# Patient Record
Sex: FEMALE | Race: BLACK OR AFRICAN AMERICAN | NOT HISPANIC OR LATINO | Employment: FULL TIME | URBAN - METROPOLITAN AREA
[De-identification: names, ages, dates, MRNs, and addresses within clinical notes are randomized per-mention and may not be internally consistent; named-entity substitution may affect disease eponyms.]

---

## 2017-01-05 ENCOUNTER — HOSPITAL ENCOUNTER (EMERGENCY)
Facility: HOSPITAL | Age: 22
Discharge: HOME | End: 2017-01-05
Attending: EMERGENCY MEDICINE

## 2017-01-05 VITALS
OXYGEN SATURATION: 100 % | HEART RATE: 85 BPM | BODY MASS INDEX: 28.99 KG/M2 | SYSTOLIC BLOOD PRESSURE: 119 MMHG | RESPIRATION RATE: 16 BRPM | DIASTOLIC BLOOD PRESSURE: 76 MMHG | WEIGHT: 174 LBS | TEMPERATURE: 99 F | HEIGHT: 65 IN

## 2017-01-05 DIAGNOSIS — N39.0 LOWER URINARY TRACT INFECTIOUS DISEASE: Primary | ICD-10-CM

## 2017-01-05 LAB
B-HCG UR QL: NEGATIVE
BILIRUB UR QL STRIP: NEGATIVE
CLARITY UR REFRACT.AUTO: CLEAR
COLOR UR AUTO: ABNORMAL
GLUCOSE UR QL STRIP: NEGATIVE
HGB UR QL STRIP: NEGATIVE
KETONES UR QL STRIP: NEGATIVE
LEUKOCYTE ESTERASE UR QL STRIP: ABNORMAL
MICROSCOPIC COMMENT: ABNORMAL
NITRITE UR QL STRIP: NEGATIVE
PH UR STRIP: 6 [PH] (ref 5–8)
PROT UR QL STRIP: ABNORMAL
SP GR UR STRIP: 1.02 (ref 1–1.03)
URN SPEC COLLECT METH UR: ABNORMAL
UROBILINOGEN UR STRIP-ACNC: 1 EU/DL
WBC #/AREA URNS AUTO: 6 /HPF (ref 0–5)

## 2017-01-05 PROCEDURE — 81025 URINE PREGNANCY TEST: CPT

## 2017-01-05 PROCEDURE — 99284 EMERGENCY DEPT VISIT MOD MDM: CPT

## 2017-01-05 PROCEDURE — 81000 URINALYSIS NONAUTO W/SCOPE: CPT

## 2017-01-05 RX ORDER — ONDANSETRON 4 MG/1
4 TABLET, FILM COATED ORAL EVERY 8 HOURS PRN
Qty: 6 TABLET | Refills: 0 | Status: SHIPPED | OUTPATIENT
Start: 2017-01-05 | End: 2018-02-02

## 2017-01-05 RX ORDER — NITROFURANTOIN 25; 75 MG/1; MG/1
100 CAPSULE ORAL 2 TIMES DAILY
Qty: 14 CAPSULE | Refills: 0 | Status: SHIPPED | OUTPATIENT
Start: 2017-01-05 | End: 2017-01-15

## 2017-01-05 RX ORDER — IBUPROFEN 200 MG
400 TABLET ORAL EVERY 6 HOURS PRN
Qty: 30 TABLET | Refills: 0
Start: 2017-01-05 | End: 2018-02-02

## 2017-01-05 NOTE — ED PROVIDER NOTES
Encounter Date: 1/5/2017       History     Chief Complaint   Patient presents with    Vomiting     for 2 weeks, took pregnancy test at home and was negative     Review of patient's allergies indicates:  No Known Allergies  The history is provided by the patient. No  was used.     planes of nausea vomiting for 2 weeks.  Patient states that has been occurring about 1-2 times a day.  Patient states last menstrual.  Was 2-3 weeks ago.  Patient states he was a light.  Which only lasted about 2-3 days.  Patient denies any fever chills cough runny nose sore throat.  Patient has some mild suprapubic pain.  Denies any urinary frequency or dysuria.  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  History reviewed. No pertinent past surgical history.  History reviewed. No pertinent family history.  Social History   Substance Use Topics    Smoking status: Never Smoker    Smokeless tobacco: None    Alcohol use No     Review of Systems   All other systems reviewed and are negative.      Physical Exam   Initial Vitals   BP Pulse Resp Temp SpO2   01/05/17 1035 01/05/17 1035 01/05/17 1035 01/05/17 1035 01/05/17 1035   119/76 85 16 98.7 °F (37.1 °C) 100 %     Physical Exam    Nursing note and vitals reviewed.  Constitutional: She appears well-developed and well-nourished.   HENT:   Head: Normocephalic and atraumatic.   Right Ear: External ear normal.   Left Ear: External ear normal.   Nose: Nose normal.   Mouth/Throat: Oropharynx is clear and moist.   Eyes: EOM are normal. Pupils are equal, round, and reactive to light.   Neck: Normal range of motion.   Cardiovascular: Normal rate, regular rhythm and normal heart sounds. Exam reveals no gallop and no friction rub.    No murmur heard.  Pulmonary/Chest: Breath sounds normal. No respiratory distress. She has no wheezes. She has no rhonchi. She has no rales.    Abdominal: Soft. She exhibits no distension. There is no tenderness.   Musculoskeletal: Normal range of motion.   Neurological: She is alert and oriented to person, place, and time.   Skin: Skin is warm and dry.   Psychiatric: She has a normal mood and affect.         ED Course   Procedures  Labs Reviewed   PREGNANCY TEST, URINE RAPID   URINALYSIS                               ED Course     Clinical Impression:   The encounter diagnosis was Lower urinary tract infectious disease.          Satya Choi MD  01/05/17 0045

## 2017-01-05 NOTE — DISCHARGE INSTRUCTIONS
Urinary Tract Infections in Women  Urinary tract infections (UTIs) are most often caused by bacteria (germs). These bacteria enter the urinary tract. The bacteria may come from outside the body. Or they may travel from the skin outside the rectum or vagina into the urethra. Female anatomy makes it easier for bacteria from the bowel to enter a womans urinary tract, which is the most common source of UTI. This means women develop UTIs more often than men. Pain in or around the urinary tract is a common UTI symptom. But the only way to know for sure if you have a UTI for the health care provider to test your urine. The two tests that may be done are the urinalysis and urine culture.  Types of UTIs  · Cystitis: A bladder infection (cystitis) is the most common UTI in women. You may have urgent or frequent urination. You may also have pain, burning when you urinate, and bloody urine.  · Urethritis: This is an inflamed urethra, which is the tube that carries urine from the bladder to outside the body. You may have lower stomach or back pain. You may also have urgent or frequent urination.  · Pyelonephritis: This is a kidney infection. If not treated, it can be serious and damage your kidneys. In severe cases, you may be hospitalized. You may have a fever and lower back pain.  Medications to treat a UTI  Most UTIs are treated with antibiotics. These kill the bacteria. The length of time you need to take them depends on the type of infection. It may be as short as 3 days. If you have repeated UTIs, a low-dose antibiotic may be needed for several months. Take antibiotics exactly as directed. Dont stop taking them until all of the medication is gone. If you stop taking the antibiotic too soon, the infection may not go away, and you may develop a resistance to the antibiotic. This can make it much harder to treat.  Lifestyle changes to treat and prevent UTIs  The lifestyle changes below will help get rid of your UTI.  They may also help prevent future UTIs.  · Drink plenty of fluids. This includes water, juice, or other caffeine-free drinks. Fluids help flush bacteria out of your body.  · Empty your bladder. Always empty your bladder when you feel the urge to urinate. And always urinate before going to sleep. Urine that stays in your bladder can lead to infection. Try to urinate before and after sex as well.  · Practice good personal hygiene. Wipe yourself from front to back after using the toilet. This helps keep bacteria from getting into the urethra.  · Use condoms during sex. These help prevent UTIs caused by sexually transmitted bacteria. Also, avoid using spermicides during sex. These can increase the risk of UTIs. Choose other forms of birth control instead. For women who tend to get UTIs after sex, a low-dose of a preventive antibiotic may be used. Be sure to discuss this option with your health care provider.  · Follow up with your health care provider as directed. He or she may test to make sure the infection has cleared. If necessary, additional treatment may be started.  © 7120-6302 [employer]. [address] [place] [address], [place] [address]. All rights reserved. This information is not intended as a substitute for professional medical care. Always follow your healthcare professional's instructions.

## 2017-01-05 NOTE — ED AVS SNAPSHOT
[facility]  [address]  [place] [address]               Ray Knox   2017 11:42 AM   ED    Description:  Female : 1995   Department:  [facility]           Your Care was Coordinated By:     Provider Role From To    Satya Choi MD Attending Provider 17 1105 --      Reason for Visit     Vomiting           Diagnoses this Visit        Comments    Lower urinary tract infectious disease    -  Primary       ED Disposition     ED Disposition Condition Comment    Discharge             To Do List           Follow-up Information     Follow up with Russ Ram MD In 1 week(s).    Specialty:  Family Medicine    Contact information:    [address] [place] [id number] [phone]         These Medications        Disp Refills Start End    nitrofurantoin, macrocrystal-monohydrate, (MACROBID) 100 MG capsule 14 capsule 0 2017 1/15/2017    Take 1 capsule (100 mg total) by mouth 2 (two) times daily. - Oral    ibuprofen (ADVIL,MOTRIN) 200 MG tablet 30 tablet 0 2017     Take 2 tablets (400 mg total) by mouth every 6 (six) hours as needed for Pain. - Oral    ondansetron (ZOFRAN) 4 MG tablet 6 tablet 0 2017     Take 1 tablet (4 mg total) by mouth every 8 (eight) hours as needed. - Oral      [facility]     [facility] Care [facility] -  Assistance  Registered nurses in the [facility] provide clinical advisement, health education, appointment booking, and other advisory services.  Call for this free service at [phone].             Medications           Message regarding Medications     Verify the changes and/or additions to your medication regime listed below are the same as discussed with your clinician today.  If any of these changes or additions are incorrect, please notify your healthcare provider.        START taking these NEW medications        Refills    nitrofurantoin,  "macrocrystal-monohydrate, (MACROBID) 100 MG capsule 0    Sig: Take 1 capsule (100 mg total) by mouth 2 (two) times daily.    Class: Print    Route: Oral    ibuprofen (ADVIL,MOTRIN) 200 MG tablet 0    Sig: Take 2 tablets (400 mg total) by mouth every 6 (six) hours as needed for Pain.    Class: No Print    Route: Oral    ondansetron (ZOFRAN) 4 MG tablet 0    Sig: Take 1 tablet (4 mg total) by mouth every 8 (eight) hours as needed.    Class: Print    Route: Oral           Verify that the below list of medications is an accurate representation of the medications you are currently taking.  If none reported, the list may be blank. If incorrect, please contact your healthcare provider. Carry this list with you in case of emergency.           Current Medications     albuterol 90 mcg/actuation inhaler Inhale 1-2 puffs into the lungs every 4 (four) hours as needed for Wheezing.    hydrocodone-acetaminophen 5-325mg (NORCO) 5-325 mg per tablet Take 1 tablet by mouth every 4 (four) hours as needed for Pain.    ibuprofen (ADVIL,MOTRIN) 200 MG tablet Take 2 tablets (400 mg total) by mouth every 6 (six) hours as needed for Pain.    MEDROXYPROGESTERONE ACETATE (DEPO-PROVERA IM) Inject into the muscle.    nitrofurantoin, macrocrystal-monohydrate, (MACROBID) 100 MG capsule Take 1 capsule (100 mg total) by mouth 2 (two) times daily.    ondansetron (ZOFRAN) 4 MG tablet Take 1 tablet (4 mg total) by mouth every 8 (eight) hours as needed.           Clinical Reference Information           Your Vitals Were     BP Pulse Temp Resp Height Weight    119/76 (BP Location: Right arm, Patient Position: Sitting) 85 98.7 °F (37.1 °C) (Oral) 16 5' 4.5" (1.638 m) 78.9 kg (174 lb)    SpO2 BMI             100% 29.41 kg/m2         Allergies as of 1/5/2017     No Known Allergies      Immunizations Administered on Date of Encounter - 1/5/2017     None      ED Micro, Lab, POCT     Start Ordered       Status Ordering Provider    01/05/17 1048 01/05/17 1047  " UPT (Pregnancy, urine rapid)  STAT      Final result     01/05/17 1048 01/05/17 1047  Urinalysis Clean Catch  STAT      Final result     01/05/17 1047 01/05/17 1047  Urinalysis Microscopic  Once      Final result       ED Imaging Orders     None        Discharge Instructions           Urinary Tract Infections in Women  Urinary tract infections (UTIs) are most often caused by bacteria (germs). These bacteria enter the urinary tract. The bacteria may come from outside the body. Or they may travel from the skin outside the rectum or vagina into the urethra. Female anatomy makes it easier for bacteria from the bowel to enter a womans urinary tract, which is the most common source of UTI. This means women develop UTIs more often than men. Pain in or around the urinary tract is a common UTI symptom. But the only way to know for sure if you have a UTI for the health care provider to test your urine. The two tests that may be done are the urinalysis and urine culture.  Types of UTIs  · Cystitis: A bladder infection (cystitis) is the most common UTI in women. You may have urgent or frequent urination. You may also have pain, burning when you urinate, and bloody urine.  · Urethritis: This is an inflamed urethra, which is the tube that carries urine from the bladder to outside the body. You may have lower stomach or back pain. You may also have urgent or frequent urination.  · Pyelonephritis: This is a kidney infection. If not treated, it can be serious and damage your kidneys. In severe cases, you may be hospitalized. You may have a fever and lower back pain.  Medications to treat a UTI  Most UTIs are treated with antibiotics. These kill the bacteria. The length of time you need to take them depends on the type of infection. It may be as short as 3 days. If you have repeated UTIs, a low-dose antibiotic may be needed for several months. Take antibiotics exactly as directed. Dont stop taking them until all of the medication  is gone. If you stop taking the antibiotic too soon, the infection may not go away, and you may develop a resistance to the antibiotic. This can make it much harder to treat.  Lifestyle changes to treat and prevent UTIs  The lifestyle changes below will help get rid of your UTI. They may also help prevent future UTIs.  · Drink plenty of fluids. This includes water, juice, or other caffeine-free drinks. Fluids help flush bacteria out of your body.  · Empty your bladder. Always empty your bladder when you feel the urge to urinate. And always urinate before going to sleep. Urine that stays in your bladder can lead to infection. Try to urinate before and after sex as well.  · Practice good personal hygiene. Wipe yourself from front to back after using the toilet. This helps keep bacteria from getting into the urethra.  · Use condoms during sex. These help prevent UTIs caused by sexually transmitted bacteria. Also, avoid using spermicides during sex. These can increase the risk of UTIs. Choose other forms of birth control instead. For women who tend to get UTIs after sex, a low-dose of a preventive antibiotic may be used. Be sure to discuss this option with your health care provider.  · Follow up with your health care provider as directed. He or she may test to make sure the infection has cleared. If necessary, additional treatment may be started.  © 0693-7758 [employer]. [address] [place] [address]. All rights reserved. This information is not intended as a substitute for professional medical care. Always follow your healthcare professional's instructions.          [facility] Sign-Up     Activating your [facility] account is as easy as 1-2-3!     1) Visit [email or web address], select Sign Up Now, enter this activation code and your date of birth, then select Next.  [email or web address]  Expires: 2/19/2017 11:53 AM      2) Create a username and password to use when you visit [facility] in the future and  select a security question in case you lose your password and select Next.    3) Enter your e-mail address and click Sign Up!    Additional Information  If you have questions, please e-mail [email or web address] or call [phone] to talk to our [facility] staff. Remember, [facility] is NOT to be used for urgent needs. For medical emergencies, dial 911.          [facility] complies with applicable Federal civil rights laws and does not discriminate on the basis of race, color, national origin, age, disability, or sex.        Language Assistance Services     ATTENTION: Language assistance services are available, free of charge. Please call [phone].      ATENCIÓN: Si habla vance, tiene a jarrell disposición servicios gratuitos de asistencia lingüística. Llame al [phone].     CHÚ Ý: N?u b?n nói Ti?ng Vi?t, có các d?ch v? h? tr? ngôn ng? mi?n phí dành cho b?n. G?i s? [phone].

## 2017-03-03 ENCOUNTER — HOSPITAL ENCOUNTER (EMERGENCY)
Facility: HOSPITAL | Age: 22
Discharge: HOME | End: 2017-03-03
Attending: FAMILY MEDICINE

## 2017-03-03 VITALS
DIASTOLIC BLOOD PRESSURE: 73 MMHG | RESPIRATION RATE: 16 BRPM | SYSTOLIC BLOOD PRESSURE: 117 MMHG | HEIGHT: 66 IN | HEART RATE: 73 BPM | BODY MASS INDEX: 26.36 KG/M2 | OXYGEN SATURATION: 98 % | WEIGHT: 164 LBS | TEMPERATURE: 100 F

## 2017-03-03 DIAGNOSIS — N39.0 URINARY TRACT INFECTION WITHOUT HEMATURIA, SITE UNSPECIFIED: ICD-10-CM

## 2017-03-03 DIAGNOSIS — R10.11 RUQ ABDOMINAL PAIN: Primary | ICD-10-CM

## 2017-03-03 LAB
ALBUMIN SERPL BCP-MCNC: 4.5 G/DL
ALP SERPL-CCNC: 61 IU/L
ALT SERPL W/O P-5'-P-CCNC: 22 IU/L
ANION GAP SERPL CALC-SCNC: 11 MMOL/L
AST SERPL-CCNC: 18 IU/L
B-HCG UR QL: NEGATIVE
BACTERIA #/AREA URNS AUTO: ABNORMAL /HPF
BASOPHILS # BLD AUTO: 0.03 K/UL
BASOPHILS NFR BLD: 0.3 %
BILIRUB SERPL-MCNC: 0.6 MG/DL
BILIRUB UR QL STRIP: NEGATIVE
BUN SERPL-MCNC: 12 MG/DL
CALCIUM SERPL-MCNC: 9.5 MG/DL
CHLORIDE SERPL-SCNC: 106 MMOL/L
CLARITY UR REFRACT.AUTO: CLEAR
CO2 SERPL-SCNC: 26 MMOL/L
COLOR UR AUTO: YELLOW
CREAT SERPL-MCNC: 0.93 MG/DL
DIFFERENTIAL METHOD: ABNORMAL
EOSINOPHIL # BLD AUTO: 0.2 K/UL
EOSINOPHIL NFR BLD: 2.4 %
ERYTHROCYTE [DISTWIDTH] IN BLOOD BY AUTOMATED COUNT: 14 %
EST. GFR  (AFRICAN AMERICAN): >60 ML/MIN/1.73 M^2
EST. GFR  (NON AFRICAN AMERICAN): >60 ML/MIN/1.73 M^2
GLUCOSE SERPL-MCNC: 92 MG/DL
GLUCOSE UR QL STRIP: NEGATIVE
HCT VFR BLD AUTO: 37.4 %
HGB BLD-MCNC: 12 G/DL
HGB UR QL STRIP: ABNORMAL
KETONES UR QL STRIP: NEGATIVE
LEUKOCYTE ESTERASE UR QL STRIP: ABNORMAL
LIPASE SERPL-CCNC: 48 U/L
LYMPHOCYTES # BLD AUTO: 2.5 K/UL
LYMPHOCYTES NFR BLD: 27.6 %
MCH RBC QN AUTO: 26.3 PG
MCHC RBC AUTO-ENTMCNC: 32.1 %
MCV RBC AUTO: 82 FL
MICROSCOPIC COMMENT: ABNORMAL
MONOCYTES # BLD AUTO: 0.5 K/UL
MONOCYTES NFR BLD: 5.5 %
NEUTROPHILS # BLD AUTO: 5.9 K/UL
NEUTROPHILS NFR BLD: 64 %
NITRITE UR QL STRIP: NEGATIVE
PH UR STRIP: 7 [PH] (ref 5–8)
PLATELET # BLD AUTO: 237 K/UL
PMV BLD AUTO: 11 FL
POTASSIUM SERPL-SCNC: 4.2 MMOL/L
PROT SERPL-MCNC: 7.8 G/DL
PROT UR QL STRIP: NEGATIVE
RBC # BLD AUTO: 4.56 M/UL
RBC #/AREA URNS AUTO: 1 /HPF (ref 0–4)
SODIUM SERPL-SCNC: 143 MMOL/L
SP GR UR STRIP: 1.01 (ref 1–1.03)
SQUAMOUS #/AREA URNS AUTO: 3 /HPF
URN SPEC COLLECT METH UR: ABNORMAL
UROBILINOGEN UR STRIP-ACNC: ABNORMAL EU/DL
WBC # BLD AUTO: 9.14 K/UL
WBC #/AREA URNS AUTO: 15 /HPF (ref 0–5)

## 2017-03-03 PROCEDURE — 80053 COMPREHEN METABOLIC PANEL: CPT

## 2017-03-03 PROCEDURE — 81000 URINALYSIS NONAUTO W/SCOPE: CPT

## 2017-03-03 PROCEDURE — 85025 COMPLETE CBC W/AUTO DIFF WBC: CPT

## 2017-03-03 PROCEDURE — 81025 URINE PREGNANCY TEST: CPT

## 2017-03-03 PROCEDURE — 83690 ASSAY OF LIPASE: CPT

## 2017-03-03 PROCEDURE — 99283 EMERGENCY DEPT VISIT LOW MDM: CPT

## 2017-03-03 RX ORDER — KETOROLAC TROMETHAMINE 10 MG/1
10 TABLET, FILM COATED ORAL EVERY 6 HOURS PRN
Qty: 15 TABLET | Refills: 0 | Status: SHIPPED | OUTPATIENT
Start: 2017-03-03 | End: 2017-03-08

## 2017-03-03 RX ORDER — CIPROFLOXACIN 500 MG/1
500 TABLET ORAL 2 TIMES DAILY
Qty: 10 TABLET | Refills: 0 | Status: SHIPPED | OUTPATIENT
Start: 2017-03-03 | End: 2017-03-08

## 2017-03-03 NOTE — ED PROVIDER NOTES
Encounter Date: 3/3/2017       History     Chief Complaint   Patient presents with    Abdominal Pain     Pt has had upper abdominal pain that started 3 days ago. Denies nausea and vomiting and diarrhea.     Review of patient's allergies indicates:  No Known Allergies  HPI  Past Medical History:   Diagnosis Date    Asthma     Kidney cysts     pt born with 1 kidney     History reviewed. No pertinent surgical history.  History reviewed. No pertinent family history.  Social History   Substance Use Topics    Smoking status: Never Smoker    Smokeless tobacco: None    Alcohol use No     Review of Systems    Physical Exam   Initial Vitals   BP Pulse Resp Temp SpO2   03/03/17 1310 03/03/17 1310 03/03/17 1310 03/03/17 1310 03/03/17 1408   115/70 80 16 98.6 °F (37 °C) 98 %     Physical Exam    ED Course   Procedures  Labs Reviewed   URINALYSIS - Abnormal; Notable for the following:        Result Value    Occult Blood UA 2+ (*)     Urobilinogen, UA 4.0-6.0 (*)     Leukocytes, UA 2+ (*)     All other components within normal limits   URINALYSIS MICROSCOPIC - Abnormal; Notable for the following:     WBC, UA 15 (*)     Bacteria, UA Moderate (*)     All other components within normal limits   CBC W/ AUTO DIFFERENTIAL - Abnormal; Notable for the following:     MCH 26.3 (*)     All other components within normal limits   PREGNANCY TEST, URINE RAPID   COMPREHENSIVE METABOLIC PANEL   LIPASE                               ED Course     Clinical Impression:   The primary encounter diagnosis was RUQ abdominal pain. A diagnosis of Urinary tract infection without hematuria, site unspecified was also pertinent to this visit.          CHEVY Rebollar MD  03/03/17 1291

## 2017-03-03 NOTE — ED AVS SNAPSHOT
[facility]  [address] [place] [address]               Ray Knox   3/3/2017  2:05 PM   ED    Description:  Female : 1995   Department:  [facility]           Your Care was Coordinated By:     Provider Role From Mendez Rebollar MD Attending Provider 17 [phone] --      Reason for Visit     Abdominal Pain           Diagnoses this Visit        Comments    RUQ abdominal pain    -  Primary     Urinary tract infection without hematuria, site unspecified           ED Disposition     ED Disposition Condition Comment    Discharge             To Do List           Follow-up Information     Schedule an appointment as soon as possible for a visit with Russ Ram MD.    Specialty:  Family Medicine    Why:  As needed, If symptoms worsen    Contact information:    [address] [place] [address] [phone]         These Medications        Disp Refills Start End    ciprofloxacin HCl (CIPRO) 500 MG tablet 10 tablet 0 3/3/2017 3/8/2017    Take 1 tablet (500 mg total) by mouth 2 (two) times daily. For infection - Oral    ketorolac (TORADOL) 10 mg tablet 15 tablet 0 3/3/2017 3/8/2017    Take 1 tablet (10 mg total) by mouth every 6 (six) hours as needed for Pain. - Oral      [facility]     [facility] On Call Nurse Care Line -  Assistance  Registered nurses in the [facility] provide clinical advisement, health education, appointment booking, and other advisory services.  Call for this free service at [phone].             Medications           Message regarding Medications     Verify the changes and/or additions to your medication regime listed below are the same as discussed with your clinician today.  If any of these changes or additions are incorrect, please notify your healthcare provider.        START taking these NEW medications        Refills    ciprofloxacin HCl (CIPRO) 500 MG tablet 0    Sig: Take 1  "tablet (500 mg total) by mouth 2 (two) times daily. For infection    Class: Print    Route: Oral    ketorolac (TORADOL) 10 mg tablet 0    Sig: Take 1 tablet (10 mg total) by mouth every 6 (six) hours as needed for Pain.    Class: Print    Route: Oral           Verify that the below list of medications is an accurate representation of the medications you are currently taking.  If none reported, the list may be blank. If incorrect, please contact your healthcare provider. Carry this list with you in case of emergency.           Current Medications     albuterol 90 mcg/actuation inhaler Inhale 1-2 puffs into the lungs every 4 (four) hours as needed for Wheezing.    ciprofloxacin HCl (CIPRO) 500 MG tablet Take 1 tablet (500 mg total) by mouth 2 (two) times daily. For infection    hydrocodone-acetaminophen 5-325mg (NORCO) 5-325 mg per tablet Take 1 tablet by mouth every 4 (four) hours as needed for Pain.    ibuprofen (ADVIL,MOTRIN) 200 MG tablet Take 2 tablets (400 mg total) by mouth every 6 (six) hours as needed for Pain.    ketorolac (TORADOL) 10 mg tablet Take 1 tablet (10 mg total) by mouth every 6 (six) hours as needed for Pain.    MEDROXYPROGESTERONE ACETATE (DEPO-PROVERA IM) Inject into the muscle.    ondansetron (ZOFRAN) 4 MG tablet Take 1 tablet (4 mg total) by mouth every 8 (eight) hours as needed.           Clinical Reference Information           Your Vitals Were     BP Pulse Temp Resp Height Weight    117/73 (BP Location: Right arm, Patient Position: Sitting, BP Method: Automatic) 73 99.6 °F (37.6 °C) (Oral) 16 5' 5.5" (1.664 m) 74.4 kg (164 lb)    SpO2 BMI             98% 26.88 kg/m2         Allergies as of 3/3/2017     No Known Allergies      Immunizations Administered on Date of Encounter - 3/3/2017     None      ED Micro, Lab, POCT     Start Ordered       Status Ordering Provider    03/03/17 1526 03/03/17 1525  CBC auto differential  STAT      Final result     03/03/17 1526 03/03/17 1525  Comprehensive " metabolic panel  STAT      Final result     03/03/17 1526 03/03/17 1525  Lipase  STAT      Final result     03/03/17 1314 03/03/17 1313  Urinalysis Clean Catch  STAT      Final result     03/03/17 1314 03/03/17 1313  UPT (Pregnancy, urine rapid)  STAT      Final result     03/03/17 1314 03/03/17 1314  Urinalysis Microscopic  Once      Final result       ED Imaging Orders     None      Discharge References/Attachments     URINARY TRACT INFECTIONS IN WOMEN (ENGLISH)    ABDOMINAL PAIN, UNKNOWN CAUSE, (FEMALE) (ENGLISH)      [facility] Sign-Up     Activating your [facility] account is as easy as 1-2-3!     1) Visit [email or web address], select Sign Up Now, enter this activation code and your date of birth, then select Next.  [email or web address]  Expires: 4/17/2017  4:10 PM      2) Create a username and password to use when you visit [facility] in the future and select a security question in case you lose your password and select Next.    3) Enter your e-mail address and click Sign Up!    Additional Information  If you have questions, please e-mail [email or web address] or call [phone] to talk to our [facility] staff. Remember, [facility] is NOT to be used for urgent needs. For medical emergencies, dial 911.          [facility] complies with applicable Federal civil rights laws and does not discriminate on the basis of race, color, national origin, age, disability, or sex.        Language Assistance Services     ATTENTION: Language assistance services are available, free of charge. Please call [phone].      ATENCIÓN: Si habla español, tiene a jarrell disposición servicios gratuitos de asistencia lingüística. Llame al [phone].     CHÚ Ý: N?u b?n nói Ti?ng Vi?t, có các d?ch v? h? tr? ngôn ng? mi?n phí dành cho b?n. G?i s? [phone].

## 2017-05-19 ENCOUNTER — HOSPITAL ENCOUNTER (EMERGENCY)
Facility: HOSPITAL | Age: 22
Discharge: HOME | End: 2017-05-19
Attending: FAMILY MEDICINE

## 2017-05-19 VITALS
DIASTOLIC BLOOD PRESSURE: 48 MMHG | RESPIRATION RATE: 18 BRPM | BODY MASS INDEX: 29.71 KG/M2 | HEIGHT: 64 IN | OXYGEN SATURATION: 99 % | HEART RATE: 91 BPM | SYSTOLIC BLOOD PRESSURE: 95 MMHG | TEMPERATURE: 99 F | WEIGHT: 174 LBS

## 2017-05-19 DIAGNOSIS — N39.0 LOWER URINARY TRACT INFECTIOUS DISEASE: Primary | ICD-10-CM

## 2017-05-19 LAB
B-HCG UR QL: NEGATIVE
BACTERIA #/AREA URNS AUTO: ABNORMAL /HPF
BILIRUB UR QL STRIP: NEGATIVE
CLARITY UR REFRACT.AUTO: ABNORMAL
COLOR UR AUTO: YELLOW
GLUCOSE UR QL STRIP: NEGATIVE
HGB UR QL STRIP: NEGATIVE
KETONES UR QL STRIP: NEGATIVE
LEUKOCYTE ESTERASE UR QL STRIP: ABNORMAL
MICROSCOPIC COMMENT: ABNORMAL
NITRITE UR QL STRIP: POSITIVE
PH UR STRIP: 7 [PH] (ref 5–8)
PROT UR QL STRIP: NEGATIVE
SP GR UR STRIP: 1.01 (ref 1–1.03)
URN SPEC COLLECT METH UR: ABNORMAL
UROBILINOGEN UR STRIP-ACNC: NEGATIVE EU/DL
WBC #/AREA URNS AUTO: 24 /HPF (ref 0–5)

## 2017-05-19 PROCEDURE — 96372 THER/PROPH/DIAG INJ SC/IM: CPT

## 2017-05-19 PROCEDURE — 99284 EMERGENCY DEPT VISIT MOD MDM: CPT | Mod: 25

## 2017-05-19 PROCEDURE — 81000 URINALYSIS NONAUTO W/SCOPE: CPT

## 2017-05-19 PROCEDURE — 81025 URINE PREGNANCY TEST: CPT

## 2017-05-19 PROCEDURE — 63600175 PHARM REV CODE 636 W HCPCS: Performed by: FAMILY MEDICINE

## 2017-05-19 RX ORDER — NITROFURANTOIN 25; 75 MG/1; MG/1
100 CAPSULE ORAL 2 TIMES DAILY
Qty: 14 CAPSULE | Refills: 0 | Status: SHIPPED | OUTPATIENT
Start: 2017-05-19 | End: 2018-01-22

## 2017-05-19 RX ORDER — PROMETHAZINE HYDROCHLORIDE 25 MG/ML
25 INJECTION, SOLUTION INTRAMUSCULAR; INTRAVENOUS
Status: COMPLETED | OUTPATIENT
Start: 2017-05-19 | End: 2017-05-19

## 2017-05-19 RX ORDER — ACETAMINOPHEN 500 MG
1000 TABLET ORAL 3 TIMES DAILY PRN
Qty: 30 TABLET | Refills: 0
Start: 2017-05-19 | End: 2019-03-19

## 2017-05-19 RX ORDER — ONDANSETRON 8 MG/1
8 TABLET, ORALLY DISINTEGRATING ORAL 3 TIMES DAILY PRN
Qty: 12 TABLET | Refills: 1 | Status: SHIPPED | OUTPATIENT
Start: 2017-05-19 | End: 2018-02-02

## 2017-05-19 RX ORDER — IBUPROFEN 200 MG
400 TABLET ORAL EVERY 6 HOURS PRN
Qty: 30 TABLET | Refills: 0
Start: 2017-05-19 | End: 2018-02-02

## 2017-05-19 RX ADMIN — PROMETHAZINE HYDROCHLORIDE 25 MG: 25 INJECTION INTRAMUSCULAR; INTRAVENOUS at 02:05

## 2017-05-19 NOTE — ED AVS SNAPSHOT
[facility]  [address]  [place] [address]               Ray Knox   2017  1:01 PM   ED    Description:  Female : 1995   Department:  [facility]           Your Care was Coordinated By:     Provider Role From To    Edison Flower MD Attending Provider 17 1310 --      Reason for Visit     Emesis           Diagnoses this Visit        Comments    Lower urinary tract infectious disease    -  Primary       ED Disposition     ED Disposition Condition Comment    Discharge             To Do List           Follow-up Information     Follow up with Russ Ram MD In 1 week(s).    Specialty:  Family Medicine    Contact information:    [address] [place] [id number] [phone]         These Medications        Disp Refills Start End    nitrofurantoin, macrocrystal-monohydrate, (MACROBID) 100 MG capsule 14 capsule 0 2017     Take 1 capsule (100 mg total) by mouth 2 (two) times daily. - Oral    ibuprofen (ADVIL,MOTRIN) 200 MG tablet 30 tablet 0 2017     Take 2 tablets (400 mg total) by mouth every 6 (six) hours as needed for Pain. - Oral    acetaminophen (TYLENOL) 500 MG tablet 30 tablet 0 2017     Take 2 tablets (1,000 mg total) by mouth 3 (three) times daily as needed for Pain. - Oral    ondansetron (ZOFRAN ODT) 8 MG TbDL 12 tablet 1 2017     Take 1 tablet (8 mg total) by mouth 3 (three) times daily as needed. - Oral      [facility]     [facility] -  Assistance  Unless otherwise directed by your provider, please contact [facility], our nurse care line that is available for  assistance.     Registered nurses in the [facility] provide: appointment scheduling, clinical advisement, health education, and other advisory services.  Call: [phone] (toll free)               Medications           Message regarding Medications     Verify the changes and/or  additions to your medication regime listed below are the same as discussed with your clinician today.  If any of these changes or additions are incorrect, please notify your healthcare provider.        START taking these NEW medications        Refills    nitrofurantoin, macrocrystal-monohydrate, (MACROBID) 100 MG capsule 0    Sig: Take 1 capsule (100 mg total) by mouth 2 (two) times daily.    Class: Print    Route: Oral    ibuprofen (ADVIL,MOTRIN) 200 MG tablet 0    Sig: Take 2 tablets (400 mg total) by mouth every 6 (six) hours as needed for Pain.    Class: No Print    Route: Oral    acetaminophen (TYLENOL) 500 MG tablet 0    Sig: Take 2 tablets (1,000 mg total) by mouth 3 (three) times daily as needed for Pain.    Class: No Print    Route: Oral    ondansetron (ZOFRAN ODT) 8 MG TbDL 1    Sig: Take 1 tablet (8 mg total) by mouth 3 (three) times daily as needed.    Class: Print    Route: Oral      These medications were administered today        Dose Freq    promethazine injection 25 mg 25 mg ED 1 Time    Sig: Inject 1 mL (25 mg total) into the muscle ED 1 Time.    Class: Normal    Route: Intramuscular           Verify that the below list of medications is an accurate representation of the medications you are currently taking.  If none reported, the list may be blank. If incorrect, please contact your healthcare provider. Carry this list with you in case of emergency.           Current Medications     albuterol 90 mcg/actuation inhaler Inhale 1-2 puffs into the lungs every 4 (four) hours as needed for Wheezing.    MEDROXYPROGESTERONE ACETATE (DEPO-PROVERA IM) Inject into the muscle.    acetaminophen (TYLENOL) 500 MG tablet Take 2 tablets (1,000 mg total) by mouth 3 (three) times daily as needed for Pain.    hydrocodone-acetaminophen 5-325mg (NORCO) 5-325 mg per tablet Take 1 tablet by mouth every 4 (four) hours as needed for Pain.    ibuprofen (ADVIL,MOTRIN) 200 MG tablet Take 2 tablets (400 mg total) by mouth every  "6 (six) hours as needed for Pain.    ibuprofen (ADVIL,MOTRIN) 200 MG tablet Take 2 tablets (400 mg total) by mouth every 6 (six) hours as needed for Pain.    nitrofurantoin, macrocrystal-monohydrate, (MACROBID) 100 MG capsule Take 1 capsule (100 mg total) by mouth 2 (two) times daily.    ondansetron (ZOFRAN ODT) 8 MG TbDL Take 1 tablet (8 mg total) by mouth 3 (three) times daily as needed.    ondansetron (ZOFRAN) 4 MG tablet Take 1 tablet (4 mg total) by mouth every 8 (eight) hours as needed.           Clinical Reference Information           Your Vitals Were     BP Pulse Temp Resp Height Weight    113/71 (BP Location: Right arm, Patient Position: Sitting) 98 99.8 °F (37.7 °C) (Oral) 16 5' 4" (1.626 m) 78.9 kg (174 lb)    Last Period SpO2 BMI          (LMP Unknown) 98% 29.87 kg/m2        Allergies as of 5/19/2017     No Known Allergies      Immunizations Administered on Date of Encounter - 5/19/2017     None      ED Micro, Lab, POCT     Start Ordered       Status Ordering Provider    05/19/17 1318 05/19/17 1317  Urinalysis  STAT      Final result     05/19/17 1318 05/19/17 1317  UPT (Pregnancy, urine rapid)  STAT      Final result     05/19/17 1317 05/19/17 1317  Urinalysis Microscopic  Once      Final result       ED Imaging Orders     None        Discharge Instructions           Urinary Tract Infections in Women    Urinary tract infections (UTIs) are most often caused by bacteria (germs). These bacteria enter the urinary tract. The bacteria may come from outside the body. Or they may travel from the skin outside the rectum or vagina into the urethra. Female anatomy makes it easier for bacteria from the bowel to enter a womans urinary tract, which is the most common source of UTI. This means women develop UTIs more often than men. Pain in or around the urinary tract is a common UTI symptom. But the only way to know for sure if you have a UTI for the health care provider to test your urine. The two tests that may " be done are the urinalysis and urine culture.  Types of UTIs  · Cystitis: A bladder infection (cystitis) is the most common UTI in women. You may have urgent or frequent urination. You may also have pain, burning when you urinate, and bloody urine.  · Urethritis: This is an inflamed urethra, which is the tube that carries urine from the bladder to outside the body. You may have lower stomach or back pain. You may also have urgent or frequent urination.  · Pyelonephritis: This is a kidney infection. If not treated, it can be serious and damage your kidneys. In severe cases, you may be hospitalized. You may have a fever and lower back pain.  Medications to treat a UTI  Most UTIs are treated with antibiotics. These kill the bacteria. The length of time you need to take them depends on the type of infection. It may be as short as 3 days. If you have repeated UTIs, a low-dose antibiotic may be needed for several months. Take antibiotics exactly as directed. Dont stop taking them until all of the medication is gone. If you stop taking the antibiotic too soon, the infection may not go away, and you may develop a resistance to the antibiotic. This can make it much harder to treat.  Lifestyle changes to treat and prevent UTIs  The lifestyle changes below will help get rid of your UTI. They may also help prevent future UTIs.  · Drink plenty of fluids. This includes water, juice, or other caffeine-free drinks. Fluids help flush bacteria out of your body.  · Empty your bladder. Always empty your bladder when you feel the urge to urinate. And always urinate before going to sleep. Urine that stays in your bladder can lead to infection. Try to urinate before and after sex as well.  · Practice good personal hygiene. Wipe yourself from front to back after using the toilet. This helps keep bacteria from getting into the urethra.  · Use condoms during sex. These help prevent UTIs caused by sexually transmitted bacteria. Also, avoid  using spermicides during sex. These can increase the risk of UTIs. Choose other forms of birth control instead. For women who tend to get UTIs after sex, a low-dose of a preventive antibiotic may be used. Be sure to discuss this option with your health care provider.  · Follow up with your health care provider as directed. He or she may test to make sure the infection has cleared. If necessary, additional treatment may be started.  Date Last Reviewed: 9/8/2014  © 8965-4540 [employer]. [address] [place] [address]. All rights reserved. This information is not intended as a substitute for professional medical care. Always follow your healthcare professional's instructions.          [facility] Sign-Up     Activating your [facility] account is as easy as 1-2-3!     1) Visit [email or web address], select Sign Up Now, enter this activation code and your date of birth, then select Next.  [email or web address]  Expires: 7/3/2017  3:18 PM      2) Create a username and password to use when you visit [facility] in the future and select a security question in case you lose your password and select Next.    3) Enter your e-mail address and click Sign Up!    Additional Information  If you have questions, please e-mail [email or web address] or call [phone] to talk to our [facility] staff. Remember, [facility] is NOT to be used for urgent needs. For medical emergencies, dial 911.          [facility] complies with applicable Federal civil rights laws and does not discriminate on the basis of race, color, national origin, age, disability, or sex.        Language Assistance Services     ATTENTION: Language assistance services are available, free of charge. Please call [phone].      ATENCIÓN: Si habla vance, tiene a jarrell disposición servicios gratuitos de asistencia lingüística. Llame al [phone].     CHÚ Ý: N?u b?n nói Ti?ng Vi?t, có các d?ch v? h? tr? ngôn ng? mi?n phí dành cho b?n.  G?i s? [phone].

## 2017-05-19 NOTE — DISCHARGE INSTRUCTIONS
Urinary Tract Infections in Women    Urinary tract infections (UTIs) are most often caused by bacteria (germs). These bacteria enter the urinary tract. The bacteria may come from outside the body. Or they may travel from the skin outside the rectum or vagina into the urethra. Female anatomy makes it easier for bacteria from the bowel to enter a womans urinary tract, which is the most common source of UTI. This means women develop UTIs more often than men. Pain in or around the urinary tract is a common UTI symptom. But the only way to know for sure if you have a UTI for the health care provider to test your urine. The two tests that may be done are the urinalysis and urine culture.  Types of UTIs  · Cystitis: A bladder infection (cystitis) is the most common UTI in women. You may have urgent or frequent urination. You may also have pain, burning when you urinate, and bloody urine.  · Urethritis: This is an inflamed urethra, which is the tube that carries urine from the bladder to outside the body. You may have lower stomach or back pain. You may also have urgent or frequent urination.  · Pyelonephritis: This is a kidney infection. If not treated, it can be serious and damage your kidneys. In severe cases, you may be hospitalized. You may have a fever and lower back pain.  Medications to treat a UTI  Most UTIs are treated with antibiotics. These kill the bacteria. The length of time you need to take them depends on the type of infection. It may be as short as 3 days. If you have repeated UTIs, a low-dose antibiotic may be needed for several months. Take antibiotics exactly as directed. Dont stop taking them until all of the medication is gone. If you stop taking the antibiotic too soon, the infection may not go away, and you may develop a resistance to the antibiotic. This can make it much harder to treat.  Lifestyle changes to treat and prevent UTIs  The lifestyle changes below will help get rid of your UTI.  They may also help prevent future UTIs.  · Drink plenty of fluids. This includes water, juice, or other caffeine-free drinks. Fluids help flush bacteria out of your body.  · Empty your bladder. Always empty your bladder when you feel the urge to urinate. And always urinate before going to sleep. Urine that stays in your bladder can lead to infection. Try to urinate before and after sex as well.  · Practice good personal hygiene. Wipe yourself from front to back after using the toilet. This helps keep bacteria from getting into the urethra.  · Use condoms during sex. These help prevent UTIs caused by sexually transmitted bacteria. Also, avoid using spermicides during sex. These can increase the risk of UTIs. Choose other forms of birth control instead. For women who tend to get UTIs after sex, a low-dose of a preventive antibiotic may be used. Be sure to discuss this option with your health care provider.  · Follow up with your health care provider as directed. He or she may test to make sure the infection has cleared. If necessary, additional treatment may be started.  Date Last Reviewed: 9/8/2014  © 2586-5015 [employer]. [address] [place] [address], [place] [address]. All rights reserved. This information is not intended as a substitute for professional medical care. Always follow your healthcare professional's instructions.

## 2017-06-03 NOTE — ED PROVIDER NOTES
Encounter Date: 5/19/2017       History     Chief Complaint   Patient presents with    Emesis     Pt states has had nausea and vomiting x 4 days and started with diarrhea yesterday.  States last vomited this am approx 1000 and last watery stool PTA.      Review of patient's allergies indicates:  No Known Allergies  4-year-old female presents with a chief complaint of nausea and vomiting for the past 4 days.  Denies any blood in the emesis.  States at one episode of nonbloody and nonbilious diarrhea was started on yesterday at approximately 10 AM.  Patient does not believe is pregnant.  Patient denies any fever or chills.  Has noted increased urinary frequency.      The history is provided by the patient.     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 No pertinent surgical history.  Family History   Problem Relation Age of Onset    Diabetes Paternal Aunt     Hypertension Maternal Grandfather     Cancer Paternal Grandmother      Social History   Substance Use Topics    Smoking status: Never Smoker    Smokeless tobacco: Not on file    Alcohol use No     Review of Systems   Constitutional: Negative for chills and fever.   Gastrointestinal: Positive for diarrhea, nausea and vomiting. Negative for abdominal pain.   All other systems reviewed and are negative.      Physical Exam     Initial Vitals [05/19/17 1256]   BP Pulse Resp Temp SpO2   113/71 98 16 99.8 °F (37.7 °C) 98 %     Physical Exam    Nursing note and vitals reviewed.  Constitutional: She appears well-developed and well-nourished.   HENT:   Head: Normocephalic and atraumatic.   Eyes: EOM are normal. Pupils are equal, round, and reactive to light.   Cardiovascular: Normal rate, regular rhythm, normal heart sounds and intact distal pulses.   Pulmonary/Chest: Breath sounds normal.   Abdominal: Soft. Bowel sounds are normal.   Neurological: She is alert and oriented to person, place,  and time.   Skin: Skin is warm. Capillary refill takes less than 2 seconds.   Psychiatric: She has a normal mood and affect. Her behavior is normal.         ED Course   Procedures  Labs Reviewed   URINALYSIS - Abnormal; Notable for the following:        Result Value    Appearance, UA Cloudy (*)     Nitrite, UA Positive (*)     Leukocytes, UA 2+ (*)     All other components within normal limits   URINALYSIS MICROSCOPIC - Abnormal; Notable for the following:     WBC, UA 24 (*)     Bacteria, UA Many (*)     All other components within normal limits   PREGNANCY TEST, URINE RAPID                               ED Course     Clinical Impression:   The encounter diagnosis was Lower urinary tract infectious disease.          Edison Flower MD  06/02/17 1931

## 2017-07-31 ENCOUNTER — HOSPITAL ENCOUNTER (EMERGENCY)
Facility: HOSPITAL | Age: 22
Discharge: HOME | End: 2017-07-31
Attending: FAMILY MEDICINE

## 2017-07-31 VITALS
BODY MASS INDEX: 29.66 KG/M2 | HEIGHT: 65 IN | OXYGEN SATURATION: 98 % | DIASTOLIC BLOOD PRESSURE: 73 MMHG | HEART RATE: 97 BPM | RESPIRATION RATE: 20 BRPM | WEIGHT: 178 LBS | SYSTOLIC BLOOD PRESSURE: 118 MMHG | TEMPERATURE: 99 F

## 2017-07-31 DIAGNOSIS — Z00.00 NORMAL PHYSICAL EXAM: Primary | ICD-10-CM

## 2017-07-31 PROCEDURE — 99282 EMERGENCY DEPT VISIT SF MDM: CPT

## 2017-07-31 NOTE — ED TRIAGE NOTES
Pt reprots 2 weeks ago she tested positive for the flu. Pt reports her job wants her retested for the flu today before she can return to work.

## 2017-07-31 NOTE — ED PROVIDER NOTES
Encounter Date: 7/31/2017       History     Chief Complaint   Patient presents with    Influenza     Pt reprots 2 weeks ago she tested positive for the flu. Pt reports her job wants her retested for the flu today before she can return to work.      22-year-old female presents with chief complaint of insulin-dependent.  Patient states 2 weeks ago was diagnosed with influenza and left yet via a nasopharyngeal swab.  States last had symptoms last Monday and since then has been afebrile without complaint.  States needs a return to work excuse.          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 No pertinent surgical history.  Family History   Problem Relation Age of Onset    Diabetes Paternal Aunt     Hypertension Maternal Grandfather     Cancer Paternal Grandmother      Social History   Substance Use Topics    Smoking status: Never Smoker    Smokeless tobacco: Never Used    Alcohol use Yes      Comment: occ     Review of Systems   Constitutional: Negative for chills and fever.   Gastrointestinal: Negative for abdominal pain.   All other systems reviewed and are negative.      Physical Exam     Initial Vitals [07/31/17 1045]   BP Pulse Resp Temp SpO2   118/73 97 20 98.8 °F (37.1 °C) 98 %      MAP       88         Physical Exam    Nursing note and vitals reviewed.  Constitutional: She appears well-developed and well-nourished.   HENT:   Head: Normocephalic and atraumatic.   Eyes: Conjunctivae and EOM are normal. Pupils are equal, round, and reactive to light.   Neck: Normal range of motion. Neck supple.   Cardiovascular: Normal rate, regular rhythm and normal heart sounds.   Pulmonary/Chest: Breath sounds normal.   Abdominal: Soft.   Musculoskeletal: Normal range of motion.   Neurological: She is alert and oriented to person, place, and time. She has normal reflexes.   Skin: Skin  is warm. Capillary refill takes less than 2 seconds.   Psychiatric: She has a normal mood and affect. Her behavior is normal.         ED Course   Procedures  Labs Reviewed - No data to display                            ED Course     Clinical Impression:   The encounter diagnosis was Normal physical exam.                           Edison Flower MD  07/31/17 1111

## 2018-01-22 ENCOUNTER — HOSPITAL ENCOUNTER (EMERGENCY)
Facility: HOSPITAL | Age: 23
Discharge: HOME | End: 2018-01-22
Payer: MEDICAID

## 2018-01-22 VITALS
RESPIRATION RATE: 18 BRPM | HEIGHT: 65 IN | OXYGEN SATURATION: 99 % | SYSTOLIC BLOOD PRESSURE: 114 MMHG | BODY MASS INDEX: 27.32 KG/M2 | DIASTOLIC BLOOD PRESSURE: 74 MMHG | WEIGHT: 164 LBS | HEART RATE: 92 BPM | TEMPERATURE: 98 F

## 2018-01-22 DIAGNOSIS — R31.9 URINARY TRACT INFECTION WITH HEMATURIA, SITE UNSPECIFIED: Primary | ICD-10-CM

## 2018-01-22 DIAGNOSIS — R51.9 ACUTE NONINTRACTABLE HEADACHE, UNSPECIFIED HEADACHE TYPE: ICD-10-CM

## 2018-01-22 DIAGNOSIS — R42 DIZZINESS: ICD-10-CM

## 2018-01-22 DIAGNOSIS — R82.71 BACTERIA IN URINE: ICD-10-CM

## 2018-01-22 DIAGNOSIS — N39.0 URINARY TRACT INFECTION WITH HEMATURIA, SITE UNSPECIFIED: Primary | ICD-10-CM

## 2018-01-22 LAB
AMORPH CRY UR QL COMP ASSIST: ABNORMAL
B-HCG UR QL: NEGATIVE
BACTERIA #/AREA URNS AUTO: ABNORMAL /HPF
BILIRUB UR QL STRIP: NEGATIVE
CLARITY UR REFRACT.AUTO: CLEAR
COLOR UR AUTO: YELLOW
GLUCOSE UR QL STRIP: NEGATIVE
HGB UR QL STRIP: ABNORMAL
HYALINE CASTS UR QL AUTO: 0 /LPF
KETONES UR QL STRIP: NEGATIVE
LEUKOCYTE ESTERASE UR QL STRIP: ABNORMAL
MICROSCOPIC COMMENT: ABNORMAL
NITRITE UR QL STRIP: NEGATIVE
PH UR STRIP: 6 [PH] (ref 5–8)
PROT UR QL STRIP: ABNORMAL
RBC #/AREA URNS AUTO: 15 /HPF (ref 0–4)
SP GR UR STRIP: 1.01 (ref 1–1.03)
SQUAMOUS #/AREA URNS AUTO: ABNORMAL /HPF
URN SPEC COLLECT METH UR: ABNORMAL
UROBILINOGEN UR STRIP-ACNC: NEGATIVE EU/DL
WBC #/AREA URNS AUTO: >100 /HPF (ref 0–5)

## 2018-01-22 PROCEDURE — 81025 URINE PREGNANCY TEST: CPT

## 2018-01-22 PROCEDURE — 99283 EMERGENCY DEPT VISIT LOW MDM: CPT

## 2018-01-22 PROCEDURE — 81000 URINALYSIS NONAUTO W/SCOPE: CPT

## 2018-01-22 PROCEDURE — 25000003 PHARM REV CODE 250: Performed by: NURSE PRACTITIONER

## 2018-01-22 RX ORDER — KETOROLAC TROMETHAMINE 10 MG/1
10 TABLET, FILM COATED ORAL
Status: COMPLETED | OUTPATIENT
Start: 2018-01-22 | End: 2018-01-22

## 2018-01-22 RX ORDER — CIPROFLOXACIN 500 MG/1
500 TABLET ORAL 2 TIMES DAILY
Qty: 20 TABLET | Refills: 0 | Status: SHIPPED | OUTPATIENT
Start: 2018-01-22 | End: 2018-02-01

## 2018-01-22 RX ORDER — MECLIZINE HYDROCHLORIDE 25 MG/1
25 TABLET ORAL 3 TIMES DAILY PRN
Qty: 20 TABLET | Refills: 0 | Status: SHIPPED | OUTPATIENT
Start: 2018-01-22 | End: 2018-07-02

## 2018-01-22 RX ORDER — MECLIZINE HCL 12.5 MG 12.5 MG/1
25 TABLET ORAL
Status: COMPLETED | OUTPATIENT
Start: 2018-01-22 | End: 2018-01-22

## 2018-01-22 RX ADMIN — KETOROLAC TROMETHAMINE 10 MG: 10 TABLET, FILM COATED ORAL at 08:01

## 2018-01-22 RX ADMIN — MECLIZINE 25 MG: 12.5 TABLET ORAL at 08:01

## 2018-01-23 NOTE — ED PROVIDER NOTES
Encounter Date: 1/22/2018       History     Chief Complaint   Patient presents with    Headache     also reports one episode of vomiting today    Dizziness     22-year-old female presents to emergency room with headache and dizziness since 1 episode of vomiting that started today.  Patient states she took Tylenol and went to sleep but woke up with a headache was still present.  Denies any nausea.  Unknown last menstrual period.  Denies any blurry.          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  Family History   Problem Relation Age of Onset    Diabetes Paternal Aunt     Hypertension Maternal Grandfather     Cancer Paternal Grandmother      Social History   Substance Use Topics    Smoking status: Never Smoker    Smokeless tobacco: Never Used    Alcohol use Yes      Comment: occ     Review of Systems   Constitutional: Negative for fever.   HENT: Negative for sore throat.    Respiratory: Negative for shortness of breath.    Cardiovascular: Negative for chest pain.   Gastrointestinal: Negative for nausea.   Genitourinary: Negative for dysuria.   Musculoskeletal: Negative for back pain.   Skin: Negative for rash.   Neurological: Positive for dizziness and headaches. Negative for weakness.   Hematological: Does not bruise/bleed easily.   All other systems reviewed and are negative.      Physical Exam     Initial Vitals [01/22/18 1918]   BP Pulse Resp Temp SpO2   124/75 95 18 98 °F (36.7 °C) 100 %      MAP       91.33         Physical Exam    Nursing note and vitals reviewed.  Constitutional: She appears well-developed and well-nourished. No distress.   HENT:   Head: Normocephalic.   Eyes: Conjunctivae are normal.   Neck: Normal range of motion. Neck supple.   Cardiovascular: Normal rate.   Pulmonary/Chest: Breath sounds normal. No respiratory distress.    Abdominal: Soft. Bowel sounds are normal. She exhibits no distension and no abdominal bruit. There is no tenderness. There is no rebound and no guarding. No hernia.   Neurological: She is alert and oriented to person, place, and time. She has normal strength. No cranial nerve deficit or sensory deficit. GCS eye subscore is 4. GCS verbal subscore is 5. GCS motor subscore is 6.   Skin: Skin is warm and dry. Capillary refill takes less than 2 seconds.   Psychiatric: She has a normal mood and affect. Her behavior is normal. Judgment and thought content normal.         ED Course   Procedures  Labs Reviewed   URINALYSIS - Abnormal; Notable for the following:        Result Value    Protein, UA 1+ (*)     Occult Blood UA Trace (*)     Leukocytes, UA 3+ (*)     All other components within normal limits   URINALYSIS MICROSCOPIC - Abnormal; Notable for the following:     RBC, UA 15 (*)     WBC, UA >100 (*)     Bacteria, UA Many (*)     All other components within normal limits   CULTURE, URINE   PREGNANCY TEST, URINE RAPID             Medical Decision Making:   Initial Assessment:   22-year-old female presents to emergency room with headache and dizziness since 1 episode of vomiting that started today.  Patient states she took Tylenol and went to sleep but woke up with a headache was still present.  Denies any nausea.  Unknown last menstrual period.  Denies any blurry.  Patient is exam is unremarkable.  She is actively taxing on her cell phone during exam and laughing with friends.  Does not appear to be in any distress.  Awake alert oriented ×3.  PERRLA.    Differential Diagnosis:   Migraine, tension headache, cluster headache, brain tumor, CVA, vertigo, sinusitis, head trauma  Clinical Tests:   Lab Tests: Ordered and Reviewed  ED Management:  Physical exam is unremarkable.  Orthostatics unremarkable.  Patient given medications for symptoms.  Urine reveals a urinary tract infection.  I will discharge patient with Cipro and  meclizine.  Instructed to continue to take Tylenol for headache.  Rest and hydrate well.  Patient verbalizes understanding.  Instructed to follow primary care doctor in 2-3 days.  Urine sent for culture.                   ED Course      Clinical Impression:   The primary encounter diagnosis was Urinary tract infection with hematuria, site unspecified. Diagnoses of Bacteria in urine, Acute nonintractable headache, unspecified headache type, and Dizziness were also pertinent to this visit.                           Danii Knox NP  01/22/18 5104

## 2018-01-23 NOTE — ED NOTES
Patient ambulatory with steady stable gait for visual acuity test. Denies dizziness or nausea. Patient AAOX3. GCS- 15. PERRL. No evidence of distress.

## 2018-02-02 ENCOUNTER — OFFICE VISIT (OUTPATIENT)
Dept: OBSTETRICS AND GYNECOLOGY | Facility: CLINIC | Age: 23
End: 2018-02-02
Payer: MEDICAID

## 2018-02-02 ENCOUNTER — LAB VISIT (OUTPATIENT)
Dept: LAB | Facility: HOSPITAL | Age: 23
End: 2018-02-02
Attending: OBSTETRICS & GYNECOLOGY
Payer: MEDICAID

## 2018-02-02 VITALS
HEIGHT: 65 IN | BODY MASS INDEX: 27.82 KG/M2 | SYSTOLIC BLOOD PRESSURE: 120 MMHG | WEIGHT: 167 LBS | DIASTOLIC BLOOD PRESSURE: 62 MMHG

## 2018-02-02 DIAGNOSIS — N92.6 MENSES, IRREGULAR: ICD-10-CM

## 2018-02-02 DIAGNOSIS — Z01.419 WELL WOMAN EXAM WITH ROUTINE GYNECOLOGICAL EXAM: Primary | ICD-10-CM

## 2018-02-02 DIAGNOSIS — Z30.016 ENCOUNTER FOR INITIAL PRESCRIPTION OF TRANSDERMAL PATCH HORMONAL CONTRACEPTIVE DEVICE: ICD-10-CM

## 2018-02-02 LAB
ALBUMIN SERPL BCP-MCNC: 4.6 G/DL
ALP SERPL-CCNC: 50 U/L
ALT SERPL W/O P-5'-P-CCNC: 23 U/L
ANION GAP SERPL CALC-SCNC: 12 MMOL/L
AST SERPL-CCNC: 17 U/L
BASOPHILS # BLD AUTO: 0.04 K/UL
BASOPHILS NFR BLD: 0.4 %
BILIRUB SERPL-MCNC: 0.4 MG/DL
BUN SERPL-MCNC: 14 MG/DL
CALCIUM SERPL-MCNC: 9.3 MG/DL
CHLORIDE SERPL-SCNC: 108 MMOL/L
CO2 SERPL-SCNC: 24 MMOL/L
CREAT SERPL-MCNC: 0.9 MG/DL
DIFFERENTIAL METHOD: ABNORMAL
EOSINOPHIL # BLD AUTO: 0.2 K/UL
EOSINOPHIL NFR BLD: 2.2 %
ERYTHROCYTE [DISTWIDTH] IN BLOOD BY AUTOMATED COUNT: 15 %
EST. GFR  (AFRICAN AMERICAN): >60 ML/MIN/1.73 M^2
EST. GFR  (NON AFRICAN AMERICAN): >60 ML/MIN/1.73 M^2
GLUCOSE SERPL-MCNC: 84 MG/DL
HCT VFR BLD AUTO: 39.3 %
HGB BLD-MCNC: 12.1 G/DL
LYMPHOCYTES # BLD AUTO: 2.5 K/UL
LYMPHOCYTES NFR BLD: 26.5 %
MCH RBC QN AUTO: 24.7 PG
MCHC RBC AUTO-ENTMCNC: 30.8 G/DL
MCV RBC AUTO: 80 FL
MONOCYTES # BLD AUTO: 0.7 K/UL
MONOCYTES NFR BLD: 7 %
NEUTROPHILS # BLD AUTO: 6 K/UL
NEUTROPHILS NFR BLD: 63.7 %
PLATELET # BLD AUTO: 248 K/UL
PMV BLD AUTO: 12 FL
POTASSIUM SERPL-SCNC: 4.3 MMOL/L
PROT SERPL-MCNC: 8 G/DL
RBC # BLD AUTO: 4.9 M/UL
SODIUM SERPL-SCNC: 144 MMOL/L
TSH SERPL DL<=0.005 MIU/L-ACNC: 1.97 UIU/ML
WBC # BLD AUTO: 9.45 K/UL

## 2018-02-02 PROCEDURE — 99999 PR PBB SHADOW E&M-EST. PATIENT-LVL III: CPT | Mod: PBBFAC,,, | Performed by: OBSTETRICS & GYNECOLOGY

## 2018-02-02 PROCEDURE — 99385 PREV VISIT NEW AGE 18-39: CPT | Mod: S$PBB,,, | Performed by: OBSTETRICS & GYNECOLOGY

## 2018-02-02 PROCEDURE — 80053 COMPREHEN METABOLIC PANEL: CPT | Mod: PO

## 2018-02-02 PROCEDURE — 36415 COLL VENOUS BLD VENIPUNCTURE: CPT | Mod: PO

## 2018-02-02 PROCEDURE — 87491 CHLMYD TRACH DNA AMP PROBE: CPT

## 2018-02-02 PROCEDURE — 99213 OFFICE O/P EST LOW 20 MIN: CPT | Mod: PBBFAC,PN | Performed by: OBSTETRICS & GYNECOLOGY

## 2018-02-02 PROCEDURE — 85025 COMPLETE CBC W/AUTO DIFF WBC: CPT | Mod: PO

## 2018-02-02 PROCEDURE — 88175 CYTOPATH C/V AUTO FLUID REDO: CPT

## 2018-02-02 PROCEDURE — 84443 ASSAY THYROID STIM HORMONE: CPT | Mod: PO

## 2018-02-02 RX ORDER — NORELGESTROMIN AND ETHINYL ESTRADIOL 35; 150 UG/MG; UG/MG
1 PATCH TRANSDERMAL
Qty: 4 PATCH | Refills: 11 | Status: SHIPPED | OUTPATIENT
Start: 2018-02-02 | End: 2018-04-02

## 2018-02-02 NOTE — PROGRESS NOTES
CC: Annual check-up    SUBJECTIVE:   22 y.o. female   for annual routine Pap and checkup. Patient's last menstrual period was 2018 (exact date)..  She complains of vaginal bleeding.  She states that she was on depot for approximately three years from -2017 however in 10/17 she developed heavy bleeding with cramping and passage of clots. Between Dec 2017 and 2018 she has had four cycles of at least 5 days of bleeding per cycle. She states that she started her last cycle on 2018 and continues to have bleeding. She states that she is changing a heavy pad every three hours. She denies any dysuria at this time. She is not using any tampons. She denies any personal or family history of blood clots including DVT or PE or any bleeding disorders.       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 No pertinent surgical history.  Social History     Social History    Marital status: Single     Spouse name: N/A    Number of children: N/A    Years of education: N/A     Occupational History    Not on file.     Social History Main Topics    Smoking status: Never Smoker    Smokeless tobacco: Never Used    Alcohol use No    Drug use: No    Sexual activity: Not Currently     Other Topics Concern    Not on file     Social History Narrative    No narrative on file     Family History   Problem Relation Age of Onset    Diabetes Paternal Aunt     Hypertension Maternal Grandfather     Cancer Paternal Grandmother     Breast cancer Paternal Grandmother     Leukemia Paternal Grandmother     Kidney disease Maternal Grandmother     Hypertension Mother      OB History    Para Term  AB Living   0 0 0 0 0 0   SAB TAB Ectopic Multiple Live Births   0 0 0 0 0               Current Outpatient Prescriptions   Medication Sig Dispense Refill    acetaminophen (TYLENOL) 500 MG tablet Take 2 tablets (1,000 mg total) by  "mouth 3 (three) times daily as needed for Pain. 30 tablet 0    hydrocodone-acetaminophen 5-325mg (NORCO) 5-325 mg per tablet Take 1 tablet by mouth every 4 (four) hours as needed for Pain. 18 tablet 0    meclizine (ANTIVERT) 25 mg tablet Take 1 tablet (25 mg total) by mouth 3 (three) times daily as needed. 20 tablet 0    albuterol 90 mcg/actuation inhaler Inhale 1-2 puffs into the lungs every 4 (four) hours as needed for Wheezing. 1 Inhaler 0    norelgestromin-ethinyl estradiol (ORTHO EVRA) 150-35 mcg/24 hr Place 1 patch onto the skin every 7 days. 4 patch 11     No current facility-administered medications for this visit.      Allergies: Patient has no known allergies.     ROS:  Constitutional: no weight loss, weight gain, fever, fatigue  Eyes:  No vision changes, glasses/contacts  ENT/Mouth: No ulcers, sinus problems, ears ringing, headache  Cardiovascular: No inability to lie flat, chest pain, exercise intolerance, swelling, heart palpitations  Respiratory: No wheezing, coughing blood, shortness of breath, or cough  Gastrointestinal: No diarrhea, bloody stool, nausea/vomiting, constipation, gas, hemorrhoids  Genitourinary: Endorses heavy periods. No blood in urine, painful urination, urgency of urination, frequency of urination, incomplete emptying, incontinence, abnormal bleeding, painful periods, vaginal discharge, vaginal odor, painful intercourse, sexual problems, bleeding after intercourse.  Musculoskeletal: No muscle weakness  Skin/Breast: No painful breasts, nipple discharge, masses, rash, ulcers  Neurological: No passing out, seizures, numbness, headache  Endocrine: No diabetes, hypothyroid, hyperthyroid, hot flashes, hair loss, abnormal hair growth, ance  Psychiatric: No depression, crying  Hematologic: No bruises, bleeding, swollen lymph nodes, anemia.      OBJECTIVE:   The patient appears well, alert, oriented x 3, in no distress.  /62   Ht 5' 5" (1.651 m)   Wt 75.8 kg (167 lb)   LMP " 01/28/2018 (Exact Date)   BMI 27.79 kg/m²   NECK: no thyromegaly, trachea midline  SKIN: no acne, striae, hirsutism  BREAST EXAM: not examined  ABDOMEN: no hernias, masses, or hepatosplenomegaly  GENITALIA: normal external genitalia, no erythema, no discharge  URETHRA: normal urethra, normal urethral meatus  VAGINA: old blood  CERVIX: no lesions or cervical motion tenderness  UTERUS: normal  ADNEXA: normal adnexa      ASSESSMENT:   well woman  1. Well woman exam with routine gynecological exam    2. Menses, irregular    3. Encounter for initial prescription of transdermal patch hormonal contraceptive device        PLAN:   pap smear  return annually or prn  Orders Placed This Encounter    US Pelvis Complete Non OB    CBC auto differential    Comprehensive metabolic panel    TSH    Liquid-based pap smear, screening    norelgestromin-ethinyl estradiol (ORTHO EVRA) 150-35 mcg/24 hr   rtc 2 months    The use of the oral contraceptive has been fully discussed with the patient. This includes the proper method to initiate and continue the pills, the need for regular compliance to ensure adequate contraceptive effect, the physiology which make the pill effective, the instructions for what to do in event of a missed pill, and warnings about anticipated minor side effects such as breakthrough spotting, nausea, breast tenderness, weight changes, acne, headaches, etc.  She has been told of the more serious potential side effects such as MI, stroke, and deep vein thrombosis, all of which are very unlikely.  She has been asked to report any signs of such serious problems immediately.  She should back up the pill with a condom during any cycle in which antibiotics are prescribed, and during the first cycle as well. The need for additional protection, such as a condom, to prevent exposure to sexually transmitted diseases has also been discussed- the patient has been clearly reminded that OCP's cannot protect her against  diseases such as HIV and others. She understands and wishes to take the medication as prescribed.

## 2018-02-03 LAB
C TRACH DNA SPEC QL NAA+PROBE: NOT DETECTED
N GONORRHOEA DNA SPEC QL NAA+PROBE: NOT DETECTED

## 2018-02-08 ENCOUNTER — HOSPITAL ENCOUNTER (OUTPATIENT)
Dept: RADIOLOGY | Facility: HOSPITAL | Age: 23
Discharge: HOME | End: 2018-02-08
Attending: OBSTETRICS & GYNECOLOGY
Payer: MEDICAID

## 2018-02-08 DIAGNOSIS — N92.6 MENSES, IRREGULAR: ICD-10-CM

## 2018-02-08 PROCEDURE — 76856 US EXAM PELVIC COMPLETE: CPT | Mod: TC,PO

## 2018-03-08 ENCOUNTER — HOSPITAL ENCOUNTER (EMERGENCY)
Facility: HOSPITAL | Age: 23
Discharge: HOME | End: 2018-03-08
Payer: MEDICAID

## 2018-03-08 ENCOUNTER — TELEPHONE (OUTPATIENT)
Dept: OBSTETRICS AND GYNECOLOGY | Facility: CLINIC | Age: 23
End: 2018-03-08

## 2018-03-08 VITALS
OXYGEN SATURATION: 99 % | RESPIRATION RATE: 18 BRPM | HEART RATE: 95 BPM | TEMPERATURE: 99 F | SYSTOLIC BLOOD PRESSURE: 120 MMHG | DIASTOLIC BLOOD PRESSURE: 70 MMHG

## 2018-03-08 DIAGNOSIS — N92.6 IRREGULAR MENSES: Primary | ICD-10-CM

## 2018-03-08 LAB
BASOPHILS # BLD AUTO: 0.03 K/UL
BASOPHILS NFR BLD: 0.4 %
DIFFERENTIAL METHOD: ABNORMAL
EOSINOPHIL # BLD AUTO: 0.2 K/UL
EOSINOPHIL NFR BLD: 2.5 %
ERYTHROCYTE [DISTWIDTH] IN BLOOD BY AUTOMATED COUNT: 15.3 %
HCT VFR BLD AUTO: 38.6 %
HGB BLD-MCNC: 12.2 G/DL
LYMPHOCYTES # BLD AUTO: 3 K/UL
LYMPHOCYTES NFR BLD: 36.5 %
MCH RBC QN AUTO: 25.6 PG
MCHC RBC AUTO-ENTMCNC: 31.6 G/DL
MCV RBC AUTO: 81 FL
MONOCYTES # BLD AUTO: 0.7 K/UL
MONOCYTES NFR BLD: 8.6 %
NEUTROPHILS # BLD AUTO: 4.2 K/UL
NEUTROPHILS NFR BLD: 51.9 %
PLATELET # BLD AUTO: 243 K/UL
PMV BLD AUTO: 11.2 FL
RBC # BLD AUTO: 4.76 M/UL
WBC # BLD AUTO: 8.12 K/UL

## 2018-03-08 PROCEDURE — 85025 COMPLETE CBC W/AUTO DIFF WBC: CPT

## 2018-03-08 PROCEDURE — 99284 EMERGENCY DEPT VISIT MOD MDM: CPT

## 2018-03-08 RX ORDER — NAPROXEN SODIUM 550 MG/1
550 TABLET ORAL 2 TIMES DAILY WITH MEALS
Qty: 20 TABLET | Refills: 0 | Status: SHIPPED | OUTPATIENT
Start: 2018-03-08 | End: 2018-07-02

## 2018-03-08 NOTE — TELEPHONE ENCOUNTER
Pt stated to have been bleeding excessively causing her to wear double pads and changing them every 30-40 mins. Pt stated to have blood clots of the size of a marble. Pt high recommended to go to the Er. Pt verbalized understanding.

## 2018-03-08 NOTE — ED NOTES
Assisted ALEJANDRINA Anglin with pelvic exam. Pt does have vaginal bleeding consistent with a menstrual cycle. Pt tolerated exam well.

## 2018-03-08 NOTE — TELEPHONE ENCOUNTER
----- Message from Sherine Merlos sent at 3/8/2018  2:17 PM CST -----  Contact: self/[phone]  Patient called to speak with your office about an emergency she is currently having with a bleeding issue.    Please call and advise.

## 2018-03-08 NOTE — ED PROVIDER NOTES
Encounter Date: 3/8/2018       History     Chief Complaint   Patient presents with    Vaginal Bleeding     I have been having a period since before Christmas I saw Dr Gamino February 2 to try and control my periods and he put me on the patch and the week i was off the patch it got worse.  I am having lower abdominal pain and but pain. The clots are the size of quarters. I called Dr Gamino and they said to come here bc they are booked up.      22-year-old female presents to emergency room complaining of vaginal bleeding since approximately October 2017.  Patient states she was started on a new birth control approximately 6 weeks ago and has continued to have vaginal bleeding since that time.  Patient states she was passing blood clots the size of a quarter earlier today.  Denies any dizziness.  Denies any chest pain.  Denies any nausea vomiting.  Has a follow-up appointment with OB/GYN in one week but states she called today and was told to come immediately to the emergency department.          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 No pertinent surgical history.  Family History   Problem Relation Age of Onset    Diabetes Paternal Aunt     Hypertension Maternal Grandfather     Cancer Paternal Grandmother     Breast cancer Paternal Grandmother     Leukemia Paternal Grandmother     Kidney disease Maternal Grandmother     Hypertension Mother      Social History   Substance Use Topics    Smoking status: Never Smoker    Smokeless tobacco: Never Used    Alcohol use No     Review of Systems   Constitutional: Negative for fever.   HENT: Negative for sore throat.    Respiratory: Negative for shortness of breath.    Cardiovascular: Negative for chest pain.   Gastrointestinal: Negative for nausea.   Genitourinary: Positive for vaginal bleeding. Negative for dysuria.    Musculoskeletal: Negative for back pain.   Skin: Negative for rash.   Neurological: Negative for weakness.   Hematological: Does not bruise/bleed easily.   All other systems reviewed and are negative.      Physical Exam     Initial Vitals [03/08/18 1458]   BP Pulse Resp Temp SpO2   121/72 100 15 98.6 °F (37 °C) 98 %      MAP       88.33         Physical Exam    Nursing note and vitals reviewed.  Constitutional: She appears well-developed and well-nourished. No distress.   HENT:   Head: Normocephalic.   Eyes: Conjunctivae are normal.   Neck: Normal range of motion. Neck supple.   Cardiovascular: Normal rate.   Pulmonary/Chest: Breath sounds normal. No respiratory distress.   Abdominal: Soft. Bowel sounds are normal. She exhibits no distension and no abdominal bruit. There is no tenderness. There is no rebound and no guarding. No hernia.   Genitourinary: Vaginal discharge found.   Genitourinary Comments: Minimal vaginal bleeding noted on exam.  Patient having some abdominal cramping.  No cervical motion tenderness   Neurological: She is alert and oriented to person, place, and time.   Skin: Skin is warm and dry. Capillary refill takes less than 2 seconds.   Psychiatric: She has a normal mood and affect. Her behavior is normal. Judgment and thought content normal.         ED Course   Procedures  Labs Reviewed   CBC W/ AUTO DIFFERENTIAL - Abnormal; Notable for the following:        Result Value    MCV 81 (*)     MCH 25.6 (*)     MCHC 31.6 (*)     RDW 15.3 (*)     All other components within normal limits             Medical Decision Making:   Initial Assessment:   22-year-old female presents to emergency room complaining of vaginal bleeding since approximately October 2017.  Patient states she was started on a new birth control approximately 6 weeks ago and has continued to have vaginal bleeding since that time.  Patient states she was passing blood clots the size of a quarter earlier today.  Denies any dizziness.   Denies any chest pain.  Denies any nausea vomiting.  Has a follow-up appointment with OB/GYN in one week but states she called today and was told to come immediately to the emergency department.  Differential Diagnosis:   Irregular menses, pregnancy, miscarriage  ED Management:  Vaginal exam is unremarkable.  CBC is unremarkable.  I spoke with the nurse of Dr. Moser's office and discussed the results.  Patient instructed to follow-up appointment as scheduled.  Return to the emergency room if any symptoms worsen.  Will be prescribed naproxen for pain.                      Clinical Impression:   The encounter diagnosis was Irregular menses.                           Danii Knox NP  03/08/18 1606

## 2018-03-08 NOTE — ED NOTES
Called Dr. Moser's office, again, Sanjana () connected me to Sisi Moser's nurse- Linnette, NP speaking with nurse at this time.

## 2018-03-08 NOTE — ED NOTES
Dr. Moser's office called, spoke with Teodora, attempted to connect with the Nurse or the Doctor, but was unable. [facility] is sending an urgent page for someone to call us back.

## 2018-04-02 ENCOUNTER — OFFICE VISIT (OUTPATIENT)
Dept: OBSTETRICS AND GYNECOLOGY | Facility: CLINIC | Age: 23
End: 2018-04-02
Payer: MEDICAID

## 2018-04-02 DIAGNOSIS — N94.6 DYSMENORRHEA: ICD-10-CM

## 2018-04-02 DIAGNOSIS — Z30.45 ENCOUNTER FOR SURVEILLANCE OF TRANSDERMAL PATCH HORMONAL CONTRACEPTIVE DEVICE: ICD-10-CM

## 2018-04-02 DIAGNOSIS — N92.6 MENSES, IRREGULAR: Primary | ICD-10-CM

## 2018-04-02 PROCEDURE — 99213 OFFICE O/P EST LOW 20 MIN: CPT | Mod: S$PBB,,, | Performed by: OBSTETRICS & GYNECOLOGY

## 2018-04-02 RX ORDER — IBUPROFEN 600 MG/1
600 TABLET ORAL EVERY 6 HOURS PRN
Qty: 60 TABLET | Refills: 2 | Status: SHIPPED | OUTPATIENT
Start: 2018-04-02 | End: 2019-03-19

## 2018-04-02 NOTE — PROGRESS NOTES
CC:No chief complaint on file.      HPI:    22 y.o.   OB History      Para Term  AB Living    0 0 0 0 0 0    SAB TAB Ectopic Multiple Live Births    0 0 0 0 0        Complaining of: irreg vb, was seen in Feb for annual and w/u for AUB was done at that time, nml labs and u/s. Pt was coming off of Depo provera and in order to help correct irreg vb and rx for ortho Evra was sent in.  She's still having and irregular period while on the Evra patch.  Uses the patch as directed.  Has bleeding every 2 wks and lasts up to a week.        (Not in a hospital admission)    Review of patient's allergies indicates:  No Known Allergies     Past Medical History:   Diagnosis Date    Asthma     Kidney cysts     pt born with 1 kidney     No past surgical history on file.  Family History   Problem Relation Age of Onset    Diabetes Paternal Aunt     Hypertension Maternal Grandfather     Cancer Paternal Grandmother     Breast cancer Paternal Grandmother     Leukemia Paternal Grandmother     Kidney disease Maternal Grandmother     Hypertension Mother      Social History   Substance Use Topics    Smoking status: Never Smoker    Smokeless tobacco: Never Used    Alcohol use No     ROS:  GENERAL: Feeling well overall. Denies fever or chills.   SKIN: Denies rash or lesions.   HEAD: Denies head injury or headache.   NODES: Denies enlarged lymph nodes.   CHEST: Denies chest pain or shortness of breath.   CARDIOVASCULAR: Denies palpitations or left sided chest pain.    ABDOMEN: Denies diarrhea, nausea, vomiting or rectal bleeding.   URINARY: No dysuria, hematuria, or burning on urination.  REPRODUCTIVE: See HPI.   BREASTS: Denies pain, lumps, or nipple discharge.   HEMATOLOGIC: No easy bruisability or excessive bleeding.   MUSCULOSKELETAL: Denies joint pain or swelling.   NEUROLOGIC: Denies syncope or weakness.   PSYCHIATRIC: Denies depression, anxiety or mood swings.      PE: There were no vitals taken for this visit.      APPEARANCE: Well nourished, well developed, in no acute distress.  SKIN: Normal skin turgor, no lesions.  NECK: Neck symmetric without masses or thyromegaly.  NODES: No inguinal, cervical, axillary or femoral lymph node enlargement.  CARDIOVASCULAR: Normal S1, S2. No rubs, murmurs or gallops.  NEUROLOGIC: Normal mood and affect. No depression or anxiety.   ABDOMEN: Soft. No tenderness or masses. No hepatosplenomegaly. No hernias.  RESPIRATORY: Normal respiratory effort with no retractions or use of accessory muscles.  BREASTS: Symmetrical, no skin changes or visible lesions. No palpable masses, nipple discharge, or adenopathy bilaterally.   BLADDER: Non-tender  GENITALIA: normal external genitalia, no erythema, no discharge  URETHRA: normal urethra, normal urethral meatus  VAGINA: Normal  CERVIX: no lesions or cervical motion tenderness  UTERUS: not examined  ADNEXA: normal adnexa    ASSESSMENT/ PLAN    Diagnoses and all orders for this visit:    Menses, irregular    Encounter for surveillance of transdermal patch hormonal contraceptive device    *wants to try ocp's since patches arent helping  rx for motrin for cramps  F/u in 2 months.         Marty Moser MD

## 2018-04-28 ENCOUNTER — HOSPITAL ENCOUNTER (EMERGENCY)
Facility: HOSPITAL | Age: 23
Discharge: HOME | End: 2018-04-28
Attending: FAMILY MEDICINE
Payer: MEDICAID

## 2018-04-28 VITALS
RESPIRATION RATE: 18 BRPM | WEIGHT: 165 LBS | DIASTOLIC BLOOD PRESSURE: 76 MMHG | BODY MASS INDEX: 27.46 KG/M2 | SYSTOLIC BLOOD PRESSURE: 131 MMHG | OXYGEN SATURATION: 100 % | TEMPERATURE: 99 F | HEART RATE: 88 BPM

## 2018-04-28 DIAGNOSIS — M25.561 ACUTE PAIN OF RIGHT KNEE: ICD-10-CM

## 2018-04-28 DIAGNOSIS — T14.90XA INJURY: Primary | ICD-10-CM

## 2018-04-28 DIAGNOSIS — M25.571 ACUTE RIGHT ANKLE PAIN: ICD-10-CM

## 2018-04-28 PROCEDURE — 99283 EMERGENCY DEPT VISIT LOW MDM: CPT

## 2018-04-28 NOTE — ED PROVIDER NOTES
Encounter Date: 4/28/2018       History     Chief Complaint   Patient presents with    Knee Injury     fell yesterday at work now with right knee and ankle pain.  tripped over the curb.    Ankle Pain     Patient presents with pain to right ankle and knee after slip and fall yesterday at work. Patient now complains of pain to knee and top of foot and side of ankle. She denies numbness or tingling to foot.           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  Family History   Problem Relation Age of Onset    Diabetes Paternal Aunt     Hypertension Maternal Grandfather     Cancer Paternal Grandmother     Breast cancer Paternal Grandmother     Leukemia Paternal Grandmother     Kidney disease Maternal Grandmother     Hypertension Mother      Social History   Substance Use Topics    Smoking status: Never Smoker    Smokeless tobacco: Never Used    Alcohol use No     Review of Systems   Constitutional: Negative for chills and fever.   Respiratory: Negative for cough, chest tightness and shortness of breath.    Cardiovascular: Negative for chest pain.   Gastrointestinal: Negative for abdominal pain, nausea and vomiting.   Musculoskeletal: Positive for myalgias. Negative for arthralgias.   Neurological: Negative for dizziness, syncope, weakness and headaches.       Physical Exam     Initial Vitals [04/28/18 1726]   BP Pulse Resp Temp SpO2   131/76 88 18 98.5 °F (36.9 °C) 100 %      MAP       94.33         Physical Exam    Nursing note and vitals reviewed.  Constitutional: She appears well-developed and well-nourished.   HENT:   Head: Normocephalic and atraumatic.   Eyes: Conjunctivae and EOM are normal. Pupils are equal, round, and reactive to light.   Neck: Normal range of motion. Neck supple.   Cardiovascular: Normal rate and regular rhythm.   Pulmonary/Chest:  Breath sounds normal. No respiratory distress.   Abdominal: Soft. Bowel sounds are normal. There is no tenderness.   Musculoskeletal: Normal range of motion.        Right knee: She exhibits normal range of motion, no swelling, no ecchymosis and no deformity.        Right ankle: She exhibits normal range of motion, no swelling and no deformity. Tenderness.   TTP over anterior portion of knee. Pedal and posterior tibialis pulses wnl 2+, capillary refill wnl < 2 seconds. NV intact. No deformity noted  TTP over lateral malleolus of right ankle. No deformity noted.    Neurological: She is alert and oriented to person, place, and time.   Skin: Skin is warm. Capillary refill takes less than 2 seconds.         ED Course   Procedures  Labs Reviewed - No data to display          Medical Decision Making:   Initial Assessment:   Patient presents with pain to right ankle and knee after slip and fall yesterday at work. Patient now complains of pain to knee and top of foot and side of ankle. She denies numbness or tingling to foot. On exam, patient experiences TTP over anterior knee and right lateral malleolus. Full ROM with flexion and extension  Differential Diagnosis:   Right knee strain, right ankle sprain  ED Management:  Informed patient of negative Xray of knee and ankle. Will ACE wrap and discharge and instructed to ice, elevate, and follow up with PCP. Patient is agreeable with plan.                       Clinical Impression:   The primary encounter diagnosis was Injury. Diagnoses of Acute pain of right knee and Acute right ankle pain were also pertinent to this visit.                           Dennise Tadeo PA-C  04/28/18 4147

## 2018-07-02 ENCOUNTER — OFFICE VISIT (OUTPATIENT)
Dept: OBSTETRICS AND GYNECOLOGY | Facility: CLINIC | Age: 23
End: 2018-07-02
Payer: MEDICAID

## 2018-07-02 VITALS
WEIGHT: 178.81 LBS | DIASTOLIC BLOOD PRESSURE: 70 MMHG | HEIGHT: 65 IN | BODY MASS INDEX: 29.79 KG/M2 | SYSTOLIC BLOOD PRESSURE: 112 MMHG

## 2018-07-02 DIAGNOSIS — N92.6 MENSES, IRREGULAR: Primary | ICD-10-CM

## 2018-07-02 DIAGNOSIS — N97.0 FEMALE INFERTILITY ASSOCIATED WITH ANOVULATION: ICD-10-CM

## 2018-07-02 PROCEDURE — 99999 PR PBB SHADOW E&M-EST. PATIENT-LVL II: CPT | Mod: PBBFAC,,, | Performed by: OBSTETRICS & GYNECOLOGY

## 2018-07-02 PROCEDURE — 99212 OFFICE O/P EST SF 10 MIN: CPT | Mod: PBBFAC,PN | Performed by: OBSTETRICS & GYNECOLOGY

## 2018-07-02 PROCEDURE — 99214 OFFICE O/P EST MOD 30 MIN: CPT | Mod: S$PBB,,, | Performed by: OBSTETRICS & GYNECOLOGY

## 2018-07-02 RX ORDER — PROGESTERONE 100 MG/1
100 CAPSULE ORAL NIGHTLY
Qty: 28 CAPSULE | Refills: 6 | Status: SHIPPED | OUTPATIENT
Start: 2018-07-02 | End: 2018-07-19

## 2018-07-02 NOTE — PROGRESS NOTES
CC:  Chief Complaint   Patient presents with    Follow-up       HPI:  21 yo female with irregular bleeding who was getting birth control pills to regulate her cycles. She says she has not had a cycle since . She has not had any other bleeding since then. She had some cramping around the time of her menstrual cycle. She wants to stop taking birth control pills and wants to try Clomid as she wants to get pregnant.          22 y.o.   OB History      Para Term  AB Living    0 0 0 0 0 0    SAB TAB Ectopic Multiple Live Births    0 0 0 0 0        Complaining of:       (Not in a hospital admission)    Review of patient's allergies indicates:  No Known Allergies     Past Medical History:   Diagnosis Date    Asthma     Kidney cysts     pt born with 1 kidney     History reviewed. No pertinent surgical history.  Family History   Problem Relation Age of Onset    Diabetes Paternal Aunt     Hypertension Maternal Grandfather     Cancer Paternal Grandmother     Breast cancer Paternal Grandmother     Leukemia Paternal Grandmother     Kidney disease Maternal Grandmother     Hypertension Mother      Social History   Substance Use Topics    Smoking status: Never Smoker    Smokeless tobacco: Never Used    Alcohol use No     ROS:  GENERAL: Feeling well overall. Denies fever or chills.   SKIN: Denies rash or lesions.   HEAD: Denies head injury or headache.   NODES: Denies enlarged lymph nodes.   CHEST: Denies chest pain or shortness of breath.   CARDIOVASCULAR: Denies palpitations or left sided chest pain.    ABDOMEN: Denies diarrhea, nausea, vomiting or rectal bleeding.   URINARY: No dysuria, hematuria, or burning on urination.  REPRODUCTIVE: See HPI.   BREASTS: Denies pain, lumps, or nipple discharge.   HEMATOLOGIC: No easy bruisability or excessive bleeding.   MUSCULOSKELETAL: Denies joint pain or swelling.   NEUROLOGIC: Denies syncope or weakness.   PSYCHIATRIC: Denies depression, anxiety or mood  "swings.      PE: /70   Ht 5' 5" (1.651 m)   Wt 81.1 kg (178 lb 12.8 oz)   LMP 04/29/2018 (Within Weeks)   BMI 29.75 kg/m²      APPEARANCE: Well nourished, well developed, in no acute distress.  SKIN: Normal skin turgor, no lesions.  NECK: Neck symmetric without masses or thyromegaly.  NODES: No inguinal, cervical, axillary or femoral lymph node enlargement.  CARDIOVASCULAR: Normal S1, S2. No rubs, murmurs or gallops.  NEUROLOGIC: Normal mood and affect. No depression or anxiety.   ABDOMEN: Soft. No tenderness or masses. No hepatosplenomegaly. No hernias.  RESPIRATORY: Normal respiratory effort with no retractions or use of accessory muscles.    ASSESSMENT/ PLAN    Ray was seen today for follow-up.    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 On days 14-28 of cycle      rtc 3 months  Counseled on wt loss, timed intercourse  Will do ovulation kits after 3 months of prometrium and clomid after that if not ovulating      Marty Moser MD  "

## 2018-07-10 ENCOUNTER — PATIENT MESSAGE (OUTPATIENT)
Dept: OBSTETRICS AND GYNECOLOGY | Facility: CLINIC | Age: 23
End: 2018-07-10

## 2018-07-14 ENCOUNTER — PATIENT MESSAGE (OUTPATIENT)
Dept: OBSTETRICS AND GYNECOLOGY | Facility: CLINIC | Age: 23
End: 2018-07-14

## 2018-07-19 RX ORDER — MEDROXYPROGESTERONE ACETATE 5 MG/1
5 TABLET ORAL DAILY
Qty: 28 TABLET | Refills: 0 | Status: SHIPPED | OUTPATIENT
Start: 2018-07-19 | End: 2018-10-03 | Stop reason: SDUPTHER

## 2018-10-03 ENCOUNTER — OFFICE VISIT (OUTPATIENT)
Dept: OBSTETRICS AND GYNECOLOGY | Facility: CLINIC | Age: 23
End: 2018-10-03
Payer: MEDICAID

## 2018-10-03 VITALS
WEIGHT: 181 LBS | DIASTOLIC BLOOD PRESSURE: 86 MMHG | BODY MASS INDEX: 30.16 KG/M2 | SYSTOLIC BLOOD PRESSURE: 110 MMHG | HEIGHT: 65 IN

## 2018-10-03 DIAGNOSIS — N91.2 AMENORRHEA: Primary | ICD-10-CM

## 2018-10-03 DIAGNOSIS — N92.6 MENSES, IRREGULAR: ICD-10-CM

## 2018-10-03 DIAGNOSIS — N97.0 FEMALE INFERTILITY ASSOCIATED WITH ANOVULATION: ICD-10-CM

## 2018-10-03 LAB
B-HCG UR QL: NEGATIVE
CTP QC/QA: YES

## 2018-10-03 PROCEDURE — 81025 URINE PREGNANCY TEST: CPT | Mod: PBBFAC,PN | Performed by: OBSTETRICS & GYNECOLOGY

## 2018-10-03 PROCEDURE — 99214 OFFICE O/P EST MOD 30 MIN: CPT | Mod: S$PBB,,, | Performed by: OBSTETRICS & GYNECOLOGY

## 2018-10-03 PROCEDURE — 99213 OFFICE O/P EST LOW 20 MIN: CPT | Mod: PBBFAC,PN | Performed by: OBSTETRICS & GYNECOLOGY

## 2018-10-03 PROCEDURE — 99999 PR PBB SHADOW E&M-EST. PATIENT-LVL III: CPT | Mod: PBBFAC,,, | Performed by: OBSTETRICS & GYNECOLOGY

## 2018-10-03 RX ORDER — FLUTICASONE PROPIONATE 50 MCG
SPRAY, SUSPENSION (ML) NASAL
COMMUNITY
Start: 2018-09-14 | End: 2019-03-19

## 2018-10-03 RX ORDER — MEDROXYPROGESTERONE ACETATE 5 MG/1
5 TABLET ORAL DAILY
Qty: 28 TABLET | Refills: 0 | Status: SHIPPED | OUTPATIENT
Start: 2018-10-03 | End: 2019-03-19

## 2018-10-03 RX ORDER — IPRATROPIUM BROMIDE AND ALBUTEROL 20; 100 UG/1; UG/1
SPRAY, METERED RESPIRATORY (INHALATION)
COMMUNITY
Start: 2018-09-14 | End: 2019-03-19

## 2018-10-03 NOTE — PROGRESS NOTES
"CC:irreg cycles  Chief Complaint   Patient presents with    Follow-up       HPI:    23 y.o.   OB History      Para Term  AB Living    0 0 0 0 0 0    SAB TAB Ectopic Multiple Live Births    0 0 0 0 0        Complaining of: has been taking provera qd and cycles are irreg. Not doing ov kits but has lost 18 lbs.      (Not in a hospital admission)    Review of patient's allergies indicates:  No Known Allergies     Past Medical History:   Diagnosis Date    Asthma     Kidney cysts     pt born with 1 kidney     History reviewed. No pertinent surgical history.  Family History   Problem Relation Age of Onset    Diabetes Paternal Aunt     Hypertension Maternal Grandfather     Cancer Paternal Grandmother     Breast cancer Paternal Grandmother     Leukemia Paternal Grandmother     Kidney disease Maternal Grandmother     Hypertension Mother      Social History     Tobacco Use    Smoking status: Never Smoker    Smokeless tobacco: Never Used   Substance Use Topics    Alcohol use: No    Drug use: No     ROS:  GENERAL: Feeling well overall. Denies fever or chills.   SKIN: Denies rash or lesions.   HEAD: Denies head injury or headache.   NODES: Denies enlarged lymph nodes.   CHEST: Denies chest pain or shortness of breath.   CARDIOVASCULAR: Denies palpitations or left sided chest pain.    ABDOMEN: Denies diarrhea, nausea, vomiting or rectal bleeding.   URINARY: No dysuria, hematuria, or burning on urination.  REPRODUCTIVE: See HPI.   BREASTS: Denies pain, lumps, or nipple discharge.   HEMATOLOGIC: No easy bruisability or excessive bleeding.   MUSCULOSKELETAL: Denies joint pain or swelling.   NEUROLOGIC: Denies syncope or weakness.   PSYCHIATRIC: Denies depression, anxiety or mood swings.      PE: /86   Ht 5' 5" (1.651 m)   Wt 82.1 kg (181 lb)   LMP 09/15/2018 (Within Days)   BMI 30.12 kg/m²      APPEARANCE: Well nourished, well developed, in no acute distress.  SKIN: Normal skin turgor, no " lesions.  NECK: Neck symmetric without masses or thyromegaly.  NODES: No inguinal, cervical, axillary or femoral lymph node enlargement.  CARDIOVASCULAR: Normal S1, S2. No rubs, murmurs or gallops.  NEUROLOGIC: Normal mood and affect. No depression or anxiety.   ABDOMEN: Soft. No tenderness or masses. No hepatosplenomegaly. No hernias.      ASSESSMENT/ PLAN    Ray was seen today for follow-up.    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 On days 17-30 of each month      Counseled about nml cycle, PCOS, WT loss, ovulation kits need fir semenanalysisi and proper use of provera to reg cycle  Total time spent face to face with the patient: 35 minute  More than 50% of the time spent counseling/coordinating care about above.      Marty Moser MD

## 2018-11-28 ENCOUNTER — PATIENT MESSAGE (OUTPATIENT)
Dept: OBSTETRICS AND GYNECOLOGY | Facility: CLINIC | Age: 23
End: 2018-11-28

## 2018-11-29 ENCOUNTER — OFFICE VISIT (OUTPATIENT)
Dept: OBSTETRICS AND GYNECOLOGY | Facility: CLINIC | Age: 23
End: 2018-11-29
Payer: MEDICAID

## 2018-11-29 VITALS — BODY MASS INDEX: 29.85 KG/M2 | WEIGHT: 179.38 LBS

## 2018-11-29 DIAGNOSIS — Z11.3 SCREENING FOR STD (SEXUALLY TRANSMITTED DISEASE): ICD-10-CM

## 2018-11-29 DIAGNOSIS — N89.8 VAGINAL DISCHARGE: Primary | ICD-10-CM

## 2018-11-29 PROCEDURE — 99212 OFFICE O/P EST SF 10 MIN: CPT | Mod: PBBFAC,PN | Performed by: OBSTETRICS & GYNECOLOGY

## 2018-11-29 PROCEDURE — 99213 OFFICE O/P EST LOW 20 MIN: CPT | Mod: S$PBB,,, | Performed by: OBSTETRICS & GYNECOLOGY

## 2018-11-29 PROCEDURE — 87660 TRICHOMONAS VAGIN DIR PROBE: CPT

## 2018-11-29 PROCEDURE — 99999 PR PBB SHADOW E&M-EST. PATIENT-LVL II: CPT | Mod: PBBFAC,,, | Performed by: OBSTETRICS & GYNECOLOGY

## 2018-11-29 PROCEDURE — 87491 CHLMYD TRACH DNA AMP PROBE: CPT

## 2018-11-29 RX ORDER — METRONIDAZOLE 500 MG/1
TABLET ORAL
Qty: 4 TABLET | Refills: 1 | Status: SHIPPED | OUTPATIENT
Start: 2018-11-29 | End: 2019-03-19

## 2018-11-29 NOTE — PROGRESS NOTES
CC: Vaginal Discharge    HPI:   23 y.o. female  complains of white and copious vaginal discharge for 1 week. Patient's last menstrual period was 2018..  She describes her periods as irregular. She is sexually active.  She uses no method for contraception.    ROS:  GENERAL: No fever, chills, fatigability or weight loss.  VULVAR: No pain, no lesions and no itching.  VAGINAL: No relaxation, no itching, no discharge, no abnormal bleeding and no lesions.  ABDOMEN: No abdominal pain. Denies nausea. Denies vomiting. No diarrhea. No constipation  BREAST: Denies pain. No lumps. No discharge.  URINARY: No incontinence, no nocturia, no frequency and no dysuria.  CARDIOVASCULAR: No chest pain. No shortness of breath. No leg cramps.  NEUROLOGICAL: No headaches. No vision changes.        There were no vitals filed for this visit.      OBJECTIVE:   She appears well, afebrile.  Abdomen: benign, soft, nontender, no masses.  VULVA: Normal external female genitalia, normal urethra, normal urethral meatus  VAGINA:discharge: copious, white and yellow  CERVIXinflammation  UTERUSnormal  ADNEXAnormal adnexa        ASSESSMENT:   Possible trichomonas and rule out GC or chlamydia    PLAN:   Orders Placed This Encounter    metroNIDAZOLE (FLAGYL) 500 MG tablet     ROV prn if symptoms persist or worsen.  Abstain until results obtained

## 2018-11-30 ENCOUNTER — TELEPHONE (OUTPATIENT)
Dept: OBSTETRICS AND GYNECOLOGY | Facility: CLINIC | Age: 23
End: 2018-11-30

## 2018-11-30 LAB
C TRACH DNA SPEC QL NAA+PROBE: DETECTED
CANDIDA RRNA VAG QL PROBE: NEGATIVE
G VAGINALIS RRNA GENITAL QL PROBE: POSITIVE
N GONORRHOEA DNA SPEC QL NAA+PROBE: NOT DETECTED
T VAGINALIS RRNA GENITAL QL PROBE: NEGATIVE

## 2018-11-30 NOTE — TELEPHONE ENCOUNTER
Patient stated that she accidentally threw her rx of Flagyl away while cleaning her car and was wondering if she could get a refill.  Rx has been called in to the pharmacy. All questions answered and patient verbalized understanding.

## 2018-11-30 NOTE — TELEPHONE ENCOUNTER
----- Message from Shelli Bishop sent at 11/30/2018  2:25 PM CST -----  Contact: Self [phone]  Patient is calling to talk to nurse in regards to her medication, she accidentally threw away while she was cleaning her car. Please advice

## 2018-12-01 ENCOUNTER — TELEPHONE (OUTPATIENT)
Dept: OBSTETRICS AND GYNECOLOGY | Facility: CLINIC | Age: 23
End: 2018-12-01

## 2018-12-01 RX ORDER — AZITHROMYCIN 250 MG/1
TABLET, FILM COATED ORAL
Qty: 2 TABLET | Refills: 0 | Status: SHIPPED | OUTPATIENT
Start: 2018-12-01 | End: 2018-12-06

## 2018-12-06 ENCOUNTER — PATIENT MESSAGE (OUTPATIENT)
Dept: OBSTETRICS AND GYNECOLOGY | Facility: CLINIC | Age: 23
End: 2018-12-06

## 2018-12-07 NOTE — TELEPHONE ENCOUNTER
Pt notified of results and instructions. Pt already scheduled for 1/4 and stated that she didn't want a sooner appt. Pt verbalized understanding.

## 2018-12-27 ENCOUNTER — TELEPHONE (OUTPATIENT)
Dept: OBSTETRICS AND GYNECOLOGY | Facility: CLINIC | Age: 23
End: 2018-12-27

## 2018-12-27 NOTE — TELEPHONE ENCOUNTER
Called pt to reschedule appt due to dr not in clinic. Pt offered 1/16 in [facility]. Pt accepted and verbalized understanding.

## 2019-01-04 ENCOUNTER — HOSPITAL ENCOUNTER (EMERGENCY)
Facility: HOSPITAL | Age: 24
Discharge: HOME | End: 2019-01-04
Attending: SURGERY
Payer: MEDICAID

## 2019-01-04 VITALS
TEMPERATURE: 100 F | BODY MASS INDEX: 27.32 KG/M2 | WEIGHT: 164 LBS | OXYGEN SATURATION: 100 % | HEART RATE: 95 BPM | DIASTOLIC BLOOD PRESSURE: 64 MMHG | SYSTOLIC BLOOD PRESSURE: 124 MMHG | HEIGHT: 65 IN | RESPIRATION RATE: 22 BRPM

## 2019-01-04 DIAGNOSIS — R05.9 COUGH: ICD-10-CM

## 2019-01-04 DIAGNOSIS — J45.901 EXACERBATION OF ASTHMA, UNSPECIFIED ASTHMA SEVERITY, UNSPECIFIED WHETHER PERSISTENT: Primary | ICD-10-CM

## 2019-01-04 LAB
FLUAV AG SPEC QL IA: NEGATIVE
FLUBV AG SPEC QL IA: NEGATIVE
SPECIMEN SOURCE: NORMAL

## 2019-01-04 PROCEDURE — 63600175 PHARM REV CODE 636 W HCPCS: Mod: ER | Performed by: PHYSICIAN ASSISTANT

## 2019-01-04 PROCEDURE — 99284 EMERGENCY DEPT VISIT MOD MDM: CPT | Mod: 25,ER

## 2019-01-04 PROCEDURE — 94640 AIRWAY INHALATION TREATMENT: CPT | Mod: ER

## 2019-01-04 PROCEDURE — 87400 INFLUENZA A/B EACH AG IA: CPT | Mod: 59,ER

## 2019-01-04 PROCEDURE — 25000242 PHARM REV CODE 250 ALT 637 W/ HCPCS: Mod: ER | Performed by: PHYSICIAN ASSISTANT

## 2019-01-04 RX ORDER — AZITHROMYCIN 250 MG/1
TABLET, FILM COATED ORAL
Qty: 6 TABLET | Refills: 0 | Status: SHIPPED | OUTPATIENT
Start: 2019-01-04 | End: 2019-01-09

## 2019-01-04 RX ORDER — PREDNISONE 20 MG/1
40 TABLET ORAL DAILY
Qty: 10 TABLET | Refills: 0 | Status: SHIPPED | OUTPATIENT
Start: 2019-01-04 | End: 2019-01-09

## 2019-01-04 RX ORDER — PREDNISONE 20 MG/1
60 TABLET ORAL
Status: COMPLETED | OUTPATIENT
Start: 2019-01-04 | End: 2019-01-04

## 2019-01-04 RX ORDER — IPRATROPIUM BROMIDE AND ALBUTEROL SULFATE 2.5; .5 MG/3ML; MG/3ML
3 SOLUTION RESPIRATORY (INHALATION)
Status: COMPLETED | OUTPATIENT
Start: 2019-01-04 | End: 2019-01-04

## 2019-01-04 RX ORDER — ALBUTEROL SULFATE 90 UG/1
1-2 AEROSOL, METERED RESPIRATORY (INHALATION) EVERY 6 HOURS PRN
Qty: 1 INHALER | Refills: 0 | Status: SHIPPED | OUTPATIENT
Start: 2019-01-04 | End: 2019-03-19

## 2019-01-04 RX ORDER — ALBUTEROL SULFATE 2.5 MG/.5ML
2.5 SOLUTION RESPIRATORY (INHALATION) EVERY 4 HOURS PRN
Qty: 1 EACH | Refills: 0 | Status: SHIPPED | OUTPATIENT
Start: 2019-01-04 | End: 2019-03-19

## 2019-01-04 RX ADMIN — PREDNISONE 60 MG: 20 TABLET ORAL at 11:01

## 2019-01-04 RX ADMIN — IPRATROPIUM BROMIDE AND ALBUTEROL SULFATE 3 ML: .5; 3 SOLUTION RESPIRATORY (INHALATION) at 11:01

## 2019-01-04 NOTE — ED PROVIDER NOTES
Encounter Date: 1/4/2019       History     Chief Complaint   Patient presents with    Cough     cough and cold like symptoms x2 weeks, unrelieved by OTC. fever two days ago     Ray Knox, a 23 y.o. female  has a past medical history of Asthma and Kidney cysts.     She presents to the ED for evaluation of cough and nasal congestion symptoms x2 weeks.  Patient states that she has been alternating Tylenol and Motrin for her intermittent fever as well as taking over-the-counter Robitussin.  She states that her symptoms have gotten worse and her cough has been more productive.  She does have a history of asthma and feels like she needs her rescue inhaler more often.  Her last day of fever with 2 days ago.  She does attest to some chest tightness.  Denies any SOB, sore throat.            The history is provided by the patient.     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  Family History   Problem Relation Age of Onset    Diabetes Paternal Aunt     Hypertension Maternal Grandfather     Cancer Paternal Grandmother     Breast cancer Paternal Grandmother     Leukemia Paternal Grandmother     Kidney disease Maternal Grandmother     Hypertension Mother      Social History     Tobacco Use    Smoking status: Never Smoker    Smokeless tobacco: Never Used   Substance Use Topics    Alcohol use: No    Drug use: No     Review of Systems   Constitutional: Positive for fever (resolved).   HENT: Positive for congestion. Negative for sore throat.    Respiratory: Positive for cough, chest tightness and wheezing.    Gastrointestinal: Negative for nausea and vomiting.   Skin: Negative for color change.   Allergic/Immunologic: Negative for immunocompromised state.   Psychiatric/Behavioral: Negative for agitation.   All other systems reviewed and are negative.      Physical  Exam     Initial Vitals [01/04/19 1048]   BP Pulse Resp Temp SpO2   124/64 105 18 99.5 °F (37.5 °C) 98 %      MAP       --         Physical Exam    Nursing note and vitals reviewed.  Constitutional: She appears well-developed and well-nourished.   HENT:   Head: Normocephalic and atraumatic.   Right Ear: Hearing, tympanic membrane, external ear and ear canal normal.   Left Ear: Hearing, tympanic membrane, external ear and ear canal normal.   Nose: Mucosal edema present.   Mouth/Throat: Uvula is midline, oropharynx is clear and moist and mucous membranes are normal.   Eyes: Conjunctivae and EOM are normal.   Neck: Normal range of motion.   Cardiovascular: Normal rate and regular rhythm.   Pulmonary/Chest: No respiratory distress. She has no wheezes. She has rhonchi in the right upper field. She has no rales.   Musculoskeletal: Normal range of motion.   Neurological: She is alert and oriented to person, place, and time.   Skin: Skin is warm and dry. Capillary refill takes less than 2 seconds. No rash noted. No erythema.   Psychiatric: She has a normal mood and affect. Thought content normal.         ED Course   Procedures  Labs Reviewed   PREGNANCY TEST, URINE RAPID          Imaging Results          X-Ray Chest PA And Lateral (Final result)  Result time 01/04/19 11:19:32    Final result by MOI Fountain Sr., MD (01/04/19 11:19:32)                 Impression:      Normal study.      Electronically signed by: Russ Fountain MD  Date:    01/04/2019  Time:    11:19             Narrative:    EXAMINATION:  XR CHEST PA AND LATERAL    CLINICAL HISTORY:  Cough    COMPARISON:  None    FINDINGS:  The size and contour of the heart are normal. The lungs are clear. There is no pneumothorax or pleural effusion.                                 Medical Decision Making:   Initial Assessment:   Cough x2 weeks with intermittent fever and nasal congestion  Differential Diagnosis:   Asthma exacerbation, viral URI, pneumonia,  influenza  Clinical Tests:   Radiological Study: Ordered and Reviewed  ED Management:  Patient presents for evaluation of continued cough x2 weeks despite over-the-counter medication ingestion.  Influenza today was negative. Rhonchi noted to right upper lung field.  DuoNeb and prednisone were administered with improvement of symptoms.  Chest x-ray shows no evidence of infiltrate.  Patient will be prescribed a short course of steroid as well as antibiotic and her albuterol will be refilled.  She was instructed to follow up with her PCP for further evaluation and to return with any new or worsening symptoms. Patient verbalized understanding and is in agreement with plan.                      Clinical Impression:   The primary encounter diagnosis was Exacerbation of asthma, unspecified asthma severity, unspecified whether persistent. A diagnosis of Cough was also pertinent to this visit.                             Pamela Mcginnis PA-C  01/04/19 3004

## 2019-01-16 ENCOUNTER — OFFICE VISIT (OUTPATIENT)
Dept: OBSTETRICS AND GYNECOLOGY | Facility: CLINIC | Age: 24
End: 2019-01-16
Payer: MEDICAID

## 2019-01-16 VITALS
WEIGHT: 176.63 LBS | BODY MASS INDEX: 29.39 KG/M2 | SYSTOLIC BLOOD PRESSURE: 108 MMHG | DIASTOLIC BLOOD PRESSURE: 68 MMHG

## 2019-01-16 DIAGNOSIS — R30.0 DYSURIA: ICD-10-CM

## 2019-01-16 DIAGNOSIS — Z11.3 SCREENING FOR STD (SEXUALLY TRANSMITTED DISEASE): ICD-10-CM

## 2019-01-16 DIAGNOSIS — Z71.1 CONCERN ABOUT STD IN FEMALE WITHOUT DIAGNOSIS: Primary | ICD-10-CM

## 2019-01-16 LAB
CANDIDA RRNA VAG QL PROBE: NEGATIVE
G VAGINALIS RRNA GENITAL QL PROBE: NEGATIVE
T VAGINALIS RRNA GENITAL QL PROBE: NEGATIVE

## 2019-01-16 PROCEDURE — 87491 CHLMYD TRACH DNA AMP PROBE: CPT

## 2019-01-16 PROCEDURE — 99999 PR PBB SHADOW E&M-EST. PATIENT-LVL III: ICD-10-PCS | Mod: PBBFAC,,, | Performed by: OBSTETRICS & GYNECOLOGY

## 2019-01-16 PROCEDURE — 99213 PR OFFICE/OUTPT VISIT, EST, LEVL III, 20-29 MIN: ICD-10-PCS | Mod: S$PBB,,, | Performed by: OBSTETRICS & GYNECOLOGY

## 2019-01-16 PROCEDURE — 99213 OFFICE O/P EST LOW 20 MIN: CPT | Mod: S$PBB,,, | Performed by: OBSTETRICS & GYNECOLOGY

## 2019-01-16 PROCEDURE — 99213 OFFICE O/P EST LOW 20 MIN: CPT | Mod: PBBFAC,PN | Performed by: OBSTETRICS & GYNECOLOGY

## 2019-01-16 PROCEDURE — 87480 CANDIDA DNA DIR PROBE: CPT

## 2019-01-16 PROCEDURE — 87086 URINE CULTURE/COLONY COUNT: CPT

## 2019-01-16 PROCEDURE — 87510 GARDNER VAG DNA DIR PROBE: CPT

## 2019-01-16 PROCEDURE — 87088 URINE BACTERIA CULTURE: CPT

## 2019-01-16 PROCEDURE — 99999 PR PBB SHADOW E&M-EST. PATIENT-LVL III: CPT | Mod: PBBFAC,,, | Performed by: OBSTETRICS & GYNECOLOGY

## 2019-01-16 NOTE — PROGRESS NOTES
CC: f/u retest for CT    HPI:   23 y.o. female  complains dysuria. Patient's last menstrual period was 2018..  She describes her periods as irregular. She is sexually active.  She uses no method,  for contraception. Trying to get pregnant  ROS:  GENERAL: No fever, chills, fatigability or weight loss.  VULVAR: No pain, no lesions and no itching.  VAGINAL: No relaxation, no itching, no discharge, no abnormal bleeding and no lesions.  ABDOMEN: No abdominal pain. Denies nausea. Denies vomiting. No diarrhea. No constipation  BREAST: Denies pain. No lumps. No discharge.  URINARY: No incontinence, no nocturia, no frequency and no dysuria.  CARDIOVASCULAR: No chest pain. No shortness of breath. No leg cramps.  NEUROLOGICAL: No headaches. No vision changes.        Vitals:    19 0919   BP: 108/68         OBJECTIVE:   She appears well, afebrile.  Abdomen: benign, soft, nontender, no masses.  VULVA: Normal external female genitalia, normal urethra, normal urethral meatus  VAGINA:no lesions  CERVIXNormal  UTERUSnormal  ADNEXAnormal adnexa        ASSESSMENT:   rule out GC or chlamydia    PLAN:   Orders Placed This Encounter    Urine culture    HIV 1/2 Ag/Ab (4th Gen)     ROV prn if symptoms persist or worsen.

## 2019-01-17 LAB
C TRACH DNA SPEC QL NAA+PROBE: NOT DETECTED
N GONORRHOEA DNA SPEC QL NAA+PROBE: NOT DETECTED

## 2019-01-18 ENCOUNTER — LAB VISIT (OUTPATIENT)
Dept: LAB | Facility: HOSPITAL | Age: 24
End: 2019-01-18
Attending: OBSTETRICS & GYNECOLOGY
Payer: MEDICAID

## 2019-01-18 DIAGNOSIS — Z71.1 CONCERN ABOUT STD IN FEMALE WITHOUT DIAGNOSIS: ICD-10-CM

## 2019-01-18 LAB — BACTERIA UR CULT: NORMAL

## 2019-01-18 PROCEDURE — 86703 HIV-1/HIV-2 1 RESULT ANTBDY: CPT | Mod: PO,ER

## 2019-01-18 PROCEDURE — 36415 COLL VENOUS BLD VENIPUNCTURE: CPT | Mod: PO,ER

## 2019-01-21 ENCOUNTER — TELEPHONE (OUTPATIENT)
Dept: OBSTETRICS AND GYNECOLOGY | Facility: CLINIC | Age: 24
End: 2019-01-21

## 2019-01-21 LAB — HIV 1+2 AB+HIV1 P24 AG SERPL QL IA: NEGATIVE

## 2019-01-21 RX ORDER — NITROFURANTOIN 25; 75 MG/1; MG/1
100 CAPSULE ORAL 2 TIMES DAILY
Qty: 10 CAPSULE | Refills: 0 | Status: SHIPPED | OUTPATIENT
Start: 2019-01-21 | End: 2019-01-26

## 2019-01-21 NOTE — TELEPHONE ENCOUNTER
----- Message from Teodora Archer sent at 1/21/2019 11:14 AM CST -----  Contact: [phone]/uako  Patient states she is returning your call. Please advise.

## 2019-01-22 ENCOUNTER — TELEPHONE (OUTPATIENT)
Dept: OBSTETRICS AND GYNECOLOGY | Facility: CLINIC | Age: 24
End: 2019-01-22

## 2019-01-22 NOTE — TELEPHONE ENCOUNTER
----- Message from Lynne Judie sent at 1/21/2019  3:44 PM CST -----  Contact: self, [phone] (M)  Patient states she was told to call back if her test results did not appear in her [facility] account. Please advise.

## 2019-02-20 ENCOUNTER — HOSPITAL ENCOUNTER (EMERGENCY)
Facility: HOSPITAL | Age: 24
Discharge: HOME | End: 2019-02-20
Attending: EMERGENCY MEDICINE
Payer: MEDICAID

## 2019-02-20 VITALS
DIASTOLIC BLOOD PRESSURE: 72 MMHG | OXYGEN SATURATION: 98 % | WEIGHT: 174 LBS | TEMPERATURE: 98 F | SYSTOLIC BLOOD PRESSURE: 117 MMHG | HEART RATE: 83 BPM | BODY MASS INDEX: 28.99 KG/M2 | RESPIRATION RATE: 18 BRPM | HEIGHT: 65 IN

## 2019-02-20 DIAGNOSIS — N12 PYELONEPHRITIS: Primary | ICD-10-CM

## 2019-02-20 LAB
ALBUMIN SERPL BCP-MCNC: 4.8 G/DL
ALP SERPL-CCNC: 66 U/L
ALT SERPL W/O P-5'-P-CCNC: 13 U/L
ANION GAP SERPL CALC-SCNC: 10 MMOL/L
AST SERPL-CCNC: 23 U/L
B-HCG UR QL: NEGATIVE
BASOPHILS # BLD AUTO: 0.03 K/UL
BASOPHILS NFR BLD: 0.2 %
BILIRUB SERPL-MCNC: 1.1 MG/DL
BILIRUB UR QL STRIP: NEGATIVE
BUN SERPL-MCNC: 10 MG/DL
CALCIUM SERPL-MCNC: 9.4 MG/DL
CHLORIDE SERPL-SCNC: 104 MMOL/L
CLARITY UR REFRACT.AUTO: ABNORMAL
CO2 SERPL-SCNC: 26 MMOL/L
COLOR UR AUTO: ABNORMAL
CREAT SERPL-MCNC: 0.89 MG/DL
DIFFERENTIAL METHOD: ABNORMAL
EOSINOPHIL # BLD AUTO: 0.1 K/UL
EOSINOPHIL NFR BLD: 0.4 %
ERYTHROCYTE [DISTWIDTH] IN BLOOD BY AUTOMATED COUNT: 14.7 %
EST. GFR  (AFRICAN AMERICAN): >60 ML/MIN/1.73 M^2
EST. GFR  (NON AFRICAN AMERICAN): >60 ML/MIN/1.73 M^2
GLUCOSE SERPL-MCNC: 79 MG/DL
GLUCOSE UR QL STRIP: NEGATIVE
HCT VFR BLD AUTO: 39.4 %
HGB BLD-MCNC: 12.4 G/DL
HGB UR QL STRIP: NEGATIVE
KETONES UR QL STRIP: ABNORMAL
LEUKOCYTE ESTERASE UR QL STRIP: ABNORMAL
LYMPHOCYTES # BLD AUTO: 3 K/UL
LYMPHOCYTES NFR BLD: 18.4 %
MCH RBC QN AUTO: 26.1 PG
MCHC RBC AUTO-ENTMCNC: 31.5 G/DL
MCV RBC AUTO: 83 FL
MICROSCOPIC COMMENT: NORMAL
MONOCYTES # BLD AUTO: 1 K/UL
MONOCYTES NFR BLD: 5.8 %
NEUTROPHILS # BLD AUTO: 12.4 K/UL
NEUTROPHILS NFR BLD: 74.9 %
NITRITE UR QL STRIP: NEGATIVE
PH UR STRIP: 6 [PH] (ref 5–8)
PLATELET # BLD AUTO: 246 K/UL
PMV BLD AUTO: 11 FL
POTASSIUM SERPL-SCNC: 3.5 MMOL/L
PROT SERPL-MCNC: 8.6 G/DL
PROT UR QL STRIP: NEGATIVE
RBC # BLD AUTO: 4.76 M/UL
SODIUM SERPL-SCNC: 140 MMOL/L
SP GR UR STRIP: 1.02 (ref 1–1.03)
URN SPEC COLLECT METH UR: ABNORMAL
UROBILINOGEN UR STRIP-ACNC: ABNORMAL EU/DL
WBC # BLD AUTO: 16.53 K/UL
WBC #/AREA URNS AUTO: 2 /HPF (ref 0–5)

## 2019-02-20 PROCEDURE — 81025 URINE PREGNANCY TEST: CPT | Mod: ER

## 2019-02-20 PROCEDURE — 96365 THER/PROPH/DIAG IV INF INIT: CPT | Mod: ER

## 2019-02-20 PROCEDURE — 81000 URINALYSIS NONAUTO W/SCOPE: CPT | Mod: ER

## 2019-02-20 PROCEDURE — 85025 COMPLETE CBC W/AUTO DIFF WBC: CPT | Mod: ER

## 2019-02-20 PROCEDURE — 63600175 PHARM REV CODE 636 W HCPCS: Mod: ER | Performed by: PHYSICIAN ASSISTANT

## 2019-02-20 PROCEDURE — 99284 EMERGENCY DEPT VISIT MOD MDM: CPT | Mod: 25,ER

## 2019-02-20 PROCEDURE — 80053 COMPREHEN METABOLIC PANEL: CPT | Mod: ER

## 2019-02-20 RX ORDER — SULFAMETHOXAZOLE AND TRIMETHOPRIM 800; 160 MG/1; MG/1
1 TABLET ORAL EVERY 12 HOURS
Qty: 20 TABLET | Refills: 0 | Status: SHIPPED | OUTPATIENT
Start: 2019-02-20 | End: 2019-03-02

## 2019-02-20 RX ADMIN — CEFTRIAXONE 1 G: 1 INJECTION, SOLUTION INTRAVENOUS at 05:02

## 2019-02-21 NOTE — ED PROVIDER NOTES
"Encounter Date: 2/20/2019       History     Chief Complaint   Patient presents with    Hematuria     "I started peeing blood today and now I got pain in my back and my side" denies trauma.      Patient is a 23-year-old female presenting with complaint of constant moderate aching pain to the low back for 1 day.  She noticed some blood in her urine today.  No dysuria, pelvic pain or vaginal discharge. No nausea vomiting or fever.  No treatment prior to arrival.          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  Family History   Problem Relation Age of Onset    Diabetes Paternal Aunt     Hypertension Maternal Grandfather     Cancer Paternal Grandmother     Breast cancer Paternal Grandmother     Leukemia Paternal Grandmother     Kidney disease Maternal Grandmother     Hypertension Mother      Social History     Tobacco Use    Smoking status: Never Smoker    Smokeless tobacco: Never Used   Substance Use Topics    Alcohol use: No    Drug use: No     Review of Systems   Constitutional: Negative for activity change, appetite change, chills, fatigue and fever.   HENT: Negative for congestion, ear pain and sore throat.    Respiratory: Negative for cough, shortness of breath and wheezing.    Cardiovascular: Negative for chest pain, palpitations and leg swelling.   Gastrointestinal: Negative for abdominal pain, blood in stool, constipation, diarrhea, nausea and vomiting.   Genitourinary: Positive for flank pain and hematuria. Negative for dysuria, frequency, pelvic pain and urgency.   Musculoskeletal: Positive for back pain. Negative for joint swelling, neck pain and neck stiffness.   Skin: Negative for rash.   Neurological: Negative for dizziness, weakness, numbness and headaches.   Hematological: Does not bruise/bleed easily.   All other systems reviewed and are " negative.      Physical Exam     Initial Vitals [02/20/19 1456]   BP Pulse Resp Temp SpO2   118/81 81 19 98.3 °F (36.8 °C) 100 %      MAP       --         Physical Exam    Nursing note and vitals reviewed.  Constitutional: She appears well-developed and well-nourished. She appears distressed (Mild.  Resting comfortably).   HENT:   Head: Normocephalic and atraumatic.   Nose: Nose normal.   Mouth/Throat: Oropharynx is clear and moist.   Eyes: Conjunctivae and EOM are normal. Pupils are equal, round, and reactive to light.   Neck: Normal range of motion. Neck supple.   Cardiovascular: Normal rate, regular rhythm, normal heart sounds and intact distal pulses.   Pulmonary/Chest: Breath sounds normal. No respiratory distress. She has no wheezes. She has no rhonchi. She has no rales.   Abdominal: Soft. Bowel sounds are normal. There is no tenderness. There is no rebound and no guarding.   No CVA tenderness   Musculoskeletal: She exhibits no edema.   Lymphadenopathy:     She has no cervical adenopathy.   Neurological: She is alert and oriented to person, place, and time.   Skin: Skin is warm and dry. No rash noted.   Psychiatric: She has a normal mood and affect. Her behavior is normal. Judgment and thought content normal.         ED Course   Procedures  Labs Reviewed   URINALYSIS, REFLEX TO URINE CULTURE - Abnormal; Notable for the following components:       Result Value    Appearance, UA Hazy (*)     Ketones, UA 1+ (*)     Urobilinogen, UA 4.0-6.0 (*)     Leukocytes, UA 1+ (*)     All other components within normal limits    Narrative:     Preferred Collection Type->Urine, Clean Catch   CBC W/ AUTO DIFFERENTIAL - Abnormal; Notable for the following components:    WBC 16.53 (*)     MCH 26.1 (*)     MCHC 31.5 (*)     RDW 14.7 (*)     Gran # (ANC) 12.4 (*)     Gran% 74.9 (*)     All other components within normal limits   COMPREHENSIVE METABOLIC PANEL - Abnormal; Notable for the following components:    Total Protein 8.6  (*)     Total Bilirubin 1.1 (*)     All other components within normal limits   PREGNANCY TEST, URINE RAPID   URINALYSIS MICROSCOPIC    Narrative:     Preferred Collection Type->Urine, Clean Catch          Imaging Results    None          Medical Decision Making:   Clinical Tests:   Lab Tests: Ordered and Reviewed  The following lab test(s) were unremarkable: CBC, CMP, Urinalysis and UPT  New PT negative.  Urinalysis consistent with UTI.  Renal function is normal.  Patient was given a dose of Rocephin IV and prescription for Bactrim.  Advised on supportive care in the importance of close follow-up given she has only 1 kidney.  Return to the ED if worse in any way.                      Clinical Impression:       ICD-10-CM ICD-9-CM   1. Pyelonephritis N12 590.80         Disposition:   Disposition: Discharged                        MONICA Ham  02/20/19 3448

## 2019-02-21 NOTE — DISCHARGE INSTRUCTIONS
Follow-up with your PCP within 2 days without fail for recheck.  Return to the ED for fever, vomiting, severe pain, decreased urination or worse in any way

## 2019-03-19 ENCOUNTER — HOSPITAL ENCOUNTER (EMERGENCY)
Facility: HOSPITAL | Age: 24
Discharge: HOME | End: 2019-03-19
Attending: EMERGENCY MEDICINE
Payer: MEDICAID

## 2019-03-19 VITALS
OXYGEN SATURATION: 99 % | TEMPERATURE: 99 F | HEART RATE: 98 BPM | SYSTOLIC BLOOD PRESSURE: 130 MMHG | DIASTOLIC BLOOD PRESSURE: 74 MMHG | RESPIRATION RATE: 20 BRPM

## 2019-03-19 DIAGNOSIS — J06.9 URI, ACUTE: Primary | ICD-10-CM

## 2019-03-19 LAB
DEPRECATED S PYO AG THROAT QL EIA: NEGATIVE
INFLUENZA A, MOLECULAR: NEGATIVE
INFLUENZA B, MOLECULAR: NEGATIVE
SPECIMEN SOURCE: NORMAL

## 2019-03-19 PROCEDURE — 99283 EMERGENCY DEPT VISIT LOW MDM: CPT | Mod: ER

## 2019-03-19 PROCEDURE — 25000003 PHARM REV CODE 250: Mod: ER | Performed by: PHYSICIAN ASSISTANT

## 2019-03-19 PROCEDURE — 87502 INFLUENZA DNA AMP PROBE: CPT | Mod: ER

## 2019-03-19 PROCEDURE — 87880 STREP A ASSAY W/OPTIC: CPT | Mod: ER

## 2019-03-19 PROCEDURE — 87081 CULTURE SCREEN ONLY: CPT | Mod: ER

## 2019-03-19 RX ORDER — BENZONATATE 100 MG/1
100 CAPSULE ORAL 3 TIMES DAILY PRN
Qty: 10 CAPSULE | Refills: 0 | Status: SHIPPED | OUTPATIENT
Start: 2019-03-19 | End: 2019-03-29

## 2019-03-19 RX ORDER — ACETAMINOPHEN 325 MG/1
650 TABLET ORAL
Status: COMPLETED | OUTPATIENT
Start: 2019-03-19 | End: 2019-03-19

## 2019-03-19 RX ADMIN — ACETAMINOPHEN 650 MG: 325 TABLET ORAL at 11:03

## 2019-03-19 NOTE — DISCHARGE INSTRUCTIONS
YourStrep test and influenza test were both negative.  You are instructed to follow up with your primary care provider for re-evaluation within 3 days.  You are instructed to return to the emergency department immediately for any new or worsening symptoms.

## 2019-03-19 NOTE — ED PROVIDER NOTES
Encounter Date: 3/19/2019       History     Chief Complaint   Patient presents with    Influenza     3 days been feeling body aches chills sore throat      23-year-old female presents emergency department for evaluation of 2 day history of nasal congestion, cough, sore throat and body aches.  She reports that the symptoms began gradually yesterday morning and have been constant since onset.  She reports that yesterday she had a fever of 100.6.  No treatment was attempted prior to arrival.  She reports that the cough is a nonproductive cough.  She reports that she had 1 episode of posttussive vomiting yesterday.  She denies any headache, dizziness, vision changes, neck pain, chest pain, shortness of breath, abdominal pain, flank pain or dysuria.  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  She reports that her last menstrual period was 3 months ago, however she reports that this her as she has irregular menstrual cycles.  She states that she is not pregnant, she reports that she took a pregnancy test 2 days ago and was negative.          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 No pertinent surgical history.  Family History   Problem Relation Age of Onset    Diabetes Paternal Aunt     Hypertension Maternal Grandfather     Cancer Paternal Grandmother     Breast cancer Paternal Grandmother     Leukemia Paternal Grandmother     Kidney disease Maternal Grandmother     Hypertension Mother      Social History     Tobacco Use    Smoking status: Never Smoker    Smokeless tobacco: Never Used   Substance Use Topics    Alcohol use: No    Drug use: No     Review of Systems   Constitutional: Negative for activity change, appetite change and fever.   HENT: Negative for congestion, ear discharge, sinus pressure, sinus pain and sore throat.    Eyes:  Negative for photophobia and visual disturbance.   Respiratory: Negative for cough, chest tightness, shortness of breath and wheezing.    Cardiovascular: Negative for chest pain.   Gastrointestinal: Negative for abdominal pain, constipation, diarrhea, nausea and vomiting.   Genitourinary: Negative for decreased urine volume, dysuria, flank pain and hematuria.   Musculoskeletal: Negative for back pain, gait problem, joint swelling and neck pain.   Neurological: Negative for dizziness, syncope, light-headedness, numbness and headaches.       Physical Exam     Initial Vitals [03/19/19 1136]   BP Pulse Resp Temp SpO2   135/79 102 20 99.1 °F (37.3 °C) 99 %      MAP       --         Physical Exam    Nursing note and vitals reviewed.  Constitutional: She appears well-developed and well-nourished. She is not diaphoretic. No distress.   HENT:   Head: Normocephalic and atraumatic.   Right Ear: External ear normal.   Left Ear: External ear normal.   Nose: Nose normal.   Mouth/Throat: Oropharynx is clear and moist.   TMs reveal no erythema.  Posterior pharynx reveals erythema, edema or tonsillar exudate. No uvular edema or deviation noted. No trismus, stridor or drooling noted.     Eyes: Conjunctivae and EOM are normal. Pupils are equal, round, and reactive to light.   Neck: Normal range of motion. Neck supple.   Cardiovascular: Normal rate, regular rhythm and normal heart sounds. Exam reveals no gallop and no friction rub.    No murmur heard.  Pulmonary/Chest: Breath sounds normal. No respiratory distress. She has no wheezes. She has no rhonchi. She has no rales. She exhibits no tenderness.   Abdominal: Soft. Bowel sounds are normal. She exhibits no distension. There is no tenderness. There is no rebound.   Lymphadenopathy:     She has no cervical adenopathy.   Neurological: She is alert and oriented to person, place, and time.   Skin: Skin is warm and dry.   Psychiatric: She has a normal mood and affect.         ED Course    Procedures  Labs Reviewed   INFLUENZA A & B BY MOLECULAR   THROAT SCREEN, RAPID          Imaging Results    None          Medical Decision Making:   Initial Assessment:   23-year-old female presents for evaluation of nasal congestion, pharyngitis and body aches.  Physical exam reveals a nontoxic-appearing female in no acute distress.  Patient is afebrile vital signs within normal limits.  Neurological exam reveals an alert and oriented patient.TMs reveal no erythema.  Posterior pharynx reveals erythema, edema or tonsillar exudate. No uvular edema or deviation noted. No trismus, stridor or drooling noted.  Neck is supple, no meningeal signs noted. Lungs clear to auscultation bilaterally. No respiratory distress or accessory muscle use noted.  Abdominal exam reveals soft abdomen, nontender to palpation. No CVA tenderness noted.  Differential Diagnosis:   Influenza  Viral URI  Streptococcal pharyngitis  Carefully considered but doubt serious etiology including pneumonia or consolidation.  ED Management:  Patient refused UPT stating she is not pregnant.  Patient does not want urinalysis stating she is not having any urinary symptoms. Influenza negative.  Rapid strep negative. Probable URI of likely viral etiology.  Symptomatic treatment advised including plenty of clear fluid.  She was instructed to follow up with her primary care provider for re-evaluation within 3 days.                      Clinical Impression:       ICD-10-CM ICD-9-CM   1. URI, acute J06.9 465.9                                Bettie Anand PA-C  03/19/19 1245

## 2019-03-21 LAB — BACTERIA THROAT CULT: NORMAL

## 2019-04-08 ENCOUNTER — OFFICE VISIT (OUTPATIENT)
Dept: OBSTETRICS AND GYNECOLOGY | Facility: CLINIC | Age: 24
End: 2019-04-08
Payer: MEDICAID

## 2019-04-08 ENCOUNTER — LAB VISIT (OUTPATIENT)
Dept: LAB | Facility: HOSPITAL | Age: 24
End: 2019-04-08
Attending: OBSTETRICS & GYNECOLOGY
Payer: MEDICAID

## 2019-04-08 VITALS
SYSTOLIC BLOOD PRESSURE: 116 MMHG | DIASTOLIC BLOOD PRESSURE: 86 MMHG | BODY MASS INDEX: 27.49 KG/M2 | WEIGHT: 165.19 LBS

## 2019-04-08 DIAGNOSIS — Z01.419 WELL WOMAN EXAM WITH ROUTINE GYNECOLOGICAL EXAM: Primary | ICD-10-CM

## 2019-04-08 DIAGNOSIS — N97.0 FEMALE INFERTILITY ASSOCIATED WITH ANOVULATION: ICD-10-CM

## 2019-04-08 DIAGNOSIS — N92.6 MENSES, IRREGULAR: ICD-10-CM

## 2019-04-08 LAB
FSH SERPL-ACNC: 6.5 MIU/ML
TSH SERPL DL<=0.005 MIU/L-ACNC: 1.99 UIU/ML (ref 0.4–4)

## 2019-04-08 PROCEDURE — 88175 CYTOPATH C/V AUTO FLUID REDO: CPT

## 2019-04-08 PROCEDURE — 86592 SYPHILIS TEST NON-TREP QUAL: CPT | Mod: PO

## 2019-04-08 PROCEDURE — 99999 PR PBB SHADOW E&M-EST. PATIENT-LVL II: CPT | Mod: PBBFAC,,, | Performed by: OBSTETRICS & GYNECOLOGY

## 2019-04-08 PROCEDURE — 99999 PR PBB SHADOW E&M-EST. PATIENT-LVL II: ICD-10-PCS | Mod: PBBFAC,,, | Performed by: OBSTETRICS & GYNECOLOGY

## 2019-04-08 PROCEDURE — 99395 PREV VISIT EST AGE 18-39: CPT | Mod: S$PBB,,, | Performed by: OBSTETRICS & GYNECOLOGY

## 2019-04-08 PROCEDURE — 83001 ASSAY OF GONADOTROPIN (FSH): CPT | Mod: PO

## 2019-04-08 PROCEDURE — 86703 HIV-1/HIV-2 1 RESULT ANTBDY: CPT | Mod: PO

## 2019-04-08 PROCEDURE — 99212 OFFICE O/P EST SF 10 MIN: CPT | Mod: PBBFAC,PN | Performed by: OBSTETRICS & GYNECOLOGY

## 2019-04-08 PROCEDURE — 87491 CHLMYD TRACH DNA AMP PROBE: CPT

## 2019-04-08 PROCEDURE — 84443 ASSAY THYROID STIM HORMONE: CPT | Mod: PO

## 2019-04-08 PROCEDURE — 36415 COLL VENOUS BLD VENIPUNCTURE: CPT | Mod: PO

## 2019-04-08 PROCEDURE — 99395 PR PREVENTIVE VISIT,EST,18-39: ICD-10-PCS | Mod: S$PBB,,, | Performed by: OBSTETRICS & GYNECOLOGY

## 2019-04-08 RX ORDER — MEDROXYPROGESTERONE ACETATE 10 MG/1
10 TABLET ORAL DAILY
Qty: 28 TABLET | Refills: 4 | Status: SHIPPED | OUTPATIENT
Start: 2019-04-08 | End: 2019-06-26

## 2019-04-08 NOTE — PROGRESS NOTES
CC: Annual check-up    SUBJECTIVE:   23 y.o. female   for annual routine Pap and checkup. No LMP recorded (lmp unknown)..  She complains of irreg cycles, off provera, never did a semenanalysis, questionable compliance with provera and asking about clomid.    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 No pertinent surgical history.  Social History     Socioeconomic History    Marital status: Single     Spouse name: Not on file    Number of children: Not on file    Years of education: Not on file    Highest education level: Not on file   Occupational History    Not on file   Social Needs    Financial resource strain: Not on file    Food insecurity:     Worry: Not on file     Inability: Not on file    Transportation needs:     Medical: Not on file     Non-medical: Not on file   Tobacco Use    Smoking status: Never Smoker    Smokeless tobacco: Never Used   Substance and Sexual Activity    Alcohol use: No    Drug use: No    Sexual activity: Not Currently   Lifestyle    Physical activity:     Days per week: Not on file     Minutes per session: Not on file    Stress: Not on file   Relationships    Social connections:     Talks on phone: Not on file     Gets together: Not on file     Attends [employer] service: Not on file     Active member of club or organization: Not on file     Attends meetings of clubs or organizations: Not on file     Relationship status: Not on file   Other Topics Concern    Not on file   Social History Narrative    Not on file     Family History   Problem Relation Age of Onset    Diabetes Paternal Aunt     Hypertension Maternal Grandfather     Cancer Paternal Grandmother     Breast cancer Paternal Grandmother     Leukemia Paternal Grandmother     Kidney disease Maternal Grandmother     Hypertension Mother      OB History    Para Term  AB Living   0 0 0 0 0 0    SAB TAB Ectopic Multiple Live Births   0 0 0 0 0         Current Outpatient Medications   Medication Sig Dispense Refill    medroxyPROGESTERone (PROVERA) 10 MG tablet Take 1 tablet (10 mg total) by mouth once daily. On days 14-27 of each [facility] 28 tablet 4     No current facility-administered medications for this visit.      Allergies: Patient has no known allergies.     ROS:  Constitutional: no weight loss, weight gain, fever, fatigue  Eyes:  No vision changes, glasses/contacts  ENT/Mouth: No ulcers, sinus problems, ears ringing, headache  Cardiovascular: No inability to lie flat, chest pain, exercise intolerance, swelling, heart palpitations  Respiratory: No wheezing, coughing blood, shortness of breath, or cough  Gastrointestinal: No diarrhea, bloody stool, nausea/vomiting, constipation, gas, hemorrhoids  Genitourinary: No blood in urine, painful urination, urgency of urination, frequency of urination, incomplete emptying, incontinence, abnormal bleeding, painful periods, heavy periods, vaginal discharge, vaginal odor, painful intercourse, sexual problems, bleeding after intercourse.  Musculoskeletal: No muscle weakness  Skin/Breast: No painful breasts, nipple discharge, masses, rash, ulcers  Neurological: No passing out, seizures, numbness, headache  Endocrine: No diabetes, hypothyroid, hyperthyroid, hot flashes, hair loss, abnormal hair growth, ance  Psychiatric: No depression, crying  Hematologic: No bruises, bleeding, swollen lymph nodes, anemia.      OBJECTIVE:   The patient appears well, alert, oriented x 3, in no distress.  /86   Wt 74.9 kg (165 lb 3.2 oz)   LMP  (LMP Unknown)   BMI 27.49 kg/m²   NECK: no thyromegaly, trachea midline  SKIN: no acne, striae, hirsutism  BREAST EXAM: breasts appear normal, no suspicious masses, no skin or nipple changes or axillary nodes  ABDOMEN: no hernias, masses, or hepatosplenomegaly  GENITALIA: normal external genitalia, no erythema, no discharge  URETHRA:  normal urethra, normal urethral meatus  VAGINA: Normal  CERVIX: no lesions or cervical motion tenderness and anterior  UTERUS: normal  ADNEXA: normal adnexa      ASSESSMENT:   well woman  1. Well woman exam with routine gynecological exam    2. Menses, irregular    3. 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

## 2019-04-09 LAB
HIV 1+2 AB+HIV1 P24 AG SERPL QL IA: NEGATIVE
RPR SER QL: NORMAL

## 2019-04-10 LAB
C TRACH DNA SPEC QL NAA+PROBE: NOT DETECTED
N GONORRHOEA DNA SPEC QL NAA+PROBE: NOT DETECTED

## 2019-06-26 ENCOUNTER — HOSPITAL ENCOUNTER (EMERGENCY)
Facility: HOSPITAL | Age: 24
Discharge: HOME | End: 2019-06-26
Attending: SURGERY
Payer: MEDICAID

## 2019-06-26 VITALS
RESPIRATION RATE: 18 BRPM | DIASTOLIC BLOOD PRESSURE: 72 MMHG | SYSTOLIC BLOOD PRESSURE: 112 MMHG | HEART RATE: 66 BPM | BODY MASS INDEX: 27.32 KG/M2 | WEIGHT: 164 LBS | HEIGHT: 65 IN | TEMPERATURE: 98 F | OXYGEN SATURATION: 99 %

## 2019-06-26 DIAGNOSIS — N76.0 ACUTE VAGINITIS: ICD-10-CM

## 2019-06-26 DIAGNOSIS — N76.0 BACTERIAL VAGINOSIS: Primary | ICD-10-CM

## 2019-06-26 DIAGNOSIS — B96.89 BACTERIAL VAGINOSIS: Primary | ICD-10-CM

## 2019-06-26 LAB
BACTERIA GENITAL QL WET PREP: ABNORMAL
CLUE CELLS VAG QL WET PREP: ABNORMAL
FILAMENT FUNGI VAG WET PREP-#/AREA: ABNORMAL
SPECIMEN SOURCE: ABNORMAL
T VAGINALIS GENITAL QL WET PREP: ABNORMAL
WBC #/AREA VAG WET PREP: ABNORMAL
YEAST GENITAL QL WET PREP: ABNORMAL

## 2019-06-26 PROCEDURE — 99284 EMERGENCY DEPT VISIT MOD MDM: CPT | Mod: 25,ER

## 2019-06-26 PROCEDURE — 25000003 PHARM REV CODE 250: Mod: ER | Performed by: PHYSICIAN ASSISTANT

## 2019-06-26 PROCEDURE — 96372 THER/PROPH/DIAG INJ SC/IM: CPT | Mod: ER

## 2019-06-26 PROCEDURE — 87210 SMEAR WET MOUNT SALINE/INK: CPT | Mod: ER

## 2019-06-26 PROCEDURE — 63600175 PHARM REV CODE 636 W HCPCS: Mod: ER | Performed by: PHYSICIAN ASSISTANT

## 2019-06-26 PROCEDURE — 87491 CHLMYD TRACH DNA AMP PROBE: CPT | Mod: ER

## 2019-06-26 RX ORDER — CEFTRIAXONE 250 MG/1
250 INJECTION, POWDER, FOR SOLUTION INTRAMUSCULAR; INTRAVENOUS
Status: COMPLETED | OUTPATIENT
Start: 2019-06-26 | End: 2019-06-26

## 2019-06-26 RX ORDER — AZITHROMYCIN 250 MG/1
1000 TABLET, FILM COATED ORAL
Status: COMPLETED | OUTPATIENT
Start: 2019-06-26 | End: 2019-06-26

## 2019-06-26 RX ORDER — ONDANSETRON 4 MG/1
4 TABLET, ORALLY DISINTEGRATING ORAL
Status: COMPLETED | OUTPATIENT
Start: 2019-06-26 | End: 2019-06-26

## 2019-06-26 RX ORDER — METRONIDAZOLE 500 MG/1
500 TABLET ORAL ONCE
Qty: 4 TABLET | Refills: 0 | Status: SHIPPED | OUTPATIENT
Start: 2019-06-26 | End: 2019-06-26

## 2019-06-26 RX ORDER — FLUCONAZOLE 150 MG/1
150 TABLET ORAL DAILY
Qty: 1 TABLET | Refills: 0 | Status: SHIPPED | OUTPATIENT
Start: 2019-06-26 | End: 2019-06-27

## 2019-06-26 RX ADMIN — CEFTRIAXONE SODIUM 250 MG: 250 INJECTION, POWDER, FOR SOLUTION INTRAMUSCULAR; INTRAVENOUS at 10:06

## 2019-06-26 RX ADMIN — AZITHROMYCIN MONOHYDRATE 1000 MG: 250 TABLET ORAL at 10:06

## 2019-06-26 RX ADMIN — ONDANSETRON 4 MG: 4 TABLET, ORALLY DISINTEGRATING ORAL at 10:06

## 2019-06-27 LAB
C TRACH DNA SPEC QL NAA+PROBE: NOT DETECTED
N GONORRHOEA DNA SPEC QL NAA+PROBE: NOT DETECTED

## 2019-06-27 NOTE — ED PROVIDER NOTES
Encounter Date: 6/26/2019       History     Chief Complaint   Patient presents with    Vaginal Itching     Pt reports she was recently on an antibiotic and started with vaginal itching and burning. Pt also reoprts swelling to the vaginal area. Pt denies dysuria.      Patient is a 23-year-old female presenting with complaint of vaginal itching and irritation that began today.  She was recently on antibiotic and thinks that she may have a yeast infection.  She is also concerned that she could have STD.  She is questioning the face fullness of her sexual partner.  She would like to be tested and treated.  No treatment for his symptoms prior to arrival.        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 No pertinent surgical history.  Family History   Problem Relation Age of Onset    Diabetes Paternal Aunt     Hypertension Maternal Grandfather     Cancer Paternal Grandmother     Breast cancer Paternal Grandmother     Leukemia Paternal Grandmother     Kidney disease Maternal Grandmother     Hypertension Mother      Social History     Tobacco Use    Smoking status: Never Smoker    Smokeless tobacco: Never Used   Substance Use Topics    Alcohol use: No    Drug use: No     Review of Systems   Constitutional: Negative for activity change, appetite change, chills, fatigue and fever.   HENT: Negative for congestion, ear pain, rhinorrhea, sore throat and voice change.    Respiratory: Negative for cough, shortness of breath and wheezing.    Cardiovascular: Negative for chest pain.   Gastrointestinal: Negative for abdominal pain, nausea and vomiting.   Genitourinary: Positive for pelvic pain, vaginal discharge and vaginal pain. Negative for dysuria, flank pain, frequency and hematuria.   Musculoskeletal: Negative for neck pain and neck stiffness.   Skin: Negative for rash.    Neurological: Negative for dizziness and headaches.   All other systems reviewed and are negative.      Physical Exam     Initial Vitals [06/26/19 2126]   BP Pulse Resp Temp SpO2   117/64 85 18 98.8 °F (37.1 °C) 99 %      MAP       --         Physical Exam    Nursing note and vitals reviewed.  Constitutional: She appears well-developed and well-nourished. She appears distressed (Mild).   HENT:   Head: Normocephalic.   Nose: Nose normal.   Mouth/Throat: Oropharynx is clear and moist.   Eyes: Conjunctivae and EOM are normal. Pupils are equal, round, and reactive to light.   Neck: Normal range of motion. Neck supple.   Cardiovascular: Normal rate, regular rhythm, normal heart sounds and intact distal pulses.   Pulmonary/Chest: Breath sounds normal. No respiratory distress.   Abdominal: Soft. Bowel sounds are normal. There is no tenderness.   Genitourinary:   Genitourinary Comments: Thin white vaginal discharge with occasional thick clumps.  No lesions. No cervical motion tenderness.   Musculoskeletal: She exhibits no edema.   Lymphadenopathy:     She has no cervical adenopathy.   Neurological: She is alert and oriented to person, place, and time.   Skin: Skin is warm and dry. No rash noted.   Psychiatric: She has a normal mood and affect. Her behavior is normal. Judgment and thought content normal.         ED Course   Procedures  Labs Reviewed   VAGINAL SCREEN - Abnormal; Notable for the following components:       Result Value    Clue Cells Few (*)     WBC - Vaginal Screen Occasional (*)     Bacteria - Vaginal Screen Moderate (*)     All other components within normal limits   C. TRACHOMATIS/N. GONORRHOEAE BY AMP DNA   VAGINOSIS SCREEN BY DNA PROBE          Imaging Results    None          Medical Decision Making:   Clinical Tests:   Lab Tests: Ordered and Reviewed       <> Summary of Lab: Wet prep positive for clue cells and bacteria.  Prescription for Flagyl for bacterial vaginosis.  GC and chlamydia are pending.   Patient has been treated prophylactically with IM Rocephin and p.o. azithromycin.  She was also given a prescription for Diflucan.  Follow-up with her gynecologist.  No sex until she received test results and partners treated if needed.  She has no evidence of PID at this time.                      Clinical Impression:       ICD-10-CM ICD-9-CM   1. Bacterial vaginosis N76.0 616.10    B96.89 041.9   2. Acute vaginitis N76.0 616.10         Disposition:   Disposition: Discharged                        MONICA Ham  06/26/19 9005

## 2019-06-27 NOTE — DISCHARGE INSTRUCTIONS
No sex until you receive test results.  Follow-up with your PCP for further STD testing including HIV.

## 2019-07-08 ENCOUNTER — HOSPITAL ENCOUNTER (EMERGENCY)
Facility: HOSPITAL | Age: 24
Discharge: HOME | End: 2019-07-09
Attending: FAMILY MEDICINE
Payer: MEDICAID

## 2019-07-08 DIAGNOSIS — N92.1 MENORRHAGIA WITH IRREGULAR CYCLE: Primary | ICD-10-CM

## 2019-07-08 DIAGNOSIS — D64.9 ANEMIA, UNSPECIFIED TYPE: ICD-10-CM

## 2019-07-08 DIAGNOSIS — N30.01 ACUTE CYSTITIS WITH HEMATURIA: ICD-10-CM

## 2019-07-08 PROCEDURE — 99284 EMERGENCY DEPT VISIT MOD MDM: CPT | Mod: ER

## 2019-07-09 VITALS
TEMPERATURE: 98 F | SYSTOLIC BLOOD PRESSURE: 109 MMHG | BODY MASS INDEX: 28 KG/M2 | DIASTOLIC BLOOD PRESSURE: 57 MMHG | OXYGEN SATURATION: 99 % | HEART RATE: 75 BPM | HEIGHT: 64 IN | RESPIRATION RATE: 18 BRPM | WEIGHT: 164 LBS

## 2019-07-09 LAB
ALBUMIN SERPL BCP-MCNC: 4.2 G/DL (ref 3.5–5.2)
ALP SERPL-CCNC: 55 U/L (ref 38–126)
ALT SERPL W/O P-5'-P-CCNC: 11 U/L (ref 10–44)
ANION GAP SERPL CALC-SCNC: 10 MMOL/L (ref 8–16)
AST SERPL-CCNC: 17 U/L (ref 15–46)
B-HCG UR QL: NEGATIVE
BACTERIA #/AREA URNS AUTO: ABNORMAL /HPF
BASOPHILS # BLD AUTO: 0.04 K/UL (ref 0–0.2)
BASOPHILS NFR BLD: 0.4 % (ref 0–1.9)
BILIRUB SERPL-MCNC: 0.5 MG/DL (ref 0.1–1)
BILIRUB UR QL STRIP: NEGATIVE
BUN SERPL-MCNC: 14 MG/DL (ref 7–17)
CALCIUM SERPL-MCNC: 9.1 MG/DL (ref 8.7–10.5)
CHLORIDE SERPL-SCNC: 104 MMOL/L (ref 95–110)
CLARITY UR REFRACT.AUTO: ABNORMAL
CO2 SERPL-SCNC: 25 MMOL/L (ref 23–29)
COLOR UR AUTO: ABNORMAL
CREAT SERPL-MCNC: 0.93 MG/DL (ref 0.5–1.4)
DIFFERENTIAL METHOD: ABNORMAL
EOSINOPHIL # BLD AUTO: 0.3 K/UL (ref 0–0.5)
EOSINOPHIL NFR BLD: 2.9 % (ref 0–8)
ERYTHROCYTE [DISTWIDTH] IN BLOOD BY AUTOMATED COUNT: 15.7 % (ref 11.5–14.5)
EST. GFR  (AFRICAN AMERICAN): >60 ML/MIN/1.73 M^2
EST. GFR  (NON AFRICAN AMERICAN): >60 ML/MIN/1.73 M^2
GLUCOSE SERPL-MCNC: 86 MG/DL (ref 70–110)
GLUCOSE UR QL STRIP: NEGATIVE
HCT VFR BLD AUTO: 32.7 % (ref 37–48.5)
HGB BLD-MCNC: 10.2 G/DL (ref 12–16)
HGB UR QL STRIP: ABNORMAL
HYALINE CASTS UR QL AUTO: 0 /LPF
KETONES UR QL STRIP: ABNORMAL
LEUKOCYTE ESTERASE UR QL STRIP: ABNORMAL
LYMPHOCYTES # BLD AUTO: 3.5 K/UL (ref 1–4.8)
LYMPHOCYTES NFR BLD: 31.2 % (ref 18–48)
MCH RBC QN AUTO: 24.3 PG (ref 27–31)
MCHC RBC AUTO-ENTMCNC: 31.2 G/DL (ref 32–36)
MCV RBC AUTO: 78 FL (ref 82–98)
MICROSCOPIC COMMENT: ABNORMAL
MONOCYTES # BLD AUTO: 0.7 K/UL (ref 0.3–1)
MONOCYTES NFR BLD: 6.4 % (ref 4–15)
NEUTROPHILS # BLD AUTO: 6.6 K/UL (ref 1.8–7.7)
NEUTROPHILS NFR BLD: 59.1 % (ref 38–73)
NITRITE UR QL STRIP: NEGATIVE
PH UR STRIP: 7 [PH] (ref 5–8)
PLATELET # BLD AUTO: 231 K/UL (ref 150–350)
PMV BLD AUTO: 11 FL (ref 9.2–12.9)
POTASSIUM SERPL-SCNC: 4.1 MMOL/L (ref 3.5–5.1)
PROT SERPL-MCNC: 7.5 G/DL (ref 6–8.4)
PROT UR QL STRIP: ABNORMAL
RBC # BLD AUTO: 4.2 M/UL (ref 4–5.4)
RBC #/AREA URNS AUTO: >100 /HPF (ref 0–4)
SODIUM SERPL-SCNC: 139 MMOL/L (ref 136–145)
SP GR UR STRIP: 1.01 (ref 1–1.03)
SQUAMOUS #/AREA URNS AUTO: ABNORMAL /HPF
URN SPEC COLLECT METH UR: ABNORMAL
UROBILINOGEN UR STRIP-ACNC: ABNORMAL EU/DL
WBC # BLD AUTO: 11.3 K/UL (ref 3.9–12.7)
WBC #/AREA URNS AUTO: 10 /HPF (ref 0–5)

## 2019-07-09 PROCEDURE — 25000003 PHARM REV CODE 250: Mod: ER | Performed by: FAMILY MEDICINE

## 2019-07-09 PROCEDURE — 81025 URINE PREGNANCY TEST: CPT | Mod: ER

## 2019-07-09 PROCEDURE — 80053 COMPREHEN METABOLIC PANEL: CPT | Mod: ER

## 2019-07-09 PROCEDURE — 85025 COMPLETE CBC W/AUTO DIFF WBC: CPT | Mod: ER

## 2019-07-09 PROCEDURE — 81000 URINALYSIS NONAUTO W/SCOPE: CPT | Mod: ER

## 2019-07-09 RX ORDER — NITROFURANTOIN 25; 75 MG/1; MG/1
100 CAPSULE ORAL 2 TIMES DAILY
Qty: 14 CAPSULE | Refills: 0 | Status: SHIPPED | OUTPATIENT
Start: 2019-07-09 | End: 2019-07-15

## 2019-07-09 RX ORDER — IBUPROFEN 800 MG/1
800 TABLET ORAL EVERY 6 HOURS PRN
Qty: 20 TABLET | Refills: 0 | Status: SHIPPED | OUTPATIENT
Start: 2019-07-09 | End: 2020-01-04 | Stop reason: CLARIF

## 2019-07-09 RX ORDER — IBUPROFEN 400 MG/1
800 TABLET ORAL
Status: COMPLETED | OUTPATIENT
Start: 2019-07-09 | End: 2019-07-09

## 2019-07-09 RX ADMIN — SODIUM CHLORIDE 1000 ML: 0.9 INJECTION, SOLUTION INTRAVENOUS at 01:07

## 2019-07-09 RX ADMIN — IBUPROFEN 800 MG: 400 TABLET, FILM COATED ORAL at 01:07

## 2019-07-09 NOTE — ED PROVIDER NOTES
"Encounter Date: 7/8/2019       History     Chief Complaint   Patient presents with    Vaginal Bleeding     PT on menstrual cycle x4 days. PT stated, "When I was taking a bath the water turned red, I got out the bath to pee and had golf ball sized blood clots." 2 pads per hour. Denies any nausea, vomiting. PT has history of large blood clots and heavy periods. Complaints of lower back pain and heavy cramping with cycle.      23-year-old female presents with chief complaint of heavy vaginal bleeding for the last 4 days.  Patient reports has some mild lightheadedness when she stands.  Patient denies any chest pain or shortness of breath.  Denies any fever or chills. Patient has a history of irregular periods.  Had not had cycles for couple of months.        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 No pertinent surgical history.  Family History   Problem Relation Age of Onset    Diabetes Paternal Aunt     Hypertension Maternal Grandfather     Cancer Paternal Grandmother     Breast cancer Paternal Grandmother     Leukemia Paternal Grandmother     Kidney disease Maternal Grandmother     Hypertension Mother      Social History     Tobacco Use    Smoking status: Never Smoker    Smokeless tobacco: Never Used   Substance Use Topics    Alcohol use: No    Drug use: No     Review of Systems   Constitutional: Negative for chills and fever.   Genitourinary: Positive for vaginal bleeding. Negative for vaginal pain.   Neurological: Positive for dizziness.   All other systems reviewed and are negative.      Physical Exam     Initial Vitals [07/08/19 2341]   BP Pulse Resp Temp SpO2   116/64 74 20 98.4 °F (36.9 °C) 99 %      MAP       --         Physical Exam    Nursing note and vitals reviewed.  Constitutional: She appears well-developed and well-nourished.   HENT:   Head: Normocephalic " and atraumatic.   Eyes: EOM are normal. Pupils are equal, round, and reactive to light.   Neck: Normal range of motion. Neck supple.   Cardiovascular: Normal rate, regular rhythm and normal heart sounds.   Pulmonary/Chest: Breath sounds normal.   Abdominal: Soft. Bowel sounds are normal.   Genitourinary: Pelvic exam was performed with patient prone. No erythema or bleeding (No active bleeding noted no significant blood in the vaginal vault noted) in the vagina.   Musculoskeletal: Normal range of motion.   Neurological: She is alert and oriented to person, place, and time. She has normal strength. GCS score is 15. GCS eye subscore is 4. GCS verbal subscore is 5. GCS motor subscore is 6.   Skin: Skin is warm. Capillary refill takes less than 2 seconds.   Psychiatric: She has a normal mood and affect. Her behavior is normal. Thought content normal.         ED Course   Procedures  Labs Reviewed   CBC W/ AUTO DIFFERENTIAL - Abnormal; Notable for the following components:       Result Value    Hemoglobin 10.2 (*)     Hematocrit 32.7 (*)     Mean Corpuscular Volume 78 (*)     Mean Corpuscular Hemoglobin 24.3 (*)     Mean Corpuscular Hemoglobin Conc 31.2 (*)     RDW 15.7 (*)     All other components within normal limits   URINALYSIS, REFLEX TO URINE CULTURE - Abnormal; Notable for the following components:    Color, UA Orange (*)     Appearance, UA Cloudy (*)     Protein, UA 1+ (*)     Ketones, UA 1+ (*)     Occult Blood UA 3+ (*)     Urobilinogen, UA 4.0-6.0 (*)     Leukocytes, UA 2+ (*)     All other components within normal limits    Narrative:     Preferred Collection Type->Urine, Clean Catch   URINALYSIS MICROSCOPIC - Abnormal; Notable for the following components:    RBC, UA >100 (*)     WBC, UA 10 (*)     All other components within normal limits    Narrative:     Preferred Collection Type->Urine, Clean Catch   COMPREHENSIVE METABOLIC PANEL   PREGNANCY TEST, URINE RAPID          Imaging Results    None           Medical Decision Making:   Differential Diagnosis:   Dysfunctional uterine bleeding, fibroids, menorrhagia  Clinical Tests:   Lab Tests: Ordered and Reviewed  The following lab test(s) were unremarkable: CBC, CMP and UPT       <> Summary of Lab: CBC patient is anemic with a negative UPT  ED Management:  Patient administered IV fluids re-evaluated patient continued to exhibit no evidence of active hemorrhaging.  Patient was comfortable caval following up with Dr. Gamino in as an outpatient.  In light of the back to earlier noted in the urine start the patient on Macrodantin twice daily did also advise the patient to take iron supplement daily.                      Clinical Impression:       ICD-10-CM ICD-9-CM   1. Menorrhagia with irregular cycle N92.1 626.2   2. Anemia, unspecified type D64.9 285.9   3. Acute cystitis with hematuria N30.01 595.0                                Edison Flower MD  07/09/19 0136       Edison Flower MD  07/09/19 0138

## 2019-07-15 ENCOUNTER — OFFICE VISIT (OUTPATIENT)
Dept: OBSTETRICS AND GYNECOLOGY | Facility: CLINIC | Age: 24
End: 2019-07-15
Payer: MEDICAID

## 2019-07-15 ENCOUNTER — PATIENT MESSAGE (OUTPATIENT)
Dept: OBSTETRICS AND GYNECOLOGY | Facility: CLINIC | Age: 24
End: 2019-07-15

## 2019-07-15 VITALS — DIASTOLIC BLOOD PRESSURE: 60 MMHG | SYSTOLIC BLOOD PRESSURE: 112 MMHG | WEIGHT: 159 LBS | BODY MASS INDEX: 27.29 KG/M2

## 2019-07-15 DIAGNOSIS — N92.1 MENORRHAGIA WITH IRREGULAR CYCLE: Primary | ICD-10-CM

## 2019-07-15 DIAGNOSIS — N92.6 MENSES, IRREGULAR: ICD-10-CM

## 2019-07-15 DIAGNOSIS — N94.6 DYSMENORRHEA: ICD-10-CM

## 2019-07-15 LAB
B-HCG UR QL: NEGATIVE
CTP QC/QA: YES

## 2019-07-15 PROCEDURE — 99213 OFFICE O/P EST LOW 20 MIN: CPT | Mod: PBBFAC,PN | Performed by: OBSTETRICS & GYNECOLOGY

## 2019-07-15 PROCEDURE — 99213 OFFICE O/P EST LOW 20 MIN: CPT | Mod: S$PBB,,, | Performed by: OBSTETRICS & GYNECOLOGY

## 2019-07-15 PROCEDURE — 99213 PR OFFICE/OUTPT VISIT, EST, LEVL III, 20-29 MIN: ICD-10-PCS | Mod: S$PBB,,, | Performed by: OBSTETRICS & GYNECOLOGY

## 2019-07-15 PROCEDURE — 99999 PR PBB SHADOW E&M-EST. PATIENT-LVL III: CPT | Mod: PBBFAC,,, | Performed by: OBSTETRICS & GYNECOLOGY

## 2019-07-15 PROCEDURE — 99999 PR PBB SHADOW E&M-EST. PATIENT-LVL III: ICD-10-PCS | Mod: PBBFAC,,, | Performed by: OBSTETRICS & GYNECOLOGY

## 2019-07-15 PROCEDURE — 81025 URINE PREGNANCY TEST: CPT | Mod: PBBFAC,PN | Performed by: OBSTETRICS & GYNECOLOGY

## 2019-07-15 RX ORDER — FERROUS SULFATE 324(65)MG
325 TABLET, DELAYED RELEASE (ENTERIC COATED) ORAL DAILY
Refills: 0 | COMMUNITY
Start: 2019-07-15 | End: 2020-01-04 | Stop reason: CLARIF

## 2019-07-15 NOTE — PROGRESS NOTES
CC:  Chief Complaint   Patient presents with    Vaginal Bleeding       HPI:  Presents today for menorrhagia and pelvic pain. Started 10 days ago, heavy bleeding, and went to the ED a week ago w/ H/H 10, it was normal 4 month ago. She was off provera ocp in April, because she was trying to conceive that time. She said she has tried nuvaring, depo shot and prefers ocp. She said her period is much lighter in the last couple of days, however she's still seeing some clots. She denies family or personal hx of blood dycrasia, sickle cell or thalasemia, no hx of nose bleed.      23 y.o.   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 No pertinent surgical history.  Family History   Problem Relation Age of Onset    Diabetes Paternal Aunt     Hypertension Maternal Grandfather     Cancer Paternal Grandmother     Breast cancer Paternal Grandmother     Leukemia Paternal Grandmother     Kidney disease Maternal Grandmother     Hypertension Mother      Social History     Tobacco Use    Smoking status: Never Smoker    Smokeless tobacco: Never Used   Substance Use Topics    Alcohol use: No    Drug use: No     ROS:  GENERAL: Feeling well overall. Denies fever or chills.   SKIN: Denies rash or lesions.   HEAD: Denies head injury or headache.   NODES: Denies enlarged lymph nodes.   CHEST: Denies chest pain or shortness of breath.   CARDIOVASCULAR: Denies palpitations or left sided chest pain.    ABDOMEN: Denies diarrhea, nausea, vomiting or rectal bleeding.   URINARY: No dysuria, hematuria, or burning on urination.  REPRODUCTIVE: See HPI.   BREASTS: Denies pain,  lumps, or nipple discharge.   HEMATOLOGIC: No easy bruisability or excessive bleeding besides period.   MUSCULOSKELETAL: Denies joint pain or swelling.   NEUROLOGIC: Denies syncope or weakness.   PSYCHIATRIC: Denies depression, anxiety or mood swings.      PE: /60   Wt 72.1 kg (159 lb)   LMP 07/04/2019   BMI 27.29 kg/m²      APPEARANCE: Well nourished, well developed, in no acute distress.  SKIN: Normal skin turgor, no lesions.  NECK: Neck symmetric without masses or thyromegaly.  NODES: No inguinal, cervical, axillary or femoral lymph node enlargement.  CARDIOVASCULAR: Normal S1, S2. No rubs, murmurs or gallops.  NEUROLOGIC: Normal mood and affect. No depression or anxiety.   ABDOMEN: Soft. No tenderness or masses. No hepatosplenomegaly. No hernias.  RESPIRATORY: Normal respiratory effort with no retractions or use of accessory muscles.  BREASTS: Symmetrical, no skin changes or visible lesions. No palpable masses, nipple discharge, or adenopathy bilaterally.   BLADDER: Non-tender  GENITALIA: normal external genitalia, no erythema, no discharge and normal uterus, size and consistency  URETHRA: not indicated and normal urethra, normal urethral meatus  Genitourinary: consistent with vaginal bleeding/spotting.     ASSESSMENT/ PLAN    Gelescia was seen today for vaginal bleeding.    Diagnoses and all orders for this visit:    Menorrhagia with irregular cycle  -     norgestrel-ethinyl estradiol (LO/OVRAL) 0.3-30 mg-mcg per tablet; Take 1 tablet by mouth once daily.    Menses, irregular  -     norgestrel-ethinyl estradiol (LO/OVRAL) 0.3-30 mg-mcg per tablet; Take 1 tablet by mouth once daily.  -     POCT urine pregnancy    Dysmenorrhea  -     norgestrel-ethinyl estradiol (LO/OVRAL) 0.3-30 mg-mcg per tablet; Take 1 tablet by mouth once daily.    Other orders  -     ferrous sulfate 324 mg (65 mg iron) TbEC; Take 1 tablet (324 mg total) by mouth once daily.  -     norgestrel-ethinyl estradiol (LO/OVRAL) 0.3-30  mg-mcg per tablet; Take 2 tablets by mouth once daily for 7 days, THEN 1 tablet once daily for 14 days, THEN No dosage for 7 days, THEN 1 tablet once daily for 21 days.      RTC 2 mo      Marty Moser MD

## 2019-07-15 NOTE — PROGRESS NOTES
CC:  Chief Complaint   Patient presents with    Vaginal Bleeding       HPI:    23 y.o.   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 No pertinent surgical history.  Family History   Problem Relation Age of Onset    Diabetes Paternal Aunt     Hypertension Maternal Grandfather     Cancer Paternal Grandmother     Breast cancer Paternal Grandmother     Leukemia Paternal Grandmother     Kidney disease Maternal Grandmother     Hypertension Mother      Social History     Tobacco Use    Smoking status: Never Smoker    Smokeless tobacco: Never Used   Substance Use Topics    Alcohol use: No    Drug use: No     ROS:  GENERAL: Feeling well overall. Denies fever or chills.   SKIN: Denies rash or lesions.   HEAD: Denies head injury or headache.   NODES: Denies enlarged lymph nodes.   CHEST: Denies chest pain or shortness of breath.   CARDIOVASCULAR: Denies palpitations or left sided chest pain.    ABDOMEN: Denies diarrhea, nausea, vomiting or rectal bleeding.   URINARY: No dysuria, hematuria, or burning on urination.  REPRODUCTIVE: See HPI.   BREASTS: Denies pain, lumps, or nipple discharge.   HEMATOLOGIC: No easy bruisability or excessive bleeding.   MUSCULOSKELETAL: Denies joint pain or swelling.   NEUROLOGIC: Denies syncope or weakness.   PSYCHIATRIC: Denies depression, anxiety or mood swings.      PE: /60   Wt 72.1 kg (159 lb)   LMP 2019   BMI 27.29 kg/m²      APPEARANCE: Well nourished, well developed, in no acute distress.  SKIN: Normal skin turgor, no lesions.  NECK: Neck symmetric without masses or thyromegaly.  NODES: No inguinal,  "cervical, axillary or femoral lymph node enlargement.  CARDIOVASCULAR: Normal S1, S2. No rubs, murmurs or gallops.  NEUROLOGIC: Normal mood and affect. No depression or anxiety.   ABDOMEN: Soft. No tenderness or masses. No hepatosplenomegaly. No hernias.  RESPIRATORY: Normal respiratory effort with no retractions or use of accessory muscles.  BREASTS: Symmetrical, no skin changes or visible lesions. No palpable masses, nipple discharge, or adenopathy bilaterally.   BLADDER: Non-tender  GENITALIA: {Exam; genitalia female:93682::"normal external genitalia, no erythema, no discharge"}  URETHRA: {urethra:979974::"normal urethra, normal urethral meatus"}  VAGINA: {Exam; female vaginal exam:84480}  CERVIX: {pe cervix ob comprehensive:121338::"no lesions or cervical motion tenderness"}  UTERUS: {exam; uterus:15979}  ADNEXA: {exam; adnexa:72144}    ASSESSMENT/ PLAN    Gelescia was seen today for vaginal bleeding.    Diagnoses and all orders for this visit:    Menses, irregular  -     norgestrel-ethinyl estradiol (LO/OVRAL) 0.3-30 mg-mcg per tablet; Take 1 tablet by mouth once daily.  -     POCT urine pregnancy    Dysmenorrhea  -     norgestrel-ethinyl estradiol (LO/OVRAL) 0.3-30 mg-mcg per tablet; Take 1 tablet by mouth once daily.    Menorrhagia with irregular cycle  -     norgestrel-ethinyl estradiol (LO/OVRAL) 0.3-30 mg-mcg per tablet; Take 1 tablet by mouth once daily.    Other orders  -     ferrous sulfate 324 mg (65 mg iron) TbEC; Take 1 tablet (324 mg total) by mouth once daily.  -     norgestrel-ethinyl estradiol (LO/OVRAL) 0.3-30 mg-mcg per tablet; Take 2 tablets by mouth once daily for 7 days, THEN 1 tablet once daily for 14 days, THEN No dosage for 7 days, THEN 1 tablet once daily for 21 days.            Marty Moser MD  "

## 2019-07-23 ENCOUNTER — HOSPITAL ENCOUNTER (EMERGENCY)
Facility: HOSPITAL | Age: 24
Discharge: HOME | End: 2019-07-23
Attending: EMERGENCY MEDICINE
Payer: MEDICAID

## 2019-07-23 VITALS
RESPIRATION RATE: 16 BRPM | SYSTOLIC BLOOD PRESSURE: 117 MMHG | BODY MASS INDEX: 26.99 KG/M2 | HEIGHT: 65 IN | TEMPERATURE: 98 F | OXYGEN SATURATION: 99 % | DIASTOLIC BLOOD PRESSURE: 74 MMHG | WEIGHT: 162 LBS | HEART RATE: 81 BPM

## 2019-07-23 DIAGNOSIS — L50.9 URTICARIA: Primary | ICD-10-CM

## 2019-07-23 PROCEDURE — 99283 EMERGENCY DEPT VISIT LOW MDM: CPT | Mod: ER

## 2019-07-23 PROCEDURE — 63600175 PHARM REV CODE 636 W HCPCS: Mod: ER | Performed by: PHYSICIAN ASSISTANT

## 2019-07-23 RX ORDER — PREDNISONE 20 MG/1
40 TABLET ORAL DAILY
Qty: 8 TABLET | Refills: 0 | Status: SHIPPED | OUTPATIENT
Start: 2019-07-23 | End: 2019-07-27

## 2019-07-23 RX ORDER — PREDNISONE 20 MG/1
40 TABLET ORAL
Status: COMPLETED | OUTPATIENT
Start: 2019-07-23 | End: 2019-07-23

## 2019-07-23 RX ADMIN — PREDNISONE 40 MG: 20 TABLET ORAL at 11:07

## 2019-07-23 NOTE — ED PROVIDER NOTES
"Encounter Date: 7/23/2019       History     Chief Complaint   Patient presents with    swelling to botton eyelid     "I woke up quintana=ith swelling the the bottom of my eyelid and there was crusty stuff all over it and yesterday it was pink" denies trauma. "My sinuses been acting up though"     24-year-old female presents to the emergency department for evaluation of swelling and itching to her right lower eyelid.  She reports that yesterday she noticed that underneath her right eye was slightly itchy and the symptoms lasted throughout the night.  She reports that this morning she woke up in her right lower eyelid was swollen.  She reports that she is not used any new makeup, lotion, detergents, hair products, medications or foods.  This reports mild itching to her right eye but denies any redness or swelling. She denies any vision changes, discharge from the eyes, foreign body sensation or pain to the eye.  She denies  headache, dizziness, fever, neck pain, nasal congestion, cough, abdominal pain or vomiting. No treatment was attempted prior to arrival.  She reports that she is not pregnant as she has been having extended vaginal bleeding for which her GYN Dr. Moser just placed her on oral birth control one week ago.         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  Family History   Problem Relation Age of Onset    Diabetes Paternal Aunt     Hypertension Maternal Grandfather     Cancer Paternal Grandmother     Breast cancer Paternal Grandmother     Leukemia Paternal Grandmother     Kidney disease Maternal Grandmother     Hypertension Mother      Social History     Tobacco Use    Smoking status: Never Smoker    Smokeless tobacco: Never Used   Substance Use Topics    Alcohol use: No    Drug use: No "     Review of Systems   Constitutional: Negative for activity change, appetite change and fever.   HENT: Negative for congestion, ear discharge, mouth sores, sinus pressure, sinus pain, sore throat, trouble swallowing and voice change.    Eyes: Positive for itching. Negative for photophobia, pain, discharge, redness and visual disturbance.   Respiratory: Negative for cough, chest tightness, shortness of breath and wheezing.    Cardiovascular: Negative for chest pain.   Gastrointestinal: Negative for abdominal pain, constipation, diarrhea, nausea and vomiting.   Genitourinary: Negative for decreased urine volume and dysuria.   Musculoskeletal: Negative for back pain, joint swelling and neck pain.   Skin: Negative for rash.   Neurological: Negative for dizziness, syncope and headaches.       Physical Exam     Initial Vitals [07/23/19 1114]   BP Pulse Resp Temp SpO2   117/74 81 16 97.9 °F (36.6 °C) 99 %      MAP       --         Physical Exam    Nursing note and vitals reviewed.  Constitutional: She appears well-developed and well-nourished. She is not diaphoretic. No distress.   HENT:   Head: Normocephalic and atraumatic. Head is without raccoon's eyes, without Hartman's sign, without right periorbital erythema and without left periorbital erythema.       Right Ear: Tympanic membrane, external ear and ear canal normal.   Left Ear: Tympanic membrane, external ear and ear canal normal.   Nose: Nose normal. No rhinorrhea or nasal deformity.   Mouth/Throat: Uvula is midline and oropharynx is clear and moist.   Eyes: Conjunctivae and EOM are normal. Pupils are equal, round, and reactive to light. Right eye exhibits no chemosis, no discharge and no exudate. No foreign body present in the right eye. Right conjunctiva is not injected. Right conjunctiva has no hemorrhage. Left conjunctiva is not injected. Left conjunctiva has no hemorrhage.       Neck: Normal range of motion. Neck supple.   Cardiovascular: Normal rate, regular  rhythm and normal heart sounds.   Pulmonary/Chest: Breath sounds normal. No respiratory distress. She has no wheezes. She has no rhonchi. She has no rales. She exhibits no tenderness.   Abdominal: Soft. Bowel sounds are normal. She exhibits no distension. There is no tenderness. There is no rebound.   Lymphadenopathy:     She has no cervical adenopathy.   Neurological: She is alert and oriented to person, place, and time.   Skin: Skin is warm and dry.   Psychiatric: She has a normal mood and affect.         ED Course   Procedures  Labs Reviewed - No data to display       Imaging Results    None          Medical Decision Making:   Initial Assessment:   24-year-old female presents for evaluation of urticarial rash. Physical exam reveals a nontoxic-appearing female in no acute distress. Patient is afebrile vital signs within normal limits.  Neurological exam reveals an alert and oriented patient.  No periorbital ecchymosis, erythema or warmth.Urticaria noted just inferior to the right lower eyelid.  No surrounding erythema, edema or induration noted.  No warmth noted.  No vesicles, pustules, ecchymosis or bulla noted. No fluctuance noted. Conjunctiva not injected bilaterally. No chemosis noted. Posterior pharynx reveals no erythema, edema or tonsillar exudate.  No perioral erythema or edema noted. No uvular edema or deviation noted. No evidence of angioedema.  Neck is supple, no meningeal signs noted.  Lungs clear to auscultation bilaterally. No respiratory distress or accessory muscle use noted.  Differential Diagnosis:   Urticaria  I carefully considered but doubt serious etiology including periorbital cellulitis.  ED Management:  Discussed these findings at length patient verbalizes understanding and agreement course treatment.  Patient given prednisone in the emergency department will prescribe prednisone upon discharge. Instructed patient to take Benadryl at home, patient cannot have Benadryl on the emergency  department as she wants to drive herself home upon discharge. Instructed patient to follow up with her primary care provider for re-evaluation and to return to the emergency department immediately for any new or worsening symptoms.                      Clinical Impression:       ICD-10-CM ICD-9-CM   1. Urticaria L50.9 708.9                                Bettie Anand PA-C  07/23/19 1150

## 2019-07-23 NOTE — DISCHARGE INSTRUCTIONS
You are advised to follow up with your primary care provider for re-evaluation within 3 days.  You are instructed to return to the emergency department immediately for any new or worsening symptoms including but not limited to increased swelling to the eyelid, redness, warmth or fever.

## 2019-09-24 ENCOUNTER — TELEPHONE (OUTPATIENT)
Dept: OBSTETRICS AND GYNECOLOGY | Facility: CLINIC | Age: 24
End: 2019-09-24

## 2019-09-24 NOTE — TELEPHONE ENCOUNTER
----- Message from Sushila WALTONMarquis Moralesjohnathan sent at 9/24/2019 12:33 PM CDT -----  Contact: [phone] self   REFILLS:    Patient is requesting a medication refill.    RX name: ibuprofen (ADVIL,MOTRIN) 800 MG tablet    Strength: 800 Mg tab    Directions: Take 1 tablet (800 mg total) by mouth every 6 (six) hours as needed for Pain    Pharmacy name: [id number] - [address] AT [facility] [address]    Phone number where pt can be reached: [phone]

## 2019-09-24 NOTE — TELEPHONE ENCOUNTER
Called patient, patient stated she was calling to to see if she could get a refill on ibuprofen 800mg that the ER doctor wrote for her in July. I asked patient what see was taking it for and she stated she is having pelvic pains and cramping. Advised patient to schedule to be seen. Appointment scheduled for 9/25/19 at 9:45am at the [place] office. Patient verbalized understanding.

## 2019-09-25 ENCOUNTER — OFFICE VISIT (OUTPATIENT)
Dept: OBSTETRICS AND GYNECOLOGY | Facility: CLINIC | Age: 24
End: 2019-09-25
Payer: MEDICAID

## 2019-09-25 VITALS
WEIGHT: 158.38 LBS | SYSTOLIC BLOOD PRESSURE: 104 MMHG | HEIGHT: 65 IN | DIASTOLIC BLOOD PRESSURE: 72 MMHG | BODY MASS INDEX: 26.39 KG/M2

## 2019-09-25 DIAGNOSIS — Z30.41 ENCOUNTER FOR SURVEILLANCE OF CONTRACEPTIVE PILLS: ICD-10-CM

## 2019-09-25 DIAGNOSIS — N94.6 DYSMENORRHEA: Primary | ICD-10-CM

## 2019-09-25 PROCEDURE — 99213 PR OFFICE/OUTPT VISIT, EST, LEVL III, 20-29 MIN: ICD-10-PCS | Mod: S$PBB,,, | Performed by: OBSTETRICS & GYNECOLOGY

## 2019-09-25 PROCEDURE — 99999 PR PBB SHADOW E&M-EST. PATIENT-LVL III: CPT | Mod: PBBFAC,,, | Performed by: OBSTETRICS & GYNECOLOGY

## 2019-09-25 PROCEDURE — 99213 OFFICE O/P EST LOW 20 MIN: CPT | Mod: S$PBB,,, | Performed by: OBSTETRICS & GYNECOLOGY

## 2019-09-25 PROCEDURE — 99213 OFFICE O/P EST LOW 20 MIN: CPT | Mod: PBBFAC,PN | Performed by: OBSTETRICS & GYNECOLOGY

## 2019-09-25 PROCEDURE — 99999 PR PBB SHADOW E&M-EST. PATIENT-LVL III: ICD-10-PCS | Mod: PBBFAC,,, | Performed by: OBSTETRICS & GYNECOLOGY

## 2019-09-25 RX ORDER — IBUPROFEN 800 MG/1
800 TABLET ORAL EVERY 8 HOURS PRN
Qty: 60 TABLET | Refills: 4 | Status: SHIPPED | OUTPATIENT
Start: 2019-09-25 | End: 2020-01-04 | Stop reason: CLARIF

## 2019-09-25 NOTE — PROGRESS NOTES
"CC:  Chief Complaint   Patient presents with    Pelvic Pain       HPI:    24 y.o.   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 No pertinent surgical history.  Family History   Problem Relation Age of Onset    Diabetes Paternal Aunt     Hypertension Maternal Grandfather     Cancer Paternal Grandmother     Breast cancer Paternal Grandmother     Leukemia Paternal Grandmother     Kidney disease Maternal Grandmother     Hypertension Mother      Social History     Tobacco Use    Smoking status: Never Smoker    Smokeless tobacco: Never Used   Substance Use Topics    Alcohol use: No    Drug use: No     ROS:  GENERAL: Feeling well overall. Denies fever or chills.   SKIN: Denies rash or lesions.   HEAD: Denies head injury or headache.   NODES: Denies enlarged lymph nodes.   CHEST: Denies chest pain or shortness of breath.   CARDIOVASCULAR: Denies palpitations or left sided chest pain.    ABDOMEN: Denies diarrhea, nausea, vomiting or rectal bleeding.   URINARY: No dysuria, hematuria, or burning on urination.  REPRODUCTIVE: See HPI.   BREASTS: Denies pain, lumps, or nipple discharge.   HEMATOLOGIC: No easy bruisability or excessive bleeding.   MUSCULOSKELETAL: Denies joint pain or swelling.   NEUROLOGIC: Denies syncope or weakness.   PSYCHIATRIC: Denies depression, anxiety or mood swings.      PE: /72   Ht 5' 5" (1.651 m)   Wt 71.8 kg (158 lb 6.4 oz)   LMP 2019 (Exact Date)   BMI 26.36 kg/m²  "     APPEARANCE: Well nourished, well developed, in no acute distress.  SKIN: Normal skin turgor, no lesions.  NECK: Neck symmetric without masses or thyromegaly.  NODES: No inguinal, cervical, axillary or femoral lymph node enlargement.  CARDIOVASCULAR: Normal S1, S2. No rubs, murmurs or gallops.  NEUROLOGIC: Normal mood and affect. No depression or anxiety.   ABDOMEN: Soft. No tenderness or masses. No hepatosplenomegaly. No hernias.  RESPIRATORY: Normal respiratory effort with no retractions or use of accessory muscles.      ASSESSMENT/ PLAN    Ray was seen today for pelvic pain.    Diagnoses and all orders for this visit:    Dysmenorrhea    Encounter for surveillance of contraceptive pills    Other orders  -     ibuprofen (ADVIL,MOTRIN) 800 MG tablet; Take 1 tablet (800 mg total) by mouth every 8 (eight) hours as needed for Pain.      rtc prn      Marty Moser MD

## 2019-10-11 ENCOUNTER — HOSPITAL ENCOUNTER (EMERGENCY)
Facility: HOSPITAL | Age: 24
Discharge: HOME | End: 2019-10-12
Attending: FAMILY MEDICINE
Payer: MEDICAID

## 2019-10-11 VITALS
HEIGHT: 64 IN | HEART RATE: 110 BPM | DIASTOLIC BLOOD PRESSURE: 68 MMHG | WEIGHT: 154 LBS | BODY MASS INDEX: 26.29 KG/M2 | SYSTOLIC BLOOD PRESSURE: 109 MMHG | RESPIRATION RATE: 18 BRPM | OXYGEN SATURATION: 100 % | TEMPERATURE: 97 F

## 2019-10-11 DIAGNOSIS — S60.221A CONTUSION OF RIGHT HAND, INITIAL ENCOUNTER: Primary | ICD-10-CM

## 2019-10-11 PROCEDURE — 25000003 PHARM REV CODE 250: Mod: ER | Performed by: FAMILY MEDICINE

## 2019-10-11 PROCEDURE — 99283 EMERGENCY DEPT VISIT LOW MDM: CPT | Mod: 25,ER

## 2019-10-11 RX ORDER — HYDROCODONE BITARTRATE AND ACETAMINOPHEN 5; 325 MG/1; MG/1
1 TABLET ORAL
Status: COMPLETED | OUTPATIENT
Start: 2019-10-11 | End: 2019-10-11

## 2019-10-11 RX ADMIN — HYDROCODONE BITARTRATE AND ACETAMINOPHEN 1 TABLET: 5; 325 TABLET ORAL at 11:10

## 2019-10-12 NOTE — ED PROVIDER NOTES
Encounter Date: 10/11/2019       History     Chief Complaint   Patient presents with    Hand Pain     Right hand pain, slipped and fell in the rain walking down the bleachers, catching herself on outstretched hand     24-year-old female presents with chief complaint of right hand pain. Patient reports slipped and fell on her right hand while she was quickly trying to exit the stadium at which time she slipped and fell onto her right hand.  Patient denies any fever chills.  Denies any nausea vomiting is able to flex and extend her wrist.        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 No pertinent surgical history.  Family History   Problem Relation Age of Onset    Diabetes Paternal Aunt     Hypertension Maternal Grandfather     Cancer Paternal Grandmother     Breast cancer Paternal Grandmother     Leukemia Paternal Grandmother     Kidney disease Maternal Grandmother     Hypertension Mother      Social History     Tobacco Use    Smoking status: Never Smoker    Smokeless tobacco: Never Used   Substance Use Topics    Alcohol use: No    Drug use: No     Review of Systems   Musculoskeletal: Positive for arthralgias. Negative for joint swelling.   All other systems reviewed and are negative.      Physical Exam     Initial Vitals [10/11/19 2318]   BP Pulse Resp Temp SpO2   109/68 110 18 97.1 °F (36.2 °C) 100 %      MAP       --         Physical Exam    Nursing note and vitals reviewed.  Constitutional: She appears well-developed and well-nourished.   HENT:   Head: Normocephalic and atraumatic.   Eyes: EOM are normal. Pupils are equal, round, and reactive to light.   Neck: Normal range of motion. Neck supple. No thyromegaly present.   Cardiovascular: Normal rate, regular rhythm and normal heart sounds.   Pulmonary/Chest: Breath  sounds normal.   Abdominal: Soft.   Musculoskeletal: Normal range of motion. She exhibits tenderness.        Right wrist: She exhibits tenderness.        Arms:  Neurological: She is alert. She has normal strength. GCS score is 15. GCS eye subscore is 4. GCS verbal subscore is 5. GCS motor subscore is 6.   Skin: Skin is warm. Capillary refill takes less than 2 seconds.   Psychiatric: She has a normal mood and affect. Her behavior is normal. Thought content normal.         ED Course   Procedures  Labs Reviewed - No data to display       Imaging Results          X-Ray Hand 3 view Right (Final result)  Result time 10/11/19 23:38:00    Final result by Beny Lawton MD (10/11/19 23:38:00)                 Impression:      No acute fracture or dislocation.      Electronically signed by: Beny Lawton MD  Date:    10/11/2019  Time:    23:38             Narrative:    EXAMINATION:  XR HAND COMPLETE 3 VIEW RIGHT    CLINICAL HISTORY:  XR HAND COMPLETE 3 VIEW RIGHT    COMPARISON:  None    FINDINGS:  Three views of the right hand were obtained.    No evidence of acute fracture or dislocation.  Bony mineralization is normal.  Soft tissues are unremarkable.                                                      Clinical Impression:       ICD-10-CM ICD-9-CM   1. Contusion of right hand, initial encounter S60.221A 923.20                                Edison Flower MD  10/11/19 5328

## 2019-11-02 ENCOUNTER — HOSPITAL ENCOUNTER (EMERGENCY)
Facility: HOSPITAL | Age: 24
Discharge: HOME | End: 2019-11-02
Attending: FAMILY MEDICINE
Payer: MEDICAID

## 2019-11-02 VITALS
WEIGHT: 164 LBS | RESPIRATION RATE: 18 BRPM | SYSTOLIC BLOOD PRESSURE: 125 MMHG | TEMPERATURE: 98 F | BODY MASS INDEX: 27.32 KG/M2 | DIASTOLIC BLOOD PRESSURE: 77 MMHG | HEIGHT: 65 IN | HEART RATE: 69 BPM

## 2019-11-02 DIAGNOSIS — T78.40XA ALLERGIC REACTION, INITIAL ENCOUNTER: Primary | ICD-10-CM

## 2019-11-02 PROCEDURE — 99284 EMERGENCY DEPT VISIT MOD MDM: CPT | Mod: ER

## 2019-11-02 PROCEDURE — 63600175 PHARM REV CODE 636 W HCPCS: Mod: ER | Performed by: FAMILY MEDICINE

## 2019-11-02 PROCEDURE — 25000003 PHARM REV CODE 250: Mod: ER | Performed by: FAMILY MEDICINE

## 2019-11-02 RX ORDER — PREDNISONE 20 MG/1
40 TABLET ORAL DAILY
Qty: 10 TABLET | Refills: 0 | Status: SHIPPED | OUTPATIENT
Start: 2019-11-02 | End: 2019-11-07

## 2019-11-02 RX ORDER — FAMOTIDINE 20 MG/1
20 TABLET, FILM COATED ORAL
Status: COMPLETED | OUTPATIENT
Start: 2019-11-02 | End: 2019-11-02

## 2019-11-02 RX ORDER — PREDNISONE 20 MG/1
60 TABLET ORAL
Status: COMPLETED | OUTPATIENT
Start: 2019-11-02 | End: 2019-11-02

## 2019-11-02 RX ORDER — ERYTHROMYCIN 5 MG/G
OINTMENT OPHTHALMIC
Qty: 1 TUBE | Refills: 0 | Status: SHIPPED | OUTPATIENT
Start: 2019-11-02 | End: 2019-11-02 | Stop reason: ALTCHOICE

## 2019-11-02 RX ADMIN — FAMOTIDINE 20 MG: 20 TABLET, FILM COATED ORAL at 08:11

## 2019-11-02 RX ADMIN — PREDNISONE 60 MG: 20 TABLET ORAL at 08:11

## 2019-11-02 NOTE — ED NOTES
Pt's left eyelids are starting to swell as well with itching. Continues to deny respiratory complaints.  MD aware and at bedside for reassessment.

## 2019-11-02 NOTE — ED TRIAGE NOTES
Pt presents to ED c/o swelling to right side of face since this am. States she ate seafood platter and eggs and rice last night but has had that in the past. Obvious swelling to right upper eyelid with tenderness. No noticeable stye of or abscess. States she feels like the right side of her lip and face is swollen too. No obvious swelling here but states it was worse pta. Took clariton D at home pta but with no relief.

## 2019-11-09 NOTE — ED PROVIDER NOTES
Encounter Date: 11/2/2019       History     Chief Complaint   Patient presents with    Facial Swelling     Pt states started with swelling to right side of face last night, states this am still has swelling to right eye.  States took claritin D last pm. Denies SOB.  Denies any known allergies.  States ate seafood platter yesterday, eggs and rice, states no known food allergies.      24-year-old female presents with chief complaint of facial swelling.  Noted to the right side of her face.  Patient reports did take some Claritin last night without any relief.  Denies any known allergies.  Denies having similar complaints in the past.  Denies any difficulty breathing or difficulty swallowing.  Denies any shortness of breath. Denies any drainage or discharge from her nose.  Denies taking any antihypertensives name the Ace inhibitors.  Denies any new or different eye makeup.        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 No pertinent surgical history.  Family History   Problem Relation Age of Onset    Diabetes Paternal Aunt     Hypertension Maternal Grandfather     Cancer Paternal Grandmother     Breast cancer Paternal Grandmother     Leukemia Paternal Grandmother     Kidney disease Maternal Grandmother     Hypertension Mother      Social History     Tobacco Use    Smoking status: Never Smoker    Smokeless tobacco: Never Used   Substance Use Topics    Alcohol use: No    Drug use: No     Review of Systems   Constitutional: Negative for chills and fever.   HENT: Negative for sore throat.    Gastrointestinal: Negative for nausea.   Skin: Negative for rash.   All other systems reviewed and are negative.      Physical Exam     Initial Vitals [11/02/19 0759]   BP Pulse Resp Temp SpO2   125/77 69 18 98 °F (36.7 °C) --      MAP        --         Physical Exam    Nursing note and vitals reviewed.  Constitutional: She appears well-developed and well-nourished.   HENT:   Head: Normocephalic and atraumatic.   Eyes: EOM are normal. Pupils are equal, round, and reactive to light. Right eye exhibits no chemosis, no discharge and no hordeolum. No foreign body present in the right eye. Left eye exhibits no chemosis, no discharge and no hordeolum. No foreign body present in the left eye. Right conjunctiva is not injected. Left conjunctiva is not injected.   Right upper lobe eyelid swollen no discharge noted no erythema.   Cardiovascular: Normal rate, regular rhythm and normal heart sounds.   Pulmonary/Chest: Breath sounds normal.         ED Course   Procedures  Labs Reviewed - No data to display       Imaging Results    None          Medical Decision Making:   Differential Diagnosis:   Allergic reaction,.  Conjunctivitis, blepharitis  Clinical Tests:   Lab Tests: Ordered and Reviewed  ED Management:  Patient initially stated treated with steroids as well as Pepcid.  Upon re-evaluation patient's left eye started becoming itchy and swollen the symptoms are most consistent with allergic type of reaction.  No overt infectious etiology noted. Plan to keep the patient on a 5 day course of steroids as well as keeping on Zantac.  Patient advised to follow up PCP to 3 days return here for worsening.                                 Clinical Impression:       ICD-10-CM ICD-9-CM   1. Allergic reaction, initial encounter T78.40XA 995.3                             Edison Flower MD  11/09/19 0744

## 2020-01-04 ENCOUNTER — HOSPITAL ENCOUNTER (EMERGENCY)
Facility: HOSPITAL | Age: 25
Discharge: HOME | End: 2020-01-04
Attending: EMERGENCY MEDICINE
Payer: MEDICAID

## 2020-01-04 VITALS
DIASTOLIC BLOOD PRESSURE: 64 MMHG | OXYGEN SATURATION: 100 % | HEART RATE: 93 BPM | RESPIRATION RATE: 18 BRPM | TEMPERATURE: 98 F | HEIGHT: 65 IN | BODY MASS INDEX: 26.33 KG/M2 | SYSTOLIC BLOOD PRESSURE: 120 MMHG | WEIGHT: 158 LBS

## 2020-01-04 DIAGNOSIS — B37.31 YEAST VAGINITIS: Primary | ICD-10-CM

## 2020-01-04 PROCEDURE — 25000003 PHARM REV CODE 250: Mod: ER | Performed by: EMERGENCY MEDICINE

## 2020-01-04 PROCEDURE — 99283 EMERGENCY DEPT VISIT LOW MDM: CPT | Mod: ER

## 2020-01-04 RX ORDER — FLUCONAZOLE 150 MG/1
150 TABLET ORAL DAILY
Qty: 1 TABLET | Refills: 0 | Status: SHIPPED | OUTPATIENT
Start: 2020-01-04 | End: 2020-01-05

## 2020-01-04 RX ORDER — FLUCONAZOLE 150 MG/1
150 TABLET ORAL
Status: COMPLETED | OUTPATIENT
Start: 2020-01-04 | End: 2020-01-04

## 2020-01-04 RX ADMIN — FLUCONAZOLE 150 MG: 150 TABLET ORAL at 04:01

## 2020-01-04 NOTE — ED PROVIDER NOTES
"Encounter Date: 1/4/2020       History     Chief Complaint   Patient presents with    Vaginal Itching     Pt with c/o vaginal itching and "bumps" to vaginal area. Pt reports she finished aom antibiotics about a week ago and then the symptoms started. Pt states "I think I have a yeast infection. Pt denies vaginal discharge or bleeding.     24-year-old female presenting with vaginal itching and bumps on her labia that started after finishing a course of antibiotics for a kidney infection.  Patient believes she has a yeast infection.  She also says that she cut herself shaving.  She denies dysuria, vaginal discharge or pain.        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 No pertinent surgical history.  Family History   Problem Relation Age of Onset    Diabetes Paternal Aunt     Hypertension Maternal Grandfather     Cancer Paternal Grandmother     Breast cancer Paternal Grandmother     Leukemia Paternal Grandmother     Kidney disease Maternal Grandmother     Hypertension Mother      Social History     Tobacco Use    Smoking status: Never Smoker    Smokeless tobacco: Never Used   Substance Use Topics    Alcohol use: No    Drug use: No     Review of Systems   Constitutional: Negative for fever.   Genitourinary: Negative for dysuria.        +vaginal itching   Skin:        +bump on labia       Physical Exam     Initial Vitals [01/04/20 1638]   BP Pulse Resp Temp SpO2   120/64 93 18 98.4 °F (36.9 °C) 100 %      MAP       --         Physical Exam    Nursing note and vitals reviewed.  Genitourinary:   Genitourinary Comments: Few nontender, nonerythematous macular lesions on the inferior aspect of the labia on the right, no vaginal discharge, no tenderness   Neurological: She is alert and oriented to person, place, and time. "         ED Course   Procedures  Labs Reviewed - No data to display       Imaging Results    None          Medical Decision Making:   Initial Assessment:   24-year-old female presenting with vaginal itching following a course of antibiotics for kidney infection.  Clinically patient has evidence of yeast infection.  No evidence of abscess.  Will treat with Diflucan x1 in the ED and will give additional does if symptoms do not improve in 3 days.  Return instructions given.  Patient verbalized understanding and agreement with the plan.                                 Clinical Impression:       ICD-10-CM ICD-9-CM   1. Yeast vaginitis B37.3 112.1                             Rajan Jimenez MD  01/04/20 1657

## 2020-01-13 ENCOUNTER — HOSPITAL ENCOUNTER (EMERGENCY)
Facility: HOSPITAL | Age: 25
Discharge: HOME | End: 2020-01-13
Attending: FAMILY MEDICINE
Payer: MEDICAID

## 2020-01-13 VITALS
DIASTOLIC BLOOD PRESSURE: 55 MMHG | RESPIRATION RATE: 19 BRPM | TEMPERATURE: 99 F | OXYGEN SATURATION: 100 % | HEART RATE: 95 BPM | SYSTOLIC BLOOD PRESSURE: 112 MMHG | BODY MASS INDEX: 26.98 KG/M2 | WEIGHT: 158 LBS | HEIGHT: 64 IN

## 2020-01-13 DIAGNOSIS — J06.9 UPPER RESPIRATORY TRACT INFECTION, UNSPECIFIED TYPE: ICD-10-CM

## 2020-01-13 DIAGNOSIS — R55 NEAR SYNCOPE: Primary | ICD-10-CM

## 2020-01-13 DIAGNOSIS — R06.02 SHORTNESS OF BREATH: ICD-10-CM

## 2020-01-13 DIAGNOSIS — R42 DIZZINESS: ICD-10-CM

## 2020-01-13 LAB
ALBUMIN SERPL BCP-MCNC: 4.7 G/DL (ref 3.5–5.2)
ALP SERPL-CCNC: 45 U/L (ref 38–126)
ALT SERPL W/O P-5'-P-CCNC: 11 U/L (ref 10–44)
ANION GAP SERPL CALC-SCNC: 11 MMOL/L (ref 8–16)
AST SERPL-CCNC: 23 U/L (ref 15–46)
B-HCG UR QL: NEGATIVE
BACTERIA #/AREA URNS AUTO: ABNORMAL /HPF
BASOPHILS # BLD AUTO: 0.05 K/UL (ref 0–0.2)
BASOPHILS NFR BLD: 0.5 % (ref 0–1.9)
BILIRUB SERPL-MCNC: 0.3 MG/DL (ref 0.1–1)
BILIRUB UR QL STRIP: NEGATIVE
BUN SERPL-MCNC: 16 MG/DL (ref 7–17)
CALCIUM SERPL-MCNC: 9.4 MG/DL (ref 8.7–10.5)
CHLORIDE SERPL-SCNC: 107 MMOL/L (ref 95–110)
CLARITY UR REFRACT.AUTO: CLEAR
CO2 SERPL-SCNC: 22 MMOL/L (ref 23–29)
COLOR UR AUTO: ABNORMAL
CREAT SERPL-MCNC: 0.95 MG/DL (ref 0.5–1.4)
D DIMER PPP FEU-MCNC: 330 NG/ML
DIFFERENTIAL METHOD: ABNORMAL
EOSINOPHIL # BLD AUTO: 0.7 K/UL (ref 0–0.5)
EOSINOPHIL NFR BLD: 6.7 % (ref 0–8)
ERYTHROCYTE [DISTWIDTH] IN BLOOD BY AUTOMATED COUNT: 18.8 % (ref 11.5–14.5)
EST. GFR  (AFRICAN AMERICAN): >60 ML/MIN/1.73 M^2
EST. GFR  (NON AFRICAN AMERICAN): >60 ML/MIN/1.73 M^2
GLUCOSE SERPL-MCNC: 91 MG/DL (ref 70–110)
GLUCOSE UR QL STRIP: NEGATIVE
HCT VFR BLD AUTO: 32.2 % (ref 37–48.5)
HGB BLD-MCNC: 9.1 G/DL (ref 12–16)
HGB UR QL STRIP: ABNORMAL
IMM GRANULOCYTES # BLD AUTO: 0.03 K/UL (ref 0–0.04)
IMM GRANULOCYTES NFR BLD AUTO: 0.3 % (ref 0–0.5)
KETONES UR QL STRIP: NEGATIVE
LEUKOCYTE ESTERASE UR QL STRIP: ABNORMAL
LYMPHOCYTES # BLD AUTO: 3.1 K/UL (ref 1–4.8)
LYMPHOCYTES NFR BLD: 31 % (ref 18–48)
MCH RBC QN AUTO: 18.7 PG (ref 27–31)
MCHC RBC AUTO-ENTMCNC: 28.3 G/DL (ref 32–36)
MCV RBC AUTO: 66 FL (ref 82–98)
MICROSCOPIC COMMENT: ABNORMAL
MONOCYTES # BLD AUTO: 0.8 K/UL (ref 0.3–1)
MONOCYTES NFR BLD: 7.7 % (ref 4–15)
NEUTROPHILS # BLD AUTO: 5.4 K/UL (ref 1.8–7.7)
NEUTROPHILS NFR BLD: 53.8 % (ref 38–73)
NITRITE UR QL STRIP: NEGATIVE
NRBC BLD-RTO: 0 /100 WBC
PH UR STRIP: 7 [PH] (ref 5–8)
PLATELET # BLD AUTO: 312 K/UL (ref 150–350)
PMV BLD AUTO: ABNORMAL FL (ref 9.2–12.9)
POTASSIUM SERPL-SCNC: 4.3 MMOL/L (ref 3.5–5.1)
PROT SERPL-MCNC: 8.6 G/DL (ref 6–8.4)
PROT UR QL STRIP: NEGATIVE
RBC # BLD AUTO: 4.87 M/UL (ref 4–5.4)
RBC #/AREA URNS AUTO: 1 /HPF (ref 0–4)
SODIUM SERPL-SCNC: 140 MMOL/L (ref 136–145)
SP GR UR STRIP: 1 (ref 1–1.03)
TROPONIN I SERPL DL<=0.01 NG/ML-MCNC: <0.012 NG/ML (ref 0.01–0.03)
URN SPEC COLLECT METH UR: ABNORMAL
UROBILINOGEN UR STRIP-ACNC: NEGATIVE EU/DL
WBC # BLD AUTO: 9.97 K/UL (ref 3.9–12.7)
WBC #/AREA URNS AUTO: 6 /HPF (ref 0–5)

## 2020-01-13 PROCEDURE — 99285 EMERGENCY DEPT VISIT HI MDM: CPT | Mod: 25,ER

## 2020-01-13 PROCEDURE — 93010 ELECTROCARDIOGRAM REPORT: CPT | Mod: ,,, | Performed by: INTERNAL MEDICINE

## 2020-01-13 PROCEDURE — 85025 COMPLETE CBC W/AUTO DIFF WBC: CPT | Mod: ER

## 2020-01-13 PROCEDURE — 93005 ELECTROCARDIOGRAM TRACING: CPT | Mod: ER

## 2020-01-13 PROCEDURE — 25500020 PHARM REV CODE 255: Mod: ER | Performed by: FAMILY MEDICINE

## 2020-01-13 PROCEDURE — 81000 URINALYSIS NONAUTO W/SCOPE: CPT | Mod: ER

## 2020-01-13 PROCEDURE — 85379 FIBRIN DEGRADATION QUANT: CPT | Mod: ER

## 2020-01-13 PROCEDURE — 80053 COMPREHEN METABOLIC PANEL: CPT | Mod: ER

## 2020-01-13 PROCEDURE — 84484 ASSAY OF TROPONIN QUANT: CPT | Mod: ER

## 2020-01-13 PROCEDURE — 81025 URINE PREGNANCY TEST: CPT | Mod: ER

## 2020-01-13 PROCEDURE — 93010 EKG 12-LEAD: ICD-10-PCS | Mod: ,,, | Performed by: INTERNAL MEDICINE

## 2020-01-13 PROCEDURE — 96360 HYDRATION IV INFUSION INIT: CPT | Mod: ER

## 2020-01-13 PROCEDURE — 63600175 PHARM REV CODE 636 W HCPCS: Mod: ER | Performed by: PHYSICIAN ASSISTANT

## 2020-01-13 RX ORDER — MEDROXYPROGESTERONE ACETATE 150 MG/ML
150 INJECTION, SUSPENSION INTRAMUSCULAR
COMMUNITY
End: 2020-06-15 | Stop reason: SDUPTHER

## 2020-01-13 RX ADMIN — IOHEXOL 100 ML: 350 INJECTION, SOLUTION INTRAVENOUS at 11:01

## 2020-01-13 RX ADMIN — SODIUM CHLORIDE 1000 ML: 0.9 INJECTION, SOLUTION INTRAVENOUS at 10:01

## 2020-01-14 NOTE — DISCHARGE INSTRUCTIONS
Follow up with your PCP in the next 1-2 days for re-evaluation of symptoms. Return to the ED if her symptoms worsen in any way.

## 2020-01-14 NOTE — ED PROVIDER NOTES
"Encounter Date: 1/13/2020       History     Chief Complaint   Patient presents with    Dizziness     25 y/o F to er be evaluated for "dizziness episode and SOB this morning." Pt went to Urgent care and seen for upper respiratory infection earlier. Pt sent from Urgent care here b/c they told her she is "anemic and looks dehydrated." Pt given Rx of z-pack for URI. Pt reports cold and fever symptoms x 1 week.      Patient is a 24-year-old female with past medical history of anemia and asthma who was sent to ED from  for near syncope and shortness of breath. Patient reports that this morning she started having central chest tightness with shortness of breath on exertion.  She reports that she also has a mild cough.  She says she used her cousin's inhalers at home, along with some improvement of symptoms. She also reports that whenever she had this episode of shortness of breath, she felt dizzy and lightheaded.  She states she almost passed out but she did not have full syncope episode.  Reports that she has also been having cough and congestion over the last few days.  She was told by the urgent care that she had a URI.  She was given Rx for Z-Yehuda.  He denies any associated fever, chills, body aches, nausea, vomiting, abdominal pain, leg pain, leg swelling. She reports that she is currently on birth control - Depot injections.  Denies any recent travel, recent surgery, history of DVT/PE.  She also denies any family history of premature CVD or sudden cardiac death.    The history is provided by the patient.     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 No pertinent surgical history.  Family History   Problem Relation Age of Onset    Diabetes Paternal Aunt     Hypertension Maternal Grandfather     Cancer Paternal Grandmother     " Breast cancer Paternal Grandmother     Leukemia Paternal Grandmother     Kidney disease Maternal Grandmother     Hypertension Mother      Social History     Tobacco Use    Smoking status: Never Smoker    Smokeless tobacco: Never Used   Substance Use Topics    Alcohol use: No    Drug use: No     Review of Systems   Constitutional: Negative for chills, diaphoresis, fatigue and fever.   HENT: Positive for congestion. Negative for ear pain, sore throat and trouble swallowing.    Eyes: Negative for pain and visual disturbance.   Respiratory: Positive for cough, chest tightness and shortness of breath. Negative for wheezing.    Cardiovascular: Positive for chest pain. Negative for palpitations and leg swelling.   Gastrointestinal: Negative for abdominal pain, nausea and vomiting.   Genitourinary: Negative for decreased urine volume, difficulty urinating, dysuria, hematuria and menstrual problem.   Musculoskeletal: Negative for back pain, neck pain and neck stiffness.   Skin: Negative for rash.   Neurological: Positive for dizziness and light-headedness. Negative for tremors, seizures, syncope, facial asymmetry, speech difficulty, weakness, numbness and headaches.       Physical Exam     Initial Vitals [01/13/20 2023]   BP Pulse Resp Temp SpO2   115/75 75 18 98.1 °F (36.7 °C) 100 %      MAP       --         Physical Exam    Nursing note and vitals reviewed.  Constitutional: She appears well-developed and well-nourished. She is not diaphoretic. No distress.   HENT:   Head: Normocephalic and atraumatic.   Eyes: Conjunctivae and EOM are normal. Pupils are equal, round, and reactive to light.   Neck: Normal range of motion. Neck supple.   Cardiovascular: Normal rate, regular rhythm, normal heart sounds and intact distal pulses. Exam reveals no friction rub.    Pulmonary/Chest: Breath sounds normal. No respiratory distress.   Abdominal: Soft. Bowel sounds are normal. There is no tenderness.   Musculoskeletal: Normal  range of motion. She exhibits no edema or tenderness.   No lower extremity edema, calf tenderness, or palpable cords.   Neurological: She is alert and oriented to person, place, and time. She has normal strength and normal reflexes. No cranial nerve deficit. GCS score is 15. GCS eye subscore is 4. GCS verbal subscore is 5. GCS motor subscore is 6.   Skin: Skin is warm. Capillary refill takes less than 2 seconds. No rash noted. No erythema.         ED Course   Procedures  Labs Reviewed   CBC W/ AUTO DIFFERENTIAL - Abnormal; Notable for the following components:       Result Value    Hemoglobin 9.1 (*)     Hematocrit 32.2 (*)     Mean Corpuscular Volume 66 (*)     Mean Corpuscular Hemoglobin 18.7 (*)     Mean Corpuscular Hemoglobin Conc 28.3 (*)     RDW 18.8 (*)     Eos # 0.7 (*)     All other components within normal limits   COMPREHENSIVE METABOLIC PANEL - Abnormal; Notable for the following components:    CO2 22 (*)     Total Protein 8.6 (*)     All other components within normal limits   URINALYSIS - Abnormal; Notable for the following components:    Occult Blood UA 2+ (*)     Leukocytes, UA 2+ (*)     All other components within normal limits   D DIMER - Abnormal; Notable for the following components:    D-Dimer 330 (*)     All other components within normal limits   URINALYSIS MICROSCOPIC - Abnormal; Notable for the following components:    WBC, UA 6 (*)     Bacteria Many (*)     All other components within normal limits   PREGNANCY TEST, URINE RAPID   TROPONIN I          Imaging Results          CTA Chest Non-Coronary (PE Study) (Final result)  Result time 01/13/20 23:11:08    Final result by Art Altamirano MD (01/13/20 23:11:08)                 Impression:      1. Negative CTA chest.  No pulmonary embolism.  2. Clear lungs.  3. 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  All CT scans at this facility are performed  using dose modulation techniques as appropriate to  performed exam including the following:  automated exposure control; adjustment of mA and/or kV according to the patients size (this includes techniques or standardized protocols for targeted exams where dose is matched to indication/reason for exam: i.e. extremities or head);  iterative reconstruction technique.      Electronically signed by: Art Altamirano MD  Date:    01/13/2020  Time:    23:11             Narrative:    EXAMINATION:  CTA CHEST NON CORON    CLINICAL HISTORY:  Chest pain, acute, PE suspected, intermed prob, positive D-dimer;    TECHNIQUE:  CT angiogram chest performed with pulmonary embolism protocol.  100 cc Omnipaque 350.  Soft tissue algorithm.  Multiplanar maximum intensity projections.    COMPARISON:  None    FINDINGS:  There is excellent opacification of the pulmonary arteries without intraluminal filling defect to suggest pulmonary embolism.  Normal caliber pulmonary arteries.    Normal caliber aorta without dissection.  Heart and pericardium are normal.    There is no mediastinal or hilar lymphadenopathy.  Trachea and central bronchi are widely patent.    Lungs are clear.    The gallbladder is completely contracted versus absent.  No bile duct dilatation.    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  Otherwise, the visualized portion of the upper abdominal viscera is unremarkable.    The bones are unremarkable.                                                   ED Course as of Jan 13 2357   Mon Jan 13, 2020   2200 EKG shows normal sinus rhythm at rate 91 beats p.m..  No ST or T-wave changes.  No QT prolongation.  No STEMI.  No previous for comparison.    [EM]   2215 Similar to previous 6 months ago    Hemoglobin(!): 9.1 [EM]   2216 D-Dimer(!): 330 [EM]   2216 Negative    Troponin I: <0.012 [EM]   2326   1. Negative CTA chest.  No pulmonary embolism.  2. Clear lungs.  3. Markedly atrophic right kidney with 2 cm cyst, likely congenital  insult such as MCDK.   CTA Chest Non-Coronary (PE Study) [EM]      ED Course User Index  [EM] Eri Ayala PA-C                Clinical Impression:       ICD-10-CM ICD-9-CM   1. Near syncope R55 780.2   2. Shortness of breath R06.02 786.05   3. Dizziness R42 780.4   4. Upper respiratory tract infection, unspecified type J06.9 465.9                             Jose Matamoros MD  01/14/20 0530

## 2020-01-14 NOTE — ED NOTES
Adult Physical Assessment  LOC: Ray Knox, 24 y.o. female verified via two identifiers.  The patient is awake, alert, oriented and speaking appropriately at this time.  APPEARANCE: Patient resting comfortably and appears to be in no acute distress at this time. Patient is clean and well groomed, patient's clothing is properly fastened.  SKIN:The skin is warm and dry, color consistent with ethnicity, patient has normal skin turgor and moist mucus membranes, skin intact, no breakdown or brusing noted.  MUSCULOSKELETAL: Patient moving all extremities well, no obvious swelling or deformities noted.  RESPIRATORY: Airway is open and patent, respirations are spontaneous, patient has a normal effort and rate, no accessory muscle use noted.  CARDIAC: Patient has a normal rate and rhythm, no periphreal edema noted in any extremity, capillary refill < 3 seconds in all extremities  ABDOMEN: Soft and non tender to palpation, no abdominal distention noted. Bowel sounds present in all four quadrants.  NEUROLOGIC: Eyes open spontaneously, behavior appropriate to situation, follows commands, facial expression symmetrical, bilateral hand grasp equal and even, purposeful motor response noted, normal sensation in all extremities when touched with a finger.

## 2020-01-26 ENCOUNTER — PATIENT MESSAGE (OUTPATIENT)
Dept: OBSTETRICS AND GYNECOLOGY | Facility: CLINIC | Age: 25
End: 2020-01-26

## 2020-02-05 ENCOUNTER — TELEPHONE (OUTPATIENT)
Dept: OBSTETRICS AND GYNECOLOGY | Facility: CLINIC | Age: 25
End: 2020-02-05

## 2020-02-05 NOTE — TELEPHONE ENCOUNTER
----- Message from Dorothy Ray sent at 2/5/2020 11:35 AM CST -----  Pt called to see if she can squeezed in today since she missed her scheduled appt today.      Pt contact #[phone].    Thanks

## 2020-02-05 NOTE — TELEPHONE ENCOUNTER
Called patient, patient stated she had to cancel her appointment for today cause of the weather. Patient stated she would go to any of the locations for the next appointment if it was sooner the 2/19/2020. Appointment scheduled for 02/11/2020 at 10:00am at the [facility]. Patient verbalized understanding.

## 2020-02-10 ENCOUNTER — TELEPHONE (OUTPATIENT)
Dept: OBSTETRICS AND GYNECOLOGY | Facility: CLINIC | Age: 25
End: 2020-02-10

## 2020-02-10 NOTE — TELEPHONE ENCOUNTER
Called pt, no answer, left message to remind pt of appointment on 02/11/2020 for 10:00am at the [place] office, and if pt was unable to make appointment please call the office to reschedule.

## 2020-02-11 ENCOUNTER — OFFICE VISIT (OUTPATIENT)
Dept: OBSTETRICS AND GYNECOLOGY | Facility: CLINIC | Age: 25
End: 2020-02-11
Payer: MEDICAID

## 2020-02-11 VITALS
BODY MASS INDEX: 28.38 KG/M2 | SYSTOLIC BLOOD PRESSURE: 114 MMHG | WEIGHT: 165.38 LBS | DIASTOLIC BLOOD PRESSURE: 78 MMHG

## 2020-02-11 DIAGNOSIS — Z86.19 HISTORY OF SEXUALLY TRANSMITTED DISEASE: ICD-10-CM

## 2020-02-11 DIAGNOSIS — N89.8 VAGINAL DISCHARGE: Primary | ICD-10-CM

## 2020-02-11 DIAGNOSIS — Z11.3 SCREENING FOR STD (SEXUALLY TRANSMITTED DISEASE): ICD-10-CM

## 2020-02-11 PROCEDURE — 99213 PR OFFICE/OUTPT VISIT, EST, LEVL III, 20-29 MIN: ICD-10-PCS | Mod: S$PBB,,, | Performed by: OBSTETRICS & GYNECOLOGY

## 2020-02-11 PROCEDURE — 99999 PR PBB SHADOW E&M-EST. PATIENT-LVL III: ICD-10-PCS | Mod: PBBFAC,,, | Performed by: OBSTETRICS & GYNECOLOGY

## 2020-02-11 PROCEDURE — 99213 OFFICE O/P EST LOW 20 MIN: CPT | Mod: S$PBB,,, | Performed by: OBSTETRICS & GYNECOLOGY

## 2020-02-11 PROCEDURE — 99213 OFFICE O/P EST LOW 20 MIN: CPT | Mod: PBBFAC,PO | Performed by: OBSTETRICS & GYNECOLOGY

## 2020-02-11 PROCEDURE — 87491 CHLMYD TRACH DNA AMP PROBE: CPT | Mod: 59

## 2020-02-11 PROCEDURE — 99999 PR PBB SHADOW E&M-EST. PATIENT-LVL III: CPT | Mod: PBBFAC,,, | Performed by: OBSTETRICS & GYNECOLOGY

## 2020-02-11 PROCEDURE — 87481 CANDIDA DNA AMP PROBE: CPT | Mod: 59

## 2020-02-11 NOTE — PROGRESS NOTES
CC: Vaginal Discharge    HPI:   24 y.o. female  complains of white, curd-like and vulvar erythema noted vaginal discharge for 1 week. No LMP recorded. Patient has had an injection..  No menses since starting Depo. Second Depo scheduled by [facility] in 3/2020. She is not sexually active since 21.  She uses Depo-Provera for contraception.    ROS:  GENERAL: No fever, chills, fatigability or weight loss.  VULVAR: No pain, no lesions and no itching.  VAGINAL: No relaxation, no itching, no discharge, no abnormal bleeding and no lesions.  ABDOMEN: No abdominal pain. Denies nausea. Denies vomiting. No diarrhea. No constipation  BREAST: Denies pain. No lumps. No discharge.  URINARY: No incontinence, no nocturia, no frequency and no dysuria.  CARDIOVASCULAR: No chest pain. No shortness of breath. No leg cramps.  NEUROLOGICAL: No headaches. No vision changes.        Vitals:    20 1034   BP: 114/78         OBJECTIVE:   She appears well, afebrile.  Abdomen: benign, soft, non tender, no masses.  VULVA: vulvar excoriation, vulvar erythema  VAGINA:no lesions  CERVIX white discharge present. Cervix w/ inflammation. No cervical motion tenderness.   UTERUSnot examined  ADNEXAnot evaluated        ASSESSMENT:   rule out GC, yeast or chlamydia  STI screening    PLAN:   Orders Placed This Encounter    C. trachomatis/N. gonorrhoeae by AMP DNA [facility]; Cervix    Vaginosis Screen by DNA Probe    HIV 1/2 Ag/Ab (4th Gen)    RPR     Return in 2020 for annual WWE and pap. Encouraged the pt to receive her second depo at [facility] in 3/2020 prior to her WWE in 2020.

## 2020-02-13 ENCOUNTER — TELEPHONE (OUTPATIENT)
Dept: OBSTETRICS AND GYNECOLOGY | Facility: CLINIC | Age: 25
End: 2020-02-13

## 2020-02-13 LAB
BACTERIAL VAGINOSIS DNA: NEGATIVE
C TRACH DNA SPEC QL NAA+PROBE: NOT DETECTED
CANDIDA GLABRATA DNA: NEGATIVE
CANDIDA KRUSEI DNA: NEGATIVE
CANDIDA RRNA VAG QL PROBE: POSITIVE
N GONORRHOEA DNA SPEC QL NAA+PROBE: NOT DETECTED
T VAGINALIS RRNA GENITAL QL PROBE: NEGATIVE

## 2020-02-13 RX ORDER — TERCONAZOLE 8 MG/G
1 CREAM VAGINAL NIGHTLY
Qty: 20 G | Refills: 0 | Status: SHIPPED | OUTPATIENT
Start: 2020-02-13 | End: 2020-02-16

## 2020-03-28 ENCOUNTER — PATIENT MESSAGE (OUTPATIENT)
Dept: OBSTETRICS AND GYNECOLOGY | Facility: CLINIC | Age: 25
End: 2020-03-28

## 2020-03-30 RX ORDER — MEDROXYPROGESTERONE ACETATE 150 MG/ML
150 INJECTION, SUSPENSION INTRAMUSCULAR
Qty: 1 ML | Refills: 3 | Status: SHIPPED | OUTPATIENT
Start: 2020-03-30 | End: 2020-04-09 | Stop reason: SDUPTHER

## 2020-03-31 ENCOUNTER — PATIENT MESSAGE (OUTPATIENT)
Dept: OBSTETRICS AND GYNECOLOGY | Facility: CLINIC | Age: 25
End: 2020-03-31

## 2020-04-09 ENCOUNTER — PATIENT MESSAGE (OUTPATIENT)
Dept: OBSTETRICS AND GYNECOLOGY | Facility: CLINIC | Age: 25
End: 2020-04-09

## 2020-04-09 RX ORDER — MEDROXYPROGESTERONE ACETATE 150 MG/ML
150 INJECTION, SUSPENSION INTRAMUSCULAR
Qty: 1 ML | Refills: 3 | Status: SHIPPED | OUTPATIENT
Start: 2020-04-09 | End: 2021-02-17

## 2020-04-09 NOTE — TELEPHONE ENCOUNTER
Called patient, patient stated she was trying to see if provider could send Rx to her pharmacy for her Depo injection. Patient stated she normally goes to the health unit but due to the COVID-19 she can not. Patient stated she is late getting her injection but she took a UPT and it was negative. Informed patient I will send message to provider.

## 2020-04-25 ENCOUNTER — PATIENT MESSAGE (OUTPATIENT)
Dept: OBSTETRICS AND GYNECOLOGY | Facility: CLINIC | Age: 25
End: 2020-04-25

## 2020-04-27 ENCOUNTER — PATIENT MESSAGE (OUTPATIENT)
Dept: OBSTETRICS AND GYNECOLOGY | Facility: CLINIC | Age: 25
End: 2020-04-27

## 2020-05-06 ENCOUNTER — OFFICE VISIT (OUTPATIENT)
Dept: OBSTETRICS AND GYNECOLOGY | Facility: CLINIC | Age: 25
End: 2020-05-06
Payer: MEDICAID

## 2020-05-06 VITALS
DIASTOLIC BLOOD PRESSURE: 65 MMHG | SYSTOLIC BLOOD PRESSURE: 108 MMHG | HEIGHT: 64 IN | WEIGHT: 170.38 LBS | BODY MASS INDEX: 29.09 KG/M2

## 2020-05-06 DIAGNOSIS — N89.8 VAGINAL ODOR: ICD-10-CM

## 2020-05-06 DIAGNOSIS — Z11.3 SCREENING FOR STD (SEXUALLY TRANSMITTED DISEASE): ICD-10-CM

## 2020-05-06 DIAGNOSIS — Z01.419 WELL WOMAN EXAM WITH ROUTINE GYNECOLOGICAL EXAM: Primary | ICD-10-CM

## 2020-05-06 PROCEDURE — 87801 DETECT AGNT MULT DNA AMPLI: CPT

## 2020-05-06 PROCEDURE — 88141 PR  CYTOPATH CERV/VAG INTERPRET: ICD-10-PCS | Mod: ,,, | Performed by: PATHOLOGY

## 2020-05-06 PROCEDURE — 99999 PR PBB SHADOW E&M-EST. PATIENT-LVL III: ICD-10-PCS | Mod: PBBFAC,,, | Performed by: OBSTETRICS & GYNECOLOGY

## 2020-05-06 PROCEDURE — 88141 CYTOPATH C/V INTERPRET: CPT | Mod: ,,, | Performed by: PATHOLOGY

## 2020-05-06 PROCEDURE — 99213 OFFICE O/P EST LOW 20 MIN: CPT | Mod: PBBFAC,PN | Performed by: OBSTETRICS & GYNECOLOGY

## 2020-05-06 PROCEDURE — 87491 CHLMYD TRACH DNA AMP PROBE: CPT | Mod: 59

## 2020-05-06 PROCEDURE — 99999 PR PBB SHADOW E&M-EST. PATIENT-LVL III: CPT | Mod: PBBFAC,,, | Performed by: OBSTETRICS & GYNECOLOGY

## 2020-05-06 PROCEDURE — 87481 CANDIDA DNA AMP PROBE: CPT | Mod: 59

## 2020-05-06 PROCEDURE — 88142 CYTOPATH C/V THIN LAYER: CPT | Performed by: PATHOLOGY

## 2020-05-06 PROCEDURE — 99395 PREV VISIT EST AGE 18-39: CPT | Mod: S$PBB,,, | Performed by: OBSTETRICS & GYNECOLOGY

## 2020-05-06 PROCEDURE — 99395 PR PREVENTIVE VISIT,EST,18-39: ICD-10-PCS | Mod: S$PBB,,, | Performed by: OBSTETRICS & GYNECOLOGY

## 2020-05-06 NOTE — PROGRESS NOTES
3CC: Annual check-up3    SUBJECTIVE:   24 y.o. female   for annual routine Pap and checkup. No LMP recorded. Patient has had an injection..  She complains of vaginal odor  Gets depo from mom who is a nurse, no cycles.        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 No pertinent surgical history.  Social History     Socioeconomic History    Marital status: Single     Spouse name: Not on file    Number of children: Not on file    Years of education: Not on file    Highest education level: Not on file   Occupational History    Not on file   Social Needs    Financial resource strain: Not on file    Food insecurity:     Worry: Not on file     Inability: Not on file    Transportation needs:     Medical: Not on file     Non-medical: Not on file   Tobacco Use    Smoking status: Never Smoker    Smokeless tobacco: Never Used   Substance and Sexual Activity    Alcohol use: No    Drug use: No    Sexual activity: Not Currently   Lifestyle    Physical activity:     Days per week: Not on file     Minutes per session: Not on file    Stress: Not on file   Relationships    Social connections:     Talks on phone: Not on file     Gets together: Not on file     Attends Denominational service: Not on file     Active member of club or organization: Not on file     Attends meetings of clubs or organizations: Not on file     Relationship status: Not on file   Other Topics Concern    Not on file   Social History Narrative    Not on file     Family History   Problem Relation Age of Onset    Diabetes Paternal Aunt     Hypertension Maternal Grandfather     Cancer Paternal Grandmother     Breast cancer Paternal Grandmother     Leukemia Paternal Grandmother     Kidney disease Maternal Grandmother     Hypertension Mother      OB History    Para Term  AB Living   0 0 0 0 0 0   SAB TAB  "Ectopic Multiple Live Births   0 0 0 0 0         Current Outpatient Medications   Medication Sig Dispense Refill    medroxyPROGESTERone (DEPO-PROVERA) 150 mg/mL injection Inject 150 mg into the muscle every 3 (three) months.      medroxyPROGESTERone (DEPO-PROVERA) 150 mg/mL Syrg Inject 1 mL (150 mg total) into the muscle every 3 (three) months. 1 mL 3     No current facility-administered medications for this visit.      Allergies: Patient has no known allergies.     ROS:  Constitutional: no weight loss, weight gain, fever, fatigue  Eyes:  No vision changes, glasses/contacts  ENT/Mouth: No ulcers, sinus problems, ears ringing, headache  Cardiovascular: No inability to lie flat, chest pain, exercise intolerance, swelling, heart palpitations  Respiratory: No wheezing, coughing blood, shortness of breath, or cough  Gastrointestinal: No diarrhea, bloody stool, nausea/vomiting, constipation, gas, hemorrhoids  Genitourinary: No blood in urine, painful urination, urgency of urination, frequency of urination, incomplete emptying, incontinence, abnormal bleeding, painful periods, heavy periods, vaginal discharge, vaginal odor, painful intercourse, sexual problems, bleeding after intercourse.  Musculoskeletal: No muscle weakness  Skin/Breast: No painful breasts, nipple discharge, masses, rash, ulcers  Neurological: No passing out, seizures, numbness, headache  Endocrine: No diabetes, hypothyroid, hyperthyroid, hot flashes, hair loss, abnormal hair growth, ance  Psychiatric: No depression, crying  Hematologic: No bruises, bleeding, swollen lymph nodes, anemia.      OBJECTIVE:   The patient appears well, alert, oriented x 3, in no distress.  /65   Ht 5' 4" (1.626 m)   Wt 77.3 kg (170 lb 6.4 oz)   BMI 29.25 kg/m²   NECK: no thyromegaly, trachea midline  SKIN: no acne, striae, hirsutism  BREAST EXAM: not examined  ABDOMEN: no hernias, masses, or hepatosplenomegaly  GENITALIA: normal external genitalia, no erythema, no " discharge  URETHRA: normal urethra, normal urethral meatus  VAGINA: vaginal discharge scant and white  CERVIX: no lesions or cervical motion tenderness  UTERUS: normal  ADNEXA: normal adnexa      ASSESSMENT:   well woman  no contraindication to begin use of depo contraceptives  1. Well woman exam with routine gynecological exam    2. Screening for STD (sexually transmitted disease)    3. Vaginal odor        PLAN:   pap smear  additional lab tests per orders  return annually or prn  BA supp  Will f/u on testing

## 2020-05-08 LAB
BACTERIAL VAGINOSIS DNA: POSITIVE
CANDIDA GLABRATA DNA: NEGATIVE
CANDIDA KRUSEI DNA: NEGATIVE
CANDIDA RRNA VAG QL PROBE: POSITIVE
T VAGINALIS RRNA GENITAL QL PROBE: NEGATIVE

## 2020-05-09 LAB
C TRACH DNA SPEC QL NAA+PROBE: DETECTED
N GONORRHOEA DNA SPEC QL NAA+PROBE: NOT DETECTED

## 2020-05-11 ENCOUNTER — TELEPHONE (OUTPATIENT)
Dept: OBSTETRICS AND GYNECOLOGY | Facility: CLINIC | Age: 25
End: 2020-05-11

## 2020-05-11 RX ORDER — AZITHROMYCIN 500 MG/1
1000 TABLET, FILM COATED ORAL ONCE
Qty: 2 TABLET | Refills: 0 | Status: SHIPPED | OUTPATIENT
Start: 2020-05-11 | End: 2021-05-04

## 2020-05-11 NOTE — TELEPHONE ENCOUNTER
Call and notify patient of positive chlamydia.  Prescription for 1 g Azithromycin PO x 1 dose only sent to pharmacy.   that patient's partner should be tested and treated as well.  She should plan to return to clinic in 2-3 weeks for a test of cure.  In the interim, she should use barrier protection to prevent re-infection.  Also has BV and yeast will tx when she comes back for SHAYNE

## 2020-05-11 NOTE — TELEPHONE ENCOUNTER
Called patient, informed patient of providers message. SHAYNE appointment is scheduled for 6/17/2020 at 9:30am at the [place] office. Patient verbalized understanding

## 2020-06-11 ENCOUNTER — TELEPHONE (OUTPATIENT)
Dept: OBSTETRICS AND GYNECOLOGY | Facility: CLINIC | Age: 25
End: 2020-06-11

## 2020-06-11 NOTE — TELEPHONE ENCOUNTER
----- Message from Nilda Phillips sent at 6/11/2020 11:04 AM CDT -----  Contact: self  [phone]  .Type:  Patient Returning Call    Who Called:  Self     Who Left Message for Patient: Claire Reilly    Does the patient know what this is regarding? appt     Would the patient rather a call back or a response via [facility]?  Call back     Best Call Back Number [phone]

## 2020-06-11 NOTE — TELEPHONE ENCOUNTER
Called patient, patient stated she could not come on 6/19/20 and would like to keep her scheduled appointment.

## 2020-06-11 NOTE — TELEPHONE ENCOUNTER
Called patient, no answer, left message to call back. I was calling because I see she has an appointment on 6/17/20 at 9:30am at the [place] office but provider will not be in the office at the time of her appointment and I was calling to see if we can change her appointment to 6/19/20 at the [place] office.

## 2020-06-15 ENCOUNTER — OFFICE VISIT (OUTPATIENT)
Dept: OBSTETRICS AND GYNECOLOGY | Facility: CLINIC | Age: 25
End: 2020-06-15
Payer: MEDICAID

## 2020-06-15 VITALS
BODY MASS INDEX: 30.86 KG/M2 | WEIGHT: 179.81 LBS | DIASTOLIC BLOOD PRESSURE: 80 MMHG | SYSTOLIC BLOOD PRESSURE: 120 MMHG

## 2020-06-15 DIAGNOSIS — Z11.3 SCREENING FOR STD (SEXUALLY TRANSMITTED DISEASE): Primary | ICD-10-CM

## 2020-06-15 PROCEDURE — 99213 PR OFFICE/OUTPT VISIT, EST, LEVL III, 20-29 MIN: ICD-10-PCS | Mod: S$PBB,,, | Performed by: OBSTETRICS & GYNECOLOGY

## 2020-06-15 PROCEDURE — 99212 OFFICE O/P EST SF 10 MIN: CPT | Mod: PBBFAC,PN | Performed by: OBSTETRICS & GYNECOLOGY

## 2020-06-15 PROCEDURE — 99213 OFFICE O/P EST LOW 20 MIN: CPT | Mod: S$PBB,,, | Performed by: OBSTETRICS & GYNECOLOGY

## 2020-06-15 PROCEDURE — 99999 PR PBB SHADOW E&M-EST. PATIENT-LVL II: ICD-10-PCS | Mod: PBBFAC,,, | Performed by: OBSTETRICS & GYNECOLOGY

## 2020-06-15 PROCEDURE — 87491 CHLMYD TRACH DNA AMP PROBE: CPT

## 2020-06-15 PROCEDURE — 99999 PR PBB SHADOW E&M-EST. PATIENT-LVL II: CPT | Mod: PBBFAC,,, | Performed by: OBSTETRICS & GYNECOLOGY

## 2020-06-15 RX ORDER — METRONIDAZOLE 500 MG/1
500 TABLET ORAL EVERY 12 HOURS
Qty: 14 TABLET | Refills: 0 | Status: SHIPPED | OUTPATIENT
Start: 2020-06-15 | End: 2020-06-22

## 2020-06-15 RX ORDER — IBUPROFEN 800 MG/1
800 TABLET ORAL
COMMUNITY
Start: 2020-05-28 | End: 2020-11-03

## 2020-06-15 RX ORDER — MEDROXYPROGESTERONE ACETATE 150 MG/ML
150 INJECTION, SUSPENSION INTRAMUSCULAR
Qty: 1 SYRINGE | Refills: 3 | Status: SHIPPED | OUTPATIENT
Start: 2020-06-15 | End: 2020-06-19

## 2020-06-15 RX ORDER — CYCLOBENZAPRINE HCL 10 MG
1 TABLET ORAL DAILY PRN
COMMUNITY
Start: 2020-05-28 | End: 2020-11-13 | Stop reason: CLARIF

## 2020-06-15 RX ORDER — TERCONAZOLE 8 MG/G
1 CREAM VAGINAL NIGHTLY
Qty: 20 G | Refills: 0 | Status: SHIPPED | OUTPATIENT
Start: 2020-06-15 | End: 2020-06-18

## 2020-06-15 NOTE — PROGRESS NOTES
CC: Vaginal Discharge, needs depo    HPI:   24 y.o. female  complains of white and thick vaginal discharge for 1 week. No LMP recorded (lmp unknown). Patient has had an injection..  She describes her periods as absent. She is not sexually active.  She uses Depo-Provera for contraception. Had + CT, took zithromax, had yeast and BV but has not gotten tx'd and still has sx's, needs refill on depo, mom gives her her shots    ROS:  GENERAL: No fever, chills, fatigability or weight loss.  VULVAR: No pain, no lesions and no itching.  VAGINAL: No relaxation, no itching, no discharge, no abnormal bleeding and no lesions.  ABDOMEN: No abdominal pain. Denies nausea. Denies vomiting. No diarrhea. No constipation  BREAST: Denies pain. No lumps. No discharge.  URINARY: No incontinence, no nocturia, no frequency and no dysuria.  CARDIOVASCULAR: No chest pain. No shortness of breath. No leg cramps.  NEUROLOGICAL: No headaches. No vision changes.        Vitals:    06/15/20 1352   BP: 120/80         OBJECTIVE:   She appears well, afebrile.  Abdomen: benign, soft, nontender, no masses.  VULVA: Normal external female genitalia, normal urethra, normal urethral meatus  VAGINA:discharge: white and thick  CERVIXNormal  UTERUSnormal  ADNEXAno mass, fullness, tenderness        ASSESSMENT:   bacterial vaginosis, rule out GC or chlamydia and C. albicans vulvovaginitis  contraception  PLAN:   Orders Placed This Encounter    C. trachomatis/N. gonorrhoeae by AMP DNA [facility]; Urine    terconazole (TERAZOL 3) 0.8 % vaginal cream    metroNIDAZOLE (FLAGYL) 500 MG tablet    medroxyPROGESTERone (DEPO-PROVERA) 150 mg/mL Syrg   former partner notified of +CT  ROV prn if symptoms persist or worsen.

## 2020-06-17 LAB
C TRACH DNA SPEC QL NAA+PROBE: NOT DETECTED
N GONORRHOEA DNA SPEC QL NAA+PROBE: NOT DETECTED

## 2020-07-30 ENCOUNTER — PATIENT MESSAGE (OUTPATIENT)
Dept: OBSTETRICS AND GYNECOLOGY | Facility: CLINIC | Age: 25
End: 2020-07-30

## 2020-08-05 ENCOUNTER — OFFICE VISIT (OUTPATIENT)
Dept: OBSTETRICS AND GYNECOLOGY | Facility: CLINIC | Age: 25
End: 2020-08-05
Payer: MEDICAID

## 2020-08-05 VITALS
DIASTOLIC BLOOD PRESSURE: 60 MMHG | WEIGHT: 178.81 LBS | SYSTOLIC BLOOD PRESSURE: 100 MMHG | BODY MASS INDEX: 30.53 KG/M2 | HEIGHT: 64 IN

## 2020-08-05 DIAGNOSIS — N89.8 VAGINAL ODOR: ICD-10-CM

## 2020-08-05 DIAGNOSIS — N30.01 ACUTE CYSTITIS WITH HEMATURIA: ICD-10-CM

## 2020-08-05 DIAGNOSIS — N89.8 VAGINAL DISCHARGE: Primary | ICD-10-CM

## 2020-08-05 PROCEDURE — 87088 URINE BACTERIA CULTURE: CPT

## 2020-08-05 PROCEDURE — 99999 PR PBB SHADOW E&M-EST. PATIENT-LVL III: ICD-10-PCS | Mod: PBBFAC,,, | Performed by: OBSTETRICS & GYNECOLOGY

## 2020-08-05 PROCEDURE — 99213 OFFICE O/P EST LOW 20 MIN: CPT | Mod: S$PBB,,, | Performed by: OBSTETRICS & GYNECOLOGY

## 2020-08-05 PROCEDURE — 99999 PR PBB SHADOW E&M-EST. PATIENT-LVL III: CPT | Mod: PBBFAC,,, | Performed by: OBSTETRICS & GYNECOLOGY

## 2020-08-05 PROCEDURE — 87186 SC STD MICRODIL/AGAR DIL: CPT

## 2020-08-05 PROCEDURE — 87077 CULTURE AEROBIC IDENTIFY: CPT

## 2020-08-05 PROCEDURE — 87491 CHLMYD TRACH DNA AMP PROBE: CPT

## 2020-08-05 PROCEDURE — 99213 PR OFFICE/OUTPT VISIT, EST, LEVL III, 20-29 MIN: ICD-10-PCS | Mod: S$PBB,,, | Performed by: OBSTETRICS & GYNECOLOGY

## 2020-08-05 PROCEDURE — 87086 URINE CULTURE/COLONY COUNT: CPT

## 2020-08-05 PROCEDURE — 99213 OFFICE O/P EST LOW 20 MIN: CPT | Mod: PBBFAC,PN | Performed by: OBSTETRICS & GYNECOLOGY

## 2020-08-05 NOTE — PROGRESS NOTES
CC:There is no problem list on file for this patient.      HPI:   Ray Knox is a 25 y.o. female  who complains of burning with urination, dysuria, frequency and suprapubic pressure x7 days. No LMP recorded (lmp unknown). Patient has had an injection..  She does not complain of vaginal discharge.  She is sexually active.  She uses Depo-Provera.      ROS:  GENERAL: No fever, chills, fatigability or weight loss.  VULVAR: No pain, no lesions and no itching.  VAGINAL: No relaxation, no itching, no discharge, no abnormal bleeding and no lesions.  ABDOMEN: No abdominal pain. Denies nausea. Denies vomiting. No diarrhea. No constipation  BREAST: Denies pain. No lumps. No discharge.  URINARY: No incontinence, no nocturia, no frequency and no dysuria.  CARDIOVASCULAR: No chest pain. No shortness of breath. No leg cramps.  NEUROLOGICAL: No headaches. No vision changes.      Vitals:    20 1047   BP: 100/60       OBJECTIVE: Appears well, in no apparent distress.    soft, non-tender; bowel sounds normal  EXTERNAL GENITALIA: normal appearing vulva with no masses, tenderness or lesions  VAGINA: discharge scant white  CERVIX: no lesions or cervical motion tenderness  UTERUS: small, nontender  ADNEXA: no masses palpable and nontender    Urine dipstick shows positive for WBC's, positive for RBC's and positive for protein.      ASSESSMENT: UTI uncomplicated without evidence of pyelonephritis      PLAN:    Treatment per orders - also push fluids, may use Pyridium OTC prn. Call or return to clinic prn if these symptoms worsen or fail to improve as anticipated.   rx snet in for macrobid , but pt did not p/u rx  GC/CT done  Urine cx  Start abx today

## 2020-08-07 ENCOUNTER — TELEPHONE (OUTPATIENT)
Dept: OBSTETRICS AND GYNECOLOGY | Facility: CLINIC | Age: 25
End: 2020-08-07

## 2020-08-07 LAB — BACTERIA UR CULT: ABNORMAL

## 2020-08-07 RX ORDER — CIPROFLOXACIN 500 MG/1
500 TABLET ORAL 2 TIMES DAILY
Qty: 6 TABLET | Refills: 0 | Status: SHIPPED | OUTPATIENT
Start: 2020-08-07 | End: 2020-08-10

## 2020-08-08 LAB
C TRACH DNA SPEC QL NAA+PROBE: NOT DETECTED
N GONORRHOEA DNA SPEC QL NAA+PROBE: NOT DETECTED

## 2020-08-10 ENCOUNTER — HOSPITAL ENCOUNTER (EMERGENCY)
Facility: HOSPITAL | Age: 25
Discharge: HOME | End: 2020-08-11
Attending: EMERGENCY MEDICINE
Payer: MEDICAID

## 2020-08-10 DIAGNOSIS — N30.00 ACUTE CYSTITIS WITHOUT HEMATURIA: Primary | ICD-10-CM

## 2020-08-10 DIAGNOSIS — D35.01 ADRENAL ADENOMA, RIGHT: ICD-10-CM

## 2020-08-10 LAB
B-HCG UR QL: NEGATIVE
BACTERIA #/AREA URNS AUTO: ABNORMAL /HPF
BILIRUB UR QL STRIP: NEGATIVE
CLARITY UR REFRACT.AUTO: CLEAR
COLOR UR AUTO: ABNORMAL
GLUCOSE UR QL STRIP: NEGATIVE
HGB UR QL STRIP: NEGATIVE
HYALINE CASTS UR QL AUTO: 0 /LPF
KETONES UR QL STRIP: ABNORMAL
LEUKOCYTE ESTERASE UR QL STRIP: ABNORMAL
MICROSCOPIC COMMENT: ABNORMAL
NITRITE UR QL STRIP: NEGATIVE
PH UR STRIP: 6 [PH] (ref 5–8)
PROT UR QL STRIP: ABNORMAL
RBC #/AREA URNS AUTO: 0 /HPF (ref 0–4)
SP GR UR STRIP: 1.02 (ref 1–1.03)
SQUAMOUS #/AREA URNS AUTO: 10 /HPF
URN SPEC COLLECT METH UR: ABNORMAL
UROBILINOGEN UR STRIP-ACNC: 1 EU/DL
WBC #/AREA URNS AUTO: 25 /HPF (ref 0–5)

## 2020-08-10 PROCEDURE — 96374 THER/PROPH/DIAG INJ IV PUSH: CPT | Mod: ER

## 2020-08-10 PROCEDURE — 80053 COMPREHEN METABOLIC PANEL: CPT | Mod: ER

## 2020-08-10 PROCEDURE — 87186 SC STD MICRODIL/AGAR DIL: CPT | Mod: ER

## 2020-08-10 PROCEDURE — 81025 URINE PREGNANCY TEST: CPT | Mod: ER

## 2020-08-10 PROCEDURE — 87077 CULTURE AEROBIC IDENTIFY: CPT | Mod: ER

## 2020-08-10 PROCEDURE — 87088 URINE BACTERIA CULTURE: CPT | Mod: ER

## 2020-08-10 PROCEDURE — 85025 COMPLETE CBC W/AUTO DIFF WBC: CPT | Mod: ER

## 2020-08-10 PROCEDURE — 87086 URINE CULTURE/COLONY COUNT: CPT | Mod: ER

## 2020-08-10 PROCEDURE — 99285 EMERGENCY DEPT VISIT HI MDM: CPT | Mod: 25,ER

## 2020-08-10 PROCEDURE — 96375 TX/PRO/DX INJ NEW DRUG ADDON: CPT | Mod: ER

## 2020-08-10 PROCEDURE — 81000 URINALYSIS NONAUTO W/SCOPE: CPT | Mod: ER

## 2020-08-11 VITALS
TEMPERATURE: 99 F | WEIGHT: 178 LBS | HEIGHT: 64 IN | OXYGEN SATURATION: 100 % | BODY MASS INDEX: 30.39 KG/M2 | SYSTOLIC BLOOD PRESSURE: 112 MMHG | DIASTOLIC BLOOD PRESSURE: 77 MMHG | RESPIRATION RATE: 19 BRPM | HEART RATE: 78 BPM

## 2020-08-11 LAB
ALBUMIN SERPL BCP-MCNC: 4.3 G/DL (ref 3.5–5.2)
ALP SERPL-CCNC: 52 U/L (ref 38–126)
ALT SERPL W/O P-5'-P-CCNC: 12 U/L (ref 10–44)
ANION GAP SERPL CALC-SCNC: 7 MMOL/L (ref 8–16)
AST SERPL-CCNC: 17 U/L (ref 15–46)
BASOPHILS # BLD AUTO: 0.04 K/UL (ref 0–0.2)
BASOPHILS NFR BLD: 0.4 % (ref 0–1.9)
BILIRUB SERPL-MCNC: 0.4 MG/DL (ref 0.1–1)
BUN SERPL-MCNC: 12 MG/DL (ref 7–17)
CALCIUM SERPL-MCNC: 9.1 MG/DL (ref 8.7–10.5)
CHLORIDE SERPL-SCNC: 108 MMOL/L (ref 95–110)
CO2 SERPL-SCNC: 24 MMOL/L (ref 23–29)
CREAT SERPL-MCNC: 1.18 MG/DL (ref 0.5–1.4)
DIFFERENTIAL METHOD: ABNORMAL
EOSINOPHIL # BLD AUTO: 0.3 K/UL (ref 0–0.5)
EOSINOPHIL NFR BLD: 2.5 % (ref 0–8)
ERYTHROCYTE [DISTWIDTH] IN BLOOD BY AUTOMATED COUNT: 17.2 % (ref 11.5–14.5)
EST. GFR  (AFRICAN AMERICAN): >60 ML/MIN/1.73 M^2
EST. GFR  (NON AFRICAN AMERICAN): >60 ML/MIN/1.73 M^2
GLUCOSE SERPL-MCNC: 115 MG/DL (ref 70–110)
HCT VFR BLD AUTO: 33.7 % (ref 37–48.5)
HGB BLD-MCNC: 10 G/DL (ref 12–16)
IMM GRANULOCYTES # BLD AUTO: 0.14 K/UL (ref 0–0.04)
IMM GRANULOCYTES NFR BLD AUTO: 1.4 % (ref 0–0.5)
LYMPHOCYTES # BLD AUTO: 3.1 K/UL (ref 1–4.8)
LYMPHOCYTES NFR BLD: 31.5 % (ref 18–48)
MCH RBC QN AUTO: 21.9 PG (ref 27–31)
MCHC RBC AUTO-ENTMCNC: 29.7 G/DL (ref 32–36)
MCV RBC AUTO: 74 FL (ref 82–98)
MONOCYTES # BLD AUTO: 0.7 K/UL (ref 0.3–1)
MONOCYTES NFR BLD: 6.6 % (ref 4–15)
NEUTROPHILS # BLD AUTO: 5.7 K/UL (ref 1.8–7.7)
NEUTROPHILS NFR BLD: 57.6 % (ref 38–73)
NRBC BLD-RTO: 0 /100 WBC
PLATELET # BLD AUTO: 287 K/UL (ref 150–350)
PMV BLD AUTO: 11.4 FL (ref 9.2–12.9)
POTASSIUM SERPL-SCNC: 3.9 MMOL/L (ref 3.5–5.1)
PROT SERPL-MCNC: 7.7 G/DL (ref 6–8.4)
RBC # BLD AUTO: 4.57 M/UL (ref 4–5.4)
SODIUM SERPL-SCNC: 139 MMOL/L (ref 136–145)
WBC # BLD AUTO: 9.89 K/UL (ref 3.9–12.7)

## 2020-08-11 PROCEDURE — 25500020 PHARM REV CODE 255: Mod: ER | Performed by: EMERGENCY MEDICINE

## 2020-08-11 PROCEDURE — 63600175 PHARM REV CODE 636 W HCPCS: Mod: ER | Performed by: EMERGENCY MEDICINE

## 2020-08-11 RX ORDER — KETOROLAC TROMETHAMINE 30 MG/ML
10 INJECTION, SOLUTION INTRAMUSCULAR; INTRAVENOUS
Status: COMPLETED | OUTPATIENT
Start: 2020-08-11 | End: 2020-08-11

## 2020-08-11 RX ORDER — CEPHALEXIN 500 MG/1
500 CAPSULE ORAL EVERY 12 HOURS
Qty: 10 CAPSULE | Refills: 0 | Status: SHIPPED | OUTPATIENT
Start: 2020-08-11 | End: 2020-08-16

## 2020-08-11 RX ORDER — ONDANSETRON 2 MG/ML
4 INJECTION INTRAMUSCULAR; INTRAVENOUS
Status: COMPLETED | OUTPATIENT
Start: 2020-08-11 | End: 2020-08-11

## 2020-08-11 RX ADMIN — ONDANSETRON 4 MG: 2 INJECTION INTRAMUSCULAR; INTRAVENOUS at 12:08

## 2020-08-11 RX ADMIN — KETOROLAC TROMETHAMINE 10 MG: 30 INJECTION, SOLUTION INTRAMUSCULAR at 01:08

## 2020-08-11 RX ADMIN — IOHEXOL 100 ML: 350 INJECTION, SOLUTION INTRAVENOUS at 01:08

## 2020-08-11 NOTE — ED PROVIDER NOTES
"Encounter Date: 8/10/2020       History     Chief Complaint   Patient presents with    Abdominal Pain     Pt to er states "feeling nausea with abdominal cramping all day." Pt she vomited x 1 and it had "blood in it" Pt reports to taking Tylenol this morning and a percocet about 3 hours ago with no relief     Patient currently presents with concern regarding abdominal cramping.  This is localized to the lower abdomen.  Onset was noted earlier today.  Patient notes she has been nauseated with 1 episode of vomiting earlier today.  She denies any constipation or diarrhea.  Patient notes that she took a Percocet on 3 hr ago with no relief.  Vaginal discharge is also denied.        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 No pertinent surgical history.  Family History   Problem Relation Age of Onset    Diabetes Paternal Aunt     Hypertension Maternal Grandfather     Cancer Paternal Grandmother     Breast cancer Paternal Grandmother     Leukemia Paternal Grandmother     Kidney disease Maternal Grandmother     Hypertension Mother      Social History     Tobacco Use    Smoking status: Never Smoker    Smokeless tobacco: Never Used   Substance Use Topics    Alcohol use: No    Drug use: No     Review of Systems   Constitutional: Negative for chills and fever.   HENT: Negative for congestion and rhinorrhea.    Respiratory: Negative for cough, chest tightness, shortness of breath and wheezing.    Cardiovascular: Negative for chest pain, palpitations and leg swelling.   Gastrointestinal: Positive for abdominal pain, nausea and vomiting. Negative for constipation and diarrhea.   Genitourinary: Negative for dysuria, frequency, urgency, vaginal bleeding and vaginal discharge.   Skin: Negative for color change and rash. "   Allergic/Immunologic: Negative for immunocompromised state.   Neurological: Negative for dizziness, weakness and numbness.   Hematological: Negative for adenopathy. Does not bruise/bleed easily.   All other systems reviewed and are negative.      Physical Exam     Initial Vitals [08/10/20 2306]   BP Pulse Resp Temp SpO2   120/77 80 18 99 °F (37.2 °C) 99 %      MAP       --         Physical Exam    Nursing note and vitals reviewed.  Constitutional: She appears well-developed and well-nourished. She is not diaphoretic. No distress.   HENT:   Head: Normocephalic and atraumatic.   Right Ear: External ear normal.   Left Ear: External ear normal.   Nose: Nose normal.   Mouth/Throat: Oropharynx is clear and moist.   Eyes: Conjunctivae and EOM are normal. Pupils are equal, round, and reactive to light. No scleral icterus.   Neck: Neck supple. No tracheal deviation present. No JVD present.   Cardiovascular: Normal rate, regular rhythm, normal heart sounds and intact distal pulses. Exam reveals no gallop and no friction rub.    No murmur heard.  Pulmonary/Chest: Breath sounds normal. No respiratory distress. She has no wheezes. She has no rhonchi. She has no rales.   Abdominal: Soft. Bowel sounds are normal. She exhibits no distension. There is abdominal tenderness (mild SP).   Musculoskeletal: Normal range of motion. No edema.   Neurological: She is alert and oriented to person, place, and time. She has normal strength. No cranial nerve deficit or sensory deficit.   Skin: Skin is warm and dry. No rash noted.   Psychiatric: She has a normal mood and affect. Her behavior is normal.         ED Course   Procedures  Labs Reviewed   URINALYSIS, REFLEX TO URINE CULTURE - Abnormal; Notable for the following components:       Result Value    Protein, UA 1+ (*)     Ketones, UA 1+ (*)     Leukocytes, UA 3+ (*)     All other components within normal limits    Narrative:     Specimen Source->Urine   URINALYSIS MICROSCOPIC - Abnormal;  Notable for the following components:    WBC, UA 25 (*)     Bacteria Moderate (*)     All other components within normal limits    Narrative:     Specimen Source->Urine   CBC W/ AUTO DIFFERENTIAL - Abnormal; Notable for the following components:    Hemoglobin 10.0 (*)     Hematocrit 33.7 (*)     Mean Corpuscular Volume 74 (*)     Mean Corpuscular Hemoglobin 21.9 (*)     Mean Corpuscular Hemoglobin Conc 29.7 (*)     RDW 17.2 (*)     Immature Granulocytes 1.4 (*)     Immature Grans (Abs) 0.14 (*)     All other components within normal limits   COMPREHENSIVE METABOLIC PANEL - Abnormal; Notable for the following components:    Glucose 115 (*)     Anion Gap 7 (*)     All other components within normal limits   CULTURE, URINE   PREGNANCY TEST, URINE RAPID    Narrative:     Specimen Source->Urine            X-Rays: Other Radiology Reports: To preliminary report from the CT of the abdomen and pelvis demonstrates no evidence of appendicitis.  Uterus is noted to be fluid filled with mildly thickened endometrium.  Multiple underlying follicles noted in the ovaries.  Trace pelvic fluid noted on the right.  A 21 mm right adrenal gland adenoma is noted.     Medical Decision Making:   ED Management:  All findings were reviewed with the patient/family in detail.  Patient has been advised of the diagnosis of UTI any indication for antibiotics as well as the diagnosis for an incidental adrenal adenoma.  At present this appears to be benign but she has been encouraged to seek PCP follow-up to arrange surveillance.  I see no indication of an emergent process beyond that addressed during our encounter but have duly counseled the patient/family regarding the need for prompt follow-up as well as the indications that should prompt immediate return to the emergency room should new or worrisome developments occur.  The patient has additionally been provided with printed information regarding diagnosis as well as instructions regarding follow  up and any medications intended to manage the patient's aforementioned conditions.  The patient/family communicates understanding of all this information and all remaining questions and concerns were addressed at this time.                                       Clinical Impression:       ICD-10-CM ICD-9-CM   1. Acute cystitis without hematuria  N30.00 595.0   2. Adrenal adenoma, right  D35.01 227.0             ED Disposition Condition    Discharge Stable        ED Prescriptions     Medication Sig Dispense Start Date End Date Auth. Provider    cephALEXin (KEFLEX) 500 MG capsule Take 1 capsule (500 mg total) by mouth every 12 (twelve) hours. for 5 days 10 capsule 8/11/2020 8/16/2020 Main Velazquez MD        Follow-up Information     Follow up With Specialties Details Why Contact Info    Russ Ram MD Family Medicine Schedule an appointment as soon as possible for a visit  for reassessment and surveillance of incidental adrenal adenoma [address] [place] [id number]  [phone]                                       Main Velaqzuez MD  08/11/20 1631       Main Velazquez MD  08/13/20 0549

## 2020-08-11 NOTE — ED NOTES
Educated pt on clean catch specimen. Pt provided a cup and wipes to obtain specimen.  Pt ambulatory to the bathroom with CHLOÉ

## 2020-08-13 LAB — BACTERIA UR CULT: ABNORMAL

## 2020-08-21 ENCOUNTER — HOSPITAL ENCOUNTER (EMERGENCY)
Facility: HOSPITAL | Age: 25
Discharge: HOME | End: 2020-08-21
Attending: FAMILY MEDICINE
Payer: MEDICAID

## 2020-08-21 VITALS
SYSTOLIC BLOOD PRESSURE: 99 MMHG | BODY MASS INDEX: 30.73 KG/M2 | OXYGEN SATURATION: 100 % | HEART RATE: 60 BPM | TEMPERATURE: 99 F | RESPIRATION RATE: 19 BRPM | DIASTOLIC BLOOD PRESSURE: 61 MMHG | WEIGHT: 180 LBS | HEIGHT: 64 IN

## 2020-08-21 DIAGNOSIS — F12.90 MARIJUANA USE: ICD-10-CM

## 2020-08-21 DIAGNOSIS — R10.9 ABDOMINAL PAIN: ICD-10-CM

## 2020-08-21 DIAGNOSIS — K59.00 CONSTIPATION, UNSPECIFIED CONSTIPATION TYPE: Primary | ICD-10-CM

## 2020-08-21 LAB
ALBUMIN SERPL BCP-MCNC: 4.5 G/DL (ref 3.5–5.2)
ALP SERPL-CCNC: 46 U/L (ref 38–126)
ALT SERPL W/O P-5'-P-CCNC: 13 U/L (ref 10–44)
AMPHET+METHAMPHET UR QL: NEGATIVE
ANION GAP SERPL CALC-SCNC: 10 MMOL/L (ref 8–16)
AST SERPL-CCNC: 18 U/L (ref 15–46)
B-HCG UR QL: NEGATIVE
BACTERIA #/AREA URNS AUTO: ABNORMAL /HPF
BARBITURATES UR QL SCN>200 NG/ML: NEGATIVE
BASOPHILS # BLD AUTO: 0.05 K/UL (ref 0–0.2)
BASOPHILS NFR BLD: 0.5 % (ref 0–1.9)
BENZODIAZ UR QL SCN>200 NG/ML: NEGATIVE
BILIRUB SERPL-MCNC: 0.7 MG/DL (ref 0.1–1)
BILIRUB UR QL STRIP: NEGATIVE
BUN SERPL-MCNC: 12 MG/DL (ref 7–17)
BZE UR QL SCN: NEGATIVE
CALCIUM SERPL-MCNC: 9.1 MG/DL (ref 8.7–10.5)
CANNABINOIDS UR QL SCN: NORMAL
CHLORIDE SERPL-SCNC: 106 MMOL/L (ref 95–110)
CLARITY UR REFRACT.AUTO: ABNORMAL
CO2 SERPL-SCNC: 24 MMOL/L (ref 23–29)
COLOR UR AUTO: YELLOW
CREAT SERPL-MCNC: 1.21 MG/DL (ref 0.5–1.4)
CREAT UR-MCNC: 261 MG/DL (ref 15–325)
DIFFERENTIAL METHOD: ABNORMAL
EOSINOPHIL # BLD AUTO: 0.3 K/UL (ref 0–0.5)
EOSINOPHIL NFR BLD: 2.6 % (ref 0–8)
ERYTHROCYTE [DISTWIDTH] IN BLOOD BY AUTOMATED COUNT: 17.1 % (ref 11.5–14.5)
EST. GFR  (AFRICAN AMERICAN): >60 ML/MIN/1.73 M^2
EST. GFR  (NON AFRICAN AMERICAN): >60 ML/MIN/1.73 M^2
GLUCOSE SERPL-MCNC: 89 MG/DL (ref 70–110)
GLUCOSE UR QL STRIP: NEGATIVE
HCT VFR BLD AUTO: 35.1 % (ref 37–48.5)
HGB BLD-MCNC: 10.4 G/DL (ref 12–16)
HGB UR QL STRIP: ABNORMAL
IMM GRANULOCYTES # BLD AUTO: 0.03 K/UL (ref 0–0.04)
IMM GRANULOCYTES NFR BLD AUTO: 0.3 % (ref 0–0.5)
KETONES UR QL STRIP: NEGATIVE
LEUKOCYTE ESTERASE UR QL STRIP: ABNORMAL
LYMPHOCYTES # BLD AUTO: 2.9 K/UL (ref 1–4.8)
LYMPHOCYTES NFR BLD: 30.2 % (ref 18–48)
MCH RBC QN AUTO: 22 PG (ref 27–31)
MCHC RBC AUTO-ENTMCNC: 29.6 G/DL (ref 32–36)
MCV RBC AUTO: 74 FL (ref 82–98)
METHADONE UR QL SCN>300 NG/ML: NEGATIVE
MICROSCOPIC COMMENT: ABNORMAL
MONOCYTES # BLD AUTO: 0.7 K/UL (ref 0.3–1)
MONOCYTES NFR BLD: 7.6 % (ref 4–15)
NEUTROPHILS # BLD AUTO: 5.6 K/UL (ref 1.8–7.7)
NEUTROPHILS NFR BLD: 58.8 % (ref 38–73)
NITRITE UR QL STRIP: NEGATIVE
NRBC BLD-RTO: 0 /100 WBC
OPIATES UR QL SCN: NEGATIVE
PCP UR QL SCN>25 NG/ML: NEGATIVE
PH UR STRIP: 7 [PH] (ref 5–8)
PLATELET # BLD AUTO: 289 K/UL (ref 150–350)
PMV BLD AUTO: 10.9 FL (ref 9.2–12.9)
POTASSIUM SERPL-SCNC: 4.2 MMOL/L (ref 3.5–5.1)
PROT SERPL-MCNC: 7.9 G/DL (ref 6–8.4)
PROT UR QL STRIP: ABNORMAL
RBC # BLD AUTO: 4.73 M/UL (ref 4–5.4)
RBC #/AREA URNS AUTO: 1 /HPF (ref 0–4)
SODIUM SERPL-SCNC: 140 MMOL/L (ref 136–145)
SP GR UR STRIP: 1.01 (ref 1–1.03)
TOXICOLOGY INFORMATION: NORMAL
URN SPEC COLLECT METH UR: ABNORMAL
UROBILINOGEN UR STRIP-ACNC: 1 EU/DL
WBC # BLD AUTO: 9.59 K/UL (ref 3.9–12.7)
WBC #/AREA URNS AUTO: 8 /HPF (ref 0–5)

## 2020-08-21 PROCEDURE — 85025 COMPLETE CBC W/AUTO DIFF WBC: CPT | Mod: ER

## 2020-08-21 PROCEDURE — 80307 DRUG TEST PRSMV CHEM ANLYZR: CPT | Mod: ER

## 2020-08-21 PROCEDURE — 99284 EMERGENCY DEPT VISIT MOD MDM: CPT | Mod: 25,ER

## 2020-08-21 PROCEDURE — 96374 THER/PROPH/DIAG INJ IV PUSH: CPT | Mod: ER

## 2020-08-21 PROCEDURE — 80053 COMPREHEN METABOLIC PANEL: CPT | Mod: ER

## 2020-08-21 PROCEDURE — 81000 URINALYSIS NONAUTO W/SCOPE: CPT | Mod: ER

## 2020-08-21 PROCEDURE — 81025 URINE PREGNANCY TEST: CPT | Mod: ER

## 2020-08-21 PROCEDURE — 63600175 PHARM REV CODE 636 W HCPCS: Mod: ER | Performed by: FAMILY MEDICINE

## 2020-08-21 RX ORDER — ONDANSETRON 4 MG/1
4 TABLET, ORALLY DISINTEGRATING ORAL EVERY 6 HOURS PRN
Qty: 12 TABLET | Refills: 0 | Status: SHIPPED | OUTPATIENT
Start: 2020-08-21 | End: 2020-11-13 | Stop reason: CLARIF

## 2020-08-21 RX ORDER — ONDANSETRON 2 MG/ML
4 INJECTION INTRAMUSCULAR; INTRAVENOUS
Status: COMPLETED | OUTPATIENT
Start: 2020-08-21 | End: 2020-08-21

## 2020-08-21 RX ORDER — ADHESIVE BANDAGE
30 BANDAGE TOPICAL DAILY PRN
COMMUNITY
Start: 2020-08-21 | End: 2020-11-13 | Stop reason: CLARIF

## 2020-08-21 RX ADMIN — ONDANSETRON 4 MG: 2 INJECTION INTRAMUSCULAR; INTRAVENOUS at 05:08

## 2020-08-21 NOTE — ED PROVIDER NOTES
"Encounter Date: 8/21/2020       History     Chief Complaint   Patient presents with    Abdominal Pain     Pt states "I found out a couple days ago that both my kidneys have cyst. I have been vomiting. I finished all my antibiotics. My doctor told me to come here." Denies fever, diarrhea, chest pain. C/o body aches. States right side of abd hurts. Tylenol at 1100. PA speaking with pt on iPad.      25-year-old female complains of right abdominal pain for last 2 weeks.  Which was intermittent and more constant since last week and she was seen in the ED and had CT of abdomen.  She was diagnosed with UTI and was prescribed antibiotics.  She also mentioned about kidney cyst and had follow-up with PCP and Nephrology.  Today patient presents with persistent nausea, vomiting which has been there since last week.  She also complains of pain not getting better.  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  On review of CT  Patient has:congenital hypoplasia of the right kidney, with unchanged small cysts.  Left  upper pole cyst- moderate amount of stool in right colon and transverse colon.    The history is provided by the patient.     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 No pertinent surgical history.  Family History   Problem Relation Age of Onset    Diabetes Paternal Aunt     Hypertension Maternal Grandfather     Cancer Paternal Grandmother     Breast cancer Paternal Grandmother     Leukemia Paternal Grandmother     Kidney disease Maternal Grandmother     Hypertension Mother      Social History     Tobacco Use    Smoking status: Never Smoker    Smokeless tobacco: Never Used   Substance Use Topics    Alcohol use: No    Drug use: No     Review of Systems   Constitutional: Negative for activity change, " appetite change, chills and fever.   HENT: Negative for congestion, ear discharge, rhinorrhea, sinus pressure, sinus pain, sore throat and trouble swallowing.    Eyes: Negative for photophobia, pain, discharge, redness, itching and visual disturbance.   Respiratory: Negative for cough, chest tightness, shortness of breath and wheezing.    Cardiovascular: Negative for chest pain, palpitations and leg swelling.   Gastrointestinal: Positive for abdominal pain, nausea and vomiting. Negative for abdominal distention, constipation and diarrhea.   Genitourinary: Negative for dysuria, flank pain, frequency and hematuria.   Musculoskeletal: Negative for back pain, gait problem, neck pain and neck stiffness.   Skin: Negative for rash and wound.   Neurological: Negative for dizziness, tremors, seizures, syncope, speech difficulty, weakness, light-headedness, numbness and headaches.   Psychiatric/Behavioral: Negative for behavioral problems, confusion, hallucinations and sleep disturbance. The patient is not nervous/anxious.    All other systems reviewed and are negative.      Physical Exam     Initial Vitals [08/21/20 1633]   BP Pulse Resp Temp SpO2   (!) 102/59 85 19 99.4 °F (37.4 °C) 98 %      MAP       --         Physical Exam    Nursing note and vitals reviewed.  Constitutional: Vital signs are normal. She appears well-developed and well-nourished. She is active.   HENT:   Head: Normocephalic and atraumatic.   Right Ear: Tympanic membrane normal.   Left Ear: Tympanic membrane normal.   Nose: Nose normal.   Mouth/Throat: Oropharynx is clear and moist.   Eyes: Conjunctivae and lids are normal.   Neck: Trachea normal, normal range of motion and full passive range of motion without pain. Neck supple. Normal range of motion present. No neck rigidity.   Cardiovascular: Normal rate, regular rhythm, S1 normal, S2 normal, normal heart sounds, intact distal pulses and normal pulses.   Pulmonary/Chest: Breath sounds normal. No  respiratory distress. She has no wheezes. She has no rhonchi. She has no rales. She exhibits no tenderness.   Abdominal: Soft. Normal appearance and bowel sounds are normal. She exhibits no distension. There is abdominal tenderness in the right upper quadrant and right lower quadrant. There is guarding.       Musculoskeletal: Normal range of motion.   Lymphadenopathy:     She has no cervical adenopathy.   Neurological: She is alert and oriented to person, place, and time. She has normal strength and normal reflexes. No cranial nerve deficit or sensory deficit. GCS score is 15. GCS eye subscore is 4. GCS verbal subscore is 5. GCS motor subscore is 6.   Skin: Skin is warm and intact. Capillary refill takes less than 2 seconds. No abrasion, no bruising and no rash noted.   Psychiatric: She has a normal mood and affect. Her speech is normal and behavior is normal. Judgment and thought content normal. She is not actively hallucinating. Cognition and memory are normal. She is attentive.         ED Course   Procedures  Labs Reviewed   CBC W/ AUTO DIFFERENTIAL   COMPREHENSIVE METABOLIC PANEL   URINALYSIS, REFLEX TO URINE CULTURE          Imaging Results    None          Medical Decision Making:   Initial Assessment:   Right upper and right lower quadrant pain.  I do not think it is a cyst which is causing her pain.  And have lab work repeat and urinalysis.  Flat and erect x-ray.  Differential Diagnosis:   Constipation,  Gastroenteritis,  GERD,  ED Management:  Pt checked out to  at shift change.                                 Clinical Impression:       ICD-10-CM ICD-9-CM   1. Constipation, unspecified constipation type  K59.00 564.00   2. Abdominal pain  R10.9 789.00   3. Marijuana use  F12.90 305.20                                Jose Matamoros MD  08/24/20 0632

## 2020-08-22 NOTE — ED NOTES
"The patient was received from the off-going emergency room physician Dr Matamoros at 6:00PM.  All pertinent details presently available from the patient encounter were discussed along with the expected plan for disposition.      Vitals:    08/21/20 1633 08/21/20 1900   BP: (!) 102/59 99/61   Pulse: 85 60   Resp: 19    Temp: 99.4 °F (37.4 °C)    SpO2: 98% 100%   Weight: 81.6 kg (180 lb)    Height: 5' 4" (1.626 m)      Results for ARLEY REYES (MRN [id number]) as of 8/23/2020 04:14   Ref. Range 8/21/2020 17:12 8/21/2020 17:31 8/21/2020 17:47 8/21/2020 18:19   WBC Latest Ref Range: 3.90 - 12.70 K/uL  9.59     RBC Latest Ref Range: 4.00 - 5.40 M/uL  4.73     Hemoglobin Latest Ref Range: 12.0 - 16.0 g/dL  10.4 (L)     Hematocrit Latest Ref Range: 37.0 - 48.5 %  35.1 (L)     MCV Latest Ref Range: 82 - 98 fL  74 (L)     MCH Latest Ref Range: 27.0 - 31.0 pg  22.0 (L)     MCHC Latest Ref Range: 32.0 - 36.0 g/dL  29.6 (L)     RDW Latest Ref Range: 11.5 - 14.5 %  17.1 (H)     Platelets Latest Ref Range: 150 - 350 K/uL  289     MPV Latest Ref Range: 9.2 - 12.9 fL  10.9     Gran% Latest Ref Range: 38.0 - 73.0 %  58.8     Gran # (ANC) Latest Ref Range: 1.8 - 7.7 K/uL  5.6     Lymph% Latest Ref Range: 18.0 - 48.0 %  30.2     Lymph # Latest Ref Range: 1.0 - 4.8 K/uL  2.9     Mono% Latest Ref Range: 4.0 - 15.0 %  7.6     Mono # Latest Ref Range: 0.3 - 1.0 K/uL  0.7     Eosinophil% Latest Ref Range: 0.0 - 8.0 %  2.6     Eos # Latest Ref Range: 0.0 - 0.5 K/uL  0.3     Basophil% Latest Ref Range: 0.0 - 1.9 %  0.5     Baso # Latest Ref Range: 0.00 - 0.20 K/uL  0.05     nRBC Latest Ref Range: 0 /100 WBC  0     Differential Method Unknown  Automated     Immature Grans (Abs) Latest Ref Range: 0.00 - 0.04 K/uL  0.03     Immature Granulocytes Latest Ref Range: 0.0 - 0.5 %  0.3     Sodium Latest Ref Range: 136 - 145 mmol/L  140     Potassium Latest Ref Range: 3.5 - 5.1 mmol/L  4.2     Chloride Latest Ref Range: 95 - 110 mmol/L  106     CO2 " Latest Ref Range: 23 - 29 mmol/L  24     Anion Gap Latest Ref Range: 8 - 16 mmol/L  10     BUN, Bld Latest Ref Range: 7 - 17 mg/dL  12     Creatinine Latest Ref Range: 0.50 - 1.40 mg/dL  1.21     eGFR if non African American Latest Ref Range: >60 mL/min/1.73 m^2  >60.0     eGFR if African American Latest Ref Range: >60 mL/min/1.73 m^2  >60.0     Glucose Latest Ref Range: 70 - 110 mg/dL  89     Calcium Latest Ref Range: 8.7 - 10.5 mg/dL  9.1     Alkaline Phosphatase Latest Ref Range: 38 - 126 U/L  46     PROTEIN TOTAL Latest Ref Range: 6.0 - 8.4 g/dL  7.9     Albumin Latest Ref Range: 3.5 - 5.2 g/dL  4.5     BILIRUBIN TOTAL Latest Ref Range: 0.1 - 1.0 mg/dL  0.7     AST Latest Ref Range: 15 - 46 U/L  18     ALT Latest Ref Range: 10 - 44 U/L  13     Preg Test, Ur Unknown Negative      Benzodiazepines Unknown   Negative    Methadone metabolites Unknown   Negative    Phencyclidine Unknown   Negative    Barbiturate Screen, Ur Unknown   Negative    Cocaine (Metab.) Unknown   Negative    Opiate Scrn, Ur Unknown   Negative    Amphetamine Screen, Ur Unknown   Negative    Marijuana (THC) Metabolite Unknown   Presumptive Positive    Specimen UA Unknown Urine, Clean Catch      Color, UA Latest Ref Range: Yellow, Straw, Justina  Yellow      Appearance, UA Latest Ref Range: Clear  Cloudy (A)      Specific Marty, UA Latest Ref Range: 1.005 - 1.030  1.010      pH, UA Latest Ref Range: 5.0 - 8.0  7.0      Protein, UA Latest Ref Range: Negative  Trace (A)      Glucose, UA Latest Ref Range: Negative  Negative      Ketones, UA Latest Ref Range: Negative  Negative      Occult Blood UA Latest Ref Range: Negative  Trace (A)      NITRITE UA Latest Ref Range: Negative  Negative      UROBILINOGEN UA Latest Ref Range: <2.0 EU/dL 1.0      Bilirubin (UA) Latest Ref Range: Negative  Negative      Leukocytes, UA Latest Ref Range: Negative  2+ (A)      RBC, UA Latest Ref Range: 0 - 4 /hpf 1      WBC, UA Latest Ref Range: 0 - 5 /hpf 8 (H)       Bacteria, UA Latest Ref Range: None-Occ /hpf Moderate (A)      Microscopic Comment Unknown SEE COMMENT      Creatinine, Random Ur Latest Ref Range: 15.0 - 325.0 mg/dL   261.0    XR ABDOMEN FLAT AND ERECT Unknown    Rpt   Toxicology Information Unknown   SEE COMMENT        All findings were reviewed with the patient/family in detail.  Patient is feeling improved at this time and has been counseled regarding the appropriate use of laxatives as needed for resolving her constipation.  I see no indication of an emergent process beyond that addressed during our encounter but have duly counseled the patient/family regarding the need for prompt follow-up as well as the indications that should prompt immediate return to the emergency room should new or worrisome developments occur.  The patient has additionally been provided with printed information regarding diagnosis as well as instructions regarding follow up and any medications intended to manage the patient's aforementioned conditions.  The patient/family communicates understanding of all this information and all remaining questions and concerns were addressed at this time.            ICD-10-CM ICD-9-CM   1. Constipation, unspecified constipation type  K59.00 564.00   2. Abdominal pain  R10.9 789.00   3. Marijuana use  F12.90 305.20              Main Velazquez MD  08/23/20 0415

## 2020-09-26 ENCOUNTER — HOSPITAL ENCOUNTER (EMERGENCY)
Facility: HOSPITAL | Age: 25
Discharge: HOME | End: 2020-09-26
Attending: FAMILY MEDICINE
Payer: MEDICAID

## 2020-09-26 VITALS
WEIGHT: 184 LBS | DIASTOLIC BLOOD PRESSURE: 70 MMHG | TEMPERATURE: 100 F | BODY MASS INDEX: 30.66 KG/M2 | HEART RATE: 89 BPM | OXYGEN SATURATION: 100 % | RESPIRATION RATE: 18 BRPM | HEIGHT: 65 IN | SYSTOLIC BLOOD PRESSURE: 125 MMHG

## 2020-09-26 DIAGNOSIS — H57.89 IRRITATION OF RIGHT EYE: Primary | ICD-10-CM

## 2020-09-26 PROCEDURE — 99283 EMERGENCY DEPT VISIT LOW MDM: CPT | Mod: ER

## 2020-09-26 PROCEDURE — 25000003 PHARM REV CODE 250: Mod: ER | Performed by: FAMILY MEDICINE

## 2020-09-26 RX ORDER — TETRACAINE HYDROCHLORIDE 5 MG/ML
2 SOLUTION OPHTHALMIC
Status: COMPLETED | OUTPATIENT
Start: 2020-09-26 | End: 2020-09-26

## 2020-09-26 RX ORDER — GENTAMICIN SULFATE 3 MG/ML
2 SOLUTION/ DROPS OPHTHALMIC 4 TIMES DAILY
Qty: 15 ML | Refills: 0 | Status: SHIPPED | OUTPATIENT
Start: 2020-09-26 | End: 2020-11-13 | Stop reason: CLARIF

## 2020-09-26 RX ORDER — GENTAMICIN SULFATE 3 MG/ML
2 SOLUTION/ DROPS OPHTHALMIC
Status: COMPLETED | OUTPATIENT
Start: 2020-09-26 | End: 2020-09-26

## 2020-09-26 RX ADMIN — GENTAMICIN SULFATE 2 DROP: 3 SOLUTION OPHTHALMIC at 05:09

## 2020-09-26 RX ADMIN — FLUORESCEIN SODIUM 1 EACH: 1 STRIP OPHTHALMIC at 05:09

## 2020-09-26 RX ADMIN — TETRACAINE HYDROCHLORIDE 2 DROP: 5 SOLUTION OPHTHALMIC at 05:09

## 2020-09-26 NOTE — Clinical Note
"Ray Knox (Gelescia) was seen and treated in our emergency department on 9/26/2020.  She may return to work on 09/28/2020.       If you have any questions or concerns, please don't hesitate to call.       RN    "

## 2020-09-26 NOTE — ED PROVIDER NOTES
Encounter Date: 9/26/2020       History     Chief Complaint   Patient presents with    Eye Pain     Pt reports last night a mosquito flew into her R eye and now the R eye is irriatated.      25-year-old female felt like a muscular fell in her right eye.  Since then she has been having foreign body sensation.  Mild discharged to medial aspect of the eye.  Normal vision.    The history is provided by the patient.     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 No pertinent surgical history.  Family History   Problem Relation Age of Onset    Diabetes Paternal Aunt     Hypertension Maternal Grandfather     Cancer Paternal Grandmother     Breast cancer Paternal Grandmother     Leukemia Paternal Grandmother     Kidney disease Maternal Grandmother     Hypertension Mother      Social History     Tobacco Use    Smoking status: Never Smoker    Smokeless tobacco: Never Used   Substance Use Topics    Alcohol use: Yes     Comment: OCC    Drug use: No     Review of Systems   Constitutional: Negative for activity change, appetite change, chills and fever.   HENT: Negative for congestion, ear discharge, rhinorrhea, sinus pressure, sinus pain, sore throat and trouble swallowing.    Eyes: Positive for pain and discharge. Negative for photophobia, redness, itching and visual disturbance.   Respiratory: Negative for cough, chest tightness, shortness of breath and wheezing.    Cardiovascular: Negative for chest pain, palpitations and leg swelling.   Gastrointestinal: Negative for abdominal distention, abdominal pain, constipation, diarrhea, nausea and vomiting.   Genitourinary: Negative for dysuria, flank pain, frequency and hematuria.   Musculoskeletal: Negative for back pain, gait problem, neck pain and neck stiffness.   Skin: Negative for  rash and wound.   Neurological: Negative for dizziness, tremors, seizures, syncope, speech difficulty, weakness, light-headedness, numbness and headaches.   Psychiatric/Behavioral: Negative for behavioral problems, confusion, hallucinations and sleep disturbance. The patient is not nervous/anxious.    All other systems reviewed and are negative.      Physical Exam     Initial Vitals [09/26/20 1713]   BP Pulse Resp Temp SpO2   125/70 89 18 99.5 °F (37.5 °C) 100 %      MAP       --         Physical Exam    Nursing note and vitals reviewed.  Constitutional: Vital signs are normal. She appears well-developed and well-nourished. She is not diaphoretic. She is active. No distress.   HENT:   Head: Normocephalic and atraumatic.   Right Ear: Tympanic membrane normal.   Left Ear: Tympanic membrane normal.   Nose: Nose normal.   Mouth/Throat: Oropharynx is clear and moist.   Eyes: Conjunctivae, EOM and lids are normal. Pupils are equal, round, and reactive to light. Lids are everted and swept, no foreign bodies found. No foreign body present in the right eye.   Slit lamp exam:       The right eye shows fluorescein uptake. The right eye shows no corneal abrasion.   Small episclera rule abrasion on right medial aspect at 9:00 a.m. position   Neck: Trachea normal, normal range of motion and full passive range of motion without pain. Neck supple. Normal range of motion present. No neck rigidity.   Cardiovascular: Normal rate, regular rhythm, S1 normal, S2 normal, normal heart sounds, intact distal pulses and normal pulses.   Pulmonary/Chest: Breath sounds normal. No respiratory distress. She has no wheezes. She has no rhonchi. She has no rales.   Abdominal: Soft. Normal appearance and bowel sounds are normal. She exhibits no distension. There is no abdominal tenderness.   Musculoskeletal: Normal range of motion.   Lymphadenopathy:     She has no cervical adenopathy.   Neurological: She is alert and oriented to person, place, and  time. She has normal strength and normal reflexes. No cranial nerve deficit or sensory deficit. GCS score is 15. GCS eye subscore is 4. GCS verbal subscore is 5. GCS motor subscore is 6.   Skin: Skin is warm and intact. Capillary refill takes less than 2 seconds. No abrasion, no bruising and no rash noted.   Psychiatric: She has a normal mood and affect. Her speech is normal and behavior is normal. Judgment and thought content normal. She is not actively hallucinating. Cognition and memory are normal. She is attentive.         ED Course   Procedures  Labs Reviewed - No data to display       Imaging Results    None          Medical Decision Making:   ED Management:  Foreign body sensation in right eye.  Episclera low abrasion on middle aspect of right eye.  Treated with it again and gentamicin eyedrops.  Advised to continue gentamicin eyedrops at home and follow up with Ophthalmology as needed.                             Clinical Impression:     ICD-10-CM ICD-9-CM   1. Irritation of right eye  H57.89 379.99                      Disposition:   Disposition: Discharged  Condition: Stable     ED Disposition Condition    Discharge Stable        ED Prescriptions     Medication Sig Dispense Start Date End Date Auth. Provider    gentamicin (GARAMYCIN) 0.3 % ophthalmic solution Place 2 drops into both eyes 4 (four) times daily. 15 mL 9/26/2020  Jose Matamoros MD        Follow-up Information     Follow up With Specialties Details Why Contact Info    Russ Ram MD Family Medicine   [address] [place] [id number] [phone]                                         Jose Matamoros MD  09/26/20 1745       Jose Matamoros MD  09/26/20 9273

## 2020-10-13 ENCOUNTER — LAB VISIT (OUTPATIENT)
Dept: LAB | Facility: HOSPITAL | Age: 25
End: 2020-10-13
Attending: INTERNAL MEDICINE
Payer: MEDICAID

## 2020-10-13 DIAGNOSIS — Q61.02 RENAL CYSTS, CONGENITAL, BILATERAL: ICD-10-CM

## 2020-10-13 DIAGNOSIS — N27.0 SMALL RIGHT KIDNEY: ICD-10-CM

## 2020-10-13 DIAGNOSIS — D50.0 IRON DEFICIENCY ANEMIA DUE TO CHRONIC BLOOD LOSS: ICD-10-CM

## 2020-10-13 DIAGNOSIS — N18.2 CKD (CHRONIC KIDNEY DISEASE) STAGE 2, GFR 60-89 ML/MIN: ICD-10-CM

## 2020-10-13 LAB
ALBUMIN SERPL BCP-MCNC: 4.3 G/DL (ref 3.5–5.2)
ALP SERPL-CCNC: 48 U/L (ref 38–126)
ALT SERPL W/O P-5'-P-CCNC: 20 U/L (ref 10–44)
ANION GAP SERPL CALC-SCNC: 10 MMOL/L (ref 8–16)
ANION GAP SERPL CALC-SCNC: 10 MMOL/L (ref 8–16)
AST SERPL-CCNC: 46 U/L (ref 15–46)
BASOPHILS # BLD AUTO: 0.04 K/UL (ref 0–0.2)
BASOPHILS NFR BLD: 0.5 % (ref 0–1.9)
BILIRUB SERPL-MCNC: 0.5 MG/DL (ref 0.1–1)
BUN SERPL-MCNC: 12 MG/DL (ref 7–17)
BUN SERPL-MCNC: 12 MG/DL (ref 7–17)
CALCIUM SERPL-MCNC: 9.2 MG/DL (ref 8.7–10.5)
CALCIUM SERPL-MCNC: 9.2 MG/DL (ref 8.7–10.5)
CHLORIDE SERPL-SCNC: 107 MMOL/L (ref 95–110)
CHLORIDE SERPL-SCNC: 107 MMOL/L (ref 95–110)
CO2 SERPL-SCNC: 23 MMOL/L (ref 23–29)
CO2 SERPL-SCNC: 23 MMOL/L (ref 23–29)
CREAT SERPL-MCNC: 1.01 MG/DL (ref 0.5–1.4)
CREAT SERPL-MCNC: 1.01 MG/DL (ref 0.5–1.4)
DIFFERENTIAL METHOD: ABNORMAL
EOSINOPHIL # BLD AUTO: 0.3 K/UL (ref 0–0.5)
EOSINOPHIL NFR BLD: 3.8 % (ref 0–8)
ERYTHROCYTE [DISTWIDTH] IN BLOOD BY AUTOMATED COUNT: 16.1 % (ref 11.5–14.5)
EST. GFR  (AFRICAN AMERICAN): >60 ML/MIN/1.73 M^2
EST. GFR  (AFRICAN AMERICAN): >60 ML/MIN/1.73 M^2
EST. GFR  (NON AFRICAN AMERICAN): >60 ML/MIN/1.73 M^2
EST. GFR  (NON AFRICAN AMERICAN): >60 ML/MIN/1.73 M^2
GLUCOSE SERPL-MCNC: 135 MG/DL (ref 70–110)
GLUCOSE SERPL-MCNC: 135 MG/DL (ref 70–110)
HCT VFR BLD AUTO: 37.8 % (ref 37–48.5)
HGB BLD-MCNC: 11.2 G/DL (ref 12–16)
IMM GRANULOCYTES # BLD AUTO: 0.02 K/UL (ref 0–0.04)
IMM GRANULOCYTES NFR BLD AUTO: 0.2 % (ref 0–0.5)
LYMPHOCYTES # BLD AUTO: 2.8 K/UL (ref 1–4.8)
LYMPHOCYTES NFR BLD: 31.8 % (ref 18–48)
MCH RBC QN AUTO: 22.2 PG (ref 27–31)
MCHC RBC AUTO-ENTMCNC: 29.6 G/DL (ref 32–36)
MCV RBC AUTO: 75 FL (ref 82–98)
MONOCYTES # BLD AUTO: 0.4 K/UL (ref 0.3–1)
MONOCYTES NFR BLD: 4.4 % (ref 4–15)
NEUTROPHILS # BLD AUTO: 5.2 K/UL (ref 1.8–7.7)
NEUTROPHILS NFR BLD: 59.3 % (ref 38–73)
NRBC BLD-RTO: 0 /100 WBC
PLATELET # BLD AUTO: 274 K/UL (ref 150–350)
PMV BLD AUTO: 11 FL (ref 9.2–12.9)
POTASSIUM SERPL-SCNC: 4.1 MMOL/L (ref 3.5–5.1)
POTASSIUM SERPL-SCNC: 4.1 MMOL/L (ref 3.5–5.1)
PROT SERPL-MCNC: 7.7 G/DL (ref 6–8.4)
RBC # BLD AUTO: 5.05 M/UL (ref 4–5.4)
SODIUM SERPL-SCNC: 140 MMOL/L (ref 136–145)
SODIUM SERPL-SCNC: 140 MMOL/L (ref 136–145)
WBC # BLD AUTO: 8.73 K/UL (ref 3.9–12.7)

## 2020-10-13 PROCEDURE — 82728 ASSAY OF FERRITIN: CPT

## 2020-10-13 PROCEDURE — 85025 COMPLETE CBC W/AUTO DIFF WBC: CPT | Mod: PO

## 2020-10-13 PROCEDURE — 80053 COMPREHEN METABOLIC PANEL: CPT | Mod: PO

## 2020-10-13 PROCEDURE — 36415 COLL VENOUS BLD VENIPUNCTURE: CPT | Mod: PO

## 2020-10-13 PROCEDURE — 83540 ASSAY OF IRON: CPT | Mod: PO

## 2020-10-14 ENCOUNTER — CLINICAL SUPPORT (OUTPATIENT)
Dept: UROLOGY | Facility: CLINIC | Age: 25
End: 2020-10-14
Payer: MEDICAID

## 2020-10-14 ENCOUNTER — HOSPITAL ENCOUNTER (OUTPATIENT)
Dept: RADIOLOGY | Facility: HOSPITAL | Age: 25
Discharge: HOME | End: 2020-10-14
Attending: INTERNAL MEDICINE
Payer: MEDICAID

## 2020-10-14 ENCOUNTER — DOCUMENTATION ONLY (OUTPATIENT)
Dept: UROLOGY | Facility: CLINIC | Age: 25
End: 2020-10-14

## 2020-10-14 DIAGNOSIS — N39.0 URINARY TRACT INFECTION WITHOUT HEMATURIA, SITE UNSPECIFIED: Primary | ICD-10-CM

## 2020-10-14 DIAGNOSIS — N18.2 CKD (CHRONIC KIDNEY DISEASE) STAGE 2, GFR 60-89 ML/MIN: ICD-10-CM

## 2020-10-14 DIAGNOSIS — N28.1 RENAL CYSTS, ACQUIRED, BILATERAL: ICD-10-CM

## 2020-10-14 DIAGNOSIS — N39.0 URINARY TRACT INFECTION WITHOUT HEMATURIA, SITE UNSPECIFIED: ICD-10-CM

## 2020-10-14 LAB
FERRITIN SERPL-MCNC: 12 NG/ML (ref 20–300)
IRON SERPL-MCNC: 26 UG/DL (ref 30–160)
POC RESIDUAL URINE VOLUME: 181 ML (ref 0–100)
SATURATED IRON: 6 % (ref 20–50)
TOTAL IRON BINDING CAPACITY: 443 UG/DL (ref 250–450)
TRANSFERRIN SERPL-MCNC: 299 MG/DL (ref 200–375)

## 2020-10-14 PROCEDURE — 51798 US URINE CAPACITY MEASURE: CPT | Mod: PBBFAC,PO

## 2020-10-14 PROCEDURE — 99211 OFF/OP EST MAY X REQ PHY/QHP: CPT | Mod: PBBFAC,25,PO

## 2020-10-14 PROCEDURE — 76770 US EXAM ABDO BACK WALL COMP: CPT | Mod: 26,,, | Performed by: RADIOLOGY

## 2020-10-14 PROCEDURE — 99999 PR PBB SHADOW E&M-EST. PATIENT-LVL I: CPT | Mod: PBBFAC,,,

## 2020-10-14 PROCEDURE — 76770 US EXAM ABDO BACK WALL COMP: CPT | Mod: TC

## 2020-10-14 PROCEDURE — 76770 US RETROPERITONEAL COMPLETE: ICD-10-PCS | Mod: 26,,, | Performed by: RADIOLOGY

## 2020-10-14 PROCEDURE — 99999 PR PBB SHADOW E&M-EST. PATIENT-LVL I: ICD-10-PCS | Mod: PBBFAC,,,

## 2020-10-14 NOTE — PROGRESS NOTES
Dr Perez contacted office regarding patient currently being evaluated by him.  He is requesting a bladder scan.  Appointment scheduled as nurse visit.  Will send bladder scan upon completion.

## 2020-11-03 ENCOUNTER — HOSPITAL ENCOUNTER (EMERGENCY)
Facility: HOSPITAL | Age: 25
Discharge: HOME | End: 2020-11-03
Attending: EMERGENCY MEDICINE
Payer: MEDICAID

## 2020-11-03 VITALS
WEIGHT: 154 LBS | DIASTOLIC BLOOD PRESSURE: 80 MMHG | HEIGHT: 65 IN | SYSTOLIC BLOOD PRESSURE: 123 MMHG | OXYGEN SATURATION: 100 % | TEMPERATURE: 98 F | RESPIRATION RATE: 18 BRPM | BODY MASS INDEX: 25.66 KG/M2 | HEART RATE: 93 BPM

## 2020-11-03 DIAGNOSIS — M54.42 ACUTE LEFT-SIDED LOW BACK PAIN WITH LEFT-SIDED SCIATICA: ICD-10-CM

## 2020-11-03 DIAGNOSIS — N30.00 ACUTE CYSTITIS WITHOUT HEMATURIA: ICD-10-CM

## 2020-11-03 DIAGNOSIS — S39.012A STRAIN OF LUMBAR REGION, INITIAL ENCOUNTER: Primary | ICD-10-CM

## 2020-11-03 LAB
B-HCG UR QL: NEGATIVE
BACTERIA #/AREA URNS AUTO: ABNORMAL /HPF
BILIRUB UR QL STRIP: NEGATIVE
CLARITY UR REFRACT.AUTO: ABNORMAL
COLOR UR AUTO: YELLOW
GLUCOSE UR QL STRIP: NEGATIVE
HGB UR QL STRIP: ABNORMAL
KETONES UR QL STRIP: NEGATIVE
LEUKOCYTE ESTERASE UR QL STRIP: ABNORMAL
MICROSCOPIC COMMENT: ABNORMAL
NITRITE UR QL STRIP: POSITIVE
PH UR STRIP: 7 [PH] (ref 5–8)
PROT UR QL STRIP: NEGATIVE
RBC #/AREA URNS AUTO: 1 /HPF (ref 0–4)
SP GR UR STRIP: 1.01 (ref 1–1.03)
URN SPEC COLLECT METH UR: ABNORMAL
UROBILINOGEN UR STRIP-ACNC: NEGATIVE EU/DL
WBC #/AREA URNS AUTO: 11 /HPF (ref 0–5)

## 2020-11-03 PROCEDURE — 99284 EMERGENCY DEPT VISIT MOD MDM: CPT | Mod: ER

## 2020-11-03 PROCEDURE — 87088 URINE BACTERIA CULTURE: CPT | Mod: ER

## 2020-11-03 PROCEDURE — 87086 URINE CULTURE/COLONY COUNT: CPT | Mod: ER

## 2020-11-03 PROCEDURE — 81000 URINALYSIS NONAUTO W/SCOPE: CPT | Mod: ER

## 2020-11-03 PROCEDURE — 87186 SC STD MICRODIL/AGAR DIL: CPT | Mod: ER

## 2020-11-03 PROCEDURE — 87077 CULTURE AEROBIC IDENTIFY: CPT | Mod: ER

## 2020-11-03 PROCEDURE — 25000003 PHARM REV CODE 250: Mod: ER | Performed by: PHYSICIAN ASSISTANT

## 2020-11-03 PROCEDURE — 81025 URINE PREGNANCY TEST: CPT | Mod: ER

## 2020-11-03 RX ORDER — KETOROLAC TROMETHAMINE 10 MG/1
10 TABLET, FILM COATED ORAL EVERY 6 HOURS
Qty: 10 TABLET | Refills: 0 | Status: SHIPPED | OUTPATIENT
Start: 2020-11-03 | End: 2020-11-13 | Stop reason: CLARIF

## 2020-11-03 RX ORDER — METHOCARBAMOL 500 MG/1
500 TABLET, FILM COATED ORAL 3 TIMES DAILY
Qty: 15 TABLET | Refills: 0 | Status: SHIPPED | OUTPATIENT
Start: 2020-11-03 | End: 2020-11-08

## 2020-11-03 RX ORDER — METHOCARBAMOL 500 MG/1
500 TABLET, FILM COATED ORAL
Status: DISCONTINUED | OUTPATIENT
Start: 2020-11-03 | End: 2020-11-03

## 2020-11-03 RX ORDER — CEPHALEXIN 250 MG/1
250 CAPSULE ORAL 4 TIMES DAILY
Qty: 28 CAPSULE | Refills: 0 | Status: SHIPPED | OUTPATIENT
Start: 2020-11-03 | End: 2020-11-10

## 2020-11-03 RX ORDER — ACETAMINOPHEN 325 MG/1
650 TABLET ORAL
Status: COMPLETED | OUTPATIENT
Start: 2020-11-03 | End: 2020-11-03

## 2020-11-03 RX ORDER — TRAMADOL HYDROCHLORIDE 50 MG/1
50 TABLET ORAL
Status: DISCONTINUED | OUTPATIENT
Start: 2020-11-03 | End: 2020-11-03

## 2020-11-03 RX ORDER — CEPHALEXIN 500 MG/1
500 CAPSULE ORAL
Status: COMPLETED | OUTPATIENT
Start: 2020-11-03 | End: 2020-11-03

## 2020-11-03 RX ADMIN — CEPHALEXIN 500 MG: 500 CAPSULE ORAL at 05:11

## 2020-11-03 RX ADMIN — ACETAMINOPHEN 650 MG: 325 TABLET ORAL at 05:11
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
## 2020-11-03 NOTE — Clinical Note
"Ray Knox (Gelescia) was seen and treated in our emergency department on 11/3/2020.  She may return to work on 11/05/2020.       If you have any questions or concerns, please don't hesitate to call.       RN    "
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
## 2020-11-03 NOTE — DISCHARGE INSTRUCTIONS
You are advised to follow-up with your primary care provider for re-evaluation within 3 days.  You are instructed to return to the emergency department immediately for any new or worsening symptoms.

## 2020-11-04 ENCOUNTER — PATIENT MESSAGE (OUTPATIENT)
Dept: OBSTETRICS AND GYNECOLOGY | Facility: CLINIC | Age: 25
End: 2020-11-04

## 2020-11-06 LAB — BACTERIA UR CULT: ABNORMAL

## 2020-11-13 ENCOUNTER — HOSPITAL ENCOUNTER (EMERGENCY)
Facility: HOSPITAL | Age: 25
Discharge: HOME | End: 2020-11-13
Attending: EMERGENCY MEDICINE
Payer: MEDICAID

## 2020-11-13 VITALS
RESPIRATION RATE: 19 BRPM | SYSTOLIC BLOOD PRESSURE: 108 MMHG | BODY MASS INDEX: 30.66 KG/M2 | TEMPERATURE: 99 F | DIASTOLIC BLOOD PRESSURE: 67 MMHG | HEIGHT: 65 IN | HEART RATE: 77 BPM | OXYGEN SATURATION: 99 % | WEIGHT: 184 LBS

## 2020-11-13 DIAGNOSIS — B34.9 VIRAL SYNDROME: Primary | ICD-10-CM

## 2020-11-13 LAB — SARS-COV-2 RDRP RESP QL NAA+PROBE: NEGATIVE

## 2020-11-13 PROCEDURE — U0002 COVID-19 LAB TEST NON-CDC: HCPCS | Mod: ER

## 2020-11-13 PROCEDURE — 99282 EMERGENCY DEPT VISIT SF MDM: CPT | Mod: ER

## 2020-11-13 NOTE — ED PROVIDER NOTES
Encounter Date: 11/13/2020       History     Chief Complaint   Patient presents with    COVID-19 Concerns     Pt reports diarrhea yesterday, sneezing, cough, loss of taste and smell. Denies SOB, chest pain, vomiting, ear pain, sore throat, or headaches. Pt states a coworker + for COVID. Pt has been taking Tylenol at 0200 and Mucinex.      HPI     Pt is a 25 y.o. female w/h/o renal cysts, who presents for COVID19 concerns. Duration: three days, gradual onset. Loose non-bloody stool, rhinorrhea, congestion, change in sense of taste, SOB, dry cough. Denies fever, chills, CP, HA, neck pain, rash, nausea, vomiting, abdominal pain, change in urine, change in vaginal dc. Does not get menses 2/2 depo; due for next depo on 11/18. Multiple sick contacts at work.        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 No pertinent surgical history.  Family History   Problem Relation Age of Onset    Diabetes Paternal Aunt     Hypertension Maternal Grandfather     Cancer Paternal Grandmother     Breast cancer Paternal Grandmother     Leukemia Paternal Grandmother     Kidney disease Maternal Grandmother     Hypertension Mother      Social History     Tobacco Use    Smoking status: Never Smoker    Smokeless tobacco: Never Used   Substance Use Topics    Alcohol use: Yes     Comment: OCC    Drug use: No     Review of Systems     General: No fever.  No chills.  Eyes: No visual changes.  Head: No headache.    Integument: No rashes or lesions.  Chest: No shortness of breath.  Cardiovascular: No chest pain.  Abdomen: No abdominal pain.  No nausea or vomiting.  Urinary: No abnormal urination.  Neurologic: No focal weakness.  No numbness.  Hematologic: No easy bruising.  Endocrine: No excessive thirst or urination.      Physical Exam     Initial  Vitals [11/13/20 0938]   BP Pulse Resp Temp SpO2   123/77 104 19 98.9 °F (37.2 °C) 100 %      MAP       --         Physical Exam     Appearance: No acute distress.  HEENT: Normocephalic. Atraumatic. No conjunctival injection. EOMI. PERRL.     Neck: No JVD.  No LN. Neck supple.    Chest: Non-tender. Clear to auscultation bilaterally.  Good air movement.  No wheezes.  No rhonchi.  Cardiovascular: Borderline tachycardic. Regular rhythm. No murmurs. No gallops. No rubs. +2 radial/DP/DT pulses bilaterally. No LE edema or ttp  Abdomen: Soft. Not distended. Nontender. No guarding. No rebound. No Masses  Musculoskeletal: Good range of motion all joints. No deformities.    Neurologic: Alert and oriented x 3.  Equal strength in upper and lower extremities bilaterally. Normal sensation. No facial droop. Normal speech. Neg Kernigs and Brudzinski.   Psych:  Appropriate, conversant. Mildly anxious   Integumentary: No rashes seen.  Good turgor.  No abrasions.  No ecchymoses.      ED Course   Procedures  Labs Reviewed   SARS-COV-2 RNA AMPLIFICATION, QUAL          Imaging Results    None                            ED Course as of Nov 13 1133   Fri Nov 13, 2020   1039 COVID-19 Rapid Screening [EG]      ED Course User Index  [EG] Violette Minaya MD            Clinical Impression:     ICD-10-CM ICD-9-CM   1. Viral syndrome  B34.9 079.99                      26 yo female presents for COVID19 concerns. Borderline tachycardic, improved on re-check, otherwise VSS, afeb. No e/o CHF or VTE. Mildly anxious. COVID neg. Offered flu, pt declined.    Discussed results, diagnosis, and treatment plan with pt; advised close follow-up with PCP. Reviewed strict return precautions. Pt confirms understanding and ability to comply.          ED Disposition Condition    Discharge Stable        ED Prescriptions     None        Follow-up Information     Follow up With Specialties Details Why Contact Info    Russ Ram MD Family Medicine Schedule  an appointment as soon as possible for a visit   [address] [place] [id number] [phone]                                         Violette Minaya MD  11/13/20 9702

## 2020-11-13 NOTE — Clinical Note
"Ray Rochajuan Knox was seen and treated in our emergency department on 11/13/2020.  She may return to work on 11/14/2020.       If you have any questions or concerns, please don't hesitate to call.      Violette Minaya MD"

## 2020-11-18 ENCOUNTER — HOSPITAL ENCOUNTER (EMERGENCY)
Facility: HOSPITAL | Age: 25
Discharge: HOME | End: 2020-11-18
Attending: EMERGENCY MEDICINE
Payer: MEDICAID

## 2020-11-18 VITALS
OXYGEN SATURATION: 99 % | TEMPERATURE: 99 F | RESPIRATION RATE: 16 BRPM | HEART RATE: 82 BPM | BODY MASS INDEX: 29.57 KG/M2 | DIASTOLIC BLOOD PRESSURE: 77 MMHG | HEIGHT: 66 IN | SYSTOLIC BLOOD PRESSURE: 117 MMHG | WEIGHT: 184 LBS

## 2020-11-18 DIAGNOSIS — S60.221A CONTUSION OF RIGHT HAND, INITIAL ENCOUNTER: Primary | ICD-10-CM

## 2020-11-18 PROCEDURE — 99283 EMERGENCY DEPT VISIT LOW MDM: CPT | Mod: 25,ER
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
## 2020-12-01 ENCOUNTER — TELEPHONE (OUTPATIENT)
Dept: OBSTETRICS AND GYNECOLOGY | Facility: CLINIC | Age: 25
End: 2020-12-01

## 2020-12-01 ENCOUNTER — OFFICE VISIT (OUTPATIENT)
Dept: OBSTETRICS AND GYNECOLOGY | Facility: CLINIC | Age: 25
End: 2020-12-01
Payer: MEDICAID

## 2020-12-01 VITALS
SYSTOLIC BLOOD PRESSURE: 112 MMHG | WEIGHT: 192.13 LBS | HEIGHT: 66 IN | BODY MASS INDEX: 30.88 KG/M2 | DIASTOLIC BLOOD PRESSURE: 74 MMHG

## 2020-12-01 DIAGNOSIS — N18.2 STAGE 2 CHRONIC KIDNEY DISEASE: Primary | ICD-10-CM

## 2020-12-01 DIAGNOSIS — Z31.9 PATIENT DESIRES PREGNANCY: ICD-10-CM

## 2020-12-01 PROCEDURE — 99999 PR PBB SHADOW E&M-EST. PATIENT-LVL III: ICD-10-PCS | Mod: PBBFAC,,, | Performed by: OBSTETRICS & GYNECOLOGY

## 2020-12-01 PROCEDURE — 99999 PR PBB SHADOW E&M-EST. PATIENT-LVL III: CPT | Mod: PBBFAC,,, | Performed by: OBSTETRICS & GYNECOLOGY

## 2020-12-01 PROCEDURE — 99213 OFFICE O/P EST LOW 20 MIN: CPT | Mod: PBBFAC,PO | Performed by: OBSTETRICS & GYNECOLOGY

## 2020-12-01 PROCEDURE — 99213 PR OFFICE/OUTPT VISIT, EST, LEVL III, 20-29 MIN: ICD-10-PCS | Mod: S$PBB,,, | Performed by: OBSTETRICS & GYNECOLOGY

## 2020-12-01 PROCEDURE — 99213 OFFICE O/P EST LOW 20 MIN: CPT | Mod: S$PBB,,, | Performed by: OBSTETRICS & GYNECOLOGY

## 2020-12-01 NOTE — TELEPHONE ENCOUNTER
----- Message from Vaishnavi Enriquez sent at 12/1/2020  1:39 PM CST -----  Contact: [phone]  Type:  Needs Medical Advice    Who Called: PT  Reason for call: pt would like to make sure she can still come to her appt today at 2:45  Would the patient rather a call back or a response via [employer]?  Call back  Best Call Back Number:  [phone]

## 2020-12-01 NOTE — TELEPHONE ENCOUNTER
Patient calling to see if she could earlier let patient know that she couldn't that KASEY Moser was in surgery . Patient verbalized understanding.

## 2020-12-02 NOTE — PROGRESS NOTES
"  CC:  Chief Complaint   Patient presents with    Contraception       HPI:    25 y.o.   OB History        0    Para   0    Term   0       0    AB   0    Living   0       SAB   0    TAB   0    Ectopic   0    Multiple   0    Live Births   0               Complaining of: has been on depo stopped in July still hasn;t had a cycle and is requesting to start clomid  Has h/o CKD stage 2, Cr is 1 has 1 kidney  (Not in a hospital admission)      Review of patient's allergies indicates:  No Known Allergies     Past Medical History:   Diagnosis Date    Anemia     Asthma     Bilateral renal cysts     Congenital absence of one kidney     Kidney cysts     pt born with 1 kidney     History reviewed. No pertinent surgical history.  Family History   Problem Relation Age of Onset    Diabetes Paternal Aunt     Hypertension Maternal Grandfather     Cancer Paternal Grandmother     Breast cancer Paternal Grandmother     Leukemia Paternal Grandmother     Kidney disease Maternal Grandmother     Hypertension Mother      Social History     Tobacco Use    Smoking status: Never Smoker    Smokeless tobacco: Never Used   Substance Use Topics    Alcohol use: Yes     Comment: OCC    Drug use: No     ROS:  GENERAL: Feeling well overall. Denies fever or chills.   SKIN: Denies rash or lesions.   HEAD: Denies head injury or headache.   NODES: Denies enlarged lymph nodes.   CHEST: Denies chest pain or shortness of breath.   CARDIOVASCULAR: Denies palpitations or left sided chest pain.    ABDOMEN: Denies diarrhea, nausea, vomiting or rectal bleeding.   URINARY: No dysuria, hematuria, or burning on urination.  REPRODUCTIVE: See HPI.   BREASTS: Denies pain, lumps, or nipple discharge.   HEMATOLOGIC: No easy bruisability or excessive bleeding.   MUSCULOSKELETAL: Denies joint pain or swelling.   NEUROLOGIC: Denies syncope or weakness.   PSYCHIATRIC: Denies depression, anxiety or mood swings.      PE: /74   Ht 5' 5.5" " (1.664 m)   Wt 87.1 kg (192 lb 1.6 oz)   BMI 31.48 kg/m²      APPEARANCE: Well nourished, well developed, in no acute distress.  SKIN: Normal skin turgor, no lesions.  NECK: Neck symmetric without masses or thyromegaly.  NODES: No inguinal, cervical, axillary or femoral lymph node enlargement.  CARDIOVASCULAR: Normal S1, S2. No rubs, murmurs or gallops.  NEUROLOGIC: Normal mood and affect. No depression or anxiety.   ABDOMEN: Soft. No tenderness or masses. No hepatosplenomegaly. No hernias.  RESPIRATORY: Normal respiratory effort with no retractions or use of accessory muscles.    ASSESSMENT/ PLAN    Ray was seen today for contraception.    Diagnoses and all orders for this visit:    Stage 2 chronic kidney disease  -     Ambulatory referral/consult to Perinatology; Future    Patient desires pregnancy  -     Ambulatory referral/consult to Perinatology; Future      Will need to see MFM bf attempting to concieve  rtc for annual 5/21    would not advise getting pregnant until consult with MFM done    Marty Moser MD

## 2020-12-28 ENCOUNTER — LAB VISIT (OUTPATIENT)
Dept: LAB | Facility: HOSPITAL | Age: 25
End: 2020-12-28
Attending: INTERNAL MEDICINE
Payer: MEDICAID

## 2020-12-28 DIAGNOSIS — N18.2 CKD (CHRONIC KIDNEY DISEASE) STAGE 2, GFR 60-89 ML/MIN: ICD-10-CM

## 2020-12-28 DIAGNOSIS — N28.1 RENAL CYSTS, ACQUIRED, BILATERAL: ICD-10-CM

## 2020-12-28 LAB
BACTERIA #/AREA URNS HPF: ABNORMAL /HPF
BILIRUB UR QL STRIP: NEGATIVE
CLARITY UR: ABNORMAL
COLOR UR: YELLOW
GLUCOSE UR QL STRIP: NEGATIVE
HGB UR QL STRIP: NEGATIVE
KETONES UR QL STRIP: NEGATIVE
LEUKOCYTE ESTERASE UR QL STRIP: NEGATIVE
MICROSCOPIC COMMENT: ABNORMAL
NITRITE UR QL STRIP: POSITIVE
PH UR STRIP: 7 [PH] (ref 5–8)
PROT UR QL STRIP: NEGATIVE
RBC #/AREA URNS HPF: 3 /HPF (ref 0–4)
SP GR UR STRIP: 1.01 (ref 1–1.03)
URN SPEC COLLECT METH UR: ABNORMAL
UROBILINOGEN UR STRIP-ACNC: NEGATIVE EU/DL
WBC #/AREA URNS HPF: 15 /HPF (ref 0–5)

## 2020-12-28 PROCEDURE — 81000 URINALYSIS NONAUTO W/SCOPE: CPT

## 2021-01-04 ENCOUNTER — HOSPITAL ENCOUNTER (EMERGENCY)
Facility: HOSPITAL | Age: 26
Discharge: HOME | End: 2021-01-04
Attending: FAMILY MEDICINE
Payer: MEDICAID

## 2021-01-04 VITALS
HEART RATE: 102 BPM | BODY MASS INDEX: 32.65 KG/M2 | OXYGEN SATURATION: 100 % | RESPIRATION RATE: 19 BRPM | HEIGHT: 65 IN | SYSTOLIC BLOOD PRESSURE: 120 MMHG | TEMPERATURE: 99 F | WEIGHT: 196 LBS | DIASTOLIC BLOOD PRESSURE: 81 MMHG

## 2021-01-04 DIAGNOSIS — S83.92XA SPRAIN OF LEFT KNEE, UNSPECIFIED LIGAMENT, INITIAL ENCOUNTER: Primary | ICD-10-CM

## 2021-01-04 DIAGNOSIS — M25.562 LEFT KNEE PAIN: ICD-10-CM

## 2021-01-04 PROCEDURE — 99283 EMERGENCY DEPT VISIT LOW MDM: CPT | Mod: 25,ER

## 2021-01-04 PROCEDURE — 29505 APPLICATION LONG LEG SPLINT: CPT | Mod: LT,ER

## 2021-01-07 ENCOUNTER — INITIAL CONSULT (OUTPATIENT)
Dept: MATERNAL FETAL MEDICINE | Facility: CLINIC | Age: 26
End: 2021-01-07
Payer: MEDICAID

## 2021-01-07 VITALS
BODY MASS INDEX: 32.72 KG/M2 | DIASTOLIC BLOOD PRESSURE: 75 MMHG | SYSTOLIC BLOOD PRESSURE: 122 MMHG | WEIGHT: 196.63 LBS

## 2021-01-07 DIAGNOSIS — N18.2 STAGE 2 CHRONIC KIDNEY DISEASE: ICD-10-CM

## 2021-01-07 DIAGNOSIS — Z31.9 PATIENT DESIRES PREGNANCY: ICD-10-CM

## 2021-01-07 PROCEDURE — 99214 OFFICE O/P EST MOD 30 MIN: CPT | Mod: S$PBB,,, | Performed by: OBSTETRICS & GYNECOLOGY

## 2021-01-07 PROCEDURE — 99999 PR PBB SHADOW E&M-EST. PATIENT-LVL III: ICD-10-PCS | Mod: PBBFAC,,, | Performed by: OBSTETRICS & GYNECOLOGY

## 2021-01-07 PROCEDURE — 99999 PR PBB SHADOW E&M-EST. PATIENT-LVL III: CPT | Mod: PBBFAC,,, | Performed by: OBSTETRICS & GYNECOLOGY

## 2021-01-07 PROCEDURE — 99213 OFFICE O/P EST LOW 20 MIN: CPT | Mod: PBBFAC | Performed by: OBSTETRICS & GYNECOLOGY

## 2021-01-07 PROCEDURE — 99214 PR OFFICE/OUTPT VISIT, EST, LEVL IV, 30-39 MIN: ICD-10-PCS | Mod: S$PBB,,, | Performed by: OBSTETRICS & GYNECOLOGY

## 2021-01-17 ENCOUNTER — HOSPITAL ENCOUNTER (EMERGENCY)
Facility: HOSPITAL | Age: 26
Discharge: HOME | End: 2021-01-17
Attending: EMERGENCY MEDICINE
Payer: MEDICAID

## 2021-01-17 VITALS
TEMPERATURE: 99 F | RESPIRATION RATE: 19 BRPM | WEIGHT: 196 LBS | HEART RATE: 95 BPM | BODY MASS INDEX: 32.65 KG/M2 | HEIGHT: 65 IN | OXYGEN SATURATION: 100 % | DIASTOLIC BLOOD PRESSURE: 50 MMHG | SYSTOLIC BLOOD PRESSURE: 103 MMHG

## 2021-01-17 DIAGNOSIS — R51.9 ACUTE NONINTRACTABLE HEADACHE, UNSPECIFIED HEADACHE TYPE: Primary | ICD-10-CM

## 2021-01-17 LAB — B-HCG UR QL: NEGATIVE

## 2021-01-17 PROCEDURE — 96361 HYDRATE IV INFUSION ADD-ON: CPT | Mod: ER

## 2021-01-17 PROCEDURE — 99285 EMERGENCY DEPT VISIT HI MDM: CPT | Mod: 25,ER

## 2021-01-17 PROCEDURE — 81025 URINE PREGNANCY TEST: CPT | Mod: ER

## 2021-01-17 PROCEDURE — 96375 TX/PRO/DX INJ NEW DRUG ADDON: CPT | Mod: ER

## 2021-01-17 PROCEDURE — 63600175 PHARM REV CODE 636 W HCPCS: Mod: ER | Performed by: EMERGENCY MEDICINE

## 2021-01-17 PROCEDURE — 25000003 PHARM REV CODE 250: Mod: ER | Performed by: EMERGENCY MEDICINE

## 2021-01-17 PROCEDURE — 96374 THER/PROPH/DIAG INJ IV PUSH: CPT | Mod: ER

## 2021-01-17 RX ORDER — METOCLOPRAMIDE HYDROCHLORIDE 5 MG/ML
10 INJECTION INTRAMUSCULAR; INTRAVENOUS
Status: COMPLETED | OUTPATIENT
Start: 2021-01-17 | End: 2021-01-17

## 2021-01-17 RX ORDER — BUTALBITAL, ACETAMINOPHEN AND CAFFEINE 50; 325; 40 MG/1; MG/1; MG/1
1 TABLET ORAL EVERY 6 HOURS PRN
Qty: 12 TABLET | Refills: 0 | Status: SHIPPED | OUTPATIENT
Start: 2021-01-17 | End: 2021-02-17

## 2021-01-17 RX ORDER — DIPHENHYDRAMINE HYDROCHLORIDE 50 MG/ML
50 INJECTION INTRAMUSCULAR; INTRAVENOUS
Status: COMPLETED | OUTPATIENT
Start: 2021-01-17 | End: 2021-01-17

## 2021-01-17 RX ADMIN — DIPHENHYDRAMINE HYDROCHLORIDE 50 MG: 50 INJECTION, SOLUTION INTRAMUSCULAR; INTRAVENOUS at 02:01

## 2021-01-17 RX ADMIN — SODIUM CHLORIDE 1000 ML: 0.9 INJECTION, SOLUTION INTRAVENOUS at 02:01

## 2021-01-17 RX ADMIN — METOCLOPRAMIDE 10 MG: 5 INJECTION, SOLUTION INTRAMUSCULAR; INTRAVENOUS at 02:01

## 2021-02-08 ENCOUNTER — TELEPHONE (OUTPATIENT)
Dept: OBSTETRICS AND GYNECOLOGY | Facility: CLINIC | Age: 26
End: 2021-02-08

## 2021-02-11 ENCOUNTER — OFFICE VISIT (OUTPATIENT)
Dept: FAMILY MEDICINE | Facility: HOSPITAL | Age: 26
End: 2021-02-11
Attending: FAMILY MEDICINE
Payer: MEDICAID

## 2021-02-11 VITALS
DIASTOLIC BLOOD PRESSURE: 81 MMHG | HEART RATE: 95 BPM | HEIGHT: 65 IN | BODY MASS INDEX: 33.72 KG/M2 | SYSTOLIC BLOOD PRESSURE: 121 MMHG | WEIGHT: 202.38 LBS

## 2021-02-11 DIAGNOSIS — R06.83 SNORING: ICD-10-CM

## 2021-02-11 DIAGNOSIS — G44.209 TENSION HEADACHE: Primary | ICD-10-CM

## 2021-02-11 DIAGNOSIS — F07.81 POSTCONCUSSION SYNDROME: ICD-10-CM

## 2021-02-11 DIAGNOSIS — E66.9 OBESITY, UNSPECIFIED CLASSIFICATION, UNSPECIFIED OBESITY TYPE, UNSPECIFIED WHETHER SERIOUS COMORBIDITY PRESENT: ICD-10-CM

## 2021-02-11 DIAGNOSIS — D50.9 IRON DEFICIENCY ANEMIA, UNSPECIFIED IRON DEFICIENCY ANEMIA TYPE: ICD-10-CM

## 2021-02-11 PROCEDURE — 99213 OFFICE O/P EST LOW 20 MIN: CPT | Performed by: STUDENT IN AN ORGANIZED HEALTH CARE EDUCATION/TRAINING PROGRAM

## 2021-02-11 RX ORDER — FERROUS SULFATE 324(65)MG
324 TABLET, DELAYED RELEASE (ENTERIC COATED) ORAL DAILY
Qty: 30 TABLET | Refills: 11 | Status: SHIPPED | OUTPATIENT
Start: 2021-02-11 | End: 2021-11-25

## 2021-02-11 RX ORDER — PROPRANOLOL HYDROCHLORIDE 40 MG/1
40 TABLET ORAL DAILY
Qty: 30 TABLET | Refills: 1 | Status: SHIPPED | OUTPATIENT
Start: 2021-02-11 | End: 2021-05-19

## 2021-02-17 ENCOUNTER — OFFICE VISIT (OUTPATIENT)
Dept: ORTHOPEDICS | Facility: CLINIC | Age: 26
End: 2021-02-17
Payer: MEDICAID

## 2021-02-17 VITALS
WEIGHT: 212 LBS | BODY MASS INDEX: 35.28 KG/M2 | SYSTOLIC BLOOD PRESSURE: 126 MMHG | RESPIRATION RATE: 18 BRPM | DIASTOLIC BLOOD PRESSURE: 70 MMHG | HEART RATE: 68 BPM

## 2021-02-17 DIAGNOSIS — M25.562 LEFT KNEE PAIN, UNSPECIFIED CHRONICITY: ICD-10-CM
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
## 2021-02-17 PROCEDURE — 99999 PR PBB SHADOW E&M-EST. PATIENT-LVL IV: ICD-10-PCS | Mod: PBBFAC,,, | Performed by: PHYSICIAN ASSISTANT

## 2021-02-17 PROCEDURE — 99214 OFFICE O/P EST MOD 30 MIN: CPT | Mod: PBBFAC,PN | Performed by: PHYSICIAN ASSISTANT

## 2021-02-17 PROCEDURE — 99203 OFFICE O/P NEW LOW 30 MIN: CPT | Mod: S$PBB,,, | Performed by: PHYSICIAN ASSISTANT

## 2021-02-17 PROCEDURE — 99999 PR PBB SHADOW E&M-EST. PATIENT-LVL IV: CPT | Mod: PBBFAC,,, | Performed by: PHYSICIAN ASSISTANT

## 2021-02-17 PROCEDURE — 99203 PR OFFICE/OUTPT VISIT, NEW, LEVL III, 30-44 MIN: ICD-10-PCS | Mod: S$PBB,,, | Performed by: PHYSICIAN ASSISTANT

## 2021-02-18 ENCOUNTER — TELEPHONE (OUTPATIENT)
Dept: FAMILY MEDICINE | Facility: HOSPITAL | Age: 26
End: 2021-02-18

## 2021-02-23 ENCOUNTER — CLINICAL SUPPORT (OUTPATIENT)
Dept: REHABILITATION | Facility: HOSPITAL | Age: 26
End: 2021-02-23
Payer: MEDICAID

## 2021-02-23 DIAGNOSIS — G89.29 CHRONIC PAIN OF LEFT KNEE: ICD-10-CM
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
## 2021-02-23 DIAGNOSIS — M62.81 MUSCLE WEAKNESS OF LOWER EXTREMITY: ICD-10-CM

## 2021-02-23 DIAGNOSIS — M25.562 LEFT KNEE PAIN, UNSPECIFIED CHRONICITY: ICD-10-CM

## 2021-02-23 DIAGNOSIS — M25.562 CHRONIC PAIN OF LEFT KNEE: ICD-10-CM

## 2021-02-23 DIAGNOSIS — M25.662 DECREASED RANGE OF MOTION (ROM) OF LEFT KNEE: ICD-10-CM

## 2021-02-23 PROCEDURE — 97161 PT EVAL LOW COMPLEX 20 MIN: CPT | Mod: PO

## 2021-03-03 ENCOUNTER — CLINICAL SUPPORT (OUTPATIENT)
Dept: REHABILITATION | Facility: HOSPITAL | Age: 26
End: 2021-03-03
Payer: MEDICAID

## 2021-03-03 DIAGNOSIS — M25.662 DECREASED RANGE OF MOTION (ROM) OF LEFT KNEE: ICD-10-CM

## 2021-03-03 DIAGNOSIS — M25.562 CHRONIC PAIN OF LEFT KNEE: Primary | ICD-10-CM

## 2021-03-03 DIAGNOSIS — M62.81 MUSCLE WEAKNESS OF LOWER EXTREMITY: ICD-10-CM

## 2021-03-03 DIAGNOSIS — G89.29 CHRONIC PAIN OF LEFT KNEE: Primary | ICD-10-CM

## 2021-03-03 PROCEDURE — 97110 THERAPEUTIC EXERCISES: CPT | Mod: PO,CQ

## 2021-03-09 ENCOUNTER — CLINICAL SUPPORT (OUTPATIENT)
Dept: REHABILITATION | Facility: HOSPITAL | Age: 26
End: 2021-03-09
Payer: MEDICAID

## 2021-03-09 ENCOUNTER — OFFICE VISIT (OUTPATIENT)
Dept: SLEEP MEDICINE | Facility: CLINIC | Age: 26
End: 2021-03-09
Payer: MEDICAID

## 2021-03-09 VITALS
HEIGHT: 65 IN | DIASTOLIC BLOOD PRESSURE: 78 MMHG | WEIGHT: 202.06 LBS | HEART RATE: 89 BPM | BODY MASS INDEX: 33.66 KG/M2 | SYSTOLIC BLOOD PRESSURE: 112 MMHG

## 2021-03-09 DIAGNOSIS — R35.1 NOCTURIA: ICD-10-CM

## 2021-03-09 DIAGNOSIS — M25.662 DECREASED RANGE OF MOTION (ROM) OF LEFT KNEE: ICD-10-CM

## 2021-03-09 DIAGNOSIS — G89.29 CHRONIC PAIN OF LEFT KNEE: ICD-10-CM

## 2021-03-09 DIAGNOSIS — R29.818 SUSPECTED SLEEP APNEA: Primary | ICD-10-CM

## 2021-03-09 DIAGNOSIS — G47.10 HYPERSOMNOLENCE: ICD-10-CM

## 2021-03-09 DIAGNOSIS — M25.562 CHRONIC PAIN OF LEFT KNEE: ICD-10-CM

## 2021-03-09 DIAGNOSIS — M62.81 MUSCLE WEAKNESS OF LOWER EXTREMITY: ICD-10-CM

## 2021-03-09 PROCEDURE — 99999 PR PBB SHADOW E&M-EST. PATIENT-LVL II: ICD-10-PCS | Mod: PBBFAC,,, | Performed by: INTERNAL MEDICINE

## 2021-03-09 PROCEDURE — 99204 OFFICE O/P NEW MOD 45 MIN: CPT | Mod: S$PBB,,, | Performed by: INTERNAL MEDICINE

## 2021-03-09 PROCEDURE — 99212 OFFICE O/P EST SF 10 MIN: CPT | Mod: PBBFAC | Performed by: INTERNAL MEDICINE

## 2021-03-09 PROCEDURE — 99999 PR PBB SHADOW E&M-EST. PATIENT-LVL II: CPT | Mod: PBBFAC,,, | Performed by: INTERNAL MEDICINE

## 2021-03-09 PROCEDURE — 99204 PR OFFICE/OUTPT VISIT, NEW, LEVL IV, 45-59 MIN: ICD-10-PCS | Mod: S$PBB,,, | Performed by: INTERNAL MEDICINE

## 2021-03-09 PROCEDURE — 97110 THERAPEUTIC EXERCISES: CPT | Mod: PO

## 2021-03-11 ENCOUNTER — TELEPHONE (OUTPATIENT)
Dept: SLEEP MEDICINE | Facility: OTHER | Age: 26
End: 2021-03-11

## 2021-03-12 ENCOUNTER — CLINICAL SUPPORT (OUTPATIENT)
Dept: REHABILITATION | Facility: HOSPITAL | Age: 26
End: 2021-03-12
Payer: MEDICAID

## 2021-03-12 ENCOUNTER — OFFICE VISIT (OUTPATIENT)
Dept: FAMILY MEDICINE | Facility: HOSPITAL | Age: 26
End: 2021-03-12
Payer: MEDICAID

## 2021-03-12 VITALS
DIASTOLIC BLOOD PRESSURE: 66 MMHG | WEIGHT: 203.69 LBS | HEART RATE: 89 BPM | BODY MASS INDEX: 32.73 KG/M2 | HEIGHT: 66 IN | SYSTOLIC BLOOD PRESSURE: 125 MMHG

## 2021-03-12 DIAGNOSIS — G89.29 CHRONIC PAIN OF LEFT KNEE: ICD-10-CM

## 2021-03-12 DIAGNOSIS — R06.83 SNORING: ICD-10-CM

## 2021-03-12 DIAGNOSIS — M25.662 DECREASED RANGE OF MOTION (ROM) OF LEFT KNEE: ICD-10-CM

## 2021-03-12 DIAGNOSIS — F07.81 POSTCONCUSSION SYNDROME: Primary | ICD-10-CM

## 2021-03-12 DIAGNOSIS — M62.81 MUSCLE WEAKNESS OF LOWER EXTREMITY: ICD-10-CM

## 2021-03-12 DIAGNOSIS — E66.9 OBESITY, UNSPECIFIED CLASSIFICATION, UNSPECIFIED OBESITY TYPE, UNSPECIFIED WHETHER SERIOUS COMORBIDITY PRESENT: ICD-10-CM

## 2021-03-12 DIAGNOSIS — G44.209 TENSION HEADACHE: ICD-10-CM

## 2021-03-12 DIAGNOSIS — M25.562 CHRONIC PAIN OF LEFT KNEE: ICD-10-CM

## 2021-03-12 DIAGNOSIS — D50.9 IRON DEFICIENCY ANEMIA, UNSPECIFIED IRON DEFICIENCY ANEMIA TYPE: ICD-10-CM

## 2021-03-12 PROCEDURE — 99213 OFFICE O/P EST LOW 20 MIN: CPT | Performed by: STUDENT IN AN ORGANIZED HEALTH CARE EDUCATION/TRAINING PROGRAM

## 2021-03-12 PROCEDURE — 97110 THERAPEUTIC EXERCISES: CPT | Mod: PO

## 2021-03-12 RX ORDER — ALBUTEROL SULFATE 0.83 MG/ML
1 SOLUTION RESPIRATORY (INHALATION)
COMMUNITY
Start: 2021-01-05 | End: 2022-12-03

## 2021-03-12 RX ORDER — CYCLOBENZAPRINE HCL 10 MG
10 TABLET ORAL EVERY 8 HOURS
COMMUNITY
Start: 2021-01-04 | End: 2021-08-01 | Stop reason: ALTCHOICE

## 2021-03-12 RX ORDER — ALBUTEROL SULFATE 90 UG/1
2 AEROSOL, METERED RESPIRATORY (INHALATION) EVERY 4 HOURS PRN
COMMUNITY
Start: 2021-01-04 | End: 2022-12-03

## 2021-03-15 ENCOUNTER — CLINICAL SUPPORT (OUTPATIENT)
Dept: REHABILITATION | Facility: HOSPITAL | Age: 26
End: 2021-03-15
Payer: MEDICAID

## 2021-03-15 DIAGNOSIS — M25.662 DECREASED RANGE OF MOTION (ROM) OF LEFT KNEE: ICD-10-CM

## 2021-03-15 DIAGNOSIS — G89.29 CHRONIC PAIN OF LEFT KNEE: ICD-10-CM

## 2021-03-15 DIAGNOSIS — M62.81 MUSCLE WEAKNESS OF LOWER EXTREMITY: ICD-10-CM

## 2021-03-15 DIAGNOSIS — M25.562 CHRONIC PAIN OF LEFT KNEE: ICD-10-CM

## 2021-03-15 PROCEDURE — 97110 THERAPEUTIC EXERCISES: CPT | Mod: PO

## 2021-03-17 ENCOUNTER — CLINICAL SUPPORT (OUTPATIENT)
Dept: REHABILITATION | Facility: HOSPITAL | Age: 26
End: 2021-03-17
Payer: MEDICAID

## 2021-03-17 DIAGNOSIS — M25.662 DECREASED RANGE OF MOTION (ROM) OF LEFT KNEE: ICD-10-CM

## 2021-03-17 DIAGNOSIS — M62.81 MUSCLE WEAKNESS OF LOWER EXTREMITY: ICD-10-CM

## 2021-03-17 DIAGNOSIS — G89.29 CHRONIC PAIN OF LEFT KNEE: ICD-10-CM

## 2021-03-17 DIAGNOSIS — M25.562 CHRONIC PAIN OF LEFT KNEE: ICD-10-CM

## 2021-03-17 PROCEDURE — 97110 THERAPEUTIC EXERCISES: CPT | Mod: PO

## 2021-03-25 ENCOUNTER — TELEPHONE (OUTPATIENT)
Dept: SLEEP MEDICINE | Facility: OTHER | Age: 26
End: 2021-03-25

## 2021-03-26 ENCOUNTER — HOSPITAL ENCOUNTER (OUTPATIENT)
Dept: SLEEP MEDICINE | Facility: OTHER | Age: 26
Discharge: HOME | End: 2021-03-26
Attending: INTERNAL MEDICINE
Payer: MEDICAID

## 2021-03-26 DIAGNOSIS — R35.1 NOCTURIA: ICD-10-CM

## 2021-03-26 DIAGNOSIS — G47.10 HYPERSOMNOLENCE: ICD-10-CM

## 2021-03-26 DIAGNOSIS — R29.818 SUSPECTED SLEEP APNEA: ICD-10-CM

## 2021-03-26 PROCEDURE — 95800 SLP STDY UNATTENDED: CPT

## 2021-04-01 ENCOUNTER — CLINICAL SUPPORT (OUTPATIENT)
Dept: REHABILITATION | Facility: HOSPITAL | Age: 26
End: 2021-04-01
Payer: MEDICAID

## 2021-04-01 ENCOUNTER — PATIENT MESSAGE (OUTPATIENT)
Dept: FAMILY MEDICINE | Facility: HOSPITAL | Age: 26
End: 2021-04-01

## 2021-04-01 DIAGNOSIS — G47.10 HYPERSOMNOLENCE: Primary | ICD-10-CM

## 2021-04-01 DIAGNOSIS — G47.8 SLEEP PARALYSIS: ICD-10-CM

## 2021-04-01 DIAGNOSIS — M25.662 DECREASED RANGE OF MOTION (ROM) OF LEFT KNEE: ICD-10-CM

## 2021-04-01 DIAGNOSIS — R44.2 HYPNAGOGIC HALLUCINATIONS: ICD-10-CM

## 2021-04-01 DIAGNOSIS — M62.81 MUSCLE WEAKNESS OF LOWER EXTREMITY: ICD-10-CM

## 2021-04-01 DIAGNOSIS — M25.562 CHRONIC PAIN OF LEFT KNEE: ICD-10-CM

## 2021-04-01 DIAGNOSIS — G89.29 CHRONIC PAIN OF LEFT KNEE: ICD-10-CM

## 2021-04-01 PROCEDURE — 97110 THERAPEUTIC EXERCISES: CPT | Mod: PO

## 2021-04-05 ENCOUNTER — PATIENT MESSAGE (OUTPATIENT)
Dept: SLEEP MEDICINE | Facility: OTHER | Age: 26
End: 2021-04-05

## 2021-04-05 ENCOUNTER — TELEPHONE (OUTPATIENT)
Dept: SLEEP MEDICINE | Facility: CLINIC | Age: 26
End: 2021-04-05

## 2021-04-05 ENCOUNTER — TELEPHONE (OUTPATIENT)
Dept: SLEEP MEDICINE | Facility: OTHER | Age: 26
End: 2021-04-05

## 2021-04-05 RX ORDER — IBUPROFEN 600 MG/1
600 TABLET ORAL 3 TIMES DAILY
COMMUNITY
Start: 2021-03-21 | End: 2021-08-01

## 2021-04-06 ENCOUNTER — TELEPHONE (OUTPATIENT)
Dept: SLEEP MEDICINE | Facility: CLINIC | Age: 26
End: 2021-04-06

## 2021-04-06 ENCOUNTER — PATIENT MESSAGE (OUTPATIENT)
Dept: SLEEP MEDICINE | Facility: CLINIC | Age: 26
End: 2021-04-06

## 2021-04-06 DIAGNOSIS — G47.33 OSA (OBSTRUCTIVE SLEEP APNEA): Primary | ICD-10-CM

## 2021-04-06 DIAGNOSIS — R05.9 COUGH: ICD-10-CM

## 2021-04-14 RX ORDER — IBUPROFEN 600 MG/1
600 TABLET ORAL 3 TIMES DAILY
Qty: 90 TABLET | Refills: 0 | OUTPATIENT
Start: 2021-04-14

## 2021-04-23 ENCOUNTER — HOSPITAL ENCOUNTER (EMERGENCY)
Facility: HOSPITAL | Age: 26
Discharge: HOME | End: 2021-04-23
Attending: EMERGENCY MEDICINE
Payer: MEDICAID

## 2021-04-23 VITALS
RESPIRATION RATE: 20 BRPM | HEIGHT: 64 IN | HEART RATE: 95 BPM | BODY MASS INDEX: 34.49 KG/M2 | OXYGEN SATURATION: 100 % | DIASTOLIC BLOOD PRESSURE: 78 MMHG | SYSTOLIC BLOOD PRESSURE: 121 MMHG | WEIGHT: 202 LBS | TEMPERATURE: 98 F

## 2021-04-23 DIAGNOSIS — N39.0 URINARY TRACT INFECTION WITHOUT HEMATURIA, SITE UNSPECIFIED: Primary | ICD-10-CM

## 2021-04-23 LAB
ALBUMIN SERPL BCP-MCNC: 4.3 G/DL (ref 3.5–5.2)
ALP SERPL-CCNC: 69 U/L (ref 55–135)
ALT SERPL W/O P-5'-P-CCNC: 16 U/L (ref 10–44)
ANION GAP SERPL CALC-SCNC: 9 MMOL/L (ref 8–16)
AST SERPL-CCNC: 16 U/L (ref 10–40)
B-HCG UR QL: NEGATIVE
BACTERIA #/AREA URNS HPF: ABNORMAL /HPF
BASOPHILS # BLD AUTO: 0.05 K/UL (ref 0–0.2)
BASOPHILS NFR BLD: 0.4 % (ref 0–1.9)
BILIRUB SERPL-MCNC: 0.4 MG/DL (ref 0.1–1)
BILIRUB UR QL STRIP: NEGATIVE
BUN SERPL-MCNC: 8 MG/DL (ref 6–20)
CALCIUM SERPL-MCNC: 9.1 MG/DL (ref 8.7–10.5)
CHLORIDE SERPL-SCNC: 106 MMOL/L (ref 95–110)
CLARITY UR: ABNORMAL
CO2 SERPL-SCNC: 24 MMOL/L (ref 23–29)
COLOR UR: ABNORMAL
CREAT SERPL-MCNC: 0.9 MG/DL (ref 0.5–1.4)
CTP QC/QA: YES
DIFFERENTIAL METHOD: ABNORMAL
EOSINOPHIL # BLD AUTO: 0.2 K/UL (ref 0–0.5)
EOSINOPHIL NFR BLD: 1.4 % (ref 0–8)
ERYTHROCYTE [DISTWIDTH] IN BLOOD BY AUTOMATED COUNT: 15.6 % (ref 11.5–14.5)
EST. GFR  (AFRICAN AMERICAN): >60 ML/MIN/1.73 M^2
EST. GFR  (NON AFRICAN AMERICAN): >60 ML/MIN/1.73 M^2
GLUCOSE SERPL-MCNC: 97 MG/DL (ref 70–110)
GLUCOSE UR QL STRIP: NEGATIVE
HCT VFR BLD AUTO: 39.3 % (ref 37–48.5)
HGB BLD-MCNC: 12.2 G/DL (ref 12–16)
HGB UR QL STRIP: NEGATIVE
IMM GRANULOCYTES # BLD AUTO: 0.04 K/UL (ref 0–0.04)
IMM GRANULOCYTES NFR BLD AUTO: 0.3 % (ref 0–0.5)
KETONES UR QL STRIP: NEGATIVE
LEUKOCYTE ESTERASE UR QL STRIP: ABNORMAL
LYMPHOCYTES # BLD AUTO: 3.5 K/UL (ref 1–4.8)
LYMPHOCYTES NFR BLD: 29.7 % (ref 18–48)
MCH RBC QN AUTO: 24.1 PG (ref 27–31)
MCHC RBC AUTO-ENTMCNC: 31 G/DL (ref 32–36)
MCV RBC AUTO: 78 FL (ref 82–98)
MICROSCOPIC COMMENT: ABNORMAL
MONOCYTES # BLD AUTO: 0.9 K/UL (ref 0.3–1)
MONOCYTES NFR BLD: 7.5 % (ref 4–15)
NEUTROPHILS # BLD AUTO: 7.2 K/UL (ref 1.8–7.7)
NEUTROPHILS NFR BLD: 60.7 % (ref 38–73)
NITRITE UR QL STRIP: POSITIVE
NRBC BLD-RTO: 0 /100 WBC
PH UR STRIP: 8 [PH] (ref 5–8)
PLATELET # BLD AUTO: 279 K/UL (ref 150–450)
PMV BLD AUTO: 11.2 FL (ref 9.2–12.9)
POTASSIUM SERPL-SCNC: 3.9 MMOL/L (ref 3.5–5.1)
PROT SERPL-MCNC: 8 G/DL (ref 6–8.4)
PROT UR QL STRIP: NEGATIVE
RBC # BLD AUTO: 5.06 M/UL (ref 4–5.4)
SODIUM SERPL-SCNC: 139 MMOL/L (ref 136–145)
SP GR UR STRIP: 1.02 (ref 1–1.03)
SQUAMOUS #/AREA URNS HPF: 4 /HPF
URN SPEC COLLECT METH UR: ABNORMAL
UROBILINOGEN UR STRIP-ACNC: NEGATIVE EU/DL
WBC # BLD AUTO: 11.85 K/UL (ref 3.9–12.7)
WBC #/AREA URNS HPF: 6 /HPF (ref 0–5)

## 2021-04-23 PROCEDURE — 80053 COMPREHEN METABOLIC PANEL: CPT | Performed by: EMERGENCY MEDICINE

## 2021-04-23 PROCEDURE — 81000 URINALYSIS NONAUTO W/SCOPE: CPT | Performed by: EMERGENCY MEDICINE

## 2021-04-23 PROCEDURE — 81025 URINE PREGNANCY TEST: CPT | Performed by: EMERGENCY MEDICINE

## 2021-04-23 PROCEDURE — 25000003 PHARM REV CODE 250: Performed by: EMERGENCY MEDICINE

## 2021-04-23 PROCEDURE — 99284 EMERGENCY DEPT VISIT MOD MDM: CPT

## 2021-04-23 PROCEDURE — 85025 COMPLETE CBC W/AUTO DIFF WBC: CPT | Performed by: EMERGENCY MEDICINE

## 2021-04-23 RX ORDER — ACETAMINOPHEN 325 MG/1
650 TABLET ORAL
Status: COMPLETED | OUTPATIENT
Start: 2021-04-23 | End: 2021-04-23

## 2021-04-23 RX ORDER — PHENAZOPYRIDINE HYDROCHLORIDE 200 MG/1
200 TABLET, FILM COATED ORAL 2 TIMES DAILY PRN
Qty: 4 TABLET | Refills: 0 | Status: SHIPPED | OUTPATIENT
Start: 2021-04-23 | End: 2021-04-25

## 2021-04-23 RX ORDER — CEPHALEXIN 500 MG/1
500 CAPSULE ORAL EVERY 12 HOURS
Qty: 14 CAPSULE | Refills: 0 | Status: SHIPPED | OUTPATIENT
Start: 2021-04-23 | End: 2021-04-30

## 2021-04-23 RX ADMIN — ACETAMINOPHEN 650 MG: 325 TABLET ORAL at 09:04

## 2021-04-29 ENCOUNTER — OFFICE VISIT (OUTPATIENT)
Dept: OBSTETRICS AND GYNECOLOGY | Facility: CLINIC | Age: 26
End: 2021-04-29
Payer: MEDICAID

## 2021-04-29 VITALS
HEIGHT: 64 IN | BODY MASS INDEX: 35.13 KG/M2 | WEIGHT: 205.81 LBS | DIASTOLIC BLOOD PRESSURE: 60 MMHG | SYSTOLIC BLOOD PRESSURE: 100 MMHG

## 2021-04-29 DIAGNOSIS — Z31.69 ENCOUNTER FOR PRECONCEPTION CONSULTATION: ICD-10-CM

## 2021-04-29 DIAGNOSIS — Q61.4 MULTICYSTIC KIDNEY DISEASE: ICD-10-CM

## 2021-04-29 DIAGNOSIS — Z20.2 STD EXPOSURE: Primary | ICD-10-CM

## 2021-04-29 PROCEDURE — 99999 PR PBB SHADOW E&M-EST. PATIENT-LVL III: CPT | Mod: PBBFAC,,, | Performed by: OBSTETRICS & GYNECOLOGY

## 2021-04-29 PROCEDURE — 99214 OFFICE O/P EST MOD 30 MIN: CPT | Mod: S$PBB,,, | Performed by: OBSTETRICS & GYNECOLOGY

## 2021-04-29 PROCEDURE — 87481 CANDIDA DNA AMP PROBE: CPT | Mod: 59 | Performed by: OBSTETRICS & GYNECOLOGY

## 2021-04-29 PROCEDURE — 99999 PR PBB SHADOW E&M-EST. PATIENT-LVL III: ICD-10-PCS | Mod: PBBFAC,,, | Performed by: OBSTETRICS & GYNECOLOGY

## 2021-04-29 PROCEDURE — 99213 OFFICE O/P EST LOW 20 MIN: CPT | Mod: PBBFAC,PO | Performed by: OBSTETRICS & GYNECOLOGY

## 2021-04-29 PROCEDURE — 99214 PR OFFICE/OUTPT VISIT, EST, LEVL IV, 30-39 MIN: ICD-10-PCS | Mod: S$PBB,,, | Performed by: OBSTETRICS & GYNECOLOGY

## 2021-04-30 ENCOUNTER — TELEPHONE (OUTPATIENT)
Dept: OBSTETRICS AND GYNECOLOGY | Facility: CLINIC | Age: 26
End: 2021-04-30

## 2021-04-30 RX ORDER — METRONIDAZOLE 500 MG/1
500 TABLET ORAL EVERY 12 HOURS
Qty: 14 TABLET | Refills: 0 | Status: SHIPPED | OUTPATIENT
Start: 2021-04-30 | End: 2021-05-07

## 2021-04-30 RX ORDER — TERCONAZOLE 8 MG/G
1 CREAM VAGINAL NIGHTLY
Qty: 20 G | Refills: 0 | Status: SHIPPED | OUTPATIENT
Start: 2021-04-30 | End: 2021-05-03

## 2021-05-02 ENCOUNTER — LAB VISIT (OUTPATIENT)
Dept: URGENT CARE | Facility: CLINIC | Age: 26
End: 2021-05-02
Payer: MEDICAID

## 2021-05-02 DIAGNOSIS — R05.9 COUGH: ICD-10-CM

## 2021-05-02 PROCEDURE — U0003 INFECTIOUS AGENT DETECTION BY NUCLEIC ACID (DNA OR RNA); SEVERE ACUTE RESPIRATORY SYNDROME CORONAVIRUS 2 (SARS-COV-2) (CORONAVIRUS DISEASE [COVID-19]), AMPLIFIED PROBE TECHNIQUE, MAKING USE OF HIGH THROUGHPUT TECHNOLOGIES AS DESCRIBED BY CMS-2020-01-R: HCPCS | Performed by: INTERNAL MEDICINE

## 2021-05-02 PROCEDURE — U0005 INFEC AGEN DETEC AMPLI PROBE: HCPCS | Performed by: INTERNAL MEDICINE

## 2021-05-03 ENCOUNTER — TELEPHONE (OUTPATIENT)
Dept: SLEEP MEDICINE | Facility: OTHER | Age: 26
End: 2021-05-03

## 2021-05-03 LAB — SARS-COV-2 RNA RESP QL NAA+PROBE: NOT DETECTED

## 2021-05-04 ENCOUNTER — HOSPITAL ENCOUNTER (OUTPATIENT)
Dept: SLEEP MEDICINE | Facility: OTHER | Age: 26
Discharge: HOME | End: 2021-05-04
Attending: INTERNAL MEDICINE
Payer: MEDICAID

## 2021-05-04 DIAGNOSIS — R44.2 HYPNAGOGIC HALLUCINATIONS: ICD-10-CM

## 2021-05-04 DIAGNOSIS — G47.8 SLEEP PARALYSIS: ICD-10-CM

## 2021-05-04 DIAGNOSIS — G47.10 HYPERSOMNOLENCE: ICD-10-CM

## 2021-05-04 LAB
C TRACH RRNA SPEC QL NAA+PROBE: POSITIVE
N GONORRHOEA RRNA SPEC QL NAA+PROBE: NEGATIVE

## 2021-05-04 PROCEDURE — 95810 POLYSOM 6/> YRS 4/> PARAM: CPT

## 2021-05-04 PROCEDURE — 95810 PR POLYSOMNOGRAPHY, 4 OR MORE: ICD-10-PCS | Mod: 26,,, | Performed by: INTERNAL MEDICINE

## 2021-05-04 PROCEDURE — 95810 POLYSOM 6/> YRS 4/> PARAM: CPT | Mod: 26,,, | Performed by: INTERNAL MEDICINE

## 2021-05-04 RX ORDER — AZITHROMYCIN 500 MG/1
TABLET, FILM COATED ORAL
Qty: 2 TABLET | Refills: 0 | Status: SHIPPED | OUTPATIENT
Start: 2021-05-04 | End: 2021-11-25

## 2021-05-05 LAB
AMPHET+METHAMPHET UR QL: NEGATIVE
BARBITURATES UR QL SCN>200 NG/ML: NEGATIVE
BENZODIAZ UR QL SCN>200 NG/ML: NEGATIVE
BZE UR QL SCN: NEGATIVE
CANNABINOIDS UR QL SCN: NEGATIVE
CREAT UR-MCNC: 200.7 MG/DL (ref 15–325)
ETHANOL UR-MCNC: <10 MG/DL
METHADONE UR QL SCN>300 NG/ML: NEGATIVE
OPIATES UR QL SCN: NEGATIVE
PCP UR QL SCN>25 NG/ML: NEGATIVE
TOXICOLOGY INFORMATION: NORMAL

## 2021-05-05 PROCEDURE — 80307 DRUG TEST PRSMV CHEM ANLYZR: CPT | Performed by: INTERNAL MEDICINE

## 2021-05-05 PROCEDURE — 95805 PR MULTIPLE SLEEP LATENCY TEST: ICD-10-PCS | Mod: 26,,, | Performed by: INTERNAL MEDICINE

## 2021-05-05 PROCEDURE — 95805 MULTIPLE SLEEP LATENCY TEST: CPT

## 2021-05-05 PROCEDURE — 95805 MULTIPLE SLEEP LATENCY TEST: CPT | Mod: 26,,, | Performed by: INTERNAL MEDICINE

## 2021-05-12 ENCOUNTER — PATIENT MESSAGE (OUTPATIENT)
Dept: SLEEP MEDICINE | Facility: CLINIC | Age: 26
End: 2021-05-12

## 2021-05-15 ENCOUNTER — PATIENT MESSAGE (OUTPATIENT)
Dept: OBSTETRICS AND GYNECOLOGY | Facility: CLINIC | Age: 26
End: 2021-05-15

## 2021-05-19 ENCOUNTER — OFFICE VISIT (OUTPATIENT)
Dept: OBSTETRICS AND GYNECOLOGY | Facility: CLINIC | Age: 26
End: 2021-05-19
Payer: MEDICAID

## 2021-05-19 VITALS — DIASTOLIC BLOOD PRESSURE: 80 MMHG | BODY MASS INDEX: 36.05 KG/M2 | WEIGHT: 210 LBS | SYSTOLIC BLOOD PRESSURE: 126 MMHG

## 2021-05-19 DIAGNOSIS — N94.6 DYSMENORRHEA: ICD-10-CM

## 2021-05-19 DIAGNOSIS — A74.9 CHLAMYDIA INFECTION: ICD-10-CM

## 2021-05-19 DIAGNOSIS — Z86.19 HISTORY OF SEXUALLY TRANSMITTED DISEASE: Primary | ICD-10-CM

## 2021-05-19 PROCEDURE — 99213 OFFICE O/P EST LOW 20 MIN: CPT | Mod: PBBFAC,PN | Performed by: OBSTETRICS & GYNECOLOGY

## 2021-05-19 PROCEDURE — 99213 OFFICE O/P EST LOW 20 MIN: CPT | Mod: S$PBB,,, | Performed by: OBSTETRICS & GYNECOLOGY

## 2021-05-19 PROCEDURE — 99999 PR PBB SHADOW E&M-EST. PATIENT-LVL III: ICD-10-PCS | Mod: PBBFAC,,, | Performed by: OBSTETRICS & GYNECOLOGY

## 2021-05-19 PROCEDURE — 99999 PR PBB SHADOW E&M-EST. PATIENT-LVL III: CPT | Mod: PBBFAC,,, | Performed by: OBSTETRICS & GYNECOLOGY

## 2021-05-19 PROCEDURE — 99213 PR OFFICE/OUTPT VISIT, EST, LEVL III, 20-29 MIN: ICD-10-PCS | Mod: S$PBB,,, | Performed by: OBSTETRICS & GYNECOLOGY

## 2021-05-19 RX ORDER — AZITHROMYCIN 500 MG/1
1000 TABLET, FILM COATED ORAL ONCE
Qty: 2 TABLET | Refills: 0 | Status: SHIPPED | OUTPATIENT
Start: 2021-05-19 | End: 2021-05-19

## 2021-05-19 RX ORDER — IBUPROFEN 600 MG/1
600 TABLET ORAL EVERY 6 HOURS PRN
Qty: 60 TABLET | Refills: 2 | OUTPATIENT
Start: 2021-05-19 | End: 2021-08-01

## 2021-06-01 ENCOUNTER — OFFICE VISIT (OUTPATIENT)
Dept: OBSTETRICS AND GYNECOLOGY | Facility: CLINIC | Age: 26
End: 2021-06-01
Payer: MEDICAID

## 2021-06-01 VITALS
DIASTOLIC BLOOD PRESSURE: 76 MMHG | HEIGHT: 64 IN | BODY MASS INDEX: 36 KG/M2 | WEIGHT: 210.88 LBS | SYSTOLIC BLOOD PRESSURE: 115 MMHG

## 2021-06-01 DIAGNOSIS — Z11.3 SCREENING FOR STD (SEXUALLY TRANSMITTED DISEASE): Primary | ICD-10-CM

## 2021-06-01 DIAGNOSIS — N39.0 URINARY TRACT INFECTION WITHOUT HEMATURIA, SITE UNSPECIFIED: ICD-10-CM

## 2021-06-01 PROCEDURE — 99213 PR OFFICE/OUTPT VISIT, EST, LEVL III, 20-29 MIN: ICD-10-PCS | Mod: S$PBB,,, | Performed by: OBSTETRICS & GYNECOLOGY

## 2021-06-01 PROCEDURE — 87086 URINE CULTURE/COLONY COUNT: CPT | Performed by: OBSTETRICS & GYNECOLOGY

## 2021-06-01 PROCEDURE — 99213 OFFICE O/P EST LOW 20 MIN: CPT | Mod: S$PBB,,, | Performed by: OBSTETRICS & GYNECOLOGY

## 2021-06-01 PROCEDURE — 87077 CULTURE AEROBIC IDENTIFY: CPT | Performed by: OBSTETRICS & GYNECOLOGY

## 2021-06-01 PROCEDURE — 87591 N.GONORRHOEAE DNA AMP PROB: CPT | Performed by: OBSTETRICS & GYNECOLOGY

## 2021-06-01 PROCEDURE — 99213 OFFICE O/P EST LOW 20 MIN: CPT | Mod: PBBFAC,PO | Performed by: OBSTETRICS & GYNECOLOGY

## 2021-06-01 PROCEDURE — 99999 PR PBB SHADOW E&M-EST. PATIENT-LVL III: CPT | Mod: PBBFAC,,, | Performed by: OBSTETRICS & GYNECOLOGY

## 2021-06-01 PROCEDURE — 99999 PR PBB SHADOW E&M-EST. PATIENT-LVL III: ICD-10-PCS | Mod: PBBFAC,,, | Performed by: OBSTETRICS & GYNECOLOGY

## 2021-06-01 PROCEDURE — 87186 SC STD MICRODIL/AGAR DIL: CPT | Performed by: OBSTETRICS & GYNECOLOGY

## 2021-06-01 PROCEDURE — 87491 CHLMYD TRACH DNA AMP PROBE: CPT | Performed by: OBSTETRICS & GYNECOLOGY

## 2021-06-01 PROCEDURE — 87088 URINE BACTERIA CULTURE: CPT | Performed by: OBSTETRICS & GYNECOLOGY

## 2021-06-01 RX ORDER — CIPROFLOXACIN 500 MG/1
500 TABLET ORAL 2 TIMES DAILY
Qty: 6 TABLET | Refills: 0 | Status: SHIPPED | OUTPATIENT
Start: 2021-06-01 | End: 2021-06-04

## 2021-06-03 LAB
C TRACH DNA SPEC QL NAA+PROBE: NOT DETECTED
N GONORRHOEA DNA SPEC QL NAA+PROBE: NOT DETECTED

## 2021-06-05 LAB — BACTERIA UR CULT: ABNORMAL

## 2021-06-07 ENCOUNTER — TELEPHONE (OUTPATIENT)
Dept: OBSTETRICS AND GYNECOLOGY | Facility: HOSPITAL | Age: 26
End: 2021-06-07

## 2021-06-07 RX ORDER — CIPROFLOXACIN 250 MG/1
250 TABLET, FILM COATED ORAL 2 TIMES DAILY
Qty: 6 TABLET | Refills: 0 | Status: SHIPPED | OUTPATIENT
Start: 2021-06-07 | End: 2021-06-10

## 2021-07-26 ENCOUNTER — PATIENT MESSAGE (OUTPATIENT)
Dept: OBSTETRICS AND GYNECOLOGY | Facility: CLINIC | Age: 26
End: 2021-07-26

## 2021-08-01 ENCOUNTER — HOSPITAL ENCOUNTER (EMERGENCY)
Facility: HOSPITAL | Age: 26
Discharge: HOME | End: 2021-08-01
Attending: EMERGENCY MEDICINE
Payer: MEDICAID

## 2021-08-01 VITALS
DIASTOLIC BLOOD PRESSURE: 77 MMHG | HEIGHT: 64 IN | TEMPERATURE: 98 F | OXYGEN SATURATION: 100 % | RESPIRATION RATE: 19 BRPM | WEIGHT: 210 LBS | HEART RATE: 75 BPM | BODY MASS INDEX: 35.85 KG/M2 | SYSTOLIC BLOOD PRESSURE: 125 MMHG

## 2021-08-01 DIAGNOSIS — S39.012A STRAIN OF LUMBAR REGION, INITIAL ENCOUNTER: Primary | ICD-10-CM

## 2021-08-01 LAB
B-HCG UR QL: NEGATIVE
CTP QC/QA: YES

## 2021-08-01 PROCEDURE — 81025 URINE PREGNANCY TEST: CPT | Performed by: PHYSICIAN ASSISTANT

## 2021-08-01 PROCEDURE — 99284 EMERGENCY DEPT VISIT MOD MDM: CPT

## 2021-08-01 RX ORDER — IBUPROFEN 600 MG/1
600 TABLET ORAL EVERY 6 HOURS PRN
Qty: 20 TABLET | Refills: 0 | OUTPATIENT
Start: 2021-08-01 | End: 2021-10-02

## 2021-08-01 RX ORDER — METHOCARBAMOL 500 MG/1
500 TABLET, FILM COATED ORAL 3 TIMES DAILY
Qty: 15 TABLET | Refills: 0 | Status: SHIPPED | OUTPATIENT
Start: 2021-08-01 | End: 2021-08-06

## 2021-08-09 ENCOUNTER — LAB VISIT (OUTPATIENT)
Dept: LAB | Facility: HOSPITAL | Age: 26
End: 2021-08-09
Attending: OBSTETRICS & GYNECOLOGY
Payer: MEDICAID

## 2021-08-09 ENCOUNTER — OFFICE VISIT (OUTPATIENT)
Dept: OBSTETRICS AND GYNECOLOGY | Facility: CLINIC | Age: 26
End: 2021-08-09
Payer: MEDICAID

## 2021-08-09 VITALS
HEIGHT: 64 IN | DIASTOLIC BLOOD PRESSURE: 70 MMHG | SYSTOLIC BLOOD PRESSURE: 110 MMHG | WEIGHT: 204.19 LBS | BODY MASS INDEX: 34.86 KG/M2

## 2021-08-09 DIAGNOSIS — Z01.419 WELL WOMAN EXAM WITH ROUTINE GYNECOLOGICAL EXAM: ICD-10-CM

## 2021-08-09 DIAGNOSIS — N76.2 ACUTE VULVITIS: ICD-10-CM

## 2021-08-09 DIAGNOSIS — N89.8 VAGINAL DISCHARGE: ICD-10-CM

## 2021-08-09 DIAGNOSIS — Z11.3 SCREENING FOR STD (SEXUALLY TRANSMITTED DISEASE): ICD-10-CM

## 2021-08-09 DIAGNOSIS — Z30.41 ENCOUNTER FOR SURVEILLANCE OF CONTRACEPTIVE PILLS: Primary | ICD-10-CM

## 2021-08-09 PROCEDURE — 88142 CYTOPATH C/V THIN LAYER: CPT | Performed by: PATHOLOGY

## 2021-08-09 PROCEDURE — 87481 CANDIDA DNA AMP PROBE: CPT | Mod: 59 | Performed by: OBSTETRICS & GYNECOLOGY

## 2021-08-09 PROCEDURE — 87591 N.GONORRHOEAE DNA AMP PROB: CPT | Performed by: OBSTETRICS & GYNECOLOGY

## 2021-08-09 PROCEDURE — 99999 PR PBB SHADOW E&M-EST. PATIENT-LVL II: ICD-10-PCS | Mod: PBBFAC,,, | Performed by: OBSTETRICS & GYNECOLOGY

## 2021-08-09 PROCEDURE — 88141 PR  CYTOPATH CERV/VAG INTERPRET: ICD-10-PCS | Mod: ,,, | Performed by: PATHOLOGY

## 2021-08-09 PROCEDURE — 99999 PR PBB SHADOW E&M-EST. PATIENT-LVL II: CPT | Mod: PBBFAC,,, | Performed by: OBSTETRICS & GYNECOLOGY

## 2021-08-09 PROCEDURE — 36415 COLL VENOUS BLD VENIPUNCTURE: CPT | Mod: PO | Performed by: OBSTETRICS & GYNECOLOGY

## 2021-08-09 PROCEDURE — 88141 CYTOPATH C/V INTERPRET: CPT | Mod: ,,, | Performed by: PATHOLOGY

## 2021-08-09 PROCEDURE — 99212 OFFICE O/P EST SF 10 MIN: CPT | Mod: PBBFAC,PN | Performed by: OBSTETRICS & GYNECOLOGY

## 2021-08-09 PROCEDURE — 86695 HERPES SIMPLEX TYPE 1 TEST: CPT | Mod: PO | Performed by: OBSTETRICS & GYNECOLOGY

## 2021-08-09 PROCEDURE — 99395 PREV VISIT EST AGE 18-39: CPT | Mod: S$PBB,,, | Performed by: OBSTETRICS & GYNECOLOGY

## 2021-08-09 PROCEDURE — 86696 HERPES SIMPLEX TYPE 2 TEST: CPT | Mod: PO | Performed by: OBSTETRICS & GYNECOLOGY

## 2021-08-09 PROCEDURE — 99395 PR PREVENTIVE VISIT,EST,18-39: ICD-10-PCS | Mod: S$PBB,,, | Performed by: OBSTETRICS & GYNECOLOGY

## 2021-08-09 PROCEDURE — 87491 CHLMYD TRACH DNA AMP PROBE: CPT | Performed by: OBSTETRICS & GYNECOLOGY

## 2021-08-09 RX ORDER — NORETHINDRONE ACETATE AND ETHINYL ESTRADIOL .02; 1 MG/1; MG/1
1 TABLET ORAL DAILY
Qty: 21 TABLET | Refills: 17 | Status: SHIPPED | OUTPATIENT
Start: 2021-08-09 | End: 2021-11-25

## 2021-08-11 LAB
C TRACH DNA SPEC QL NAA+PROBE: NOT DETECTED
N GONORRHOEA DNA SPEC QL NAA+PROBE: NOT DETECTED

## 2021-08-12 LAB
HSV1 DNA SPEC QL NAA+PROBE: NEGATIVE
HSV1 IGG SERPL QL IA: NEGATIVE
HSV2 DNA SPEC QL NAA+PROBE: NEGATIVE
HSV2 IGG SERPL QL IA: POSITIVE
SPECIMEN SOURCE: NORMAL

## 2021-08-13 ENCOUNTER — PATIENT MESSAGE (OUTPATIENT)
Dept: OBSTETRICS AND GYNECOLOGY | Facility: CLINIC | Age: 26
End: 2021-08-13

## 2021-08-13 LAB
BACTERIAL VAGINOSIS DNA: POSITIVE
CANDIDA GLABRATA DNA: NEGATIVE
CANDIDA KRUSEI DNA: NEGATIVE
CANDIDA RRNA VAG QL PROBE: NEGATIVE
T VAGINALIS RRNA GENITAL QL PROBE: NEGATIVE

## 2021-08-16 ENCOUNTER — HOSPITAL ENCOUNTER (EMERGENCY)
Facility: HOSPITAL | Age: 26
Discharge: LEFT BEFORE DISCHARGE | End: 2021-08-16
Payer: MEDICAID

## 2021-08-16 ENCOUNTER — TELEPHONE (OUTPATIENT)
Dept: OBSTETRICS AND GYNECOLOGY | Facility: CLINIC | Age: 26
End: 2021-08-16

## 2021-08-16 VITALS
BODY MASS INDEX: 34.83 KG/M2 | WEIGHT: 204 LBS | HEIGHT: 64 IN | TEMPERATURE: 99 F | RESPIRATION RATE: 16 BRPM | DIASTOLIC BLOOD PRESSURE: 69 MMHG | OXYGEN SATURATION: 100 % | HEART RATE: 86 BPM | SYSTOLIC BLOOD PRESSURE: 121 MMHG

## 2021-08-16 LAB
FINAL PATHOLOGIC DIAGNOSIS: NORMAL
Lab: NORMAL

## 2021-08-16 PROCEDURE — 99281 EMR DPT VST MAYX REQ PHY/QHP: CPT | Mod: ER

## 2021-08-16 RX ORDER — VALACYCLOVIR HYDROCHLORIDE 500 MG/1
500 TABLET, FILM COATED ORAL DAILY
Qty: 30 TABLET | Refills: 12 | Status: SHIPPED | OUTPATIENT
Start: 2021-08-16 | End: 2023-03-21

## 2021-08-16 RX ORDER — METRONIDAZOLE 500 MG/1
500 TABLET ORAL EVERY 12 HOURS
Qty: 14 TABLET | Refills: 0 | Status: SHIPPED | OUTPATIENT
Start: 2021-08-16 | End: 2021-08-23

## 2021-08-24 ENCOUNTER — HOSPITAL ENCOUNTER (EMERGENCY)
Facility: HOSPITAL | Age: 26
Discharge: HOME | End: 2021-08-24
Attending: EMERGENCY MEDICINE
Payer: MEDICAID

## 2021-08-24 VITALS
OXYGEN SATURATION: 98 % | RESPIRATION RATE: 20 BRPM | TEMPERATURE: 99 F | HEART RATE: 70 BPM | SYSTOLIC BLOOD PRESSURE: 124 MMHG | BODY MASS INDEX: 34.83 KG/M2 | HEIGHT: 64 IN | WEIGHT: 204 LBS | DIASTOLIC BLOOD PRESSURE: 78 MMHG

## 2021-08-24 DIAGNOSIS — T78.40XA ALLERGIC REACTION, INITIAL ENCOUNTER: ICD-10-CM

## 2021-08-24 DIAGNOSIS — H00.014 HORDEOLUM EXTERNUM OF LEFT UPPER EYELID: Primary | ICD-10-CM

## 2021-08-24 LAB
B-HCG UR QL: NEGATIVE
CTP QC/QA: YES

## 2021-08-24 PROCEDURE — 25000003 PHARM REV CODE 250: Performed by: EMERGENCY MEDICINE

## 2021-08-24 PROCEDURE — 99283 EMERGENCY DEPT VISIT LOW MDM: CPT

## 2021-08-24 PROCEDURE — 81025 URINE PREGNANCY TEST: CPT | Performed by: EMERGENCY MEDICINE

## 2021-08-24 RX ORDER — SULFAMETHOXAZOLE AND TRIMETHOPRIM 800; 160 MG/1; MG/1
1 TABLET ORAL 2 TIMES DAILY
Qty: 14 TABLET | Refills: 0 | Status: SHIPPED | OUTPATIENT
Start: 2021-08-24 | End: 2021-08-31

## 2021-08-24 RX ORDER — DIPHENHYDRAMINE HCL 25 MG
25 CAPSULE ORAL
Status: DISCONTINUED | OUTPATIENT
Start: 2021-08-24 | End: 2021-08-24

## 2021-08-24 RX ORDER — FAMOTIDINE 20 MG/1
20 TABLET, FILM COATED ORAL
Status: COMPLETED | OUTPATIENT
Start: 2021-08-24 | End: 2021-08-24

## 2021-08-24 RX ORDER — SULFAMETHOXAZOLE AND TRIMETHOPRIM 800; 160 MG/1; MG/1
1 TABLET ORAL
Status: COMPLETED | OUTPATIENT
Start: 2021-08-24 | End: 2021-08-24

## 2021-08-24 RX ADMIN — FAMOTIDINE 20 MG: 20 TABLET ORAL at 07:08

## 2021-08-24 RX ADMIN — SULFAMETHOXAZOLE AND TRIMETHOPRIM 1 TABLET: 800; 160 TABLET ORAL at 07:08

## 2021-09-05 ENCOUNTER — PATIENT MESSAGE (OUTPATIENT)
Dept: OBSTETRICS AND GYNECOLOGY | Facility: CLINIC | Age: 26
End: 2021-09-05

## 2021-10-02 ENCOUNTER — HOSPITAL ENCOUNTER (EMERGENCY)
Facility: HOSPITAL | Age: 26
Discharge: HOME | End: 2021-10-02
Payer: MEDICAID

## 2021-10-02 VITALS
WEIGHT: 205 LBS | SYSTOLIC BLOOD PRESSURE: 117 MMHG | RESPIRATION RATE: 16 BRPM | DIASTOLIC BLOOD PRESSURE: 69 MMHG | OXYGEN SATURATION: 100 % | BODY MASS INDEX: 35 KG/M2 | HEART RATE: 69 BPM | TEMPERATURE: 98 F | HEIGHT: 64 IN

## 2021-10-02 DIAGNOSIS — L03.012 FELON OF FINGER OF LEFT HAND: Primary | ICD-10-CM

## 2021-10-02 PROCEDURE — 99284 EMERGENCY DEPT VISIT MOD MDM: CPT | Mod: 25,ER

## 2021-10-02 PROCEDURE — 10160 PNXR ASPIR ABSC HMTMA BULLA: CPT | Mod: ER

## 2021-10-02 PROCEDURE — 25000003 PHARM REV CODE 250: Mod: ER | Performed by: PHYSICIAN ASSISTANT

## 2021-10-02 RX ORDER — IBUPROFEN 600 MG/1
600 TABLET ORAL EVERY 8 HOURS PRN
Qty: 15 TABLET | Refills: 0 | Status: SHIPPED | OUTPATIENT
Start: 2021-10-02 | End: 2021-10-07

## 2021-10-02 RX ORDER — CEPHALEXIN 500 MG/1
500 CAPSULE ORAL 4 TIMES DAILY
Qty: 20 CAPSULE | Refills: 0 | Status: SHIPPED | OUTPATIENT
Start: 2021-10-02 | End: 2021-10-07

## 2021-10-02 RX ORDER — IBUPROFEN 600 MG/1
600 TABLET ORAL
Status: COMPLETED | OUTPATIENT
Start: 2021-10-02 | End: 2021-10-02

## 2021-10-02 RX ORDER — FAMOTIDINE 20 MG/1
20 TABLET, FILM COATED ORAL
COMMUNITY
Start: 2021-09-09 | End: 2021-10-09

## 2021-10-02 RX ORDER — CYCLOBENZAPRINE HCL 10 MG
10 TABLET ORAL EVERY 8 HOURS
COMMUNITY
Start: 2021-09-03 | End: 2021-11-25

## 2021-10-02 RX ORDER — ALBUTEROL SULFATE 90 UG/1
2 AEROSOL, METERED RESPIRATORY (INHALATION) EVERY 6 HOURS PRN
COMMUNITY
Start: 2021-09-09 | End: 2021-10-09

## 2021-10-02 RX ORDER — CEPHALEXIN 500 MG/1
500 CAPSULE ORAL
Status: COMPLETED | OUTPATIENT
Start: 2021-10-02 | End: 2021-10-02

## 2021-10-02 RX ORDER — PROPRANOLOL HYDROCHLORIDE 40 MG/1
40 TABLET ORAL
COMMUNITY
Start: 2021-09-09 | End: 2022-06-23

## 2021-10-02 RX ORDER — FERROUS GLUCONATE 324(38)MG
324 TABLET ORAL
COMMUNITY
Start: 2021-09-09 | End: 2021-10-09

## 2021-10-02 RX ADMIN — CEPHALEXIN 500 MG: 500 CAPSULE ORAL at 11:10

## 2021-10-02 RX ADMIN — IBUPROFEN 600 MG: 600 TABLET ORAL at 11:10

## 2021-10-13 ENCOUNTER — TELEPHONE (OUTPATIENT)
Dept: GENETICS | Facility: CLINIC | Age: 26
End: 2021-10-13

## 2021-10-14 ENCOUNTER — OFFICE VISIT (OUTPATIENT)
Dept: GENETICS | Facility: CLINIC | Age: 26
End: 2021-10-14
Payer: MEDICAID

## 2021-10-14 VITALS — HEART RATE: 81 BPM | BODY MASS INDEX: 34.16 KG/M2 | HEIGHT: 64 IN | WEIGHT: 200.06 LBS | RESPIRATION RATE: 14 BRPM

## 2021-10-14 DIAGNOSIS — Z31.69 ENCOUNTER FOR PRECONCEPTION CONSULTATION: ICD-10-CM

## 2021-10-14 DIAGNOSIS — Q61.4 MULTICYSTIC KIDNEY DISEASE: Primary | ICD-10-CM

## 2021-10-14 PROCEDURE — 99213 OFFICE O/P EST LOW 20 MIN: CPT | Mod: PBBFAC | Performed by: MEDICAL GENETICS

## 2021-10-14 PROCEDURE — 99205 PR OFFICE/OUTPT VISIT, NEW, LEVL V, 60-74 MIN: ICD-10-PCS | Mod: S$PBB,,, | Performed by: MEDICAL GENETICS

## 2021-10-14 PROCEDURE — 99205 OFFICE O/P NEW HI 60 MIN: CPT | Mod: S$PBB,,, | Performed by: MEDICAL GENETICS

## 2021-10-14 PROCEDURE — 99999 PR PBB SHADOW E&M-EST. PATIENT-LVL III: CPT | Mod: PBBFAC,,, | Performed by: MEDICAL GENETICS

## 2021-10-14 PROCEDURE — 99999 PR PBB SHADOW E&M-EST. PATIENT-LVL III: ICD-10-PCS | Mod: PBBFAC,,, | Performed by: MEDICAL GENETICS

## 2021-10-25 ENCOUNTER — OFFICE VISIT (OUTPATIENT)
Dept: OBSTETRICS AND GYNECOLOGY | Facility: CLINIC | Age: 26
End: 2021-10-25
Payer: MEDICAID

## 2021-10-25 VITALS
DIASTOLIC BLOOD PRESSURE: 80 MMHG | WEIGHT: 204.19 LBS | BODY MASS INDEX: 35.29 KG/M2 | SYSTOLIC BLOOD PRESSURE: 126 MMHG

## 2021-10-25 DIAGNOSIS — Q61.4 MULTICYSTIC KIDNEY DISEASE: Primary | ICD-10-CM

## 2021-10-25 DIAGNOSIS — N92.6 MENSES, IRREGULAR: ICD-10-CM

## 2021-10-25 PROCEDURE — 99213 OFFICE O/P EST LOW 20 MIN: CPT | Mod: PBBFAC,PN | Performed by: OBSTETRICS & GYNECOLOGY

## 2021-10-25 PROCEDURE — 99213 OFFICE O/P EST LOW 20 MIN: CPT | Mod: S$PBB,,, | Performed by: OBSTETRICS & GYNECOLOGY

## 2021-10-25 PROCEDURE — 99213 PR OFFICE/OUTPT VISIT, EST, LEVL III, 20-29 MIN: ICD-10-PCS | Mod: S$PBB,,, | Performed by: OBSTETRICS & GYNECOLOGY

## 2021-10-25 PROCEDURE — 99999 PR PBB SHADOW E&M-EST. PATIENT-LVL III: ICD-10-PCS | Mod: PBBFAC,,, | Performed by: OBSTETRICS & GYNECOLOGY

## 2021-10-25 PROCEDURE — 99999 PR PBB SHADOW E&M-EST. PATIENT-LVL III: CPT | Mod: PBBFAC,,, | Performed by: OBSTETRICS & GYNECOLOGY

## 2021-10-27 ENCOUNTER — OFFICE VISIT (OUTPATIENT)
Dept: FAMILY MEDICINE | Facility: HOSPITAL | Age: 26
End: 2021-10-27
Attending: FAMILY MEDICINE
Payer: MEDICAID

## 2021-10-27 VITALS
WEIGHT: 201.25 LBS | HEART RATE: 83 BPM | HEIGHT: 64 IN | SYSTOLIC BLOOD PRESSURE: 111 MMHG | BODY MASS INDEX: 34.36 KG/M2 | DIASTOLIC BLOOD PRESSURE: 71 MMHG

## 2021-10-27 DIAGNOSIS — K59.00 CONSTIPATION, UNSPECIFIED CONSTIPATION TYPE: Primary | ICD-10-CM

## 2021-10-27 DIAGNOSIS — T78.3XXA ANGIOEDEMA DUE TO ANGIOTENSIN CONVERTING ENZYME INHIBITOR (ACE-I): ICD-10-CM

## 2021-10-27 DIAGNOSIS — T46.4X5A ANGIOEDEMA DUE TO ANGIOTENSIN CONVERTING ENZYME INHIBITOR (ACE-I): ICD-10-CM

## 2021-10-27 DIAGNOSIS — M25.442 FINGER JOINT SWELLING, LEFT: ICD-10-CM

## 2021-10-27 PROCEDURE — 99214 OFFICE O/P EST MOD 30 MIN: CPT | Performed by: STUDENT IN AN ORGANIZED HEALTH CARE EDUCATION/TRAINING PROGRAM

## 2021-10-27 RX ORDER — POLYETHYLENE GLYCOL 3350 17 G/17G
17 POWDER, FOR SOLUTION ORAL DAILY
Qty: 100 EACH | Refills: 0 | Status: SHIPPED | OUTPATIENT
Start: 2021-10-27 | End: 2021-11-25

## 2021-10-27 RX ORDER — METHYLPREDNISOLONE 4 MG/1
TABLET ORAL
COMMUNITY
Start: 2021-09-09 | End: 2021-11-25

## 2021-11-02 ENCOUNTER — HOSPITAL ENCOUNTER (OUTPATIENT)
Dept: RADIOLOGY | Facility: HOSPITAL | Age: 26
Discharge: HOME | End: 2021-11-02
Attending: STUDENT IN AN ORGANIZED HEALTH CARE EDUCATION/TRAINING PROGRAM
Payer: MEDICAID

## 2021-11-02 DIAGNOSIS — M25.442 FINGER JOINT SWELLING, LEFT: ICD-10-CM

## 2021-11-02 PROCEDURE — 76882 US LMTD JT/FCL EVL NVASC XTR: CPT | Mod: TC,LT

## 2021-11-02 PROCEDURE — 76882 US LMTD JT/FCL EVL NVASC XTR: CPT | Mod: 26,LT,, | Performed by: RADIOLOGY

## 2021-11-02 PROCEDURE — 76882 US EXTREMITY NON VASCULAR LIMITED LEFT: ICD-10-PCS | Mod: 26,LT,, | Performed by: RADIOLOGY

## 2021-11-10 ENCOUNTER — OFFICE VISIT (OUTPATIENT)
Dept: FAMILY MEDICINE | Facility: HOSPITAL | Age: 26
End: 2021-11-10
Attending: STUDENT IN AN ORGANIZED HEALTH CARE EDUCATION/TRAINING PROGRAM
Payer: MEDICAID

## 2021-11-10 VITALS
SYSTOLIC BLOOD PRESSURE: 113 MMHG | HEIGHT: 64 IN | WEIGHT: 200.19 LBS | DIASTOLIC BLOOD PRESSURE: 81 MMHG | BODY MASS INDEX: 34.18 KG/M2 | HEART RATE: 74 BPM

## 2021-11-10 DIAGNOSIS — M25.442 FINGER JOINT SWELLING, LEFT: Primary | ICD-10-CM

## 2021-11-10 PROCEDURE — 99214 OFFICE O/P EST MOD 30 MIN: CPT | Performed by: STUDENT IN AN ORGANIZED HEALTH CARE EDUCATION/TRAINING PROGRAM

## 2021-11-25 ENCOUNTER — HOSPITAL ENCOUNTER (EMERGENCY)
Facility: HOSPITAL | Age: 26
Discharge: HOME | End: 2021-11-25
Attending: FAMILY MEDICINE
Payer: MEDICAID

## 2021-11-25 VITALS
SYSTOLIC BLOOD PRESSURE: 117 MMHG | HEART RATE: 102 BPM | BODY MASS INDEX: 32.65 KG/M2 | DIASTOLIC BLOOD PRESSURE: 69 MMHG | HEIGHT: 65 IN | RESPIRATION RATE: 20 BRPM | OXYGEN SATURATION: 99 % | WEIGHT: 196 LBS | TEMPERATURE: 99 F

## 2021-11-25 DIAGNOSIS — N30.00 ACUTE CYSTITIS WITHOUT HEMATURIA: ICD-10-CM

## 2021-11-25 DIAGNOSIS — J06.9 VIRAL URI WITH COUGH: Primary | ICD-10-CM

## 2021-11-25 DIAGNOSIS — R50.9 FEVER, UNSPECIFIED FEVER CAUSE: ICD-10-CM

## 2021-11-25 DIAGNOSIS — R06.02 SHORTNESS OF BREATH: ICD-10-CM

## 2021-11-25 LAB
B-HCG UR QL: NEGATIVE
BACTERIA #/AREA URNS AUTO: ABNORMAL /HPF
BILIRUB UR QL STRIP: NEGATIVE
CLARITY UR REFRACT.AUTO: ABNORMAL
COLOR UR AUTO: ABNORMAL
GLUCOSE UR QL STRIP: NEGATIVE
HGB UR QL STRIP: ABNORMAL
HYALINE CASTS UR QL AUTO: 0 /LPF
INFLUENZA A, MOLECULAR: NEGATIVE
INFLUENZA B, MOLECULAR: NEGATIVE
KETONES UR QL STRIP: NEGATIVE
LEUKOCYTE ESTERASE UR QL STRIP: ABNORMAL
MICROSCOPIC COMMENT: ABNORMAL
NITRITE UR QL STRIP: POSITIVE
PH UR STRIP: 5 [PH] (ref 5–8)
PROT UR QL STRIP: ABNORMAL
RBC #/AREA URNS AUTO: 2 /HPF (ref 0–4)
SARS-COV-2 RDRP RESP QL NAA+PROBE: NEGATIVE
SP GR UR STRIP: 1.01 (ref 1–1.03)
SPECIMEN SOURCE: NORMAL
URN SPEC COLLECT METH UR: ABNORMAL
UROBILINOGEN UR STRIP-ACNC: ABNORMAL EU/DL
WBC #/AREA URNS AUTO: 60 /HPF (ref 0–5)
WBC CLUMPS UR QL AUTO: ABNORMAL

## 2021-11-25 PROCEDURE — 63600175 PHARM REV CODE 636 W HCPCS: Mod: ER | Performed by: FAMILY MEDICINE

## 2021-11-25 PROCEDURE — 96372 THER/PROPH/DIAG INJ SC/IM: CPT | Mod: ER

## 2021-11-25 PROCEDURE — 94640 AIRWAY INHALATION TREATMENT: CPT | Mod: ER

## 2021-11-25 PROCEDURE — 99284 EMERGENCY DEPT VISIT MOD MDM: CPT | Mod: 25,ER

## 2021-11-25 PROCEDURE — 25000003 PHARM REV CODE 250: Mod: ER | Performed by: PHYSICIAN ASSISTANT

## 2021-11-25 PROCEDURE — 25000242 PHARM REV CODE 250 ALT 637 W/ HCPCS: Mod: ER | Performed by: PHYSICIAN ASSISTANT

## 2021-11-25 PROCEDURE — 81000 URINALYSIS NONAUTO W/SCOPE: CPT | Mod: ER | Performed by: PHYSICIAN ASSISTANT

## 2021-11-25 PROCEDURE — U0002 COVID-19 LAB TEST NON-CDC: HCPCS | Mod: ER | Performed by: FAMILY MEDICINE

## 2021-11-25 PROCEDURE — 87502 INFLUENZA DNA AMP PROBE: CPT | Mod: ER | Performed by: PHYSICIAN ASSISTANT

## 2021-11-25 PROCEDURE — 81025 URINE PREGNANCY TEST: CPT | Mod: ER | Performed by: FAMILY MEDICINE

## 2021-11-25 RX ORDER — ACETAMINOPHEN 500 MG
1000 TABLET ORAL
Status: COMPLETED | OUTPATIENT
Start: 2021-11-25 | End: 2021-11-25

## 2021-11-25 RX ORDER — IPRATROPIUM BROMIDE AND ALBUTEROL SULFATE 2.5; .5 MG/3ML; MG/3ML
3 SOLUTION RESPIRATORY (INHALATION) ONCE
Status: COMPLETED | OUTPATIENT
Start: 2021-11-25 | End: 2021-11-25

## 2021-11-25 RX ORDER — CEFTRIAXONE 1 G/1
1 INJECTION, POWDER, FOR SOLUTION INTRAMUSCULAR; INTRAVENOUS
Status: COMPLETED | OUTPATIENT
Start: 2021-11-25 | End: 2021-11-25

## 2021-11-25 RX ORDER — ONDANSETRON 4 MG/1
4 TABLET, ORALLY DISINTEGRATING ORAL
Status: COMPLETED | OUTPATIENT
Start: 2021-11-25 | End: 2021-11-25

## 2021-11-25 RX ORDER — CEPHALEXIN 500 MG/1
500 CAPSULE ORAL 4 TIMES DAILY
Qty: 40 CAPSULE | Refills: 0 | Status: SHIPPED | OUTPATIENT
Start: 2021-11-25 | End: 2021-12-05

## 2021-11-25 RX ADMIN — ACETAMINOPHEN 1000 MG: 500 TABLET ORAL at 08:11

## 2021-11-25 RX ADMIN — CEFTRIAXONE SODIUM 1 G: 1 INJECTION, POWDER, FOR SOLUTION INTRAMUSCULAR; INTRAVENOUS at 11:11

## 2021-11-25 RX ADMIN — ONDANSETRON 4 MG: 4 TABLET, ORALLY DISINTEGRATING ORAL at 08:11

## 2021-11-25 RX ADMIN — IPRATROPIUM BROMIDE AND ALBUTEROL SULFATE 3 ML: 2.5; .5 SOLUTION RESPIRATORY (INHALATION) at 10:11

## 2021-12-08 ENCOUNTER — TELEPHONE (OUTPATIENT)
Dept: GENETICS | Facility: CLINIC | Age: 26
End: 2021-12-08
Payer: MEDICAID

## 2021-12-09 ENCOUNTER — OFFICE VISIT (OUTPATIENT)
Dept: GENETICS | Facility: CLINIC | Age: 26
End: 2021-12-09
Payer: MEDICAID

## 2021-12-09 VITALS
HEART RATE: 88 BPM | RESPIRATION RATE: 14 BRPM | DIASTOLIC BLOOD PRESSURE: 77 MMHG | BODY MASS INDEX: 32.36 KG/M2 | HEIGHT: 65 IN | SYSTOLIC BLOOD PRESSURE: 116 MMHG | WEIGHT: 194.25 LBS

## 2021-12-09 DIAGNOSIS — Q60.3 RENAL HYPOPLASIA, UNILATERAL: Primary | ICD-10-CM

## 2021-12-09 PROBLEM — N26.1: Status: ACTIVE | Noted: 2021-12-09

## 2021-12-09 PROCEDURE — 99417 PROLNG OP E/M EACH 15 MIN: CPT | Mod: S$PBB,,, | Performed by: MEDICAL GENETICS

## 2021-12-09 PROCEDURE — 99417 PR PROLONGED SVC, OUTPT, W/WO DIRECT PT CONTACT,  EA ADDTL 15 MIN: ICD-10-PCS | Mod: S$PBB,,, | Performed by: MEDICAL GENETICS

## 2021-12-09 PROCEDURE — 99215 PR OFFICE/OUTPT VISIT, EST, LEVL V, 40-54 MIN: ICD-10-PCS | Mod: S$PBB,,, | Performed by: MEDICAL GENETICS

## 2021-12-09 PROCEDURE — 99213 OFFICE O/P EST LOW 20 MIN: CPT | Mod: PBBFAC | Performed by: MEDICAL GENETICS

## 2021-12-09 PROCEDURE — 99999 PR PBB SHADOW E&M-EST. PATIENT-LVL III: CPT | Mod: PBBFAC,,, | Performed by: MEDICAL GENETICS

## 2021-12-09 PROCEDURE — 99999 PR PBB SHADOW E&M-EST. PATIENT-LVL III: ICD-10-PCS | Mod: PBBFAC,,, | Performed by: MEDICAL GENETICS

## 2021-12-09 PROCEDURE — 99215 OFFICE O/P EST HI 40 MIN: CPT | Mod: S$PBB,,, | Performed by: MEDICAL GENETICS

## 2021-12-09 RX ORDER — FERROUS SULFATE 325(65) MG
TABLET, DELAYED RELEASE (ENTERIC COATED) ORAL DAILY
COMMUNITY
Start: 2021-09-09 | End: 2023-01-24

## 2022-01-03 ENCOUNTER — OFFICE VISIT (OUTPATIENT)
Dept: MATERNAL FETAL MEDICINE | Facility: CLINIC | Age: 27
End: 2022-01-03
Attending: OBSTETRICS & GYNECOLOGY
Payer: MEDICAID

## 2022-01-03 ENCOUNTER — TELEPHONE (OUTPATIENT)
Dept: MATERNAL FETAL MEDICINE | Facility: CLINIC | Age: 27
End: 2022-01-03

## 2022-01-03 DIAGNOSIS — N96 HISTORY OF MULTIPLE MISCARRIAGES: ICD-10-CM

## 2022-01-03 DIAGNOSIS — N26.1: ICD-10-CM

## 2022-01-03 DIAGNOSIS — Q60.3 RENAL HYPOPLASIA, UNILATERAL: Primary | ICD-10-CM

## 2022-01-03 PROCEDURE — 99214 OFFICE O/P EST MOD 30 MIN: CPT | Mod: 95,,, | Performed by: OBSTETRICS & GYNECOLOGY

## 2022-01-03 PROCEDURE — 99214 PR OFFICE/OUTPT VISIT, EST, LEVL IV, 30-39 MIN: ICD-10-PCS | Mod: 95,,, | Performed by: OBSTETRICS & GYNECOLOGY

## 2022-01-03 NOTE — PROGRESS NOTES
The patient location is: Home  The chief complaint leading to consultation is: Polycystic kidney disease preconceptual counseling    Visit type: audiovisual, however patient was bedridden for Covid infection and left her video off    Face to Face time with patient: 20  30 minutes of total time spent on the encounter, which includes face to face time and non-face to face time preparing to see the patient (eg, review of tests), Obtaining and/or reviewing separately obtained history, Documenting clinical information in the electronic or other health record, Independently interpreting results (not separately reported) and communicating results to the patient/family/caregiver, or Care coordination (not separately reported).         Each patient to whom he or she provides medical services by telemedicine is:  (1) informed of the relationship between the physician and patient and the respective role of any other health care provider with respect to management of the patient; and (2) notified that he or she may decline to receive medical services by telemedicine and may withdraw from such care at any time.    Notes:       Chief complaint: Polycystic kidney disease    Provider requesting consultation: Dr. Royal    26 y.o. [id number] Unknown EGA.    PMH:  Past Medical History:   Diagnosis Date    Anemia     Asthma     Bilateral renal cysts     Breast disorder     Congenital absence of one kidney     Kidney cysts     pt born with 1 kidney       PObHx:  OB History    Para Term  AB Living   1 0 0 0 0 0   SAB IAB Ectopic Multiple Live Births   0 0 0 0 0      # Outcome Date GA Lbr Jarad/2nd Weight Sex Delivery Anes PTL Lv   1                 PSH:No past surgical history on file.    Family history:family history includes Breast cancer in her paternal grandmother; Cancer in her paternal grandmother; Diabetes in her paternal aunt; Hypertension in her maternal grandfather and mother; Kidney disease in her  maternal grandmother; Leukemia in her paternal grandmother.    Social history: reports that she has been smoking. She has smoked for the past 0.50 years. She has never used smokeless tobacco. She reports current alcohol use. She reports that she does not use drugs.    A detailed fetal anatomical ultrasound was completed today.  See details in imaging section of [facility].    The patient was referred for polycystic kidney disease.  This does not seem to be what she actually has.  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  On the other kidney appears normal with the exception of an isolated simple cyst.  In addition, the patient states she has had 3 prior early miscarriages in states she was sent for a workup on this.    With respect to the patient's renal disease this most likely will not place the pregnancy increased risk unless it is associated with hypertension in which case there would be a slight increased risk for the development of preeclampsia in issues with fetal growth.  I do not see any indication of hypertension in the patient's chart.    We discussed the patient's history of miscarriage.  A complete family history was obtained during her visit. There is no further family history of recurrent miscarriages.    There is no history of children born with multiple anomalies, mental retardation or chromosome abnormalities. There is no other reported history of birth defects, mental retardation, or other inherited conditions with the exception of her renal status.   Consanguinity was denied.    Due to Ms.Gelescia Knox's history of miscarriages, the genetic causes for failing to maintain a pregnancy and infertility were discussed. We discussed that there are several reasons for recurrent first trimester miscarriages. The first of these is a parental chromosome rearrangement. Chromosomes, translocations, and  aneuploidies were discussed in detail. She expressed understanding that parental chromosome rearrangement is the cause for only 2-5% of recurrent pregnancy loss. Chromosome abnormalities not related to parental chromosome rearrangements account for up to 50% of first trimester miscarriages. If she should have future miscarriages, testing on the products of conception could aid in the determination of the cause for the miscarriage.    We also discussed inherited thrombophilias as a potential cause for recurrent pregnancy loss. Mutations in Factor V Leiden, prothrombin, defects in protein C and protein S regulation can cause an increased risk for miscarriages. Though once thought to play a role in miscarriages, mutations in methylenetetrahydrofolate reductase (MTHFR) are no longer though to play a significant role. Many of these disorders can lead to abnormal clotting that impairs the implantation or development of the fetus early in gestation. Factor V Leiden is typically associated with late term pregnancy losses (i.e. 2nd and 3rd trimesters). Other, acquired thrombophilias and lupus anticoagulant studies were discussed in detail with the patient.      During today's visit, laboratory studies were ordered for *Factor V Leiden, Prothrombin, Protein C and Protein S, lupus anticoagulant, and antiphospholipid antibodies. Since the likelihood of a parental chromosome rearrangement is low, she elected to not have paternal karyotypes performed and her karyotype is normal.    She has been scheduled for a follow-up appointment to review labs in approximately 3 weeks. If she should become pregnant in the next few weeks, she will notify our office as well as yours, which will allow for further management discussion.    In addition, the patient has recently been diagnosed with coronavirus infection.  She had 1 vaccination and was scheduled to get her 2nd but became infected before that.  Her symptoms included cough, mild  shortness of breath and feeling poorly.  Her symptoms have improved but not gone away at this point in time.  I am unable to access these records at the time of consultation but she does think that she got antibody treatment.    The patient was given an opportunity to ask questions about management and the diease process.  She expressed an understanding of and agreement to the above impression and plan. All questions were answered to her satisfaction.  She was given contact information to the [facility] clinic to address further concerns.      I spent a total of 30 minutes on the day of the visit. This includes face to face time and non-face to face time preparing to see the patient (eg, review of tests), Obtaining and/or reviewing separately obtained history, Documenting clinical information in the electronic or other health record, Independently interpreting results and communicating results to the patient/family/caregiver, or Care coordination.

## 2022-01-03 NOTE — TELEPHONE ENCOUNTER
RN left message for patient to [facility] ay [phone]. RN scheduled labs for patient to go at any time. RN also scheduled a virtual follow up appointment on Jan 24th at 1:00 with Dr. King. RN explained this in message and told pt to call [facility] clinic with any questions.

## 2022-01-17 ENCOUNTER — PATIENT MESSAGE (OUTPATIENT)
Dept: OBSTETRICS AND GYNECOLOGY | Facility: CLINIC | Age: 27
End: 2022-01-17
Payer: MEDICAID

## 2022-01-18 RX ORDER — IBUPROFEN 800 MG/1
800 TABLET ORAL 3 TIMES DAILY
Qty: 30 TABLET | Refills: 4 | Status: SHIPPED | OUTPATIENT
Start: 2022-01-18 | End: 2022-04-26 | Stop reason: ALTCHOICE

## 2022-01-19 ENCOUNTER — HOSPITAL ENCOUNTER (EMERGENCY)
Facility: HOSPITAL | Age: 27
Discharge: HOME | End: 2022-01-19
Attending: EMERGENCY MEDICINE
Payer: MEDICAID

## 2022-01-19 VITALS
HEIGHT: 65 IN | OXYGEN SATURATION: 100 % | HEART RATE: 90 BPM | DIASTOLIC BLOOD PRESSURE: 76 MMHG | BODY MASS INDEX: 29.99 KG/M2 | TEMPERATURE: 98 F | WEIGHT: 180 LBS | RESPIRATION RATE: 18 BRPM | SYSTOLIC BLOOD PRESSURE: 135 MMHG

## 2022-01-19 DIAGNOSIS — R04.0 EPISTAXIS: Primary | ICD-10-CM

## 2022-01-19 PROCEDURE — 99282 EMERGENCY DEPT VISIT SF MDM: CPT | Mod: ER

## 2022-01-19 NOTE — ED PROVIDER NOTES
Encounter Date: 1/19/2022 26-year-old black female with a history of kidney cysts presents for single episode of epistaxis that occurred last night.  Patient admits she had the heater on.  She had a sudden episode of heavy bleeding with clots that resolved with pressure and spontaneously.  She denies any recent illness.  She denies recent cold she has not been ill lately.  She is not bleeding at the present time.       History     Chief Complaint   Patient presents with    Nose Problem     Nose bleed last night and again this morning before noon, denies injury or headache      HPI  Review of patient's allergies indicates:   Allergen Reactions    Lisinopril Other (See Comments), Shortness Of Breath and Swelling     angioedema      Shellfish derived Swelling     Past Medical History:   Diagnosis Date    Anemia     Asthma     Bilateral renal cysts     Breast disorder     Congenital absence of one kidney     Kidney cysts     pt born with 1 kidney     History reviewed. No pertinent surgical history.  Family History   Problem Relation Age of Onset    Diabetes Paternal Aunt     Hypertension Maternal Grandfather     Cancer Paternal Grandmother     Breast cancer Paternal Grandmother     Leukemia Paternal Grandmother     Kidney disease Maternal Grandmother     Hypertension Mother      Social History     Tobacco Use    Smoking status: Current Every Day Smoker     Years: 0.50    Smokeless tobacco: Never Used   Substance Use Topics    Alcohol use: Yes     Comment: OCC    Drug use: No     Review of Systems   Constitutional: Negative for fever.   HENT: Positive for nosebleeds. Negative for sore throat.    Respiratory: Negative for shortness of breath.    Cardiovascular: Negative for chest pain.   Gastrointestinal: Negative for nausea.   Genitourinary: Negative for dysuria.   Musculoskeletal: Negative for back pain.   Skin: Negative for rash.   Neurological: Negative for weakness.   Hematological: Does not  bruise/bleed easily.       Physical Exam     Initial Vitals [01/19/22 1358]   BP Pulse Resp Temp SpO2   135/76 90 18 98.1 °F (36.7 °C) 100 %      MAP       --         Physical Exam    Nursing note and vitals reviewed.  Constitutional: She appears well-developed and well-nourished. She is not diaphoretic. No distress.   HENT:   Head: Normocephalic and atraumatic.   Mouth/Throat: Oropharynx is clear and moist.   Small clot on the right nasal septum.  No active bleeding   Eyes: Conjunctivae and EOM are normal. Pupils are equal, round, and reactive to light.   Neck: Neck supple.   Normal range of motion.  Cardiovascular: Normal rate, regular rhythm, normal heart sounds and intact distal pulses. Exam reveals no gallop and no friction rub.    No murmur heard.  Pulmonary/Chest: Breath sounds normal. She has no wheezes. She has no rhonchi. She has no rales.   Abdominal: Abdomen is soft. She exhibits no distension. There is no abdominal tenderness.   Musculoskeletal:         General: Normal range of motion.      Cervical back: Normal range of motion and neck supple.     Neurological: She is alert and oriented to person, place, and time. She has normal strength.   Skin: Skin is warm and dry. No rash noted. No erythema.   Psychiatric: She has a normal mood and affect.         ED Course   Procedures  Labs Reviewed - No data to display       Imaging Results    None          Medications - No data to display  Medical Decision Making:   ED Management:  26-year-old female presents for right nasal septal bleeding no active bleeding at the present time.  The patient was given Bactroban ointment told to use Walter-Synephrine and Sudafed as needed.  She will be given ENT follow-up                      Clinical Impression:   Final diagnoses:  [R04.0] Epistaxis (Primary)          ED Disposition Condition    Discharge Stable        ED Prescriptions     Medication Sig Dispense Start Date End Date Auth. Provider    mupirocin calcium 2% nasl  oint (BACTROBAN) 2 % Oint by Nasal route 2 (two) times daily. for 5 days 10 g 1/19/2022 1/24/2022 Thomas Matute MD        Follow-up Information     Follow up With Specialties Details Why Contact Info    [facility]  [address] [facility] [address]  [phone]           Thomas Matute MD  01/19/22 8689

## 2022-01-19 NOTE — Clinical Note
"Ray Hornecristina"Abilio was seen and treated in our emergency department on 1/19/2022.  She may return to work on 01/19/2022.       If you have any questions or concerns, please don't hesitate to call.      JOSHUA Watson RN    "
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
## 2022-01-21 ENCOUNTER — OFFICE VISIT (OUTPATIENT)
Dept: ORTHOPEDICS | Facility: CLINIC | Age: 27
End: 2022-01-21
Payer: MEDICAID

## 2022-01-21 ENCOUNTER — PATIENT MESSAGE (OUTPATIENT)
Dept: MATERNAL FETAL MEDICINE | Facility: CLINIC | Age: 27
End: 2022-01-21
Payer: MEDICAID

## 2022-01-21 VITALS
DIASTOLIC BLOOD PRESSURE: 84 MMHG | WEIGHT: 193.81 LBS | SYSTOLIC BLOOD PRESSURE: 116 MMHG | BODY MASS INDEX: 32.25 KG/M2

## 2022-01-21 DIAGNOSIS — R22.32 MASS OF LEFT FINGER: Primary | ICD-10-CM

## 2022-01-21 PROCEDURE — 3079F DIAST BP 80-89 MM HG: CPT | Mod: CPTII,,, | Performed by: PHYSICIAN ASSISTANT

## 2022-01-21 PROCEDURE — 3079F PR MOST RECENT DIASTOLIC BLOOD PRESSURE 80-89 MM HG: ICD-10-PCS | Mod: CPTII,,, | Performed by: PHYSICIAN ASSISTANT

## 2022-01-21 PROCEDURE — 99213 OFFICE O/P EST LOW 20 MIN: CPT | Mod: PBBFAC,PN | Performed by: PHYSICIAN ASSISTANT

## 2022-01-21 PROCEDURE — 99999 PR PBB SHADOW E&M-EST. PATIENT-LVL III: ICD-10-PCS | Mod: PBBFAC,,, | Performed by: PHYSICIAN ASSISTANT

## 2022-01-21 PROCEDURE — 99999 PR PBB SHADOW E&M-EST. PATIENT-LVL III: CPT | Mod: PBBFAC,,, | Performed by: PHYSICIAN ASSISTANT

## 2022-01-21 PROCEDURE — 99214 OFFICE O/P EST MOD 30 MIN: CPT | Mod: S$PBB,,, | Performed by: PHYSICIAN ASSISTANT

## 2022-01-21 PROCEDURE — 3008F BODY MASS INDEX DOCD: CPT | Mod: CPTII,,, | Performed by: PHYSICIAN ASSISTANT

## 2022-01-21 PROCEDURE — 1159F MED LIST DOCD IN RCRD: CPT | Mod: CPTII,,, | Performed by: PHYSICIAN ASSISTANT

## 2022-01-21 PROCEDURE — 1160F RVW MEDS BY RX/DR IN RCRD: CPT | Mod: CPTII,,, | Performed by: PHYSICIAN ASSISTANT

## 2022-01-21 PROCEDURE — 3074F SYST BP LT 130 MM HG: CPT | Mod: CPTII,,, | Performed by: PHYSICIAN ASSISTANT

## 2022-01-21 PROCEDURE — 1159F PR MEDICATION LIST DOCUMENTED IN MEDICAL RECORD: ICD-10-PCS | Mod: CPTII,,, | Performed by: PHYSICIAN ASSISTANT

## 2022-01-21 PROCEDURE — 99214 PR OFFICE/OUTPT VISIT, EST, LEVL IV, 30-39 MIN: ICD-10-PCS | Mod: S$PBB,,, | Performed by: PHYSICIAN ASSISTANT

## 2022-01-21 PROCEDURE — 3008F PR BODY MASS INDEX (BMI) DOCUMENTED: ICD-10-PCS | Mod: CPTII,,, | Performed by: PHYSICIAN ASSISTANT

## 2022-01-21 PROCEDURE — 3074F PR MOST RECENT SYSTOLIC BLOOD PRESSURE < 130 MM HG: ICD-10-PCS | Mod: CPTII,,, | Performed by: PHYSICIAN ASSISTANT

## 2022-01-21 PROCEDURE — 1160F PR REVIEW ALL MEDS BY PRESCRIBER/CLIN PHARMACIST DOCUMENTED: ICD-10-PCS | Mod: CPTII,,, | Performed by: PHYSICIAN ASSISTANT

## 2022-01-25 ENCOUNTER — LAB VISIT (OUTPATIENT)
Dept: LAB | Facility: OTHER | Age: 27
End: 2022-01-25
Attending: OBSTETRICS & GYNECOLOGY
Payer: MEDICAID

## 2022-01-25 DIAGNOSIS — N96 HISTORY OF MULTIPLE MISCARRIAGES: ICD-10-CM

## 2022-01-25 PROCEDURE — 85305 CLOT INHIBIT PROT S TOTAL: CPT | Performed by: OBSTETRICS & GYNECOLOGY

## 2022-01-25 PROCEDURE — 86147 CARDIOLIPIN ANTIBODY EA IG: CPT | Mod: 59 | Performed by: OBSTETRICS & GYNECOLOGY

## 2022-01-25 PROCEDURE — 85610 PROTHROMBIN TIME: CPT | Performed by: OBSTETRICS & GYNECOLOGY

## 2022-01-25 PROCEDURE — 81240 F2 GENE: CPT | Performed by: OBSTETRICS & GYNECOLOGY

## 2022-01-25 PROCEDURE — 81241 F5 GENE: CPT | Performed by: OBSTETRICS & GYNECOLOGY

## 2022-01-25 PROCEDURE — 85306 CLOT INHIBIT PROT S FREE: CPT | Performed by: OBSTETRICS & GYNECOLOGY

## 2022-01-25 PROCEDURE — 85303 CLOT INHIBIT PROT C ACTIVITY: CPT | Performed by: OBSTETRICS & GYNECOLOGY

## 2022-01-25 PROCEDURE — 85300 ANTITHROMBIN III ACTIVITY: CPT | Performed by: OBSTETRICS & GYNECOLOGY

## 2022-01-25 PROCEDURE — 86038 ANTINUCLEAR ANTIBODIES: CPT | Performed by: OBSTETRICS & GYNECOLOGY

## 2022-01-25 PROCEDURE — 86146 BETA-2 GLYCOPROTEIN ANTIBODY: CPT | Mod: 59 | Performed by: OBSTETRICS & GYNECOLOGY

## 2022-01-26 LAB — ANA SER QL IF: NORMAL

## 2022-01-27 LAB
AT III ACT/NOR PPP CHRO: 77 % (ref 83–118)
PROT C ACT/NOR PPP CHRO: 94 % (ref 70–150)
PROT S ACT/NOR PPP: 99 % (ref 50–160)

## 2022-01-28 LAB
B2 GLYCOPROT1 IGA SER QL: <9 SAU
B2 GLYCOPROT1 IGG SER QL: <9 SGU
B2 GLYCOPROT1 IGM SER QL: <9 SMU
CARDIOLIPIN IGG SER IA-ACNC: <9.4 GPL (ref 0–14.99)
CARDIOLIPIN IGM SER IA-ACNC: 24.3 MPL (ref 0–12.49)
PROT S FREE AG ACT/NOR PPP IA: 103 % (ref 50–147)

## 2022-02-01 LAB
F2 C.20210G>A GENO BLD/T: NEGATIVE
F2 GENE MUT ANL BLD/T: NORMAL
F5 GENE MUT ANL BLD/T: NORMAL
F5 P.R506Q BLD/T QL: NEGATIVE

## 2022-02-02 ENCOUNTER — OFFICE VISIT (OUTPATIENT)
Dept: MATERNAL FETAL MEDICINE | Facility: CLINIC | Age: 27
End: 2022-02-02
Attending: OBSTETRICS & GYNECOLOGY
Payer: MEDICAID

## 2022-02-02 DIAGNOSIS — Q61.4 MULTICYSTIC KIDNEY DISEASE: ICD-10-CM

## 2022-02-02 DIAGNOSIS — N97.0 INFERTILITY, ANOVULATION: ICD-10-CM

## 2022-02-02 DIAGNOSIS — N26.1: Primary | ICD-10-CM

## 2022-02-02 DIAGNOSIS — Z31.9 PATIENT DESIRES PREGNANCY: ICD-10-CM

## 2022-02-02 PROCEDURE — 99214 PR OFFICE/OUTPT VISIT, EST, LEVL IV, 30-39 MIN: ICD-10-PCS | Mod: 95,Q6,, | Performed by: OBSTETRICS & GYNECOLOGY

## 2022-02-02 PROCEDURE — 99214 OFFICE O/P EST MOD 30 MIN: CPT | Mod: 95,Q6,, | Performed by: OBSTETRICS & GYNECOLOGY

## 2022-02-03 LAB
APTT IMM NP PPP: NORMAL SEC (ref 32–48)
APTT P HEP NEUT PPP: NORMAL SEC (ref 32–48)
CONFIRM APTT STACLOT: NORMAL
DRVVT SCREEN TO CONFIRM RATIO: NORMAL RATIO
LA 3 SCREEN W REFLEX-IMP: NORMAL
LA NT DPL PPP QL: NORMAL
MIXING DRVVT: NORMAL SEC (ref 33–44)
PROTHROMBIN TIME: 13.5 SEC (ref 12–15.5)
REPTILASE TIME: NORMAL SEC
SCREEN APTT: 37 SEC (ref 32–48)
SCREEN DRVVT: 40 SEC (ref 33–44)
THROMBIN TIME: NORMAL SEC (ref 14.7–19.5)

## 2022-02-08 ENCOUNTER — HOSPITAL ENCOUNTER (EMERGENCY)
Facility: HOSPITAL | Age: 27
Discharge: HOME | End: 2022-02-08
Attending: EMERGENCY MEDICINE
Payer: MEDICAID

## 2022-02-08 VITALS
TEMPERATURE: 98 F | OXYGEN SATURATION: 98 % | HEART RATE: 93 BPM | WEIGHT: 184 LBS | BODY MASS INDEX: 30.62 KG/M2 | DIASTOLIC BLOOD PRESSURE: 61 MMHG | SYSTOLIC BLOOD PRESSURE: 121 MMHG | RESPIRATION RATE: 18 BRPM

## 2022-02-08 DIAGNOSIS — N39.0 URINARY TRACT INFECTION WITHOUT HEMATURIA, SITE UNSPECIFIED: Primary | ICD-10-CM

## 2022-02-08 DIAGNOSIS — E86.0 DEHYDRATION: ICD-10-CM

## 2022-02-08 DIAGNOSIS — R50.9 ACUTE FEBRILE ILLNESS: ICD-10-CM

## 2022-02-08 DIAGNOSIS — R07.9 CHEST PAIN: ICD-10-CM

## 2022-02-08 LAB
ALBUMIN SERPL BCP-MCNC: 4.3 G/DL (ref 3.5–5.2)
ALP SERPL-CCNC: 55 U/L (ref 38–126)
ALT SERPL W/O P-5'-P-CCNC: 13 U/L (ref 10–44)
ANION GAP SERPL CALC-SCNC: 13 MMOL/L (ref 8–16)
ANISOCYTOSIS BLD QL SMEAR: SLIGHT
AST SERPL-CCNC: 20 U/L (ref 15–46)
B-HCG UR QL: NEGATIVE
BACTERIA #/AREA URNS AUTO: ABNORMAL /HPF
BASOPHILS # BLD AUTO: 0.07 K/UL (ref 0–0.2)
BASOPHILS NFR BLD: 0.3 % (ref 0–1.9)
BILIRUB SERPL-MCNC: 1.5 MG/DL (ref 0.1–1)
BILIRUB UR QL STRIP: NEGATIVE
CALCIUM SERPL-MCNC: 8.7 MG/DL (ref 8.7–10.5)
CHLORIDE SERPL-SCNC: 102 MMOL/L (ref 95–110)
CLARITY UR REFRACT.AUTO: ABNORMAL
CO2 SERPL-SCNC: 21 MMOL/L (ref 23–29)
COLOR UR AUTO: ABNORMAL
CREAT SERPL-MCNC: 1.1 MG/DL (ref 0.5–1.4)
CTP QC/QA: YES
DIFFERENTIAL METHOD: ABNORMAL
EOSINOPHIL # BLD AUTO: 0 K/UL (ref 0–0.5)
EOSINOPHIL NFR BLD: 0 % (ref 0–8)
ERYTHROCYTE [DISTWIDTH] IN BLOOD BY AUTOMATED COUNT: 15.2 % (ref 11.5–14.5)
EST. GFR  (AFRICAN AMERICAN): >60 ML/MIN/1.73 M^2
EST. GFR  (NON AFRICAN AMERICAN): >60 ML/MIN/1.73 M^2
GLUCOSE SERPL-MCNC: 114 MG/DL (ref 70–110)
GLUCOSE UR QL STRIP: NEGATIVE
HCT VFR BLD AUTO: 36.3 % (ref 37–48.5)
HGB BLD-MCNC: 11.7 G/DL (ref 12–16)
HGB UR QL STRIP: ABNORMAL
HYALINE CASTS UR QL AUTO: 1 /LPF
IMM GRANULOCYTES # BLD AUTO: 0.07 K/UL (ref 0–0.04)
IMM GRANULOCYTES NFR BLD AUTO: 0.3 % (ref 0–0.5)
INFLUENZA A, MOLECULAR: NEGATIVE
INFLUENZA B, MOLECULAR: NEGATIVE
KETONES UR QL STRIP: NEGATIVE
LACTATE SERPL-SCNC: 1.4 MMOL/L (ref 0.5–2.2)
LEUKOCYTE ESTERASE UR QL STRIP: ABNORMAL
LYMPHOCYTES # BLD AUTO: 3.5 K/UL (ref 1–4.8)
LYMPHOCYTES NFR BLD: 16.7 % (ref 18–48)
MCH RBC QN AUTO: 25.9 PG (ref 27–31)
MCHC RBC AUTO-ENTMCNC: 32.2 G/DL (ref 32–36)
MCV RBC AUTO: 81 FL (ref 82–98)
MICROSCOPIC COMMENT: ABNORMAL
MONOCYTES # BLD AUTO: 2.1 K/UL (ref 0.3–1)
MONOCYTES NFR BLD: 10.2 % (ref 4–15)
NEUTROPHILS # BLD AUTO: 15.2 K/UL (ref 1.8–7.7)
NEUTROPHILS NFR BLD: 72.5 % (ref 38–73)
NITRITE UR QL STRIP: POSITIVE
NRBC BLD-RTO: 0 /100 WBC
OVALOCYTES BLD QL SMEAR: ABNORMAL
PH UR STRIP: 6 [PH] (ref 5–8)
PLATELET # BLD AUTO: 256 K/UL (ref 150–450)
PMV BLD AUTO: 10.7 FL (ref 9.2–12.9)
POLYCHROMASIA BLD QL SMEAR: ABNORMAL
POTASSIUM SERPL-SCNC: 3.3 MMOL/L (ref 3.5–5.1)
PROT SERPL-MCNC: 7.8 G/DL (ref 6–8.4)
PROT UR QL STRIP: ABNORMAL
RBC # BLD AUTO: 4.51 M/UL (ref 4–5.4)
RBC #/AREA URNS AUTO: 20 /HPF (ref 0–4)
SARS-COV-2 RDRP RESP QL NAA+PROBE: NEGATIVE
SODIUM SERPL-SCNC: 136 MMOL/L (ref 136–145)
SP GR UR STRIP: 1.01 (ref 1–1.03)
SPECIMEN SOURCE: NORMAL
URN SPEC COLLECT METH UR: ABNORMAL
UROBILINOGEN UR STRIP-ACNC: ABNORMAL EU/DL
UUN UR-MCNC: 8 MG/DL (ref 7–17)
WBC # BLD AUTO: 20.98 K/UL (ref 3.9–12.7)
WBC #/AREA URNS AUTO: 40 /HPF (ref 0–5)

## 2022-02-08 PROCEDURE — 87502 INFLUENZA DNA AMP PROBE: CPT | Mod: ER | Performed by: PHYSICIAN ASSISTANT

## 2022-02-08 PROCEDURE — 87040 BLOOD CULTURE FOR BACTERIA: CPT | Mod: ER | Performed by: EMERGENCY MEDICINE

## 2022-02-08 PROCEDURE — 25000003 PHARM REV CODE 250: Mod: ER | Performed by: EMERGENCY MEDICINE

## 2022-02-08 PROCEDURE — 87086 URINE CULTURE/COLONY COUNT: CPT | Mod: ER | Performed by: EMERGENCY MEDICINE

## 2022-02-08 PROCEDURE — 85025 COMPLETE CBC W/AUTO DIFF WBC: CPT | Mod: ER | Performed by: EMERGENCY MEDICINE

## 2022-02-08 PROCEDURE — 63600175 PHARM REV CODE 636 W HCPCS: Mod: ER | Performed by: EMERGENCY MEDICINE

## 2022-02-08 PROCEDURE — 81025 URINE PREGNANCY TEST: CPT | Mod: ER | Performed by: EMERGENCY MEDICINE

## 2022-02-08 PROCEDURE — 99285 EMERGENCY DEPT VISIT HI MDM: CPT | Mod: 25,ER

## 2022-02-08 PROCEDURE — 81000 URINALYSIS NONAUTO W/SCOPE: CPT | Mod: ER | Performed by: EMERGENCY MEDICINE

## 2022-02-08 PROCEDURE — 87088 URINE BACTERIA CULTURE: CPT | Mod: ER | Performed by: EMERGENCY MEDICINE

## 2022-02-08 PROCEDURE — 96365 THER/PROPH/DIAG IV INF INIT: CPT | Mod: ER

## 2022-02-08 PROCEDURE — 94760 N-INVAS EAR/PLS OXIMETRY 1: CPT | Mod: ER

## 2022-02-08 PROCEDURE — 80053 COMPREHEN METABOLIC PANEL: CPT | Mod: ER | Performed by: EMERGENCY MEDICINE

## 2022-02-08 PROCEDURE — 93010 EKG 12-LEAD: ICD-10-PCS | Mod: ,,, | Performed by: INTERNAL MEDICINE

## 2022-02-08 PROCEDURE — 87077 CULTURE AEROBIC IDENTIFY: CPT | Mod: ER | Performed by: EMERGENCY MEDICINE

## 2022-02-08 PROCEDURE — U0002 COVID-19 LAB TEST NON-CDC: HCPCS | Mod: ER | Performed by: PHYSICIAN ASSISTANT

## 2022-02-08 PROCEDURE — 83605 ASSAY OF LACTIC ACID: CPT | Mod: ER | Performed by: EMERGENCY MEDICINE

## 2022-02-08 PROCEDURE — 93005 ELECTROCARDIOGRAM TRACING: CPT | Mod: ER

## 2022-02-08 PROCEDURE — 93010 ELECTROCARDIOGRAM REPORT: CPT | Mod: ,,, | Performed by: INTERNAL MEDICINE

## 2022-02-08 PROCEDURE — 87186 SC STD MICRODIL/AGAR DIL: CPT | Mod: ER | Performed by: EMERGENCY MEDICINE

## 2022-02-08 PROCEDURE — 96361 HYDRATE IV INFUSION ADD-ON: CPT | Mod: ER

## 2022-02-08 RX ORDER — AMOXICILLIN AND CLAVULANATE POTASSIUM 875; 125 MG/1; MG/1
1 TABLET, FILM COATED ORAL 2 TIMES DAILY
Qty: 14 TABLET | Refills: 0 | Status: SHIPPED | OUTPATIENT
Start: 2022-02-08 | End: 2022-03-21

## 2022-02-08 RX ORDER — IBUPROFEN 600 MG/1
600 TABLET ORAL
Status: COMPLETED | OUTPATIENT
Start: 2022-02-08 | End: 2022-02-08

## 2022-02-08 RX ORDER — ACETAMINOPHEN 500 MG
1000 TABLET ORAL
Status: COMPLETED | OUTPATIENT
Start: 2022-02-08 | End: 2022-02-08

## 2022-02-08 RX ORDER — NITROFURANTOIN 25; 75 MG/1; MG/1
100 CAPSULE ORAL
Status: COMPLETED | OUTPATIENT
Start: 2022-02-08 | End: 2022-02-08

## 2022-02-08 RX ORDER — NITROFURANTOIN 25; 75 MG/1; MG/1
100 CAPSULE ORAL 2 TIMES DAILY
Qty: 14 CAPSULE | Refills: 0 | Status: SHIPPED | OUTPATIENT
Start: 2022-02-08 | End: 2022-02-15

## 2022-02-08 RX ORDER — PROCHLORPERAZINE MALEATE 5 MG
10 TABLET ORAL
Status: COMPLETED | OUTPATIENT
Start: 2022-02-08 | End: 2022-02-08

## 2022-02-08 RX ADMIN — IBUPROFEN 600 MG: 600 TABLET ORAL at 01:02

## 2022-02-08 RX ADMIN — SODIUM CHLORIDE, SODIUM LACTATE, POTASSIUM CHLORIDE, AND CALCIUM CHLORIDE 2505 ML: .6; .31; .03; .02 INJECTION, SOLUTION INTRAVENOUS at 04:02

## 2022-02-08 RX ADMIN — NITROFURANTOIN (MONOHYDRATE/MACROCRYSTALS) 100 MG: 75; 25 CAPSULE ORAL at 06:02

## 2022-02-08 RX ADMIN — ACETAMINOPHEN 1000 MG: 500 TABLET ORAL at 02:02

## 2022-02-08 RX ADMIN — PROCHLORPERAZINE MALEATE 10 MG: 5 TABLET ORAL at 02:02

## 2022-02-08 RX ADMIN — CEFTRIAXONE SODIUM 2 G: 2 INJECTION, POWDER, FOR SOLUTION INTRAMUSCULAR; INTRAVENOUS at 03:02

## 2022-02-08 NOTE — ED PROVIDER NOTES
Encounter Date: 2/8/2022       History     Chief Complaint   Patient presents with    Dizziness     Dizziness, chest pain, fatigue, chills x2 days. Pt took 1000mg tylenol at 0800 today.    Dysuria     The patient is a 26-year-old female with a history of asthma who presents to the emergency department with fever, dizziness, chest pressure, generalized weakness, chills, headache, sinus congestion, nausea, and 1 episode of vomiting.  She states that her symptoms began yesterday.  She has been taking Tylenol for her symptoms without relief.  Her symptoms have been constant.  They are moderate in severity.  She denies any sick contacts.  She is unclear of the etiology.        Review of patient's allergies indicates:   Allergen Reactions    Lisinopril Other (See Comments), Shortness Of Breath and Swelling     angioedema       Past Medical History:   Diagnosis Date    Anemia     Asthma     Bilateral renal cysts     Breast disorder     Congenital absence of one kidney     Kidney cysts     pt born with 1 kidney     No past surgical history on file.  Family History   Problem Relation Age of Onset    Diabetes Paternal Aunt     Hypertension Maternal Grandfather     Cancer Paternal Grandmother     Breast cancer Paternal Grandmother     Leukemia Paternal Grandmother     Kidney disease Maternal Grandmother     Hypertension Mother      Social History     Tobacco Use    Smoking status: Current Every Day Smoker     Years: 0.50    Smokeless tobacco: Never Used   Substance Use Topics    Alcohol use: Yes     Comment: OCC    Drug use: No     Review of Systems  General: Reports fever. Reports chills. Reports generalized weakness.  HENT: Denies sore throat.  Denies earache.  Denies rhinorrhea.  Eyes: Denies visual changes.  Denies eye pain.  Denies drainage. Reports sinus congestion.  Cardiovascular: Reports chest pressure without pain.  Denies shortness of breath.  Denies orthopnea.  Denies dyspnea on exertion.   Denies coughing.  Denies wheezing.  Respiratory: Denies shortness of breath.  Denies wheezing.  Denies coughing.  GI: Denies abdominal pain.  Reports nausea.   Reports 1 episode of vomiting.  Denies diarrhea.  Denies constipation.  Denies melena.  Denies hematochezia.  : Denies dysuria.  Denies hematuria.    Skin: Denies rashes.  Denies lesions.  Denies pallor.  Neuro:  Reports mild headache.  Denies head trauma.  Denies numbness.  Denies focal weakness.  Musculoskeletal: Denies neck pain.  Denies back pain.  Denies extremity pain.  Denies extremity swelling. Denies neck stiffness.  Reports body aches.      Physical Exam     Initial Vitals [02/08/22 1251]   BP Pulse Resp Temp SpO2   134/77 (!) 125 18 (!) 103 °F (39.4 °C) 97 %      MAP       --         Physical Exam  General:  The patient appears weak.  Nontoxic.  Well-nourished.  Well-developed.  Alert and oriented x3.  HENT:  Dry mucous membranes.  Normocephalic atraumatic.  Oropharynx clear.   Eyes: Pupils equally round and reactive to light.  Extraocular movements intact.  No scleral icterus.  No conjunctival pallor.  Cardiovascular: Mild tachycardia noted.  Regular rhythm.  No murmurs, rubs, or gallops.  Brisk capillary fill.  2+ distal pulses.  Respiratory: Clear to auscultation bilaterally.  No wheezes, rales, or rhonchi.  No respiratory distress.  Abdomen: Soft.  Nontender.  Nondistended.  No guarding.  No rebound.  No masses.  No abdominal bruit auscultated.  Skin: No rashes.  No lesions.  No pallor.  No jaundice.  Neuro: Cranial nerves II through XII grossly intact.  Moving all extremities equally.  No sensory deficits.  Strength 5 out of 5 in all 4 extremities.  Musculoskeletal: Neck supple.  No extremity tenderness.  Moving all extremities without pain.  No back tenderness.  No neck tenderness.        ED Course   Procedures  Labs Reviewed   URINALYSIS, REFLEX TO URINE CULTURE - Abnormal; Notable for the following components:       Result Value     Appearance, UA Cloudy (*)     Protein, UA 1+ (*)     Occult Blood UA 3+ (*)     Nitrite, UA Positive (*)     Urobilinogen, UA 4.0-6.0 (*)     Leukocytes, UA 3+ (*)     All other components within normal limits    Narrative:     Preferred Collection Type->Urine, Clean Catch  Specimen Source->Urine  Collection Type->Urine, Clean Catch   URINALYSIS MICROSCOPIC - Abnormal; Notable for the following components:    RBC, UA 20 (*)     WBC, UA 40 (*)     Bacteria Many (*)     All other components within normal limits    Narrative:     Preferred Collection Type->Urine, Clean Catch  Specimen Source->Urine  Collection Type->Urine, Clean Catch   CBC W/ AUTO DIFFERENTIAL - Abnormal; Notable for the following components:    WBC 20.98 (*)     Hemoglobin 11.7 (*)     Hematocrit 36.3 (*)     MCV 81 (*)     MCH 25.9 (*)     RDW 15.2 (*)     Gran # (ANC) 15.2 (*)     Immature Grans (Abs) 0.07 (*)     Mono # 2.1 (*)     Lymph % 16.7 (*)     All other components within normal limits   COMPREHENSIVE METABOLIC PANEL - Abnormal; Notable for the following components:    Potassium 3.3 (*)     CO2 21 (*)     Glucose 114 (*)     Total Bilirubin 1.5 (*)     All other components within normal limits   INFLUENZA A & B BY MOLECULAR   CULTURE, URINE   CULTURE, BLOOD   CULTURE, BLOOD   PREGNANCY TEST, URINE RAPID    Narrative:     Specimen Source->Urine   LACTIC ACID, PLASMA   SARS-COV-2 RDRP GENE     EKG Readings: (Independently Interpreted)   I independently interpreted this EKG.  Sinus tachycardia with a rate of 106.  Normal axis.  Normal intervals.  Nonspecific T-wave abnormalities are noted in the anterior and low lateral leads.     ECG Results          EKG 12-lead (Final result)  Result time 02/08/22 13:49:07    Final result by Interface, Lab In [facility] (02/08/22 13:49:07)                 Narrative:    Test Reason : R07.9,    Vent. Rate : 106 BPM     Atrial Rate : 106 BPM     P-R Int : 172 ms          QRS Dur : 082 ms      QT Int : 316 ms        P-R-T Axes : 056 069 025 degrees     QTc Int : 419 ms    Sinus tachycardia  Nonspecific T wave abnormality  Abnormal ECG    Confirmed by Amador Barksdale MD ([id number]) on 2/8/2022 1:49:01 PM    Referred By: BOBBY   SELF           Confirmed By:Amador Barksdale MD                            Imaging Results          X-Ray Chest AP Portable (Final result)  Result time 02/08/22 14:39:24    Final result by Elijah Sage III, MD (02/08/22 14:39:24)                 Impression:      No acute findings.      Electronically signed by: Elijah Sage MD  Date:    02/08/2022  Time:    14:39             Narrative:    EXAMINATION:  XR CHEST AP PORTABLE    CLINICAL HISTORY:  concern for covid 19;    FINDINGS:  Comparison is made with the most recent prior chest x-ray.  The cardiomediastinal silhouette is within normal limits for AP technique. The lungs appear clear of active disease. No acute appearing infiltrate, pleural effusion or pneumothorax identified.                                 Medications   nitrofurantoin (macrocrystal-monohydrate) 100 MG capsule 100 mg (has no administration in time range)   ibuprofen tablet 600 mg (600 mg Oral Given 2/8/22 1305)   prochlorperazine tablet 10 mg (10 mg Oral Given 2/8/22 1422)   acetaminophen tablet 1,000 mg (1,000 mg Oral Given 2/8/22 1422)   lactated ringers bolus 2,505 mL (2,505 mLs Intravenous New Bag 2/8/22 1615)   cefTRIAXone (ROCEPHIN) 2 g/50 mL D5W IVPB (0 g Intravenous Stopped 2/8/22 1612)     Medical Decision Making:   Initial Assessment:    This is an emergent evaluation.  The patient is febrile and tachycardic.  She is currently drinking Gatorade.  I will provide the patient with ibuprofen, and Compazine.  I will check an influenza swab and COVID test along with obtaining an EKG, chest x-ray, and urinalysis.  I will reassess.  ED Management:  2:31 p.m.   COVID and influenza are negative. Patient is noted to have a nitrite positive urinary tract infection.  She has been  provided with ibuprofen.  I will also provide Tylenol.  Her current temperature is 101°.    Although the heart rate has improved, the patient's blood pressure is trending downward.  Because of this, copious IV fluids for resuscitative purposes along with broad-spectrum antibiotics and laboratory studies have been ordered.    3:15 p.m.  The patient will receive Rocephin 2 g IV and a 30 cc/kilos IV fluid bolus.  I will reassess once laboratory studies have resulted.    5:52 p.m.  After IV fluid, Tylenol, Motrin, and IV antibiotics, the patient is resting comfortably.  Her vital signs are within normal limits.  I do not feel strongly that any is further emergent intervention is required at this point.  Laboratory studies do not indicate sepsis.  At this time, I feel the patient is clinically stable for discharge.  I will double cover her with Augmentin and Macrobid.  She has been instructed to follow up closely with her primary care physician.                      Clinical Impression:   Final diagnoses:  [R07.9] Chest pain  [N39.0] Urinary tract infection without hematuria, site unspecified (Primary)  [E86.0] Dehydration  [R50.9] Acute febrile illness          ED Disposition Condition    Discharge Stable        ED Prescriptions     Medication Sig Dispense Start Date End Date Auth. Provider    nitrofurantoin, macrocrystal-monohydrate, (MACROBID) 100 MG capsule Take 1 capsule (100 mg total) by mouth 2 (two) times daily. for 7 days 14 capsule 2/8/2022 2/15/2022 Pavan Arenas MD    amoxicillin-clavulanate 875-125mg (AUGMENTIN) 875-125 mg per tablet Take 1 tablet by mouth 2 (two) times daily. 14 tablet 2/8/2022  Pavan Arenas MD        Follow-up Information     Follow up With Specialties Details Why Contact Info    Les Horton MD Family Medicine In 2 days  [address] [facility] [address]  [phone]             Pavan Arenas MD  02/08/22 7960

## 2022-02-08 NOTE — PROGRESS NOTES
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                                                                           Anovulation  Visit type: audiovisual     Face to Face time with patient: 20  minutes  30 minutes of total time spent on the encounter, which includes face to face time and non-face to face time preparing to see the patient (eg, review of tests), Obtaining and/or reviewing separately obtained history, Documenting clinical information in the electronic or other health record, Independently interpreting results (not separately reported) and communicating results to the patient/family/caregiver, or Care coordination (not separately reported).     Miss Knox is a 25 yo [id number] with a history of recurrent pregnancy loss. She however has not had a confirmed pregnancy by ultrasound. She reports these losses were all very early prior to 8 weeks. She also states she can't remember when they occurred exactly. I asked that she try to record them as best she can to help us best understand how to help her problems, There seems have been only one pregnancy. In addition she has congenital unilateral atrophic kidney with the remaining kidney as she states is  polycystic. I reviewed the detailed consult of  and Dr. King. .   We discussed the following test results which at this time appears that she does not have APLS but she may have antithrombin lll deficiency. We discussed a need to repeat values in 12 weeks from the studies  below. She seems to understand but then she states she needs to no how she can become pregnant.   Beta-2 Glyco 1 IgG <=20 SGU <9    Beta-2 Glyco 1 IgM <=20 SMU <9    Beta-2 Glyco 1 IgA <=20 ZULY <9    DAVID negative  Protein S and C functional - negative  Protein S antigen- negative  APA Isotype IgG 0.00 - 14.99 GPL <9.40     Comment: Interpretation: Absent   APA Isotype IgM 0.00 - 12.49 MPL 24.30High     Antithrombin III 83 - 118 % 77Low     Factor V Leiden NEGATIVE    Comment: FACTOR V LEIDEN (R506Q) VARIANT NOT DETECTED     In reviewing her labs she does not have APLA syndrome but she may have Antithrombinlll def.   We discussed the need for repeat studies. In addition liver or kidney disease may give the impression of Antithrombinlll deficiency.   Recommendation  1. Repeat Cardiolipins in 12 weeks from first levels  2. Repeat Antithrombin lll at same time  3. The above to be performed by primary ob provider and if positive refer to [facility]  Ms. Knox does not seem to understand her problem is not straight forward and simple thus she's frustrated that she doesn't have what she desires and that is a viable pregnancy.  No follow up with at this time until above studies are performed

## 2022-02-10 ENCOUNTER — PATIENT MESSAGE (OUTPATIENT)
Dept: MATERNAL FETAL MEDICINE | Facility: CLINIC | Age: 27
End: 2022-02-10
Payer: MEDICAID

## 2022-02-10 ENCOUNTER — PATIENT MESSAGE (OUTPATIENT)
Dept: ORTHOPEDICS | Facility: CLINIC | Age: 27
End: 2022-02-10
Payer: MEDICAID

## 2022-02-10 LAB — BACTERIA UR CULT: ABNORMAL

## 2022-02-11 DIAGNOSIS — Z41.9 ELECTIVE SURGERY: Primary | ICD-10-CM

## 2022-02-13 LAB
BACTERIA BLD CULT: NORMAL
BACTERIA BLD CULT: NORMAL

## 2022-02-14 DIAGNOSIS — R22.32 FINGER MASS, LEFT: Primary | ICD-10-CM

## 2022-02-28 ENCOUNTER — HOSPITAL ENCOUNTER (EMERGENCY)
Facility: HOSPITAL | Age: 27
Discharge: HOME | End: 2022-02-28
Attending: FAMILY MEDICINE
Payer: MEDICAID

## 2022-02-28 VITALS
SYSTOLIC BLOOD PRESSURE: 131 MMHG | WEIGHT: 184 LBS | RESPIRATION RATE: 19 BRPM | OXYGEN SATURATION: 100 % | DIASTOLIC BLOOD PRESSURE: 77 MMHG | TEMPERATURE: 98 F | HEART RATE: 96 BPM | HEIGHT: 65 IN | BODY MASS INDEX: 30.66 KG/M2

## 2022-02-28 DIAGNOSIS — J06.9 VIRAL URI: Primary | ICD-10-CM

## 2022-02-28 LAB
GROUP A STREP, MOLECULAR: NEGATIVE
INFLUENZA A, MOLECULAR: NEGATIVE
INFLUENZA B, MOLECULAR: NEGATIVE
SARS-COV-2 RDRP RESP QL NAA+PROBE: NEGATIVE
SPECIMEN SOURCE: NORMAL

## 2022-02-28 PROCEDURE — 87502 INFLUENZA DNA AMP PROBE: CPT | Mod: ER | Performed by: FAMILY MEDICINE

## 2022-02-28 PROCEDURE — 87651 STREP A DNA AMP PROBE: CPT | Mod: ER | Performed by: FAMILY MEDICINE

## 2022-02-28 PROCEDURE — U0002 COVID-19 LAB TEST NON-CDC: HCPCS | Mod: ER | Performed by: FAMILY MEDICINE

## 2022-02-28 PROCEDURE — 99283 EMERGENCY DEPT VISIT LOW MDM: CPT | Mod: ER

## 2022-02-28 RX ORDER — BENZONATATE 100 MG/1
100 CAPSULE ORAL 3 TIMES DAILY PRN
Qty: 10 CAPSULE | Refills: 0 | Status: SHIPPED | OUTPATIENT
Start: 2022-02-28 | End: 2022-03-10

## 2022-02-28 NOTE — ED PROVIDER NOTES
"Encounter Date: 2/28/2022       History     Chief Complaint   Patient presents with    COVID-19 Concerns     Pt reports "sore throat, runny nose and when I woke up, I ain't had no voice." Symptoms started last night. Took Tylenol cold and flu and allergy meds without relief.      26-year-old female presents emergency department for evaluation of pharyngitis, nasal congestion, mild runny nose and mild cough.  She reports that the symptoms began gradually last night and have been intermittent since onset.  She reports that the cough is a very mild dry cough.  She reports that she attempted treatment with Tylenol cold and flu, as well as allergy/sinus medications this morning approximately 5:00 a.m. with minimal relief of symptoms.  She denies any fever, headache, dizziness, neck pain, chest pain, shortness of breath, difficulty swallowing, palpitations, abdominal pain, nausea, vomiting, flank pain or dysuria.  She denies any known sick contacts.  She reports that her last menstrual period started today.  She denies risk of pregnancy.        Review of patient's allergies indicates:   Allergen Reactions    Lisinopril Other (See Comments), Shortness Of Breath and Swelling     angioedema       Past Medical History:   Diagnosis Date    Anemia     Asthma     Bilateral renal cysts     Breast disorder     Congenital absence of one kidney     Kidney cysts     pt born with 1 kidney     History reviewed. No pertinent surgical history.  Family History   Problem Relation Age of Onset    Diabetes Paternal Aunt     Hypertension Maternal Grandfather     Cancer Paternal Grandmother     Breast cancer Paternal Grandmother     Leukemia Paternal Grandmother     Kidney disease Maternal Grandmother     Hypertension Mother      Social History     Tobacco Use    Smoking status: Current Every Day Smoker     Years: 0.50    Smokeless tobacco: Never Used   Substance Use Topics    Alcohol use: Yes     Comment: OCC    Drug use: " No     Review of Systems   Constitutional: Negative for activity change, appetite change and fever.   HENT: Positive for congestion, rhinorrhea and sore throat. Negative for ear discharge, ear pain, sinus pressure, sinus pain, trouble swallowing and voice change.    Eyes: Negative for photophobia, redness and visual disturbance.   Respiratory: Positive for cough. Negative for chest tightness, shortness of breath and wheezing.    Cardiovascular: Negative for chest pain.   Gastrointestinal: Negative for abdominal pain, diarrhea, nausea and vomiting.   Genitourinary: Negative for decreased urine volume and dysuria.   Musculoskeletal: Negative for back pain and neck pain.   Skin: Negative for rash.   Neurological: Negative for dizziness, syncope, weakness, light-headedness, numbness and headaches.       Physical Exam     Initial Vitals [02/28/22 1221]   BP Pulse Resp Temp SpO2   131/77 96 19 98.1 °F (36.7 °C) 100 %      MAP       --         Physical Exam    Nursing note and vitals reviewed.  Constitutional: She appears well-developed and well-nourished. She is not diaphoretic. No distress.   HENT:   Head: Normocephalic and atraumatic.   Right Ear: External ear normal.   Left Ear: External ear normal.   Nose: Nose normal.   Mouth/Throat: Oropharynx is clear and moist.   Eyes: Conjunctivae and EOM are normal. Pupils are equal, round, and reactive to light.   Neck: Neck supple.   Normal range of motion.  Cardiovascular: Normal rate, regular rhythm and normal heart sounds.   Pulmonary/Chest: Breath sounds normal. No respiratory distress. She has no wheezes. She has no rhonchi. She has no rales. She exhibits no tenderness.   Musculoskeletal:      Cervical back: Normal range of motion and neck supple.     Lymphadenopathy:     She has no cervical adenopathy.   Neurological: She is alert and oriented to person, place, and time.   Skin: Skin is warm and dry.   Psychiatric: She has a normal mood and affect.         ED Course    Procedures  Labs Reviewed   INFLUENZA A & B BY MOLECULAR   GROUP A STREP, MOLECULAR   SARS-COV-2 RNA AMPLIFICATION, QUAL    Narrative:     Is the patient symptomatic?->Yes          Imaging Results    None          Medications - No data to display  Medical Decision Making:   Initial Assessment:   26-year-old female presents emergency department for evaluation of pharyngitis, rhinorrhea, nasal congestion and mild dry cough.  Physical exam reveals a nontoxic-appearing female in no acute distress.  Patient is afebrile vital signs within normal limits.  Neurological exam reveals alert and oriented patient.  TMs reveal no erythema.  Posterior pharynx reveals no erythema, edema or tonsillar exudate.  No uvular edema or deviation noted.  No trismus, stridor drooling noted.  Neck is supple, nontender to palpation.  Lungs clear to auscultation bilaterally.    Differential Diagnosis:   COVID-19  Influenza  Viral URI  Streptococcal pharyngitis  ED Management:  He influenza negative.  Group a strep negative.  Rapid COVID negative.  These findings were discussed at length with the patient verbalizes understanding and agreement course of treatment.  Patient given Tessalon for symptom control.  Instructed patient to follow up her primary care provider for re-evaluation and to return to the emergency department immediately for any new or worsening symptoms.                      Clinical Impression:   Final diagnoses:  [J06.9] Viral URI (Primary)          ED Disposition Condition    Discharge Stable        ED Prescriptions     Medication Sig Dispense Start Date End Date Auth. Provider    benzonatate (TESSALON) 100 MG capsule Take 1 capsule (100 mg total) by mouth 3 (three) times daily as needed. 10 capsule 2/28/2022 3/10/2022 Bettie Anand PA-C        Follow-up Information    None          Bettie Anand PA-C  02/28/22 2033

## 2022-02-28 NOTE — DISCHARGE INSTRUCTIONS
Your COVID test, flu test and strep test were all negative.  This is likely upper respiratory infection of viral etiology.  Symptomatic treatment advised including plenty of clear fluid.  You are  advised to alternate Tylenol or Motrin as needed for any fever or body aches.  You are instructed to follow-up with her primary care provider for re-evaluation and to return to the emergency department immediately for any new or worsening symptoms.

## 2022-02-28 NOTE — Clinical Note
"Ray "Joaquim Knox was seen and treated in our emergency department on 2/28/2022.     COVID-19 is present in our communities across the state. There is limited testing for COVID at this time, so not all patients can be tested. In this situation, your employee meets the following criteria:    Ray Knox has met the criteria for COVID-19 testing and has a NEGATIVE result. The employee can return to work once they are asymptomatic for 24 hours without the use of fever reducing medications (Tylenol, Motrin, etc).     If the employee is not fully vaccinated and had a close contact:  · Retest at 5 to 7 days post-exposure  · If possible, it is recommended that they quarantine for 5 days from the time of contact regardless of their test status.  · A mask should be worn post quarantine for 5 days.    If you have any questions or concerns, or if I can be of further assistance, please do not hesitate to contact me.    Sincerely,             Bettie Anand PA-C"

## 2022-03-05 ENCOUNTER — LAB VISIT (OUTPATIENT)
Dept: PRIMARY CARE CLINIC | Facility: CLINIC | Age: 27
End: 2022-03-05
Payer: MEDICAID

## 2022-03-05 DIAGNOSIS — Z41.9 ELECTIVE SURGERY: ICD-10-CM

## 2022-03-05 PROCEDURE — U0005 INFEC AGEN DETEC AMPLI PROBE: HCPCS | Performed by: ORTHOPAEDIC SURGERY

## 2022-03-05 PROCEDURE — U0003 INFECTIOUS AGENT DETECTION BY NUCLEIC ACID (DNA OR RNA); SEVERE ACUTE RESPIRATORY SYNDROME CORONAVIRUS 2 (SARS-COV-2) (CORONAVIRUS DISEASE [COVID-19]), AMPLIFIED PROBE TECHNIQUE, MAKING USE OF HIGH THROUGHPUT TECHNOLOGIES AS DESCRIBED BY CMS-2020-01-R: HCPCS | Performed by: ORTHOPAEDIC SURGERY

## 2022-03-06 LAB
SARS-COV-2 RNA RESP QL NAA+PROBE: NOT DETECTED
SARS-COV-2- CYCLE NUMBER: NORMAL

## 2022-03-07 ENCOUNTER — PATIENT MESSAGE (OUTPATIENT)
Dept: SURGERY | Facility: HOSPITAL | Age: 27
End: 2022-03-07
Payer: MEDICAID

## 2022-03-08 ENCOUNTER — ANESTHESIA EVENT (OUTPATIENT)
Dept: SURGERY | Facility: HOSPITAL | Age: 27
End: 2022-03-08
Payer: MEDICAID

## 2022-03-08 ENCOUNTER — HOSPITAL ENCOUNTER (OUTPATIENT)
Facility: HOSPITAL | Age: 27
Discharge: HOME | End: 2022-03-08
Attending: ORTHOPAEDIC SURGERY | Admitting: ORTHOPAEDIC SURGERY
Payer: MEDICAID

## 2022-03-08 ENCOUNTER — ANESTHESIA (OUTPATIENT)
Dept: SURGERY | Facility: HOSPITAL | Age: 27
End: 2022-03-08
Payer: MEDICAID

## 2022-03-08 VITALS
BODY MASS INDEX: 30.82 KG/M2 | RESPIRATION RATE: 17 BRPM | OXYGEN SATURATION: 97 % | SYSTOLIC BLOOD PRESSURE: 119 MMHG | DIASTOLIC BLOOD PRESSURE: 61 MMHG | HEART RATE: 73 BPM | HEIGHT: 65 IN | WEIGHT: 185 LBS | TEMPERATURE: 98 F

## 2022-03-08 DIAGNOSIS — R22.32 FINGER MASS, LEFT: ICD-10-CM

## 2022-03-08 PROCEDURE — 88307 TISSUE EXAM BY PATHOLOGIST: CPT | Mod: 26,,, | Performed by: PATHOLOGY

## 2022-03-08 PROCEDURE — 88307 PR  SURG PATH,LEVEL V: ICD-10-PCS | Mod: 26,,, | Performed by: PATHOLOGY

## 2022-03-08 PROCEDURE — 88307 TISSUE EXAM BY PATHOLOGIST: CPT | Performed by: PATHOLOGY

## 2022-03-08 PROCEDURE — 37000008 HC ANESTHESIA 1ST 15 MINUTES: Performed by: ORTHOPAEDIC SURGERY

## 2022-03-08 PROCEDURE — 88342 IMHCHEM/IMCYTCHM 1ST ANTB: CPT | Performed by: PATHOLOGY

## 2022-03-08 PROCEDURE — 71000015 HC POSTOP RECOV 1ST HR: Performed by: ORTHOPAEDIC SURGERY

## 2022-03-08 PROCEDURE — 25000003 PHARM REV CODE 250: Performed by: ORTHOPAEDIC SURGERY

## 2022-03-08 PROCEDURE — 88342 IMHCHEM/IMCYTCHM 1ST ANTB: CPT | Mod: 26,,, | Performed by: PATHOLOGY

## 2022-03-08 PROCEDURE — 36000706: Performed by: ORTHOPAEDIC SURGERY

## 2022-03-08 PROCEDURE — 37000009 HC ANESTHESIA EA ADD 15 MINS: Performed by: ORTHOPAEDIC SURGERY

## 2022-03-08 PROCEDURE — 01810 ANES PX NRV MUSC F/ARM WRST: CPT | Performed by: ORTHOPAEDIC SURGERY

## 2022-03-08 PROCEDURE — 63600175 PHARM REV CODE 636 W HCPCS: Performed by: ORTHOPAEDIC SURGERY

## 2022-03-08 PROCEDURE — 88342 CHG IMMUNOCYTOCHEMISTRY: ICD-10-PCS | Mod: 26,,, | Performed by: PATHOLOGY

## 2022-03-08 PROCEDURE — 71000016 HC POSTOP RECOV ADDL HR: Performed by: ORTHOPAEDIC SURGERY

## 2022-03-08 PROCEDURE — 36000707: Performed by: ORTHOPAEDIC SURGERY

## 2022-03-08 PROCEDURE — 88341 IMHCHEM/IMCYTCHM EA ADD ANTB: CPT | Mod: 26,,, | Performed by: PATHOLOGY

## 2022-03-08 PROCEDURE — 88341 IMHCHEM/IMCYTCHM EA ADD ANTB: CPT | Performed by: PATHOLOGY

## 2022-03-08 PROCEDURE — 26113 EXC HAND TUM DEEP 1.5 CM/>: CPT | Mod: F1,,, | Performed by: ORTHOPAEDIC SURGERY

## 2022-03-08 PROCEDURE — 63600175 PHARM REV CODE 636 W HCPCS: Performed by: NURSE ANESTHETIST, CERTIFIED REGISTERED

## 2022-03-08 PROCEDURE — 26113 PR EX TUM/VASC MAL SFT TIS HAND/FNGR SUBFSC 1.5+CM: ICD-10-PCS | Mod: F1,,, | Performed by: ORTHOPAEDIC SURGERY

## 2022-03-08 PROCEDURE — 88341 PR IHC OR ICC EACH ADD'L SINGLE ANTIBODY  STAINPR: ICD-10-PCS | Mod: 26,,, | Performed by: PATHOLOGY

## 2022-03-08 PROCEDURE — 25000003 PHARM REV CODE 250: Performed by: NURSE ANESTHETIST, CERTIFIED REGISTERED

## 2022-03-08 RX ORDER — OXYCODONE HYDROCHLORIDE 5 MG/1
10 TABLET ORAL EVERY 4 HOURS PRN
Status: DISCONTINUED | OUTPATIENT
Start: 2022-03-08 | End: 2022-03-08 | Stop reason: HOSPADM

## 2022-03-08 RX ORDER — KETOROLAC TROMETHAMINE 30 MG/ML
30 INJECTION, SOLUTION INTRAMUSCULAR; INTRAVENOUS ONCE
Status: COMPLETED | OUTPATIENT
Start: 2022-03-08 | End: 2022-03-08

## 2022-03-08 RX ORDER — BUPIVACAINE HYDROCHLORIDE 5 MG/ML
INJECTION, SOLUTION EPIDURAL; INTRACAUDAL
Status: DISCONTINUED | OUTPATIENT
Start: 2022-03-08 | End: 2022-03-08 | Stop reason: HOSPADM

## 2022-03-08 RX ORDER — MIDAZOLAM HYDROCHLORIDE 1 MG/ML
INJECTION, SOLUTION INTRAMUSCULAR; INTRAVENOUS
Status: DISCONTINUED | OUTPATIENT
Start: 2022-03-08 | End: 2022-03-08

## 2022-03-08 RX ORDER — PHENYLEPHRINE HYDROCHLORIDE 10 MG/ML
INJECTION INTRAVENOUS
Status: DISCONTINUED | OUTPATIENT
Start: 2022-03-08 | End: 2022-03-08

## 2022-03-08 RX ORDER — PROPOFOL 10 MG/ML
VIAL (ML) INTRAVENOUS CONTINUOUS PRN
Status: DISCONTINUED | OUTPATIENT
Start: 2022-03-08 | End: 2022-03-08

## 2022-03-08 RX ORDER — CEFAZOLIN SODIUM 2 G/50ML
2 SOLUTION INTRAVENOUS
Status: DISCONTINUED | OUTPATIENT
Start: 2022-03-08 | End: 2022-03-08 | Stop reason: HOSPADM

## 2022-03-08 RX ORDER — LIDOCAINE HYDROCHLORIDE 20 MG/ML
INJECTION, SOLUTION EPIDURAL; INFILTRATION; INTRACAUDAL; PERINEURAL
Status: DISCONTINUED | OUTPATIENT
Start: 2022-03-08 | End: 2022-03-08

## 2022-03-08 RX ORDER — HYDROCODONE BITARTRATE AND ACETAMINOPHEN 5; 325 MG/1; MG/1
1 TABLET ORAL EVERY 4 HOURS PRN
Qty: 12 TABLET | Refills: 0 | Status: SHIPPED | OUTPATIENT
Start: 2022-03-08 | End: 2022-03-21 | Stop reason: SDUPTHER

## 2022-03-08 RX ORDER — ONDANSETRON 8 MG/1
8 TABLET, ORALLY DISINTEGRATING ORAL EVERY 8 HOURS PRN
Status: DISCONTINUED | OUTPATIENT
Start: 2022-03-08 | End: 2022-03-08 | Stop reason: HOSPADM

## 2022-03-08 RX ORDER — PROPOFOL 10 MG/ML
VIAL (ML) INTRAVENOUS
Status: DISCONTINUED | OUTPATIENT
Start: 2022-03-08 | End: 2022-03-08

## 2022-03-08 RX ORDER — LIDOCAINE HYDROCHLORIDE 10 MG/ML
INJECTION, SOLUTION EPIDURAL; INFILTRATION; INTRACAUDAL; PERINEURAL
Status: DISCONTINUED | OUTPATIENT
Start: 2022-03-08 | End: 2022-03-08 | Stop reason: HOSPADM

## 2022-03-08 RX ORDER — FENTANYL CITRATE 50 UG/ML
INJECTION, SOLUTION INTRAMUSCULAR; INTRAVENOUS
Status: DISCONTINUED | OUTPATIENT
Start: 2022-03-08 | End: 2022-03-08

## 2022-03-08 RX ORDER — ACETAMINOPHEN 325 MG/1
650 TABLET ORAL EVERY 4 HOURS PRN
Status: DISCONTINUED | OUTPATIENT
Start: 2022-03-08 | End: 2022-03-08 | Stop reason: HOSPADM

## 2022-03-08 RX ADMIN — SODIUM CHLORIDE: 0.9 INJECTION, SOLUTION INTRAVENOUS at 01:03

## 2022-03-08 RX ADMIN — MIDAZOLAM 2 MG: 1 INJECTION INTRAMUSCULAR; INTRAVENOUS at 01:03

## 2022-03-08 RX ADMIN — FENTANYL CITRATE 25 MCG: 50 INJECTION, SOLUTION INTRAMUSCULAR; INTRAVENOUS at 01:03

## 2022-03-08 RX ADMIN — PROPOFOL 150 MCG/KG/MIN: 10 INJECTION, EMULSION INTRAVENOUS at 01:03

## 2022-03-08 RX ADMIN — PHENYLEPHRINE HYDROCHLORIDE 100 MCG: 10 INJECTION INTRAVENOUS at 01:03

## 2022-03-08 RX ADMIN — KETOROLAC TROMETHAMINE 30 MG: 30 INJECTION, SOLUTION INTRAMUSCULAR; INTRAVENOUS at 02:03

## 2022-03-08 RX ADMIN — PROPOFOL 50 MG: 10 INJECTION, EMULSION INTRAVENOUS at 01:03

## 2022-03-08 RX ADMIN — LIDOCAINE HYDROCHLORIDE 80 MG: 20 INJECTION, SOLUTION EPIDURAL; INFILTRATION; INTRACAUDAL; PERINEURAL at 01:03

## 2022-03-08 RX ADMIN — GLYCOPYRROLATE 0.2 MG: 0.2 INJECTION, SOLUTION INTRAMUSCULAR; INTRAVITREAL at 01:03

## 2022-03-08 RX ADMIN — PROPOFOL 200 MCG/KG/MIN: 10 INJECTION, EMULSION INTRAVENOUS at 01:03

## 2022-03-08 NOTE — OP NOTE
[facility] - Surgery (Hospital)  Operative Note      Date of Procedure: 3/8/2022     Procedure: Procedure(s) (LRB):  EXCISION, MASS, FINGER (Left)     Surgeon(s) and Role:     * Bk Doty Jr., MD - Primary    Assisting Surgeon: None    Pre-Operative Diagnosis: Finger mass, left [R22.32]    Post-Operative Diagnosis: Post-Op Diagnosis Codes:     * Finger mass, left [R22.32]    Anesthesia: Local MAC    Operative Findings (including complications, if any):  Mass left index finger    Description of Technical Procedures:     Preop diagnosis:  Soft tissue mass left index finger (2 cm).    Postop diagnosis:  Same.    Operative procedure:  Excisional biopsy soft tissue mass left index finger (2 cm).    Surgeon:  Soren.    First assistant:  Mitzy richardson, student.    Anesthesia:  Mac.  .    Specimen:  Mass.    Complications:  None.    EBL:  Minimal    Operative procedure in detail as follows:    After operative consent was obtained the patient brought to leonor perating room placed supine operating table.  Anesthesia by IV sedation followed by injection of xylocaine Marcaine combination at the base of left index finger performing a digital block.    Tourniquet applied left arm left upper extremity prepped and draped out in the normal sterile fashion.  The Esmarch used to exsanguinate the limb and the tourniquet inflated 225 mmHg.    A chevron incision made with 15 blade directly over the mass over the middle phalanx of l the mass which was large and extending down to the flexor tendon sheath.  eft index finger.  Full-thickness skin flaps carefully raised.  The skin was very thinned out over the mass  The mass was measuring about 2 cm and was carefully shelled off of the flexor tendon sheath and the digital nerve which was compressed on the radial side.  The neurovascular bundle was carefully protected at the mass removed in 1 large block.  There was no evidence of extension to the tendon or the bone.  There was also no  evidence of infection.    Hemostasis achieved with Bovie the wound irrigated and closed with interrupted 5 O nylon horizontal mattress suture on the skin.  Sterile dressing applied followed by light wrap tourniquet deflated patient brought to recovery room stable condition all sponge needle counts reported as correct no complications    Significant Surgical Tasks Conducted by the Assistant(s), if Applicable: retraction    Estimated Blood Loss (EBL): * No values recorded between 3/8/2022  1:32 PM and 3/8/2022  1:57 PM *           Implants: * No implants in log *    Specimens:   Specimen (24h ago, onward)             Start     Ordered    03/08/22 1341  Specimen to Pathology, Surgery Orthopedics  Once        Comments: Pre-op Diagnosis: Finger mass, left [R22.32]    Procedure(s):  EXCISION, MASS, FINGER     Number of specimens: 1    Name of specimens: 1. Left index finger mass -perm   References:    Click here for ordering Quick Tip   Question Answer Comment   Procedure Type: Orthopedics    Release to patient Immediate        03/08/22 1341                        Condition: Good    Disposition: PACU - hemodynamically stable.    Attestation: I was present and scrubbed for the entire procedure.    Discharge Note    OUTCOME: Patient tolerated treatment/procedure well without complication and is now ready for discharge.    DISPOSITION: Home or Self Care    FINAL DIAGNOSIS:  Finger mass, left    FOLLOWUP: In clinic    DISCHARGE INSTRUCTIONS:    Discharge Procedure Orders   Diet general     Call MD for:  temperature >100.4     Call MD for:  persistent nausea and vomiting     Call MD for:  severe uncontrolled pain     Keep surgical extremity elevated     Remove dressing in 72 hours
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
## 2022-03-08 NOTE — ANESTHESIA POSTPROCEDURE EVALUATION
Anesthesia Post Evaluation    Patient: Ray Knox    Procedure(s) Performed: Procedure(s) (LRB):  EXCISION, MASS, FINGER (Left)    Final Anesthesia Type: MAC      Patient location during evaluation: [facility]  Level of consciousness: awake and alert  Post-procedure vital signs: reviewed and stable  Pain management: adequate  Airway patency: patent    PONV status at discharge: No PONV  Anesthetic complications: no      Cardiovascular status: hemodynamically stable  Respiratory status: room air, spontaneous ventilation and unassisted  Hydration status: euvolemic  Follow-up not needed.          Vitals Value Taken Time   BP 95/67 03/08/22 1406   Temp 36.2 03/08/22 1406   Pulse 108 03/08/22 1406   Resp 18 03/08/22 1406   SpO2 99 03/08/22 1406         No case tracking events are documented in the log.      Pain/Joey Score: No data recorded

## 2022-03-08 NOTE — DISCHARGE INSTRUCTIONS
After Hand Surgery  After surgery, the better you take care of yourself--especially your hand--the sooner it will heal. Follow your surgeons instructions. Try not to bump your hand, and dont move or lift anything while youre still wearing bandages, a splint, or a cast.  Care for your hand    Keep your hand elevated above heart level as much as possible for the first several days after surgery. This helps reduce swelling and pain.  To help prevent infection and speed healing, take care not to get your cast or bandages wet.  YOU MAY REMOVE DRESSING IN 3 DAYS AFTER 3 DAYS YOU MAY REPLACE WITH A WATER PROOF BANDAGE AND DO YOU BEST TO KEEP INCISIONS AS CLEAN AND DRY AS POSSIBLE, DO NOT SUBMERGE IN WATER SUCH AS SINK OR TUBE UNTIL CLEARED BY DR ANDERSON, LIGHTLY CLEAN WITH SOAP AND WATER DAILY    Relieve pain as directed  Your surgeon may prescribe pain medicine or suggest you take an anti-inflammatory medicine. You might also be instructed to apply ice (or another cold source) to your hand. If you use ice cubes, put them in a plastic bag and rest it on top of your bandages. Leave the cold source on your hand for as long as its comfortable. Do this several times a day for the first few days after surgery. It may take several minutes before you can feel the cold through the cast or bandages.  Follow up with your surgeon  During a follow-up visit after surgery, your surgeon will check your progress. The stitches, bandages, splint, or cast may be removed. A new cast or splint may be placed. If your hand has healed enough, your surgeon may prescribe exercises.  Do prescribed hand exercises  Your surgeon may recommend that you do exercises. These may be done under the guidance of a physical or occupational therapist. The exercises strengthen your hand, help you regain flexibility, and restore proper function. Do the exercises as advised.  Call your surgeon if you have...  A fever higher than 100.4°F (38.0°C) taken by  mouth  Side effects from your medicine, such as prolonged nausea  A wet or loose dressing, or a dressing that is too tight  Excessive bleeding  Increased, ongoing pain or numbness  Signs of infection (such as drainage, warmth, or redness) at the incision site    ANESTHESIA  -For the first 24 hours after surgery:  Do not drive, use heavy equipment, make important decisions, or drink alcohol  -It is normal to feel sleepy for several hours.  Rest until you are more awake.  -Have someone stay with you, if needed.  They can watch for problems and help keep you safe.  -Some possible post anesthesia side effects include: nausea and vomiting, sore throat and hoarseness, sleepiness, and dizziness.    PAIN  -If you have pain after surgery, pain medicine will help you feel better.  Take it as directed, before pain becomes severe.  Most pain relievers taken by mouth need at least 20-30 minutes to start working.  -Do not drive or drink alcohol while taking pain medicine.  -Pain medication can upset your stomach.  Taking them with a little food may help.  -Other ways to help control pain: elevation, ice, and relaxation  -Call your surgeon if still having unmanageable pain an hour after taking pain medicine.  -Pain medicine can cause constipation.  Taking an over-the counter stool softener while on prescription pain medicine and drinking plenty of fluids can prevent this side effect.  -Call your surgeon if you have severe side effects like: breathing problems, trouble waking up, dizziness, confusion, or severe constipation.    NAUSEA  -Some people have nausea after surgery.  This is often because of anesthesia, pain, pain medicine, or the stress of surgery.  -Do not push yourself to eat.  Start off with clear liquids and soup.  Slowly move to solid foods.  Don't eat fatty, rich, spicy foods at first.  Eat smaller amounts.  -If you develop persistent nausea and vomiting please notify your surgeon  immediately.    BLEEDING  -Different types of surgery require different types of care and dressing changes.  It is important to follow all instructions and advice from your surgeon.  Change dressing as directed.  Call your surgeon for any concerns regarding postop bleeding.    SIGNS OF INFECTION  -Signs of infection include: fever, swelling, drainage, and redness  -Notify your surgeon if you have a fever of 100.4 F (38.0 C) or higher.  -Notify your surgeon if you notice redness, swelling, increased pain, pus, or a foul smell at the incision site.

## 2022-03-08 NOTE — TRANSFER OF CARE
"Anesthesia Transfer of Care Note    Patient: Ray Knox    Procedure(s) Performed: Procedure(s) (LRB):  EXCISION, MASS, FINGER (Left)    Patient location: [facility]    Anesthesia Type: MAC    Transport from OR: Transported from OR on room air with adequate spontaneous ventilation    Post assessment: no apparent anesthetic complications    Post vital signs: stable    Level of consciousness: awake and alert    Nausea/Vomiting: no nausea/vomiting    Complications: none    Transfer of care protocol was followed      Last vitals:   Visit Vitals  /78 (BP Location: Right arm, Patient Position: Sitting)   Pulse 75   Temp 36.6 °C (97.9 °F)   Resp 16   Ht 5' 5" (1.651 m)   Wt 83.9 kg (185 lb)   LMP 02/28/2022   SpO2 98%   Breastfeeding No   BMI 30.79 kg/m²     "

## 2022-03-08 NOTE — ANESTHESIA PREPROCEDURE EVALUATION
03/08/2022  Ray Knox is a 26 y.o., female.PMH congenital unilateral kidney, HTN, asthma. Plan for L finger mass excision.       Pre-op Assessment    I have reviewed the Patient Summary Reports.     I have reviewed the Nursing Notes. I have reviewed the NPO Status.      Review of Systems  Anesthesia Hx:  History of prior surgery of interest to airway management or planning: Denies Family Hx of Anesthesia complications.   Denies Personal Hx of Anesthesia complications.   Hematology/Oncology:         -- Anemia:   Cardiovascular:   Hypertension    Pulmonary:   Asthma Sleep Apnea    Renal/:   Chronic Renal Disease (multicystic kidney disease, born with 1 kidney), CRI    Neurological:  Neurology Normal    Endocrine:  Endocrine Normal        Physical Exam    Airway:  Mallampati: II   Mouth Opening: Normal  TM Distance: Normal  Tongue: Normal  Neck ROM: Normal ROM    Dental:  Intact        Anesthesia Plan  Type of Anesthesia, risks & benefits discussed:    Anesthesia Type: MAC, Gen ETT  Intra-op Monitoring Plan: Standard ASA Monitors  Post Op Pain Control Plan: multimodal analgesia  Induction:  IV  Informed Consent: Informed consent signed with the Patient and all parties understand the risks and agree with anesthesia plan.  All questions answered.   ASA Score: 3    Ready For Surgery From Anesthesia Perspective.     .

## 2022-03-17 LAB
FINAL PATHOLOGIC DIAGNOSIS: NORMAL
GROSS: NORMAL
Lab: NORMAL

## 2022-03-18 NOTE — PROGRESS NOTES
Subjective:      Patient ID: Ray Knox is a 26 y.o. female.    Chief Complaint: No chief complaint on file.      DESMOND  (French)    She is here for a postop visit. She is s/p Excisional biopsy soft tissue mass left index finger (2 cm) by Dr. Doty on 3/8/22. 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     She has mild pain in left finger with some numbness at the tip. She has limited ROM as well. She is taking hydrocodone for pain.     She works at [employer] and has been out of work.       Past Medical History:   Diagnosis Date    Anemia     Asthma     Bilateral renal cysts     Breast disorder     Congenital absence of one kidney     Kidney cysts     pt born with 1 kidney         Current Outpatient Medications:     albuterol (PROVENTIL) 2.5 mg /3 mL (0.083 %) nebulizer solution, 1 mL., Disp: , Rfl:     albuterol (PROVENTIL/VENTOLIN HFA) 90 mcg/actuation inhaler, Inhale 2 puffs into the lungs every 4 (four) hours as needed., Disp: , Rfl:     ferrous sulfate 325 (65 FE) MG EC tablet, Take by mouth once daily., Disp: , Rfl:     ibuprofen (ADVIL,MOTRIN) 800 MG tablet, Take 1 tablet (800 mg total) by mouth 3 (three) times daily., Disp: 30 tablet, Rfl: 4    HYDROcodone-acetaminophen (NORCO) 5-325 mg per tablet, Take 1 tablet by mouth every 8 (eight) hours as needed for Pain., Disp: 6 tablet, Rfl: 0    propranoloL (INDERAL) 40 MG tablet, Take 40 mg by mouth., Disp: , Rfl:     valACYclovir (VALTREX) 500 MG tablet, Take 1 tablet (500 mg total) by mouth once daily., Disp: 30 tablet, Rfl: 12    Review of patient's allergies indicates:   Allergen Reactions    Lisinopril Other (See Comments), Shortness Of Breath and Swelling     angioedema         Review of Systems   Constitutional: Negative for chills, fever, night sweats and weight gain.   Gastrointestinal: Negative for bowel incontinence, nausea and vomiting.   Genitourinary: Negative for bladder incontinence.   Neurological: Negative for disturbances  "in coordination and loss of balance.         Objective:        Ht 5' 5" (1.651 m)   Wt 88 kg (194 lb)   LMP 02/28/2022   BMI 32.28 kg/m²     Ortho/SPM Exam    Exam of left index finger:   Incision is clean and dry with sutures intact.   No signs of infection.   She has very little flexion of finger due to pain. She has full extension.      She is NVI distally with good capillary refill. Subjective numbness to tip of finger.         Assessment:       Encounter Diagnoses   Name Primary?    Finger mass, left Yes    Elective surgery           Plan:       Diagnoses and all orders for this visit:    Finger mass, left  -     HYDROcodone-acetaminophen (NORCO) 5-325 mg per tablet; Take 1 tablet by mouth every 8 (eight) hours as needed for Pain.  -     Ambulatory referral/consult to Physical/Occupational Therapy; Future    Elective surgery  -     HYDROcodone-acetaminophen (NORCO) 5-325 mg per tablet; Take 1 tablet by mouth every 8 (eight) hours as needed for Pain.  -     Ambulatory referral/consult to Physical/Occupational Therapy; Future      She is doing well s/p above surgery. Following plan made:     - Sutures removed today without difficulty.   - Wound care reviewed.   - Work on ROM/flexion of index finger. OT orders to [facility].   - She had significant pain with suture removal. She was given one last refill of hydrocodone for severe pain (#6).  reviewed and is appropriate. Will transition to OTC NSAIDs.   - She works at [employer] and remains out of work until her follow up. Will email me if she can return earlier.   - Staff will call her with f/u with French.     Follow up in about 4 weeks (around 4/18/2022) for postop visit with Dr. Doty.         "

## 2022-03-21 ENCOUNTER — OFFICE VISIT (OUTPATIENT)
Dept: ORTHOPEDICS | Facility: CLINIC | Age: 27
End: 2022-03-21
Payer: MEDICAID

## 2022-03-21 ENCOUNTER — TELEPHONE (OUTPATIENT)
Dept: ORTHOPEDICS | Facility: CLINIC | Age: 27
End: 2022-03-21
Payer: MEDICAID

## 2022-03-21 VITALS — HEIGHT: 65 IN | BODY MASS INDEX: 32.32 KG/M2 | WEIGHT: 194 LBS

## 2022-03-21 DIAGNOSIS — R22.32 FINGER MASS, LEFT: Primary | ICD-10-CM

## 2022-03-21 DIAGNOSIS — Z41.9 ELECTIVE SURGERY: ICD-10-CM

## 2022-03-21 PROCEDURE — 3008F PR BODY MASS INDEX (BMI) DOCUMENTED: ICD-10-PCS | Mod: CPTII,,, | Performed by: PHYSICIAN ASSISTANT

## 2022-03-21 PROCEDURE — 1160F RVW MEDS BY RX/DR IN RCRD: CPT | Mod: CPTII,,, | Performed by: PHYSICIAN ASSISTANT

## 2022-03-21 PROCEDURE — 99999 PR PBB SHADOW E&M-EST. PATIENT-LVL III: ICD-10-PCS | Mod: PBBFAC,,, | Performed by: PHYSICIAN ASSISTANT

## 2022-03-21 PROCEDURE — 1159F PR MEDICATION LIST DOCUMENTED IN MEDICAL RECORD: ICD-10-PCS | Mod: CPTII,,, | Performed by: PHYSICIAN ASSISTANT

## 2022-03-21 PROCEDURE — 1160F PR REVIEW ALL MEDS BY PRESCRIBER/CLIN PHARMACIST DOCUMENTED: ICD-10-PCS | Mod: CPTII,,, | Performed by: PHYSICIAN ASSISTANT

## 2022-03-21 PROCEDURE — 99999 PR PBB SHADOW E&M-EST. PATIENT-LVL III: CPT | Mod: PBBFAC,,, | Performed by: PHYSICIAN ASSISTANT

## 2022-03-21 PROCEDURE — 99024 POSTOP FOLLOW-UP VISIT: CPT | Mod: ,,, | Performed by: PHYSICIAN ASSISTANT

## 2022-03-21 PROCEDURE — 3008F BODY MASS INDEX DOCD: CPT | Mod: CPTII,,, | Performed by: PHYSICIAN ASSISTANT

## 2022-03-21 PROCEDURE — 1159F MED LIST DOCD IN RCRD: CPT | Mod: CPTII,,, | Performed by: PHYSICIAN ASSISTANT

## 2022-03-21 PROCEDURE — 99213 OFFICE O/P EST LOW 20 MIN: CPT | Mod: PBBFAC,PN | Performed by: PHYSICIAN ASSISTANT

## 2022-03-21 PROCEDURE — 99024 PR POST-OP FOLLOW-UP VISIT: ICD-10-PCS | Mod: ,,, | Performed by: PHYSICIAN ASSISTANT

## 2022-03-21 RX ORDER — HYDROCODONE BITARTRATE AND ACETAMINOPHEN 5; 325 MG/1; MG/1
1 TABLET ORAL EVERY 8 HOURS PRN
Qty: 6 TABLET | Refills: 0 | OUTPATIENT
Start: 2022-03-21 | End: 2022-07-02

## 2022-03-21 NOTE — PATIENT INSTRUCTIONS
You can wash your hand with soap and water.     You can move finger as you tolerate. Okay to work on making a fist.     I sent therapy orders to [facility]. They should call you or you can call [phone] to schedule.     I sent a refill of hydrocodone to your pharmacy. Take only as needed for severe pain. After this, you can transition to OTC motrin/tylenol as needed.     Dr. Doty's staff will call you with a follow up with him in 4-5 weeks.     Call if you need anything.     Phoebe   [phone]

## 2022-03-21 NOTE — TELEPHONE ENCOUNTER
Left message for pt to return call to schedule appt with Dr. Doty at either [place] or [place] location.

## 2022-03-21 NOTE — TELEPHONE ENCOUNTER
----- Message from Jany You RN sent at 3/21/2022  1:48 PM CDT -----  Good afternoon,    This pt also needs an appt for approx 4-5 weeks from today but I did not see any f/u spots in this time frame. Please assist in scheduling.     Thank you,   Jany

## 2022-03-21 NOTE — TELEPHONE ENCOUNTER
----- Message from Lashno Morales sent at 3/21/2022  2:53 PM CDT -----  Type:  Patient Returning Call    Who Called:pt  Who Left Message for Patient:office  Does the patient know what this is regarding?:to schedule appt  Would the patient rather a call back or a response via [facility]? call  Best Call Back Number:[phone]  Additional Information:

## 2022-03-23 ENCOUNTER — CLINICAL SUPPORT (OUTPATIENT)
Dept: REHABILITATION | Facility: HOSPITAL | Age: 27
End: 2022-03-23
Payer: MEDICAID

## 2022-03-23 DIAGNOSIS — Z74.1 REQUIRES ASSISTANCE WITH ACTIVITIES OF DAILY LIVING (ADL): ICD-10-CM

## 2022-03-23 DIAGNOSIS — M25.642 FINGER STIFFNESS, LEFT: ICD-10-CM

## 2022-03-23 DIAGNOSIS — R22.32 FINGER MASS, LEFT: ICD-10-CM

## 2022-03-23 DIAGNOSIS — M79.642 HAND PAIN, LEFT: ICD-10-CM

## 2022-03-23 DIAGNOSIS — Z41.9 ELECTIVE SURGERY: ICD-10-CM

## 2022-03-23 DIAGNOSIS — R29.898 DECREASED PINCH STRENGTH: ICD-10-CM

## 2022-03-23 PROCEDURE — 97165 OT EVAL LOW COMPLEX 30 MIN: CPT | Mod: PO

## 2022-03-23 NOTE — PATIENT INSTRUCTIONS
"[facility] - OCCUPATIONAL THERAPY  HOME EXERCISE PROGRAM     Complete the following massages for 2 minutes each, 2-3x/day.                       Complete the following exercises with 10 repetitions each, 3x/day.     AROM: DIP Flexion / Extension  Pinch middle knuckle to prevent bending. Bend end knuckle until stretch is felt.   Hold 3 seconds. Relax. Straighten finger as far as possible.    AROM: PIP Flexion / Extension  Pinch bottom knuckle  to prevent bending. Actively bend middle knuckle until stretch is felt.   Hold 3 seconds. Relax. Straighten finger as far as possible.      AROM: Isolated MCP Flexion / Extension ("Wave")   Bend only your large, bottom knuckles. Hold 3 seconds. Keep the tips of your fingers straight. Straighten fingers.  AROM: MCP and PIP Flexion / Extension ("Straight Fist")  Bend your bottom and middle knuckles, keeping the tips of your fingers straight. Try to touch the pads of your fingers on your palm. Hold 3 seconds. Straighten your fingers.   AROM: Isolated IPJ Flexion / Extension ("Hook")  Bend only your middle and end knuckles. Hold 3 seconds.   Straighten your fingers.       AROM: Composite Flexion / Extension ("Full Fist")  Bend every joint in your hand into a fist. Hold 3 seconds. Straighten your fingers.       Therapist:  MARIELA Botello  "

## 2022-03-24 PROBLEM — R29.898 DECREASED PINCH STRENGTH: Status: ACTIVE | Noted: 2022-03-24

## 2022-03-24 PROBLEM — Z74.1 REQUIRES ASSISTANCE WITH ACTIVITIES OF DAILY LIVING (ADL): Status: ACTIVE | Noted: 2022-03-24

## 2022-03-24 PROBLEM — M25.642 FINGER STIFFNESS, LEFT: Status: ACTIVE | Noted: 2022-03-24

## 2022-03-24 PROBLEM — M79.642 HAND PAIN, LEFT: Status: ACTIVE | Noted: 2022-03-24

## 2022-03-24 NOTE — PLAN OF CARE
"  [facility]  Occupational Therapy Initial Evaluation    Date: 3/23/2022  Name: Ray Knox  Clinic Number: [id number]    Therapy Diagnosis:   Encounter Diagnoses   Name Primary?    Finger mass, left     Elective surgery     Finger stiffness, left     Hand pain, left     Decreased pinch strength     Requires assistance with activities of daily living (ADL)      Physician: Phoebe Barber PA-C    Physician Orders: Eval and treat with all modalities. Good HEP. 1-2 x per week x 6 weeks.  Medical Diagnosis: R22.32 (ICD-10-CM) - Finger mass, left Z41.9 (ICD-10-CM) - Elective surgery   Surgical Procedure and Date: Excisional biopsy soft tissue mass left index finger (2 cm) by Dr. Doty on 3/8/22.  Evaluation Date: 3/23/2022  Insurance Authorization Period Expiration: 3/21/2022  Plan of Care Certification Period: 5/6/2022  Progress Note Due:  4/22/2022  Date of Return to MD: 4/27/2022  Visit # / Visits authorized: 1 / 1  FOTO: 3/23/2022 / 63%    Precautions:  Standard    Time In: 1:05  Time Out: 1:45   Total Appointment Time (timed & untimed codes): 40 minutes    SUBJECTIVE     Date of Onset: September 2021    History of Current Condition/Mechanism of Injury: Ray reports: she noticed a mass growing on her left index finger in September of 2021. She stated it was not painful. He had treatment on it prior to having it removed, but it was unsuccessful. She had elective surgery to remove the mass on 3/8/2022, and arrives today for her initial therapy evaluation     Involved Side: left   Dominant Side: Right  Imaging: x-ray 10/2/2021 "Second digit volar soft tissue swelling at the level of the middle phalanx.  No foreign body or fracture.  No osseous involvement."  Prior Therapy: NA  Occupation:  [employer] and    Working presently: employed  Duties: ,      Functional Limitations/Social History:    Previous functional status includes: Independent with all ADLs. "     Current Functional Status   Home/Living environment: lives with their family      Limitation of Functional Status as follows:   ADLs/IADLs:     - Feeding:needs assistance with cutting food     - Bathing: difficulty washing other side of body     - Dressing/Grooming: difficulty with fasteners     - Driving: no difficulty    Pain:  Functional Pain Scale Rating 0-10: Current 3/10, worst 10/10, best 3/10   Location: left index finger   Description: Throbbing, Tingling, Numb and Sharp  Aggravating Factors: Morning, Extension, Flexing and Lifting  Easing Factors: pain medication    Patient's Goals for Therapy: return to prior level of function     Medical History:   Past Medical History:   Diagnosis Date    Anemia     Asthma     Bilateral renal cysts     Breast disorder     Congenital absence of one kidney     Kidney cysts     pt born with 1 kidney       Surgical History:    has a past surgical history that includes Finger mass excision (Left, 3/8/2022).    Medications:   has a current medication list which includes the following prescription(s): albuterol, albuterol, ferrous sulfate, hydrocodone-acetaminophen, ibuprofen, propranolol, and valacyclovir.    Allergies:   Review of patient's allergies indicates:   Allergen Reactions    Lisinopril Other (See Comments), Shortness Of Breath and Swelling     angioedema            OBJECTIVE     Observation/Appearance:  Skin intact would closed no current signes of redness or infection     Edema. Measured in centimeters.   3/23/2022 3/23/2022    Left Right   Mid palm  18.5 18.7   Proximal Wrist Crease 15.9 15.8   Index P2 5.5 5.0       Hand ROM. Measured in degrees.   3/23/2022 3/23/2022    Left Right    A/P Active         Index: MP  70/80 90              PIP     40/60 110              DIP 15/50 55          Sensation  The Lumpkin-Mckayla Monofilament test was administered, no shows no impairments in light tough sensation. She was able to recognize 0.4g monofilament.       Strength (Dyanmometer) and Pinch Strength (Pinch Gauge)  Measured in pounds and psi. Average of three trials.   3/23/2022 3/23/2022    Left Right   Rung II 25 65   Key Pinch 5 18   3pt Pinch 3 9   2pt Pinch 1 5       Limitation/Restriction for FOTO Hand  Survey    Therapist reviewed FOTO scores for Ray Knox on 3/23/2022.   FOTO documents entered into [employer] - see Media section.    Limitation Score: 63%         Treatment     Total Treatment time (time-based codes) separate from Evaluation: 0 minutes    Patient Education and Home Exercises      Education provided:   - scar massage and silicone     Written Home Exercises Provided: yes.  Exercises were reviewed and Ray was able to demonstrate them prior to the end of the session.  Ray demonstrated fair  understanding of the education provided. See EMR under Patient Instructions for exercises provided during therapy sessions.     Pt was advised to perform these exercises free of pain, and to stop performing them if pain occurs.    Patient/Family Education: role of OT, goals for OT, scheduling/cancellations - pt verbalized understanding. Discussed insurance limitations with patient.      ASSESSMENT     Ray Knox is a 26 y.o. female referred to outpatient occupational therapy and presents with a medical diagnosis ofR22.32 (ICD-10-CM) - Finger mass, left Z41.9 (ICD-10-CM) - Elective surgery. Patient presents with the following therapy deficits: Decreased ROM, Decreased  strength, Decreased pinch strength, Decreased muscle strength, Decreased functional hand use, Increased pain, Edema, Joint Stiffness and Scar Adhesions and demonstrates limitations as described in the chart below. Following medical record review it is determined that pt will benefit from occupational therapy services in order to maximize pain free and/or functional use of left index finger. The following goals were discussed with the patient and patient is in agreement with  them as to be addressed in the treatment plan. The patient's rehab potential is Good.     Anticipated barriers to occupational therapy: compliance   Pt has no cultural, educational or language barriers to learning provided.    Profile and History Assessment of Occupational Performance Level of Clinical Decision Making Complexity Score   Occupational Profile:   Ray Knox is a 26 y.o. female who lives with their family and is currently employed Ray Knox has difficulty with  ADLs and IADLs as listed previously, which  Affecting herdaily functional abilities.      Comorbidities:    has a past medical history of Anemia, Asthma, Bilateral renal cysts, Breast disorder, Congenital absence of one kidney, and Kidney cysts.    Medical and Therapy History Review:   Brief               Performance Deficits    Physical:  Joint Mobility  Muscle Power/Strength  Skin Integrity/Scar Formation  Edema   Strength  Pinch Strength  Fine Motor Coordination  Pain    Cognitive:  No Deficits    Psychosocial:    No Deficits     Clinical Decision Making:  low    Assessment Process:  Problem-Focused Assessments    Modification/Need for Assistance:  Not Necessary    Intervention Selection:  Several Treatment Options       moderate  Based on PMHX, co morbidities , data from assessments and functional level of assistance required with task and clinical presentation directly impacting function.       The following goals were discussed with the patient and patient is in agreement with them as to be addressed in the treatment plan.     Short Term Goals: (in 3 weeks)  1) Patient will be independent in HEP  2) Decrease pain in left hand  to no more than 6/10 worst in ADL/IADL's  3) Increase AROM in left index to 60 degrees for improved functioning in ADL/IADL's  4) Increase left 3 point pinch strength by to 6 psi for increased functional use  5) Decrease edema in left index P2 to 5.3 cm     Long Term Goals: (in 6 weeks)  1) Decrease pain  in left hand  to no more than 2/10 worst in ADL/IADL's  2) Increase AROM of left index finger to WFL for increased functioning in ADL/IADL's  3) Increase strength in left  to 50 for improved functioning in ADL/IADL's  4) Increased functioning in ADL/IADL's, as evidenced by a FOTO impairment rating of no more than 42%  5) Decrease edema in left hand to trace or none        PLAN   Plan of Care Certification: 3/23/2022 to 5/6/2022.     Outpatient Occupational Therapy 2 times weekly for 6 weeks to include the following interventions: Paraffin, Manual therapy/joint mobilizations, Modalities for pain management, US 3 mhz, Therapeutic exercises/activities., Strengthening, Orthotic Fabrication/Fit/Training, Edema Control, Scar Management, Wound Care, Electrical Modalities, Joint Protection and Energy Conservation.      Curt Booth OT      I CERTIFY THE NEED FOR THESE SERVICES FURNISHED UNDER THIS PLAN OF TREATMENT AND WHILE UNDER MY CARE  Physician's comments:      Physician's Signature: ___________________________________________________

## 2022-03-31 ENCOUNTER — CLINICAL SUPPORT (OUTPATIENT)
Dept: REHABILITATION | Facility: HOSPITAL | Age: 27
End: 2022-03-31
Payer: MEDICAID

## 2022-03-31 DIAGNOSIS — M79.642 HAND PAIN, LEFT: ICD-10-CM

## 2022-03-31 DIAGNOSIS — R29.898 DECREASED PINCH STRENGTH: ICD-10-CM

## 2022-03-31 DIAGNOSIS — Z74.1 REQUIRES ASSISTANCE WITH ACTIVITIES OF DAILY LIVING (ADL): ICD-10-CM

## 2022-03-31 DIAGNOSIS — M25.642 FINGER STIFFNESS, LEFT: Primary | ICD-10-CM

## 2022-03-31 PROCEDURE — 97530 THERAPEUTIC ACTIVITIES: CPT | Mod: PO
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
## 2022-04-01 ENCOUNTER — PATIENT MESSAGE (OUTPATIENT)
Dept: GENETICS | Facility: CLINIC | Age: 27
End: 2022-04-01
Payer: MEDICAID

## 2022-04-01 ENCOUNTER — PATIENT MESSAGE (OUTPATIENT)
Dept: MATERNAL FETAL MEDICINE | Facility: CLINIC | Age: 27
End: 2022-04-01
Payer: MEDICAID

## 2022-04-04 ENCOUNTER — CLINICAL SUPPORT (OUTPATIENT)
Dept: REHABILITATION | Facility: HOSPITAL | Age: 27
End: 2022-04-04
Payer: MEDICAID

## 2022-04-04 DIAGNOSIS — M79.642 HAND PAIN, LEFT: ICD-10-CM

## 2022-04-04 DIAGNOSIS — M25.642 FINGER STIFFNESS, LEFT: Primary | ICD-10-CM

## 2022-04-04 DIAGNOSIS — Z74.1 REQUIRES ASSISTANCE WITH ACTIVITIES OF DAILY LIVING (ADL): ICD-10-CM

## 2022-04-04 DIAGNOSIS — R29.898 DECREASED PINCH STRENGTH: ICD-10-CM

## 2022-04-04 PROCEDURE — 97018 PARAFFIN BATH THERAPY: CPT | Mod: PO

## 2022-04-04 PROCEDURE — 97110 THERAPEUTIC EXERCISES: CPT | Mod: PO

## 2022-04-04 NOTE — PROGRESS NOTES
[facility]  Occupational Therapy Treatment Note    Date: 4/4/2022  Name: Ray Knox  Clinic Number: [id number]    Therapy Diagnosis:   Encounter Diagnoses   Name Primary?    Finger stiffness, left Yes    Hand pain, left     Decreased pinch strength     Requires assistance with activities of daily living (ADL)      Physician: Phoebe Barber PA-C    Physician Orders: Eval and treat with all modalities. Good HEP. 1-2 x per week x 6 weeks.  Medical Diagnosis: R22.32 (ICD-10-CM) - Finger mass, left Z41.9 (ICD-10-CM) - Elective surgery   Surgical Procedure and Date: Excisional biopsy soft tissue mass left index finger (2 cm) by Dr. Doty on 3/8/22.  Evaluation Date: 3/23/2022  Insurance Authorization Period Expiration: 12/31/2022  Plan of Care Certification Period: 5/6/2022  Progress Note Due:  4/22/2022  Date of Return to MD: 4/27/2022  Visit # / Visits authorized: 2 / 20 (3 including initial evaluation)  FOTO: 3/23/2022 / 63%     Precautions:  Standard     Time In: 1:01  Time Out: 1:40        Total Appointment Time (timed & untimed codes): 39 minutes (3 TA 1 paraffin)      SUBJECTIVE     Pt reports:  No new complaints she reports decreased pain  She was compliant with home exercise program given last session.   Response to previous treatment:decrease pain  Functional change:  Increased active range of motion      Pain: 0/10  Location: left hands      OBJECTIVE     Objective Measures updated at progress report unless specified.    Treatment     Ray received the treatments listed below:     Ray Knox received the following manual therapy techniques for 10 minutes:  - Deep STM, including CFM to collateral ligaments , TPR & scar mgmt to each palmar surface using hand techniques and IASTM tools to increase blood flow/circulation, improve soft tissue extensability and decrease pain.    Ray Knox participated in dynamic functional therapeutic activities to improve functional  performance for 10  minutes, including:  - Stereoagnosis Activity small coins were placed in a bowl of rice/ bean . With her vision occluded Claude had to locate the coins using her index finger, and using a pad to pad pinch she had to retrieve all 15 coins. She completed one trial with rice and one with beans      Ray Knox received therapeutic exercises for 20 minutes including:    Exercise  Repetitions    LLPS with coban in composite flexion & Paraffin 8 min   Joint blocking  2x10 MP, PIP and DIP   Digit Abduction and adduciuton 2x10   Tendon glides  x25   Large clothe spins   Red  2 trial        Patient Education and Home Exercises      Education provided:   - Progress towards goals     Written Home Exercises Provided: Patient instructed to cont prior HEP.  Exercises were reviewed and Ray was able to demonstrate them prior to the end of the session.  Ray demonstrated good  understanding of the HEP provided. See EMR under Patient Instructions for exercises provided during therapy sessions.       Assessment     The patient tolerated therapy well  on this date. Her pain had decreased since last visit Low-load prolonged stretch with coban in conjunction with Paraffin was administered with good tolerance. Her wound is continuing to heal. Scar massage was administered  to ensure proper healing and avoidance of keloid and scar adhesion. Gentle passive range of motion was administered to increase tissue extensibility with fair tolerance. She expressed that her pain increased to 5/10 during gentle stretches  She performed tendon glides and joint blocking exercises with no limitations or complaints of pain. Introduced an stereoagnosis activity where she had to locate the coins from a bowl of rice or beans  using her index finger, and using a pad to pad pinch she had to retrieve coins.Ray is progressing well towards her goals and there are no updates to goals at this time. Pt prognosis is Good.   Pt  will continue to benefit from skilled outpatient occupational therapy to address the deficits listed in the problem list on initial evaluation provide pt/family education and to maximize pt's level of independence in the home and community environment.     Pt's spiritual, cultural and educational needs considered and pt agreeable to plan of care and goals.    Anticipated barriers to occupational therapy:  Compliance     Goals:  Short Term Goals: (in 3 weeks)  1) Patient will be independent in HEP  2) Decrease pain in left hand  to no more than 6/10 worst in ADL/IADL's  3) Increase AROM in left index to 60 degrees for improved functioning in ADL/IADL's  4) Increase left 3 point pinch strength by to 6 psi for increased functional use  5) Decrease edema in left index P2 to 5.3 cm     Long Term Goals: (in 6 weeks)  1) Decrease pain in left hand  to no more than 2/10 worst in ADL/IADL's  2) Increase AROM of left index finger to WFL for increased functioning in ADL/IADL's  3) Increase strength in left  to 50 for improved functioning in ADL/IADL's  4) Increased functioning in ADL/IADL's, as evidenced by a FOTO impairment rating of no more than 42%  5) Decrease edema in left hand to trace or none    PLAN   Plan of Care Certification: 3/23/2022 to 5/6/2022.     Outpatient Occupational Therapy 2 times weekly for 6 weeks to include the following interventions: Paraffin, Manual therapy/joint mobilizations, Modalities for pain management, US 3 mhz, Therapeutic exercises/activities., Strengthening, Orthotic Fabrication/Fit/Training, Edema Control, Scar Management, Wound Care, Electrical Modalities, Joint Protection and Energy Conservation.    Updates/Grading for next session: increase strength       Curt Booth, OT

## 2022-04-06 ENCOUNTER — CLINICAL SUPPORT (OUTPATIENT)
Dept: REHABILITATION | Facility: HOSPITAL | Age: 27
End: 2022-04-06
Payer: MEDICAID

## 2022-04-06 DIAGNOSIS — R29.898 DECREASED PINCH STRENGTH: ICD-10-CM

## 2022-04-06 DIAGNOSIS — M79.642 HAND PAIN, LEFT: ICD-10-CM

## 2022-04-06 DIAGNOSIS — M25.642 FINGER STIFFNESS, LEFT: Primary | ICD-10-CM

## 2022-04-06 DIAGNOSIS — Z74.1 REQUIRES ASSISTANCE WITH ACTIVITIES OF DAILY LIVING (ADL): ICD-10-CM

## 2022-04-06 PROCEDURE — 97530 THERAPEUTIC ACTIVITIES: CPT | Mod: PO

## 2022-04-06 NOTE — PROGRESS NOTES
[facility]  Occupational Therapy Treatment Note    Date: 4/6/2022  Name: Ray Knox  Clinic Number: [id number]    Therapy Diagnosis:   Encounter Diagnoses   Name Primary?    Finger stiffness, left Yes    Hand pain, left     Decreased pinch strength     Requires assistance with activities of daily living (ADL)      Physician: Phoebe Barber PA-C    Physician Orders: Eval and treat with all modalities. Good HEP. 1-2 x per week x 6 weeks.  Medical Diagnosis: R22.32 (ICD-10-CM) - Finger mass, left Z41.9 (ICD-10-CM) - Elective surgery   Surgical Procedure and Date: Excisional biopsy soft tissue mass left index finger (2 cm) by Dr. Doty on 3/8/22.  Evaluation Date: 3/23/2022  Insurance Authorization Period Expiration: 12/31/2022  Plan of Care Certification Period: 5/6/2022  Progress Note Due:  4/22/2022  Date of Return to MD: 4/27/2022  Visit # / Visits authorized: 3 / 20 (4 including initial evaluation)  FOTO: 3/23/2022 / 63%     Precautions:  Standard     Time In: 1:01  Time Out: 1:44     Total Appointment Time (timed & untimed codes): 43 minutes (3 TA )      SUBJECTIVE     Pt reports:  The numbness comes and goes   She was compliant with home exercise program given last session.   Response to previous treatment:decrease pain  Functional change:  Increased active range of motion      Pain: 0/10  Location: left hands      OBJECTIVE     Objective Measures updated at progress report unless specified.    Treatment     Ray received the treatments listed below:     Ray Konx received the following supervised modalities after being cleared for contradictions for 5 minutes:   Paraffin w/ MHP to left  hand, for 5 minutes pre-tx to decrease pain & increase tissue extensibility    Ray Knox received the following manual therapy techniques for 10 minutes:  - Deep STM, including CFM to collateral ligaments , TPR & scar mgmt to each palmar surface using hand techniques and IASTM  tools to increase blood flow/circulation, improve soft tissue extensability and decrease pain.    Ray Knox participated in dynamic functional therapeutic activities to improve functional performance for 10  minutes, including:  - Stereoagnosis Activity small coins were placed in a bowl of rice/ bean . With her vision occluded Claude had to locate the coins using her index finger, and using a pad to pad pinch she had to retrieve all 15 coins. She completed one trial with rice and one with beans      Ray Knox received therapeutic exercises for 20 minutes including:    Exercise  Repetitions        Joint blocking  2x10 MP, PIP and DIP   Digit Abduction and adduciuton 2x10   Tendon glides  x25   Hand gripper red and yellow x25   Yellow putty bead activity  1 trial    Large clothes pins   Red  2 trial        Patient Education and Home Exercises      Education provided:   - Progress towards goals     Written Home Exercises Provided: Patient instructed to cont prior HEP.  Exercises were reviewed and Ray was able to demonstrate them prior to the end of the session.  Ray demonstrated good  understanding of the HEP provided. See EMR under Patient Instructions for exercises provided during therapy sessions.       Assessment     Ray tolerated therapy well on this date. Her pain continues to decrease; when she has pain it is at he site of the incision. Paraffin was administered on this date to increase tissue elasticity with good tolerance. Her wound is continuing to heal. Scar massage was administered  to ensure proper healing and avoidance of keloid and scar adhesion. Gentle passive range of motion was administered to increase passive range of motion more tolerance than in previous visits; full passive range of motion of her index finger was achieved on this date. She performed tendon glides and joint blocking exercises with no limitations or complaints of pain. Continued with the stereoagnosis  activity and added two new strengthening activities with no limitation and minima complaints of pain.Ray is progressing well towards her goals and there are no updates to goals at this time. Pt prognosis is Good.   Pt will continue to benefit from skilled outpatient occupational therapy to address the deficits listed in the problem list on initial evaluation provide pt/family education and to maximize pt's level of independence in the home and community environment.     Pt's spiritual, cultural and educational needs considered and pt agreeable to plan of care and goals.    Anticipated barriers to occupational therapy:  Compliance     Goals:  Short Term Goals: (in 3 weeks)  1) Patient will be independent in HEP  2) Decrease pain in left hand  to no more than 6/10 worst in ADL/IADL's  3) Increase AROM in left index to 60 degrees for improved functioning in ADL/IADL's  4) Increase left 3 point pinch strength by to 6 psi for increased functional use  5) Decrease edema in left index P2 to 5.3 cm     Long Term Goals: (in 6 weeks)  1) Decrease pain in left hand  to no more than 2/10 worst in ADL/IADL's  2) Increase AROM of left index finger to WFL for increased functioning in ADL/IADL's  3) Increase strength in left  to 50 for improved functioning in ADL/IADL's  4) Increased functioning in ADL/IADL's, as evidenced by a FOTO impairment rating of no more than 42%  5) Decrease edema in left hand to trace or none    PLAN   Plan of Care Certification: 3/23/2022 to 5/6/2022.     Outpatient Occupational Therapy 2 times weekly for 6 weeks to include the following interventions: Paraffin, Manual therapy/joint mobilizations, Modalities for pain management, US 3 mhz, Therapeutic exercises/activities., Strengthening, Orthotic Fabrication/Fit/Training, Edema Control, Scar Management, Wound Care, Electrical Modalities, Joint Protection and Energy Conservation.    Updates/Grading for next session: increase strength       Curt  Emmy, OT

## 2022-04-13 ENCOUNTER — CLINICAL SUPPORT (OUTPATIENT)
Dept: REHABILITATION | Facility: HOSPITAL | Age: 27
End: 2022-04-13
Payer: MEDICAID

## 2022-04-13 DIAGNOSIS — R29.898 DECREASED PINCH STRENGTH: ICD-10-CM

## 2022-04-13 DIAGNOSIS — Z74.1 REQUIRES ASSISTANCE WITH ACTIVITIES OF DAILY LIVING (ADL): ICD-10-CM

## 2022-04-13 DIAGNOSIS — M79.642 HAND PAIN, LEFT: ICD-10-CM

## 2022-04-13 DIAGNOSIS — M25.642 FINGER STIFFNESS, LEFT: Primary | ICD-10-CM

## 2022-04-13 PROCEDURE — 97530 THERAPEUTIC ACTIVITIES: CPT | Mod: PO

## 2022-04-13 NOTE — PROGRESS NOTES
"  [facility] OUTPATIENT THERAPY AND WELLNESS  Occupational Therapy Treatment Note    Date: 4/13/2022  Name: Ray Knox   Clinic Number: [id number]    Therapy Diagnosis:   Encounter Diagnoses   Name Primary?    Finger stiffness, left Yes    Hand pain, left     Decreased pinch strength     Requires assistance with activities of daily living (ADL)      Physician: Phoebe Barber PA-C    Physician Orders: Eval and treat with all modalities. Good HEP. 1-2 x per week x 6 weeks.  Medical Diagnosis: R22.32 (ICD-10-CM) - Finger mass, left Z41.9 (ICD-10-CM) - Elective surgery   Surgical Procedure and Date: Excisional biopsy soft tissue mass left index finger (2 cm) by Dr. Doty on 3/8/22.  Evaluation Date: 3/23/2022  Insurance Authorization Period Expiration: 12/31/2022  Plan of Care Certification Period: 5/6/2022  Progress Note Due:  4/22/2022  Date of Return to MD: 4/27/2022  Visit # / Visits authorized: 4 / 20 (5 including initial evaluation)  FOTO: 3/23/2022 / 63%     Precautions:  Standard     Time In: 1:06  Time Out: 1:45     Total Appointment Time (timed & untimed codes): 39 minutes (3 TA )      SUBJECTIVE     Pt reports: "Its not numb anymore"  She was compliant with home exercise program given last session.   Response to previous treatment:decrease pain  Functional change:  Increased active range of motion      Pain: 0/10  Location: left hands      OBJECTIVE     Objective Measures updated at progress report unless specified.  Edema. Measured in centimeters.    3/23/2022 3/23/2022 4/13/2022     Left Right Left    Mid palm  18.5 18.7 18.3   Proximal Wrist Crease 15.9 15.8 15.4   Index P2 5.5 5.0 5.1       Hand ROM. Measured in degrees.    3/23/2022 3/23/2022 4/13/2022     Left Right Left      A/P Active  Active             Index: MP  70/80 90 85              PIP     40/60 110 105              DIP 15/50 55 72                Strength (Dyanmometer) and Pinch Strength (Pinch Gauge)  Measured in pounds and psi. " Average of three trials.    3/23/2022 3/23/2022 4/13/2022     Left Right Left    Rung II 25 65 43   Taylor Pinch 5 18 13   3pt Pinch 3 9 11.5   2pt Pinch 1 5 4       Treatment     Ray received the treatments listed below:     Ray Knox received the following supervised modalities after being cleared for contradictions for 5 minutes:   Paraffin w/ MHP to left  hand, for 5 minutes pre-tx to decrease pain & increase tissue extensibility    Ray Knox received the following manual therapy techniques for 5  minutes:  - Deep STM, including CFM to collateral ligaments , TPR & scar mgmt to each palmar surface using hand techniques and IASTM tools to increase blood flow/circulation, improve soft tissue extensability and decrease pain.    Ray Knox received therapeutic exercises for 29 minutes including:    Exercise  Repetitions        Joint blocking  2x10 MP, PIP and DIP   Digit Abduction and adduciuton Rubber band   2x10   Tendon glides  x25   Hand gripper red and yellow 25 x 2   Yellow dowel activity  1 trial    Large clothes pins   Green  2 trial        Patient Education and Home Exercises      Education provided:   - Progress towards goals     Written Home Exercises Provided: Patient instructed to cont prior HEP.  Exercises were reviewed and Ray was able to demonstrate them prior to the end of the session.  Ray demonstrated good  understanding of the HEP provided. See EMR under Patient Instructions for exercises provided during therapy sessions.       Assessment     Ray tolerated therapy well on this date. Her pain continues to decrease, She states her pain is a 0/10. She also reports that she no longer has any numbness in her index finger. Paraffin was administered on this date to increase tissue elasticity with good tolerance. Scar massage was administered  to ensure proper healing and avoidance of keloid and scar adhesion. Gentle passive range of motion was administered to increase  passive range of motion more tolerance than in previous visits; objective measurements were taken on this date for strength and ROM. See above section for details.  She performed tendon glides and joint blocking exercises with no limitations or complaints of pain. Continued with exercise to increase strength and ROM.Ray is progressing well towards her goals and there are no updates to goals at this time. Pt prognosis is Good.   Pt will continue to benefit from skilled outpatient occupational therapy to address the deficits listed in the problem list on initial evaluation provide pt/family education and to maximize pt's level of independence in the home and community environment.     Pt's spiritual, cultural and educational needs considered and pt agreeable to plan of care and goals.    Anticipated barriers to occupational therapy:  Compliance     Goals:  Short Term Goals: (in 3 weeks)  1) Patient will be independent in HEP  2) Decrease pain in left hand  to no more than 6/10 worst in ADL/IADL's  3) Increase AROM in left index to 60 degrees for improved functioning in ADL/IADL's  4) Increase left 3 point pinch strength by to 6 psi for increased functional use  5) Decrease edema in left index P2 to 5.3 cm     Long Term Goals: (in 6 weeks)  1) Decrease pain in left hand  to no more than 2/10 worst in ADL/IADL's  2) Increase AROM of left index finger to WFL for increased functioning in ADL/IADL's  3) Increase strength in left  to 50 for improved functioning in ADL/IADL's  4) Increased functioning in ADL/IADL's, as evidenced by a FOTO impairment rating of no more than 42%  5) Decrease edema in left hand to trace or none    PLAN   Plan of Care Certification: 3/23/2022 to 5/6/2022.     Outpatient Occupational Therapy 2 times weekly for 6 weeks to include the following interventions: Paraffin, Manual therapy/joint mobilizations, Modalities for pain management, US 3 mhz, Therapeutic exercises/activities.,  Strengthening, Orthotic Fabrication/Fit/Training, Edema Control, Scar Management, Wound Care, Electrical Modalities, Joint Protection and Energy Conservation.    Updates/Grading for next session: increase strength       Curt Booth OT

## 2022-04-26 ENCOUNTER — HOSPITAL ENCOUNTER (EMERGENCY)
Facility: HOSPITAL | Age: 27
Discharge: HOME | End: 2022-04-26
Attending: EMERGENCY MEDICINE
Payer: MEDICAID

## 2022-04-26 VITALS
BODY MASS INDEX: 32.32 KG/M2 | DIASTOLIC BLOOD PRESSURE: 80 MMHG | RESPIRATION RATE: 17 BRPM | SYSTOLIC BLOOD PRESSURE: 134 MMHG | HEIGHT: 65 IN | WEIGHT: 194 LBS | TEMPERATURE: 99 F | HEART RATE: 64 BPM | OXYGEN SATURATION: 99 %

## 2022-04-26 DIAGNOSIS — S20.219A CHEST WALL CONTUSION: ICD-10-CM

## 2022-04-26 LAB — B-HCG UR QL: NEGATIVE

## 2022-04-26 PROCEDURE — 99284 EMERGENCY DEPT VISIT MOD MDM: CPT | Mod: 25,ER

## 2022-04-26 PROCEDURE — 25000003 PHARM REV CODE 250: Mod: ER | Performed by: EMERGENCY MEDICINE

## 2022-04-26 PROCEDURE — 81025 URINE PREGNANCY TEST: CPT | Mod: ER | Performed by: EMERGENCY MEDICINE

## 2022-04-26 RX ORDER — KETOROLAC TROMETHAMINE 10 MG/1
10 TABLET, FILM COATED ORAL EVERY 8 HOURS PRN
Qty: 15 TABLET | Refills: 0 | Status: SHIPPED | OUTPATIENT
Start: 2022-04-26 | End: 2022-04-26 | Stop reason: ALTCHOICE

## 2022-04-26 RX ORDER — MELOXICAM 7.5 MG/1
7.5 TABLET ORAL DAILY
Qty: 7 TABLET | Refills: 0 | Status: SHIPPED | OUTPATIENT
Start: 2022-04-26 | End: 2022-05-03

## 2022-04-26 RX ORDER — KETOROLAC TROMETHAMINE 10 MG/1
10 TABLET, FILM COATED ORAL
Status: COMPLETED | OUTPATIENT
Start: 2022-04-26 | End: 2022-04-26

## 2022-04-26 RX ADMIN — KETOROLAC TROMETHAMINE 10 MG: 10 TABLET, FILM COATED ORAL at 08:04

## 2022-04-26 NOTE — ED NOTES
LOC: The patient is awake, alert and aware of environment with an appropriate affect, the patient is oriented x 3 and speaking appropriately.     Psych: Patient is calm and cooperative with good eye contact.    APPEARANCE: Patient is clean and non toxic appearing    NEUROLOGIC:  BRIAN, Follows commands without difficulty. Speech is clear. No neuro deficits observed.    HEENT: Denies HEENT complaint or injury, moist mucus membranes     RESPIRATORY: Airway is open and patent, respirations are spontaneous; patient has a normal effort and rate. Bilateral breath sounds are clear. Pink nailbeds.     CARDIAC: Patient has a normal rate and rhythm, no peripheral edema noted, capillary refill < 2 seconds. PULSES are symmetrical in all extremities    GI/ : Soft and non tender to palpation, no distention noted.     MUSCULOSKELETAL:  Normal range of motion noted. Moves all extremities well, No swelling, deformity or tenderness noted. Pt c/o pain in sternal region    SKIN: The skin is warm, dry and intact. Patient has normal skin turgor and moist mucus membranes, no rashes or lesions. No Breakdown noted.
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
## 2022-04-27 ENCOUNTER — OFFICE VISIT (OUTPATIENT)
Dept: ORTHOPEDICS | Facility: CLINIC | Age: 27
End: 2022-04-27
Payer: MEDICAID

## 2022-04-27 VITALS — WEIGHT: 194 LBS | BODY MASS INDEX: 32.32 KG/M2 | HEIGHT: 65 IN

## 2022-04-27 DIAGNOSIS — R22.32 FINGER MASS, LEFT: Primary | ICD-10-CM

## 2022-04-27 PROCEDURE — 3008F BODY MASS INDEX DOCD: CPT | Mod: CPTII,,, | Performed by: ORTHOPAEDIC SURGERY

## 2022-04-27 PROCEDURE — 99024 POSTOP FOLLOW-UP VISIT: CPT | Mod: ,,, | Performed by: ORTHOPAEDIC SURGERY

## 2022-04-27 PROCEDURE — 99024 PR POST-OP FOLLOW-UP VISIT: ICD-10-PCS | Mod: ,,, | Performed by: ORTHOPAEDIC SURGERY

## 2022-04-27 PROCEDURE — 1159F MED LIST DOCD IN RCRD: CPT | Mod: CPTII,,, | Performed by: ORTHOPAEDIC SURGERY

## 2022-04-27 PROCEDURE — 99213 OFFICE O/P EST LOW 20 MIN: CPT | Mod: PBBFAC,PN | Performed by: ORTHOPAEDIC SURGERY

## 2022-04-27 PROCEDURE — 99999 PR PBB SHADOW E&M-EST. PATIENT-LVL III: ICD-10-PCS | Mod: PBBFAC,,, | Performed by: ORTHOPAEDIC SURGERY

## 2022-04-27 PROCEDURE — 99999 PR PBB SHADOW E&M-EST. PATIENT-LVL III: CPT | Mod: PBBFAC,,, | Performed by: ORTHOPAEDIC SURGERY

## 2022-04-27 PROCEDURE — 1159F PR MEDICATION LIST DOCUMENTED IN MEDICAL RECORD: ICD-10-PCS | Mod: CPTII,,, | Performed by: ORTHOPAEDIC SURGERY

## 2022-04-27 PROCEDURE — 3008F PR BODY MASS INDEX (BMI) DOCUMENTED: ICD-10-PCS | Mod: CPTII,,, | Performed by: ORTHOPAEDIC SURGERY

## 2022-04-27 NOTE — PROGRESS NOTES
"Subjective:      Patient ID: Ray Knox is a 26 y.o. female.  Chief Complaint: Post-op Evaluation (6 wk p/o- left index mass removal )      HPI  Ray Knox is a  26 y.o. female presenting today for post op visit.  She is s/p excision fibroma from the left index finger she is now about 6 weeks postop she is doing well.     Review of patient's allergies indicates:   Allergen Reactions    Lisinopril Other (See Comments), Shortness Of Breath and Swelling     angioedema           Current Outpatient Medications   Medication Sig Dispense Refill    ferrous sulfate 325 (65 FE) MG EC tablet Take by mouth once daily.      meloxicam (MOBIC) 7.5 MG tablet Take 1 tablet (7.5 mg total) by mouth once daily. for 7 days 7 tablet 0    albuterol (PROVENTIL) 2.5 mg /3 mL (0.083 %) nebulizer solution 1 mL.      albuterol (PROVENTIL/VENTOLIN HFA) 90 mcg/actuation inhaler Inhale 2 puffs into the lungs every 4 (four) hours as needed.      HYDROcodone-acetaminophen (NORCO) 5-325 mg per tablet Take 1 tablet by mouth every 8 (eight) hours as needed for Pain. (Patient not taking: Reported on 4/27/2022) 6 tablet 0    propranoloL (INDERAL) 40 MG tablet Take 40 mg by mouth.      valACYclovir (VALTREX) 500 MG tablet Take 1 tablet (500 mg total) by mouth once daily. 30 tablet 12     No current facility-administered medications for this visit.       Past Medical History:   Diagnosis Date    Anemia     Asthma     Bilateral renal cysts     Breast disorder     Congenital absence of one kidney     Kidney cysts     pt born with 1 kidney       Past Surgical History:   Procedure Laterality Date    FINGER MASS EXCISION Left 3/8/2022    Procedure: EXCISION, MASS, FINGER;  Surgeon: Bk Doty Jr., MD;  Location: [facility];  Service: Orthopedics;  Laterality: Left;       OBJECTIVE:   PHYSICAL EXAM:  Height: 5' 5" (165.1 cm) Weight: 88 kg (194 lb 0.1 oz)  Vitals:    04/27/22 1427   Weight: 88 kg (194 lb 0.1 oz)   Height: 5' 5" (1.651 " m)   PainSc: 0-No pain     Ortho/SPM Exam  Examination left hand incision well healed mild tenderness slight thickening of the scar range of motion fingers full sensation intact    RADIOGRAPHS:  None  Comments: I have personally reviewed the imaging and I agree with the above radiologist's report.    ASSESSMENT/PLAN:     IMPRESSION:  Status post excision mass left index finger    PLAN:  I have ordered some Pennsaid topical anti-inflammatory cream for use on the finger  Squeeze ball for strengthening  Increase activities as tolerated    FOLLOW UP:  4-6 weeks    Disclaimer: This note has been generated using voice-recognition software. There may be typographical errors that have been missed during proof-reading.

## 2022-05-05 ENCOUNTER — PATIENT MESSAGE (OUTPATIENT)
Dept: ORTHOPEDICS | Facility: CLINIC | Age: 27
End: 2022-05-05
Payer: MEDICAID

## 2022-05-05 ENCOUNTER — PATIENT MESSAGE (OUTPATIENT)
Dept: OBSTETRICS AND GYNECOLOGY | Facility: CLINIC | Age: 27
End: 2022-05-05
Payer: MEDICAID

## 2022-05-05 RX ORDER — LEVONORGESTREL AND ETHINYL ESTRADIOL 0.1-0.02MG
1 KIT ORAL DAILY
Qty: 28 TABLET | Refills: 12 | Status: SHIPPED | OUTPATIENT
Start: 2022-05-05 | End: 2022-05-05

## 2022-05-06 ENCOUNTER — PATIENT MESSAGE (OUTPATIENT)
Dept: ORTHOPEDICS | Facility: CLINIC | Age: 27
End: 2022-05-06
Payer: MEDICAID

## 2022-05-08 ENCOUNTER — PATIENT MESSAGE (OUTPATIENT)
Dept: ORTHOPEDICS | Facility: CLINIC | Age: 27
End: 2022-05-08
Payer: MEDICAID

## 2022-05-09 ENCOUNTER — PATIENT MESSAGE (OUTPATIENT)
Dept: ORTHOPEDICS | Facility: CLINIC | Age: 27
End: 2022-05-09
Payer: MEDICAID

## 2022-05-10 ENCOUNTER — HOSPITAL ENCOUNTER (EMERGENCY)
Facility: HOSPITAL | Age: 27
Discharge: HOME | End: 2022-05-10
Attending: EMERGENCY MEDICINE
Payer: MEDICAID

## 2022-05-10 VITALS
SYSTOLIC BLOOD PRESSURE: 119 MMHG | DIASTOLIC BLOOD PRESSURE: 72 MMHG | RESPIRATION RATE: 18 BRPM | TEMPERATURE: 98 F | BODY MASS INDEX: 32.32 KG/M2 | HEART RATE: 107 BPM | HEIGHT: 65 IN | OXYGEN SATURATION: 98 % | WEIGHT: 194 LBS

## 2022-05-10 DIAGNOSIS — N39.0 URINARY TRACT INFECTION WITHOUT HEMATURIA, SITE UNSPECIFIED: ICD-10-CM

## 2022-05-10 DIAGNOSIS — A08.4 VIRAL GASTROENTERITIS: Primary | ICD-10-CM

## 2022-05-10 LAB
ALBUMIN SERPL BCP-MCNC: 4.4 G/DL (ref 3.5–5.2)
ALP SERPL-CCNC: 62 U/L (ref 38–126)
ALT SERPL W/O P-5'-P-CCNC: 12 U/L (ref 10–44)
ANION GAP SERPL CALC-SCNC: 12 MMOL/L (ref 8–16)
AST SERPL-CCNC: 18 U/L (ref 15–46)
B-HCG UR QL: NEGATIVE
BACTERIA #/AREA URNS AUTO: ABNORMAL /HPF
BASOPHILS # BLD AUTO: 0.03 K/UL (ref 0–0.2)
BASOPHILS NFR BLD: 0.3 % (ref 0–1.9)
BILIRUB SERPL-MCNC: 0.7 MG/DL (ref 0.1–1)
BILIRUB UR QL STRIP: NEGATIVE
CALCIUM SERPL-MCNC: 8.4 MG/DL (ref 8.7–10.5)
CHLORIDE SERPL-SCNC: 105 MMOL/L (ref 95–110)
CLARITY UR REFRACT.AUTO: CLEAR
CO2 SERPL-SCNC: 22 MMOL/L (ref 23–29)
COLOR UR AUTO: YELLOW
CREAT SERPL-MCNC: 0.92 MG/DL (ref 0.5–1.4)
DIFFERENTIAL METHOD: ABNORMAL
EOSINOPHIL # BLD AUTO: 0.1 K/UL (ref 0–0.5)
EOSINOPHIL NFR BLD: 1.5 % (ref 0–8)
ERYTHROCYTE [DISTWIDTH] IN BLOOD BY AUTOMATED COUNT: 13.8 % (ref 11.5–14.5)
EST. GFR  (AFRICAN AMERICAN): >60 ML/MIN/1.73 M^2
EST. GFR  (NON AFRICAN AMERICAN): >60 ML/MIN/1.73 M^2
GLUCOSE SERPL-MCNC: 112 MG/DL (ref 70–110)
GLUCOSE UR QL STRIP: NEGATIVE
HCT VFR BLD AUTO: 38.6 % (ref 37–48.5)
HGB BLD-MCNC: 12.3 G/DL (ref 12–16)
HGB UR QL STRIP: NEGATIVE
IMM GRANULOCYTES # BLD AUTO: 0.03 K/UL (ref 0–0.04)
IMM GRANULOCYTES NFR BLD AUTO: 0.3 % (ref 0–0.5)
KETONES UR QL STRIP: NEGATIVE
LEUKOCYTE ESTERASE UR QL STRIP: ABNORMAL
LYMPHOCYTES # BLD AUTO: 1.6 K/UL (ref 1–4.8)
LYMPHOCYTES NFR BLD: 17.4 % (ref 18–48)
MCH RBC QN AUTO: 25.9 PG (ref 27–31)
MCHC RBC AUTO-ENTMCNC: 31.9 G/DL (ref 32–36)
MCV RBC AUTO: 81 FL (ref 82–98)
MICROSCOPIC COMMENT: ABNORMAL
MONOCYTES # BLD AUTO: 0.8 K/UL (ref 0.3–1)
MONOCYTES NFR BLD: 8.4 % (ref 4–15)
NEUTROPHILS # BLD AUTO: 6.6 K/UL (ref 1.8–7.7)
NEUTROPHILS NFR BLD: 72.1 % (ref 38–73)
NITRITE UR QL STRIP: POSITIVE
NRBC BLD-RTO: 0 /100 WBC
PH UR STRIP: 5 [PH] (ref 5–8)
PLATELET # BLD AUTO: 268 K/UL (ref 150–450)
PMV BLD AUTO: 10.2 FL (ref 9.2–12.9)
POTASSIUM SERPL-SCNC: 3.7 MMOL/L (ref 3.5–5.1)
PROT SERPL-MCNC: 7.8 G/DL (ref 6–8.4)
PROT UR QL STRIP: NEGATIVE
RBC # BLD AUTO: 4.75 M/UL (ref 4–5.4)
SODIUM SERPL-SCNC: 139 MMOL/L (ref 136–145)
SP GR UR STRIP: 1.01 (ref 1–1.03)
URN SPEC COLLECT METH UR: ABNORMAL
UROBILINOGEN UR STRIP-ACNC: NEGATIVE EU/DL
UUN UR-MCNC: 7 MG/DL (ref 7–17)
WBC # BLD AUTO: 9.16 K/UL (ref 3.9–12.7)
WBC #/AREA URNS AUTO: 8 /HPF (ref 0–5)

## 2022-05-10 PROCEDURE — 63600175 PHARM REV CODE 636 W HCPCS: Mod: ER | Performed by: EMERGENCY MEDICINE

## 2022-05-10 PROCEDURE — 96374 THER/PROPH/DIAG INJ IV PUSH: CPT | Mod: ER

## 2022-05-10 PROCEDURE — 25000003 PHARM REV CODE 250: Mod: ER | Performed by: EMERGENCY MEDICINE

## 2022-05-10 PROCEDURE — 85025 COMPLETE CBC W/AUTO DIFF WBC: CPT | Mod: ER | Performed by: EMERGENCY MEDICINE

## 2022-05-10 PROCEDURE — 96372 THER/PROPH/DIAG INJ SC/IM: CPT | Mod: 59 | Performed by: EMERGENCY MEDICINE

## 2022-05-10 PROCEDURE — 96361 HYDRATE IV INFUSION ADD-ON: CPT | Mod: ER

## 2022-05-10 PROCEDURE — 81025 URINE PREGNANCY TEST: CPT | Mod: ER | Performed by: EMERGENCY MEDICINE

## 2022-05-10 PROCEDURE — 81000 URINALYSIS NONAUTO W/SCOPE: CPT | Mod: ER | Performed by: EMERGENCY MEDICINE

## 2022-05-10 PROCEDURE — 80053 COMPREHEN METABOLIC PANEL: CPT | Mod: ER | Performed by: EMERGENCY MEDICINE

## 2022-05-10 PROCEDURE — 99284 EMERGENCY DEPT VISIT MOD MDM: CPT | Mod: 25,ER

## 2022-05-10 RX ORDER — DICYCLOMINE HYDROCHLORIDE 10 MG/ML
20 INJECTION INTRAMUSCULAR
Status: COMPLETED | OUTPATIENT
Start: 2022-05-10 | End: 2022-05-10

## 2022-05-10 RX ORDER — CIPROFLOXACIN 500 MG/1
500 TABLET ORAL 2 TIMES DAILY
Qty: 14 TABLET | Refills: 0 | Status: SHIPPED | OUTPATIENT
Start: 2022-05-10 | End: 2022-05-17

## 2022-05-10 RX ORDER — ONDANSETRON 4 MG/1
8 TABLET, FILM COATED ORAL EVERY 6 HOURS PRN
Qty: 12 TABLET | Refills: 0 | Status: SHIPPED | OUTPATIENT
Start: 2022-05-10 | End: 2022-08-01 | Stop reason: CLARIF

## 2022-05-10 RX ORDER — DICYCLOMINE HYDROCHLORIDE 20 MG/1
20 TABLET ORAL 2 TIMES DAILY
Qty: 20 TABLET | Refills: 0 | Status: SHIPPED | OUTPATIENT
Start: 2022-05-10 | End: 2023-09-18

## 2022-05-10 RX ORDER — DIPHENOXYLATE HYDROCHLORIDE AND ATROPINE SULFATE 2.5; .025 MG/1; MG/1
1 TABLET ORAL
Status: COMPLETED | OUTPATIENT
Start: 2022-05-10 | End: 2022-05-10

## 2022-05-10 RX ORDER — ONDANSETRON 2 MG/ML
4 INJECTION INTRAMUSCULAR; INTRAVENOUS
Status: COMPLETED | OUTPATIENT
Start: 2022-05-10 | End: 2022-05-10

## 2022-05-10 RX ORDER — DIPHENOXYLATE HYDROCHLORIDE AND ATROPINE SULFATE 2.5; .025 MG/1; MG/1
1 TABLET ORAL 4 TIMES DAILY PRN
Qty: 12 TABLET | Refills: 0 | Status: SHIPPED | OUTPATIENT
Start: 2022-05-10 | End: 2022-05-20

## 2022-05-10 RX ADMIN — DICYCLOMINE HYDROCHLORIDE 20 MG: 20 INJECTION, SOLUTION INTRAMUSCULAR at 06:05

## 2022-05-10 RX ADMIN — DIPHENOXYLATE HYDROCHLORIDE AND ATROPINE SULFATE 1 TABLET: 2.5; .025 TABLET ORAL at 06:05

## 2022-05-10 RX ADMIN — SODIUM CHLORIDE 1000 ML: 0.9 INJECTION, SOLUTION INTRAVENOUS at 06:05

## 2022-05-10 RX ADMIN — ONDANSETRON 4 MG: 2 INJECTION INTRAMUSCULAR; INTRAVENOUS at 06:05

## 2022-05-10 NOTE — Clinical Note
"Ray Knox (Gelescia) was seen and treated in our emergency department on 5/10/2022.  She may return to work on 05/12/2022.       If you have any questions or concerns, please don't hesitate to call.      Beny Rojo RN    "

## 2022-05-10 NOTE — ED NOTES
Bedside commode brought to the room.  Pt states she is too weak to walk.   Call light within reach.   WCM.

## 2022-05-10 NOTE — ED PROVIDER NOTES
Encounter Date: 5/10/2022       History     Chief Complaint   Patient presents with    Diarrhea     Pt to the ER with diarrhea and vomiting since yesterday.      Is a 26-year-old female presenting to the emergency department this is a 26-year-old female presenting to the emergency department for vomiting and diarrhea x1 day.  Patient reports greater than 5-10 episodes of vomiting as well as 5 samples of diarrhea.  States that she can not held anything down.  She tried eat on yesterday, but vomited back up.  Mild abdominal cramping.  No fever chills.  No sick contacts.  Patient has not taken anything for symptoms.  She has no other complaints at this time.        Review of patient's allergies indicates:   Allergen Reactions    Lisinopril Other (See Comments), Shortness Of Breath and Swelling     angioedema       Past Medical History:   Diagnosis Date    Anemia     Asthma     Bilateral renal cysts     Breast disorder     Congenital absence of one kidney     Kidney cysts     pt born with 1 kidney     Past Surgical History:   Procedure Laterality Date    FINGER MASS EXCISION Left 3/8/2022    Procedure: EXCISION, MASS, FINGER;  Surgeon: Bk Doty Jr., MD;  Location: [facility];  Service: Orthopedics;  Laterality: Left;     Family History   Problem Relation Age of Onset    Diabetes Paternal Aunt     Hypertension Maternal Grandfather     Cancer Paternal Grandmother     Breast cancer Paternal Grandmother     Leukemia Paternal Grandmother     Kidney disease Maternal Grandmother     Hypertension Mother      Social History     Tobacco Use    Smoking status: Current Every Day Smoker     Years: 0.50    Smokeless tobacco: Never Used   Substance Use Topics    Alcohol use: Yes     Comment: OCC    Drug use: No     Review of Systems   Constitutional: Negative for fever.   HENT: Negative for sore throat.    Respiratory: Negative for shortness of breath.    Cardiovascular: Negative for chest pain.    Gastrointestinal: Positive for diarrhea, nausea and vomiting.   Genitourinary: Negative for dysuria.   Musculoskeletal: Negative for back pain.   Skin: Negative for rash.   Neurological: Negative for weakness.   Hematological: Does not bruise/bleed easily.   All other systems reviewed and are negative.      Physical Exam     Initial Vitals [05/10/22 0532]   BP Pulse Resp Temp SpO2   119/72 107 18 98.2 °F (36.8 °C) 98 %      MAP       --         Physical Exam    Nursing note and vitals reviewed.  Constitutional: She appears well-developed and well-nourished.   HENT:   Head: Normocephalic and atraumatic.   Eyes: EOM are normal. Pupils are equal, round, and reactive to light.   Neck: Neck supple. No thyromegaly present. No tracheal deviation present. No JVD present.   Normal range of motion.  Cardiovascular: Normal rate, regular rhythm and normal heart sounds. Exam reveals no gallop and no friction rub.    No murmur heard.  Pulmonary/Chest: Breath sounds normal. No stridor. No respiratory distress. She has no wheezes. She has no rhonchi. She has no rales. She exhibits no tenderness.   Abdominal: Abdomen is soft. Bowel sounds are normal. She exhibits no distension and no mass. There is no abdominal tenderness. There is no rebound and no guarding.   Musculoskeletal:      Cervical back: Normal range of motion and neck supple.     Lymphadenopathy:     She has no cervical adenopathy.   Neurological: She is alert and oriented to person, place, and time. She has normal strength. No cranial nerve deficit.   Skin: Skin is warm and dry. Capillary refill takes less than 2 seconds. No rash noted. No erythema.   Psychiatric: She has a normal mood and affect.         ED Course   Procedures  Labs Reviewed   URINALYSIS, REFLEX TO URINE CULTURE - Abnormal; Notable for the following components:       Result Value    Nitrite, UA Positive (*)     Leukocytes, UA 1+ (*)     All other components within normal limits    Narrative:      Preferred Collection Type->Urine, Clean Catch  Specimen Source->Urine  Collection Type->Urine, Clean Catch   CBC W/ AUTO DIFFERENTIAL - Abnormal; Notable for the following components:    MCV 81 (*)     MCH 25.9 (*)     MCHC 31.9 (*)     Lymph % 17.4 (*)     All other components within normal limits   COMPREHENSIVE METABOLIC PANEL - Abnormal; Notable for the following components:    CO2 22 (*)     Glucose 112 (*)     Calcium 8.4 (*)     All other components within normal limits   URINALYSIS MICROSCOPIC - Abnormal; Notable for the following components:    WBC, UA 8 (*)     Bacteria Many (*)     All other components within normal limits    Narrative:     Preferred Collection Type->Urine, Clean Catch  Specimen Source->Urine  Collection Type->Urine, Clean Catch   PREGNANCY TEST, URINE RAPID    Narrative:     Specimen Source->Urine          Imaging Results    None          Medications   ondansetron injection 4 mg (4 mg Intravenous Given 5/10/22 0609)   sodium chloride 0.9% bolus 1,000 mL (0 mLs Intravenous Stopped 5/10/22 0654)   dicyclomine injection 20 mg (20 mg Intramuscular Given 5/10/22 0649)   diphenoxylate-atropine 2.5-0.025 mg per tablet 1 tablet (1 tablet Oral Given 5/10/22 0649)                          Clinical Impression:   Final diagnoses:  [A08.4] Viral gastroenteritis (Primary)  [N39.0] Urinary tract infection without hematuria, site unspecified          ED Disposition Condition    Discharge Stable        ED Prescriptions     Medication Sig Dispense Start Date End Date Auth. Provider    diphenoxylate-atropine 2.5-0.025 mg (LOMOTIL) 2.5-0.025 mg per tablet Take 1 tablet by mouth 4 (four) times daily as needed for Diarrhea. 12 tablet 5/10/2022 5/20/2022 Gopi Phillips MD    ondansetron (ZOFRAN) 4 MG tablet Take 2 tablets (8 mg total) by mouth every 6 (six) hours as needed for Nausea. 12 tablet 5/10/2022  Gopi Phillips MD    dicyclomine (BENTYL) 20 mg tablet Take 1 tablet (20 mg total) by mouth 2  (two) times daily. 20 tablet 5/10/2022  Gopi Phillips MD    ciprofloxacin HCl (CIPRO) 500 MG tablet Take 1 tablet (500 mg total) by mouth 2 (two) times daily. for 7 days 14 tablet 5/10/2022 5/17/2022 Gopi Phillips MD        Follow-up Information     Follow up With Specialties Details Why Contact Info    Les Horton MD Family Medicine Schedule an appointment as soon as possible for a visit  As needed, If symptoms worsen [address] [facility] [address]  [phone]             Gopi Phillips MD  05/10/22 0749

## 2022-05-11 ENCOUNTER — OFFICE VISIT (OUTPATIENT)
Dept: OBSTETRICS AND GYNECOLOGY | Facility: CLINIC | Age: 27
End: 2022-05-11
Payer: MEDICAID

## 2022-05-11 VITALS — BODY MASS INDEX: 31.95 KG/M2 | WEIGHT: 192 LBS | SYSTOLIC BLOOD PRESSURE: 121 MMHG | DIASTOLIC BLOOD PRESSURE: 86 MMHG

## 2022-05-11 DIAGNOSIS — Z01.419 WELL WOMAN EXAM WITH ROUTINE GYNECOLOGICAL EXAM: ICD-10-CM

## 2022-05-11 DIAGNOSIS — Z30.41 ENCOUNTER FOR SURVEILLANCE OF CONTRACEPTIVE PILLS: Primary | ICD-10-CM

## 2022-05-11 DIAGNOSIS — Z11.3 SCREENING FOR STD (SEXUALLY TRANSMITTED DISEASE): ICD-10-CM

## 2022-05-11 PROCEDURE — 3079F PR MOST RECENT DIASTOLIC BLOOD PRESSURE 80-89 MM HG: ICD-10-PCS | Mod: CPTII,,, | Performed by: OBSTETRICS & GYNECOLOGY

## 2022-05-11 PROCEDURE — 88175 CYTOPATH C/V AUTO FLUID REDO: CPT | Performed by: OBSTETRICS & GYNECOLOGY

## 2022-05-11 PROCEDURE — 99999 PR PBB SHADOW E&M-EST. PATIENT-LVL III: ICD-10-PCS | Mod: PBBFAC,,, | Performed by: OBSTETRICS & GYNECOLOGY

## 2022-05-11 PROCEDURE — 1160F PR REVIEW ALL MEDS BY PRESCRIBER/CLIN PHARMACIST DOCUMENTED: ICD-10-PCS | Mod: CPTII,,, | Performed by: OBSTETRICS & GYNECOLOGY

## 2022-05-11 PROCEDURE — 99395 PR PREVENTIVE VISIT,EST,18-39: ICD-10-PCS | Mod: S$PBB,,, | Performed by: OBSTETRICS & GYNECOLOGY

## 2022-05-11 PROCEDURE — 99213 OFFICE O/P EST LOW 20 MIN: CPT | Mod: PBBFAC,PN | Performed by: OBSTETRICS & GYNECOLOGY

## 2022-05-11 PROCEDURE — 3074F PR MOST RECENT SYSTOLIC BLOOD PRESSURE < 130 MM HG: ICD-10-PCS | Mod: CPTII,,, | Performed by: OBSTETRICS & GYNECOLOGY

## 2022-05-11 PROCEDURE — 1159F MED LIST DOCD IN RCRD: CPT | Mod: CPTII,,, | Performed by: OBSTETRICS & GYNECOLOGY

## 2022-05-11 PROCEDURE — 3008F BODY MASS INDEX DOCD: CPT | Mod: CPTII,,, | Performed by: OBSTETRICS & GYNECOLOGY

## 2022-05-11 PROCEDURE — 3074F SYST BP LT 130 MM HG: CPT | Mod: CPTII,,, | Performed by: OBSTETRICS & GYNECOLOGY

## 2022-05-11 PROCEDURE — 3008F PR BODY MASS INDEX (BMI) DOCUMENTED: ICD-10-PCS | Mod: CPTII,,, | Performed by: OBSTETRICS & GYNECOLOGY

## 2022-05-11 PROCEDURE — 87591 N.GONORRHOEAE DNA AMP PROB: CPT | Performed by: OBSTETRICS & GYNECOLOGY

## 2022-05-11 PROCEDURE — 1159F PR MEDICATION LIST DOCUMENTED IN MEDICAL RECORD: ICD-10-PCS | Mod: CPTII,,, | Performed by: OBSTETRICS & GYNECOLOGY

## 2022-05-11 PROCEDURE — 99395 PREV VISIT EST AGE 18-39: CPT | Mod: S$PBB,,, | Performed by: OBSTETRICS & GYNECOLOGY

## 2022-05-11 PROCEDURE — 3079F DIAST BP 80-89 MM HG: CPT | Mod: CPTII,,, | Performed by: OBSTETRICS & GYNECOLOGY

## 2022-05-11 PROCEDURE — 1160F RVW MEDS BY RX/DR IN RCRD: CPT | Mod: CPTII,,, | Performed by: OBSTETRICS & GYNECOLOGY

## 2022-05-11 PROCEDURE — 87491 CHLMYD TRACH DNA AMP PROBE: CPT | Performed by: OBSTETRICS & GYNECOLOGY

## 2022-05-11 PROCEDURE — 99999 PR PBB SHADOW E&M-EST. PATIENT-LVL III: CPT | Mod: PBBFAC,,, | Performed by: OBSTETRICS & GYNECOLOGY

## 2022-05-11 RX ORDER — LEVONORGESTREL AND ETHINYL ESTRADIOL 0.1-0.02MG
1 KIT ORAL DAILY
Qty: 28 TABLET | Refills: 12 | Status: SHIPPED | OUTPATIENT
Start: 2022-05-11 | End: 2023-03-27

## 2022-05-11 NOTE — PROGRESS NOTES
CC: Annual check-up    SUBJECTIVE:   26 y.o. female   for annual routine Pap and checkup. Patient's last menstrual period was 2022..  She has no unusual complaints and wants to start ocp's.        Past Medical History:   Diagnosis Date    Anemia     Asthma     Bilateral renal cysts     Breast disorder     Congenital absence of one kidney     Kidney cysts     pt born with 1 kidney     Past Surgical History:   Procedure Laterality Date    FINGER MASS EXCISION Left 3/8/2022    Procedure: EXCISION, MASS, FINGER;  Surgeon: Bk Doty Jr., MD;  Location: [facility];  Service: Orthopedics;  Laterality: Left;     Social History     Socioeconomic History    Marital status: Single   Tobacco Use    Smoking status: Current Every Day Smoker     Years: 0.50    Smokeless tobacco: Never Used   Substance and Sexual Activity    Alcohol use: Yes     Comment: OCC    Drug use: No    Sexual activity: Yes     Partners: Male     Birth control/protection: None     Comment: Not current on Depo     Family History   Problem Relation Age of Onset    Diabetes Paternal Aunt     Hypertension Maternal Grandfather     Cancer Paternal Grandmother     Breast cancer Paternal Grandmother     Leukemia Paternal Grandmother     Kidney disease Maternal Grandmother     Hypertension Mother      OB History    Para Term  AB Living   1 0 0 0 0 0   SAB IAB Ectopic Multiple Live Births   0 0 0 0 0      # Outcome Date GA Lbr Jarad/2nd Weight Sex Delivery Anes PTL Lv   1                   Current Outpatient Medications   Medication Sig Dispense Refill    albuterol (PROVENTIL) 2.5 mg /3 mL (0.083 %) nebulizer solution 1 mL.      albuterol (PROVENTIL/VENTOLIN HFA) 90 mcg/actuation inhaler Inhale 2 puffs into the lungs every 4 (four) hours as needed.      ciprofloxacin HCl (CIPRO) 500 MG tablet Take 1 tablet (500 mg total) by mouth 2 (two) times daily. for 7 days 14 tablet 0    dicyclomine (BENTYL) 20 mg tablet  Take 1 tablet (20 mg total) by mouth 2 (two) times daily. 20 tablet 0    diphenoxylate-atropine 2.5-0.025 mg (LOMOTIL) 2.5-0.025 mg per tablet Take 1 tablet by mouth 4 (four) times daily as needed for Diarrhea. 12 tablet 0    ferrous sulfate 325 (65 FE) MG EC tablet Take by mouth once daily.      ondansetron (ZOFRAN) 4 MG tablet Take 2 tablets (8 mg total) by mouth every 6 (six) hours as needed for Nausea. 12 tablet 0    HYDROcodone-acetaminophen (NORCO) 5-325 mg per tablet Take 1 tablet by mouth every 8 (eight) hours as needed for Pain. (Patient not taking: No sig reported) 6 tablet 0    levonorgestrel-ethinyl estradiol (AVIANE,ALESSE,LESSINA) 0.1-20 mg-mcg per tablet Take 1 tablet by mouth once daily. 28 tablet 12    propranoloL (INDERAL) 40 MG tablet Take 40 mg by mouth.      valACYclovir (VALTREX) 500 MG tablet Take 1 tablet (500 mg total) by mouth once daily. 30 tablet 12     No current facility-administered medications for this visit.     Allergies: Lisinopril     ROS:  Constitutional: no weight loss, weight gain, fever, fatigue  Eyes:  No vision changes, glasses/contacts  ENT/Mouth: No ulcers, sinus problems, ears ringing, headache  Cardiovascular: No inability to lie flat, chest pain, exercise intolerance, swelling, heart palpitations  Respiratory: No wheezing, coughing blood, shortness of breath, or cough  Gastrointestinal: No diarrhea, bloody stool, nausea/vomiting, constipation, gas, hemorrhoids  Genitourinary: No blood in urine, painful urination, urgency of urination, frequency of urination, incomplete emptying, incontinence, abnormal bleeding, painful periods, heavy periods, vaginal discharge, vaginal odor, painful intercourse, sexual problems, bleeding after intercourse.  Musculoskeletal: No muscle weakness  Skin/Breast: No painful breasts, nipple discharge, masses, rash, ulcers  Neurological: No passing out, seizures, numbness, headache  Endocrine: No diabetes, hypothyroid, hyperthyroid, hot  flashes, hair loss, abnormal hair growth, ance  Psychiatric: No depression, crying  Hematologic: No bruises, bleeding, swollen lymph nodes, anemia.      OBJECTIVE:   The patient appears well, alert, oriented x 3, in no distress.  /86   Wt 87.1 kg (192 lb)   LMP 04/30/2022   BMI 31.95 kg/m²   NECK: no thyromegaly, trachea midline  SKIN: no acne, striae, hirsutism  BREAST EXAM: not examined  ABDOMEN: no hernias, masses, or hepatosplenomegaly  GENITALIA: normal external genitalia, no erythema, no discharge  URETHRA: normal urethra, normal urethral meatus  VAGINA: Normal  CERVIX: no lesions or cervical motion tenderness  UTERUS: normal  ADNEXA: normal adnexa and no mass, fullness, tenderness      ASSESSMENT:   well woman  no contraindication to begin use of oral contraceptives  1. Encounter for surveillance of contraceptive pills    2. Screening for STD (sexually transmitted disease)    3. Well woman exam with routine gynecological exam        PLAN:   pap smear  additional lab tests per orders  return annually or prn  Orders Placed This Encounter    levonorgestrel-ethinyl estradiol (AVIANE,ALESSE,LESSINA) 0.1-20 mg-mcg per tablet     The use of the oral contraceptive has been fully discussed with the patient. This includes the proper method to initiate and continue the pills, the need for regular compliance to ensure adequate contraceptive effect, the physiology which make the pill effective, the instructions for what to do in event of a missed pill, and warnings about anticipated minor side effects such as breakthrough spotting, nausea, breast tenderness, weight changes, acne, headaches, etc.  She has been told of the more serious potential side effects such as MI, stroke, and deep vein thrombosis, all of which are very unlikely.  She has been asked to report any signs of such serious problems immediately.  She should back up the pill with a condom during any cycle in which antibiotics are prescribed, and  during the first cycle as well. The need for additional protection, such as a condom, to prevent exposure to sexually transmitted diseases has also been discussed- the patient has been clearly reminded that OCP's cannot protect her against diseases such as HIV and others. She understands and wishes to take the medication as prescribed.

## 2022-05-12 ENCOUNTER — TELEPHONE (OUTPATIENT)
Dept: OBSTETRICS AND GYNECOLOGY | Facility: HOSPITAL | Age: 27
End: 2022-05-12
Payer: MEDICAID

## 2022-05-12 LAB
C TRACH DNA SPEC QL NAA+PROBE: DETECTED
N GONORRHOEA DNA SPEC QL NAA+PROBE: NOT DETECTED

## 2022-05-12 RX ORDER — AZITHROMYCIN 500 MG/1
1000 TABLET, FILM COATED ORAL ONCE
Qty: 2 TABLET | Refills: 0 | Status: SHIPPED | OUTPATIENT
Start: 2022-05-12 | End: 2022-05-12

## 2022-05-16 LAB
CLINICAL INFO: NORMAL
CYTO CVX: NORMAL
CYTOLOGIST CVX/VAG CYTO: NORMAL
CYTOLOGIST CVX/VAG CYTO: NORMAL
CYTOLOGY CMNT CVX/VAG CYTO-IMP: NORMAL
CYTOLOGY PAP THIN PREP EXPLANATION: NORMAL
DATE OF PREVIOUS PAP: NO
DATE PREVIOUS BX: NO
GEN CATEG CVX/VAG CYTO-IMP: NORMAL
LMP START DATE: NORMAL
MICROORGANISM CVX/VAG CYTO: NORMAL
PATHOLOGIST CVX/VAG CYTO: NORMAL
SERVICE CMNT-IMP: NORMAL
SPECIMEN SOURCE CVX/VAG CYTO: NORMAL
STAT OF ADQ CVX/VAG CYTO-IMP: NORMAL

## 2022-05-26 ENCOUNTER — OFFICE VISIT (OUTPATIENT)
Dept: OBSTETRICS AND GYNECOLOGY | Facility: CLINIC | Age: 27
End: 2022-05-26
Payer: MEDICAID

## 2022-05-26 VITALS — DIASTOLIC BLOOD PRESSURE: 74 MMHG | SYSTOLIC BLOOD PRESSURE: 118 MMHG | BODY MASS INDEX: 32.12 KG/M2 | WEIGHT: 193 LBS

## 2022-05-26 DIAGNOSIS — Z11.3 SCREENING FOR STD (SEXUALLY TRANSMITTED DISEASE): ICD-10-CM

## 2022-05-26 DIAGNOSIS — Z20.2 STD EXPOSURE: ICD-10-CM

## 2022-05-26 DIAGNOSIS — A74.9 CHLAMYDIA INFECTION: Primary | ICD-10-CM

## 2022-05-26 PROCEDURE — 3074F SYST BP LT 130 MM HG: CPT | Mod: CPTII,,, | Performed by: OBSTETRICS & GYNECOLOGY

## 2022-05-26 PROCEDURE — 1160F RVW MEDS BY RX/DR IN RCRD: CPT | Mod: CPTII,,, | Performed by: OBSTETRICS & GYNECOLOGY

## 2022-05-26 PROCEDURE — 1160F PR REVIEW ALL MEDS BY PRESCRIBER/CLIN PHARMACIST DOCUMENTED: ICD-10-PCS | Mod: CPTII,,, | Performed by: OBSTETRICS & GYNECOLOGY

## 2022-05-26 PROCEDURE — 99999 PR PBB SHADOW E&M-EST. PATIENT-LVL III: ICD-10-PCS | Mod: PBBFAC,,, | Performed by: OBSTETRICS & GYNECOLOGY

## 2022-05-26 PROCEDURE — 87491 CHLMYD TRACH DNA AMP PROBE: CPT | Performed by: OBSTETRICS & GYNECOLOGY

## 2022-05-26 PROCEDURE — 99213 PR OFFICE/OUTPT VISIT, EST, LEVL III, 20-29 MIN: ICD-10-PCS | Mod: S$PBB,,, | Performed by: OBSTETRICS & GYNECOLOGY

## 2022-05-26 PROCEDURE — 99213 OFFICE O/P EST LOW 20 MIN: CPT | Mod: S$PBB,,, | Performed by: OBSTETRICS & GYNECOLOGY

## 2022-05-26 PROCEDURE — 3078F DIAST BP <80 MM HG: CPT | Mod: CPTII,,, | Performed by: OBSTETRICS & GYNECOLOGY

## 2022-05-26 PROCEDURE — 1159F PR MEDICATION LIST DOCUMENTED IN MEDICAL RECORD: ICD-10-PCS | Mod: CPTII,,, | Performed by: OBSTETRICS & GYNECOLOGY

## 2022-05-26 PROCEDURE — 3008F PR BODY MASS INDEX (BMI) DOCUMENTED: ICD-10-PCS | Mod: CPTII,,, | Performed by: OBSTETRICS & GYNECOLOGY

## 2022-05-26 PROCEDURE — 3078F PR MOST RECENT DIASTOLIC BLOOD PRESSURE < 80 MM HG: ICD-10-PCS | Mod: CPTII,,, | Performed by: OBSTETRICS & GYNECOLOGY

## 2022-05-26 PROCEDURE — 87591 N.GONORRHOEAE DNA AMP PROB: CPT | Performed by: OBSTETRICS & GYNECOLOGY

## 2022-05-26 PROCEDURE — 99999 PR PBB SHADOW E&M-EST. PATIENT-LVL III: CPT | Mod: PBBFAC,,, | Performed by: OBSTETRICS & GYNECOLOGY

## 2022-05-26 PROCEDURE — 99213 OFFICE O/P EST LOW 20 MIN: CPT | Mod: PBBFAC,PN | Performed by: OBSTETRICS & GYNECOLOGY

## 2022-05-26 PROCEDURE — 3074F PR MOST RECENT SYSTOLIC BLOOD PRESSURE < 130 MM HG: ICD-10-PCS | Mod: CPTII,,, | Performed by: OBSTETRICS & GYNECOLOGY

## 2022-05-26 PROCEDURE — 3008F BODY MASS INDEX DOCD: CPT | Mod: CPTII,,, | Performed by: OBSTETRICS & GYNECOLOGY

## 2022-05-26 PROCEDURE — 1159F MED LIST DOCD IN RCRD: CPT | Mod: CPTII,,, | Performed by: OBSTETRICS & GYNECOLOGY

## 2022-05-26 RX ORDER — TERCONAZOLE 8 MG/G
1 CREAM VAGINAL NIGHTLY
Qty: 20 G | Refills: 0 | Status: SHIPPED | OUTPATIENT
Start: 2022-05-26 | End: 2022-05-29

## 2022-05-26 NOTE — PROGRESS NOTES
CC: h/o CT    HPI:   26 y.o. female  complains of having a yeast infxn after taking meds for + CT.   She has not been sexually active.  She uses oral contraceptives (estrogen/progesterone) for contraception.    ROS:  GENERAL: No fever, chills, fatigability or weight loss.  VULVAR: No pain, no lesions and no itching.  VAGINAL: No relaxation, no itching, no discharge, no abnormal bleeding and no lesions.  ABDOMEN: No abdominal pain. Denies nausea. Denies vomiting. No diarrhea. No constipation  BREAST: Denies pain. No lumps. No discharge.  URINARY: No incontinence, no nocturia, no frequency and no dysuria.  CARDIOVASCULAR: No chest pain. No shortness of breath. No leg cramps.  NEUROLOGICAL: No headaches. No vision changes.        Vitals:    22 1317   BP: 118/74         OBJECTIVE:   She appears well, afebrile.  Abdomen: benign, soft, nontender, no masses.  VULVA: Normal external female genitalia, normal urethra, normal urethral meatus  VAGINA:no lesions, no d/c  CERVIXNormal  UTERUSnormal  ADNEXAnormal adnexa and no mass, fullness, tenderness        ASSESSMENT:   rule out GC or chlamydia    PLAN:      ROV prn if symptoms persist or worsen.  terazol sent in

## 2022-05-29 LAB
C TRACH DNA SPEC QL NAA+PROBE: NOT DETECTED
N GONORRHOEA DNA SPEC QL NAA+PROBE: NOT DETECTED

## 2022-06-05 ENCOUNTER — PATIENT MESSAGE (OUTPATIENT)
Dept: OBSTETRICS AND GYNECOLOGY | Facility: CLINIC | Age: 27
End: 2022-06-05
Payer: MEDICAID

## 2022-06-06 NOTE — TELEPHONE ENCOUNTER
Please advise. Patient has not has cycle since seeing you and was instructed to start BC after she started her next cycle. Do you need her to be seen or want to do HGC? She said she had a positive and negative upt

## 2022-06-07 NOTE — TELEPHONE ENCOUNTER
Find out when her LMP was and schedule her for an ov next available to confirm pregnancy or discuss amenorhea

## 2022-06-15 NOTE — TELEPHONE ENCOUNTER
----- Message from Teodora Archer sent at 3/8/2018  3:21 PM CST -----  Contact: Mitzy w/ [facility] [phone]  Patient is admitted and the nurse practitioner requesting to speak with Dr. Moser. Please advise.   Return to the ER if your chest pain changes in quality or location, or becomes associated with shortness of breath, lightheadedness, vomiting or sweating  Take the Naprosyn twice daily for the next 5-10 days

## 2022-06-22 ENCOUNTER — OFFICE VISIT (OUTPATIENT)
Dept: ORTHOPEDICS | Facility: CLINIC | Age: 27
End: 2022-06-22
Payer: MEDICAID

## 2022-06-22 VITALS — HEIGHT: 65 IN | WEIGHT: 193 LBS | BODY MASS INDEX: 32.15 KG/M2

## 2022-06-22 DIAGNOSIS — R22.32 FINGER MASS, LEFT: Primary | ICD-10-CM

## 2022-06-22 PROCEDURE — 99999 PR PBB SHADOW E&M-EST. PATIENT-LVL III: ICD-10-PCS | Mod: PBBFAC,,, | Performed by: ORTHOPAEDIC SURGERY

## 2022-06-22 PROCEDURE — 3008F PR BODY MASS INDEX (BMI) DOCUMENTED: ICD-10-PCS | Mod: CPTII,,, | Performed by: ORTHOPAEDIC SURGERY

## 2022-06-22 PROCEDURE — 99024 POSTOP FOLLOW-UP VISIT: CPT | Mod: ,,, | Performed by: ORTHOPAEDIC SURGERY

## 2022-06-22 PROCEDURE — 99999 PR PBB SHADOW E&M-EST. PATIENT-LVL III: CPT | Mod: PBBFAC,,, | Performed by: ORTHOPAEDIC SURGERY

## 2022-06-22 PROCEDURE — 1159F PR MEDICATION LIST DOCUMENTED IN MEDICAL RECORD: ICD-10-PCS | Mod: CPTII,,, | Performed by: ORTHOPAEDIC SURGERY

## 2022-06-22 PROCEDURE — 3008F BODY MASS INDEX DOCD: CPT | Mod: CPTII,,, | Performed by: ORTHOPAEDIC SURGERY

## 2022-06-22 PROCEDURE — 99213 OFFICE O/P EST LOW 20 MIN: CPT | Mod: PBBFAC,PN | Performed by: ORTHOPAEDIC SURGERY

## 2022-06-22 PROCEDURE — 99024 PR POST-OP FOLLOW-UP VISIT: ICD-10-PCS | Mod: ,,, | Performed by: ORTHOPAEDIC SURGERY

## 2022-06-22 PROCEDURE — 1159F MED LIST DOCD IN RCRD: CPT | Mod: CPTII,,, | Performed by: ORTHOPAEDIC SURGERY

## 2022-06-22 RX ORDER — DICLOFENAC SODIUM 10 MG/G
2 GEL TOPICAL 3 TIMES DAILY
Qty: 100 G | Refills: 1 | Status: SHIPPED | OUTPATIENT
Start: 2022-06-22 | End: 2022-06-23

## 2022-06-22 NOTE — PROGRESS NOTES
Subjective:      Patient ID: Ray Knox is a 26 y.o. female.  Chief Complaint: Post-op Evaluation (L Index Mass Removal )      HPI  Ray Knox is a  26 y.o. female presenting today for post op visit.  She is s/p excision mass left index finger  She is doing well but still has little bit of soreness in the finger  We tried some Voltaren gel she only had samples and not a refill on that  .     Review of patient's allergies indicates:   Allergen Reactions    Lisinopril Other (See Comments), Shortness Of Breath and Swelling     angioedema           Current Outpatient Medications   Medication Sig Dispense Refill    albuterol (PROVENTIL) 2.5 mg /3 mL (0.083 %) nebulizer solution 1 mL.      albuterol (PROVENTIL/VENTOLIN HFA) 90 mcg/actuation inhaler Inhale 2 puffs into the lungs every 4 (four) hours as needed.      dicyclomine (BENTYL) 20 mg tablet Take 1 tablet (20 mg total) by mouth 2 (two) times daily. 20 tablet 0    ferrous sulfate 325 (65 FE) MG EC tablet Take by mouth once daily.      HYDROcodone-acetaminophen (NORCO) 5-325 mg per tablet Take 1 tablet by mouth every 8 (eight) hours as needed for Pain. 6 tablet 0    ondansetron (ZOFRAN) 4 MG tablet Take 2 tablets (8 mg total) by mouth every 6 (six) hours as needed for Nausea. 12 tablet 0    diclofenac sodium (VOLTAREN) 1 % Gel Apply 2 g topically 3 (three) times daily. 100 g 1    levonorgestrel-ethinyl estradiol (AVIANE,ALESSE,LESSINA) 0.1-20 mg-mcg per tablet Take 1 tablet by mouth once daily. 28 tablet 12    propranoloL (INDERAL) 40 MG tablet Take 40 mg by mouth.      valACYclovir (VALTREX) 500 MG tablet Take 1 tablet (500 mg total) by mouth once daily. 30 tablet 12     No current facility-administered medications for this visit.       Past Medical History:   Diagnosis Date    Anemia     Asthma     Bilateral renal cysts     Breast disorder     Congenital absence of one kidney     Kidney cysts     pt born with 1 kidney       Past Surgical  "History:   Procedure Laterality Date    FINGER MASS EXCISION Left 3/8/2022    Procedure: EXCISION, MASS, FINGER;  Surgeon: Bk Doty Jr., MD;  Location: [facility];  Service: Orthopedics;  Laterality: Left;       OBJECTIVE:   PHYSICAL EXAM:  Height: 5' 5" (165.1 cm) Weight: 87.5 kg (193 lb)  Vitals:    06/22/22 1458   Weight: 87.5 kg (193 lb)   Height: 5' 5" (1.651 m)   PainSc: 0-No pain     Ortho/SPM Exam  Examination of the left index finger incision well healed slight swelling minimal tenderness range of motion full no triggering  Sensation intact    RADIOGRAPHS:  None  Comments: I have personally reviewed the imaging and I agree with the above radiologist's report.    ASSESSMENT/PLAN:     IMPRESSION:  Status post excision mass left index finger    PLAN:  I recommend she continue Voltaren gel topical for use on the finger t.i.d. I have called that in for her  Advance activities as tolerated    FOLLOW UP:  6 weeks    Disclaimer: This note has been generated using voice-recognition software. There may be typographical errors that have been missed during proof-reading.    "

## 2022-06-23 ENCOUNTER — OFFICE VISIT (OUTPATIENT)
Dept: OBSTETRICS AND GYNECOLOGY | Facility: CLINIC | Age: 27
End: 2022-06-23
Payer: MEDICAID

## 2022-06-23 VITALS — BODY MASS INDEX: 32.45 KG/M2 | WEIGHT: 195 LBS | DIASTOLIC BLOOD PRESSURE: 76 MMHG | SYSTOLIC BLOOD PRESSURE: 116 MMHG

## 2022-06-23 DIAGNOSIS — N97.0 ANOVULATION: Primary | ICD-10-CM

## 2022-06-23 DIAGNOSIS — N92.6 MISSED PERIOD: ICD-10-CM

## 2022-06-23 PROCEDURE — 3008F PR BODY MASS INDEX (BMI) DOCUMENTED: ICD-10-PCS | Mod: CPTII,,, | Performed by: OBSTETRICS & GYNECOLOGY

## 2022-06-23 PROCEDURE — 3074F SYST BP LT 130 MM HG: CPT | Mod: CPTII,,, | Performed by: OBSTETRICS & GYNECOLOGY

## 2022-06-23 PROCEDURE — 3074F PR MOST RECENT SYSTOLIC BLOOD PRESSURE < 130 MM HG: ICD-10-PCS | Mod: CPTII,,, | Performed by: OBSTETRICS & GYNECOLOGY

## 2022-06-23 PROCEDURE — 99213 OFFICE O/P EST LOW 20 MIN: CPT | Mod: S$PBB,,, | Performed by: OBSTETRICS & GYNECOLOGY

## 2022-06-23 PROCEDURE — 1159F MED LIST DOCD IN RCRD: CPT | Mod: CPTII,,, | Performed by: OBSTETRICS & GYNECOLOGY

## 2022-06-23 PROCEDURE — 3008F BODY MASS INDEX DOCD: CPT | Mod: CPTII,,, | Performed by: OBSTETRICS & GYNECOLOGY

## 2022-06-23 PROCEDURE — 1160F RVW MEDS BY RX/DR IN RCRD: CPT | Mod: CPTII,,, | Performed by: OBSTETRICS & GYNECOLOGY

## 2022-06-23 PROCEDURE — 1160F PR REVIEW ALL MEDS BY PRESCRIBER/CLIN PHARMACIST DOCUMENTED: ICD-10-PCS | Mod: CPTII,,, | Performed by: OBSTETRICS & GYNECOLOGY

## 2022-06-23 PROCEDURE — 1159F PR MEDICATION LIST DOCUMENTED IN MEDICAL RECORD: ICD-10-PCS | Mod: CPTII,,, | Performed by: OBSTETRICS & GYNECOLOGY

## 2022-06-23 PROCEDURE — 3078F DIAST BP <80 MM HG: CPT | Mod: CPTII,,, | Performed by: OBSTETRICS & GYNECOLOGY

## 2022-06-23 PROCEDURE — 3078F PR MOST RECENT DIASTOLIC BLOOD PRESSURE < 80 MM HG: ICD-10-PCS | Mod: CPTII,,, | Performed by: OBSTETRICS & GYNECOLOGY

## 2022-06-23 PROCEDURE — 99213 OFFICE O/P EST LOW 20 MIN: CPT | Mod: PBBFAC,PN | Performed by: OBSTETRICS & GYNECOLOGY

## 2022-06-23 PROCEDURE — 99999 PR PBB SHADOW E&M-EST. PATIENT-LVL III: CPT | Mod: PBBFAC,,, | Performed by: OBSTETRICS & GYNECOLOGY

## 2022-06-23 PROCEDURE — 99213 PR OFFICE/OUTPT VISIT, EST, LEVL III, 20-29 MIN: ICD-10-PCS | Mod: S$PBB,,, | Performed by: OBSTETRICS & GYNECOLOGY

## 2022-06-23 PROCEDURE — 99999 PR PBB SHADOW E&M-EST. PATIENT-LVL III: ICD-10-PCS | Mod: PBBFAC,,, | Performed by: OBSTETRICS & GYNECOLOGY

## 2022-06-23 NOTE — PROGRESS NOTES
CC: Annual check-up    SUBJECTIVE:   26 y.o. female   for annual routine Pap and checkup. Patient's last menstrual period was 2022 (approximate)..  She complains of no cycle in May. Was supposed to start ocp's but cycle never came. usu monthly at the end of the month.    upt - in office today, wants to know why she did not have her cycle    Past Medical History:   Diagnosis Date    Anemia     Asthma     Bilateral renal cysts     Breast disorder     Congenital absence of one kidney     Kidney cysts     pt born with 1 kidney     Past Surgical History:   Procedure Laterality Date    FINGER MASS EXCISION Left 3/8/2022    Procedure: EXCISION, MASS, FINGER;  Surgeon: Bk Doty Jr., MD;  Location: [facility];  Service: Orthopedics;  Laterality: Left;     Social History     Socioeconomic History    Marital status: Single   Tobacco Use    Smoking status: Current Every Day Smoker     Years: 0.50    Smokeless tobacco: Never Used   Substance and Sexual Activity    Alcohol use: Yes     Comment: OCC    Drug use: No    Sexual activity: Yes     Partners: Male     Birth control/protection: None     Comment: Not current on Depo     Family History   Problem Relation Age of Onset    Diabetes Paternal Aunt     Hypertension Maternal Grandfather     Cancer Paternal Grandmother     Breast cancer Paternal Grandmother     Leukemia Paternal Grandmother     Kidney disease Maternal Grandmother     Hypertension Mother      OB History    Para Term  AB Living   1 0 0 0 0 0   SAB IAB Ectopic Multiple Live Births   0 0 0 0 0      # Outcome Date GA Lbr Jarad/2nd Weight Sex Delivery Anes PTL Lv   1                   Current Outpatient Medications   Medication Sig Dispense Refill    albuterol (PROVENTIL) 2.5 mg /3 mL (0.083 %) nebulizer solution 1 mL.      albuterol (PROVENTIL/VENTOLIN HFA) 90 mcg/actuation inhaler Inhale 2 puffs into the lungs every 4 (four) hours as needed.      dicyclomine  (BENTYL) 20 mg tablet Take 1 tablet (20 mg total) by mouth 2 (two) times daily. 20 tablet 0    ferrous sulfate 325 (65 FE) MG EC tablet Take by mouth once daily.      HYDROcodone-acetaminophen (NORCO) 5-325 mg per tablet Take 1 tablet by mouth every 8 (eight) hours as needed for Pain. 6 tablet 0    levonorgestrel-ethinyl estradiol (AVIANE,ALESSE,LESSINA) 0.1-20 mg-mcg per tablet Take 1 tablet by mouth once daily. 28 tablet 12    ondansetron (ZOFRAN) 4 MG tablet Take 2 tablets (8 mg total) by mouth every 6 (six) hours as needed for Nausea. 12 tablet 0    valACYclovir (VALTREX) 500 MG tablet Take 1 tablet (500 mg total) by mouth once daily. 30 tablet 12     No current facility-administered medications for this visit.     Allergies: Lisinopril     ROS:  Constitutional: no weight loss, weight gain, fever, fatigue  Eyes:  No vision changes, glasses/contacts  ENT/Mouth: No ulcers, sinus problems, ears ringing, headache  Cardiovascular: No inability to lie flat, chest pain, exercise intolerance, swelling, heart palpitations  Respiratory: No wheezing, coughing blood, shortness of breath, or cough  Gastrointestinal: No diarrhea, bloody stool, nausea/vomiting, constipation, gas, hemorrhoids  Genitourinary: No blood in urine, painful urination, urgency of urination, frequency of urination, incomplete emptying, incontinence, abnormal bleeding, painful periods, heavy periods, vaginal discharge, vaginal odor, painful intercourse, sexual problems, bleeding after intercourse.  Musculoskeletal: No muscle weakness  Skin/Breast: No painful breasts, nipple discharge, masses, rash, ulcers  Neurological: No passing out, seizures, numbness, headache  Endocrine: No diabetes, hypothyroid, hyperthyroid, hot flashes, hair loss, abnormal hair growth, ance  Psychiatric: No depression, crying  Hematologic: No bruises, bleeding, swollen lymph nodes, anemia.      OBJECTIVE:   The patient appears well, alert, oriented x 3, in no distress.  BP  116/76 (BP Location: Left arm, Patient Position: Sitting, BP Method: Large (Automatic))   Wt 88.5 kg (195 lb)   LMP 04/30/2022 (Approximate)   BMI 32.45 kg/m²   NECK: no thyromegaly, trachea midline  SKIN: no acne, striae, hirsutism      ASSESSMENT:     1. Anovulation    2. Missed period        PLAN:   Pt couinseled about anovulation  Start ocp's on 1st Sunday in July or after next cycle  Check home upt bf starting ocp's

## 2022-06-30 ENCOUNTER — PATIENT MESSAGE (OUTPATIENT)
Dept: FAMILY MEDICINE | Facility: HOSPITAL | Age: 27
End: 2022-06-30
Payer: MEDICAID

## 2022-07-01 ENCOUNTER — TELEPHONE (OUTPATIENT)
Dept: OBSTETRICS AND GYNECOLOGY | Facility: CLINIC | Age: 27
End: 2022-07-01
Payer: MEDICAID

## 2022-07-01 ENCOUNTER — OFFICE VISIT (OUTPATIENT)
Dept: FAMILY MEDICINE | Facility: HOSPITAL | Age: 27
End: 2022-07-01
Attending: FAMILY MEDICINE
Payer: MEDICAID

## 2022-07-01 VITALS
DIASTOLIC BLOOD PRESSURE: 71 MMHG | HEART RATE: 84 BPM | BODY MASS INDEX: 32.76 KG/M2 | WEIGHT: 196.63 LBS | SYSTOLIC BLOOD PRESSURE: 115 MMHG | HEIGHT: 65 IN

## 2022-07-01 DIAGNOSIS — N94.9 FEMALE GENITAL PROBLEM: Primary | ICD-10-CM

## 2022-07-01 PROCEDURE — 99213 OFFICE O/P EST LOW 20 MIN: CPT | Performed by: STUDENT IN AN ORGANIZED HEALTH CARE EDUCATION/TRAINING PROGRAM

## 2022-07-01 NOTE — PROGRESS NOTES
Clinic Note  [facility]    Subjective:      Ray Knox is a 26 y.o. female with PMHx of HTN, headaches, who came to clinic complaining of an uncomfortable bump on her mons pubis for about 2 weeks. Patient also feels bump to the left of her clitoris. Patient has never had similar issue in the past. Patient denies local warmth or drainage from either location, denies fever, N/V. Patient's sexual history positive for 2 male partners in past month. Patient denies vaginal discharge, burning, itching. Patient endorses remote history of having a boil under arm and thigh.              Review of Systems   Constitutional: Negative for chills and fever.   Respiratory: Negative for cough and shortness of breath.    Cardiovascular: Negative for chest pain.   Gastrointestinal: Negative for heartburn, nausea and vomiting.   Genitourinary: Negative for dysuria and frequency.        Painful bump on mons pubis   Musculoskeletal: Negative for back pain and myalgias.   Skin:        Painful bump on mons pubis, feeling of bump on clitoris   Neurological: Negative for dizziness and headaches.   Psychiatric/Behavioral: Negative for depression. The patient is not nervous/anxious.          Objective:      Vitals:    07/01/22 1048   BP: 115/71   Pulse: 84     Body mass index is 32.72 kg/m².    Physical Exam  Constitutional:       Appearance: Normal appearance. She is normal weight.   Cardiovascular:      Rate and Rhythm: Normal rate and regular rhythm.      Pulses: Normal pulses.      Heart sounds: Normal heart sounds.   Pulmonary:      Effort: Pulmonary effort is normal.      Breath sounds: Normal breath sounds.   Abdominal:      General: Abdomen is flat. Bowel sounds are normal.      Palpations: Abdomen is soft.   Genitourinary:     General: Normal vulva.      Comments: Flesh colored 0.25 cm bump r L mons pubis, no visible lesion detected on genitals  Musculoskeletal:         General: Normal range of motion.   Skin:      General: Skin is warm and dry.   Neurological:      General: No focal deficit present.      Mental Status: She is alert and oriented to person, place, and time.   Psychiatric:         Mood and Affect: Mood normal.         Behavior: Behavior normal.            Assessment:       1. Skin tag of vulva        Plan:       Ray was seen today for cyst.    Diagnoses and all orders for this visit:    Skin tag of vulva  -     Ambulatory referral/consult to Obstetrics / Gynecology; Future              Follow up in:      Les Bryan MD  [facility] PGY-1  07/04/2022

## 2022-07-01 NOTE — TELEPHONE ENCOUNTER
----- Message from Celine Arambula sent at 7/1/2022 11:31 AM CDT -----  Regarding: Appt request  Hello,     Patient has an appt scheduled with Dr. Haynes in 8/26/2022 but would like to be seen sooner if possible. Patient has skin tag that cause her pain that feels like a 10 during episodes. Could you please assist her in scheduling an appt?     Or via Patient portal.      Thanks!

## 2022-07-02 ENCOUNTER — PATIENT MESSAGE (OUTPATIENT)
Dept: OBSTETRICS AND GYNECOLOGY | Facility: CLINIC | Age: 27
End: 2022-07-02
Payer: MEDICAID

## 2022-07-02 ENCOUNTER — HOSPITAL ENCOUNTER (EMERGENCY)
Facility: HOSPITAL | Age: 27
Discharge: HOME | End: 2022-07-02
Attending: EMERGENCY MEDICINE
Payer: MEDICAID

## 2022-07-02 VITALS
TEMPERATURE: 99 F | HEART RATE: 90 BPM | DIASTOLIC BLOOD PRESSURE: 77 MMHG | OXYGEN SATURATION: 99 % | BODY MASS INDEX: 32.65 KG/M2 | HEIGHT: 65 IN | SYSTOLIC BLOOD PRESSURE: 133 MMHG | RESPIRATION RATE: 16 BRPM | WEIGHT: 196 LBS

## 2022-07-02 DIAGNOSIS — L02.91 ABSCESS: Primary | ICD-10-CM

## 2022-07-02 LAB — B-HCG UR QL: NEGATIVE

## 2022-07-02 PROCEDURE — 99284 EMERGENCY DEPT VISIT MOD MDM: CPT | Mod: ER

## 2022-07-02 PROCEDURE — 25000003 PHARM REV CODE 250: Mod: ER | Performed by: PHYSICIAN ASSISTANT

## 2022-07-02 PROCEDURE — 81025 URINE PREGNANCY TEST: CPT | Mod: ER | Performed by: EMERGENCY MEDICINE

## 2022-07-02 RX ORDER — TRAMADOL HYDROCHLORIDE AND ACETAMINOPHEN 37.5; 325 MG/1; MG/1
1 TABLET, FILM COATED ORAL EVERY 12 HOURS PRN
Qty: 6 TABLET | Refills: 0 | Status: SHIPPED | OUTPATIENT
Start: 2022-07-02 | End: 2022-07-05

## 2022-07-02 RX ORDER — KETOROLAC TROMETHAMINE 10 MG/1
10 TABLET, FILM COATED ORAL
Status: COMPLETED | OUTPATIENT
Start: 2022-07-02 | End: 2022-07-02

## 2022-07-02 RX ORDER — SULFAMETHOXAZOLE AND TRIMETHOPRIM 800; 160 MG/1; MG/1
1 TABLET ORAL 2 TIMES DAILY
Qty: 14 TABLET | Refills: 0 | Status: SHIPPED | OUTPATIENT
Start: 2022-07-02 | End: 2022-07-09

## 2022-07-02 RX ADMIN — KETOROLAC TROMETHAMINE 10 MG: 10 TABLET, FILM COATED ORAL at 09:07

## 2022-07-03 NOTE — DISCHARGE INSTRUCTIONS
You are instructed to follow-up with her primary care provider for re-evaluation and to return to the emergency department immediately for any new or worsening symptoms.

## 2022-07-03 NOTE — ED PROVIDER NOTES
Encounter Date: 7/2/2022       History     Chief Complaint   Patient presents with    Abscess     Patient reports a abscess to right inner thigh and pubis area x 1 week. Pain to both areas with no drainage noted per the patient. Denies fever.      26-year-old female presents emergency department for evaluation of one-week history of small abscess to her groin.  She reports noticing a small ingrown hair approximately 1 week ago which is been constant in size and pain since that time.  She reports that it is an achy painful bump that is worse with palpation.  She denies any fever, headache, dizziness, neck pain, chest pain, shortness of breath, cough, abdominal pain, nausea, vomiting, flank pain, dysuria, vaginal bleeding, vaginal discharge or generalized groin rash.  She reports that she had her gynecologist evaluate the bump yesterday but the gynecologist reports that she was unsure of what to do.  Gynecologist reports she also saw a small abscess to the right thigh, however patient reports that she does not see this abscess.  She reports that she has been attempting treatment at home with Tylenol Motrin with no relief of symptoms.  She reports her last dose of ibuprofen was taken early this morning.        Review of patient's allergies indicates:   Allergen Reactions    Lisinopril Other (See Comments), Shortness Of Breath and Swelling     angioedema       Past Medical History:   Diagnosis Date    Anemia     Asthma     Bilateral renal cysts     Breast disorder     Congenital absence of one kidney     Kidney cysts     pt born with 1 kidney     Past Surgical History:   Procedure Laterality Date    FINGER MASS EXCISION Left 3/8/2022    Procedure: EXCISION, MASS, FINGER;  Surgeon: Bk Doty Jr., MD;  Location: [facility];  Service: Orthopedics;  Laterality: Left;     Family History   Problem Relation Age of Onset    Diabetes Paternal Aunt     Hypertension Maternal Grandfather     Cancer Paternal  Grandmother     Breast cancer Paternal Grandmother     Leukemia Paternal Grandmother     Kidney disease Maternal Grandmother     Hypertension Mother      Social History     Tobacco Use    Smoking status: Current Every Day Smoker     Years: 0.50    Smokeless tobacco: Never Used   Substance Use Topics    Alcohol use: Yes     Comment: OCC    Drug use: No     Review of Systems   Constitutional: Negative for activity change, appetite change and fever.   HENT: Negative for congestion, ear discharge, ear pain, rhinorrhea and sore throat.    Eyes: Negative for photophobia and visual disturbance.   Respiratory: Negative for shortness of breath.    Cardiovascular: Negative for chest pain.   Gastrointestinal: Negative for abdominal pain, diarrhea, nausea and vomiting.   Genitourinary: Negative for decreased urine volume and dysuria.   Musculoskeletal: Negative for back pain and neck pain.   Skin: Positive for wound.   Neurological: Negative for dizziness, syncope and headaches.       Physical Exam     Initial Vitals [07/02/22 1951]   BP Pulse Resp Temp SpO2   133/77 90 16 98.5 °F (36.9 °C) 99 %      MAP       --         Physical Exam    Nursing note and vitals reviewed.  Constitutional: She appears well-developed and well-nourished. She is not diaphoretic. No distress.   HENT:   Head: Normocephalic and atraumatic.   Right Ear: External ear normal.   Left Ear: External ear normal.   Nose: Nose normal.   Mouth/Throat: Oropharynx is clear and moist.   Eyes: Conjunctivae are normal.   Neck: Neck supple.   Normal range of motion.  Cardiovascular: Normal rate, regular rhythm and normal heart sounds.   Pulmonary/Chest: Breath sounds normal. No respiratory distress. She has no wheezes. She has no rhonchi. She has no rales. She exhibits no tenderness.   Abdominal: Abdomen is soft. Bowel sounds are normal. She exhibits no distension. There is no abdominal tenderness. There is no rebound.   Musculoskeletal:      Cervical back:  Normal range of motion and neck supple.     Neurological: She is alert and oriented to person, place, and time.   Skin: Skin is warm and dry.        Psychiatric: She has a normal mood and affect.         ED Course   Procedures  Labs Reviewed   PREGNANCY TEST, URINE RAPID    Narrative:     Specimen Source->Urine          Imaging Results    None          Medications   ketorolac tablet 10 mg (10 mg Oral Given 7/2/22 4925)     Medical Decision Making:   Initial Assessment:   26-year-old female presents emergency department for evaluation of small abscess.  Physical exam reveals a nontoxic-appearing female in no acute distress.  Patient is afebrile and vital signs within normal limits.  Neck is supple, nontender to palpation.  Lungs clear to auscultation bilaterally.  Abdominal exam reveals soft abdomen, nontender to palpation.  No CVA tenderness noted.  Skin exam reveals Small 0.5 x 0.5 cm early abscess noted to the mons pubis.  No fluctuance, vesicles, pustules, ulceration or surrounding erythema/edema noted to the upper lower extremities bilaterally.  No abscess noted over the thighs or inguinal region bilaterally.  Differential Diagnosis:   Small early abscess   I carefully considered but doubt serious etiology including deep space abscess or surrounding cellulitis  ED Management:  Will prescribed Bactrim and warm compresses.  No indication for incision and drainage at this time.  Instructed patient to follow up primary care provider for re-evaluation and to return to the emergency department immediately for any new or worsening symptoms.  Discussed this patient at length with Dr. Jimenez who is in agreement course of treatment.                      Clinical Impression:   Final diagnoses:  [L02.91] Abscess (Primary)          ED Disposition Condition    Discharge Stable        ED Prescriptions     Medication Sig Dispense Start Date End Date Auth. Provider    sulfamethoxazole-trimethoprim 800-160mg (BACTRIM DS) 800-160  mg Tab Take 1 tablet by mouth 2 (two) times daily. for 7 days 14 tablet 7/2/2022 7/9/2022 Bettie Anand PA-C    tramadol-acetaminophen 37.5-325 mg (ULTRACET) 37.5-325 mg Tab Take 1 tablet by mouth every 12 (twelve) hours as needed for Pain. 6 tablet 7/2/2022 7/5/2022 Bettie Anand PA-C        Follow-up Information    None          Bettie Anand PA-C  07/03/22 8573

## 2022-07-04 NOTE — PROGRESS NOTES
Clinic Note  [facility]    Subjective:      Ray Knox is a 26 y.o. female with PMHx of HTN, headaches, who came to clinic complaining of an uncomfortable bump on her mons pubis for about 2 weeks. Patient also feels bump to the left of her clitoris. Patient has never had similar issue in the past. Patient denies local warmth or drainage from either location, denies fever, N/V. Patient's sexual history positive for 2 male partners in past month. Patient denies vaginal discharge, burning, itching. Patient endorses remote history of having a boil under arm and thigh.      Review of Systems   Constitutional: Negative for chills and fever.   Respiratory: Negative for cough and shortness of breath.    Cardiovascular: Negative for chest pain.   Gastrointestinal: Negative for heartburn, nausea and vomiting.   Genitourinary: Negative for dysuria and frequency.        Painful bump on mons pubis   Musculoskeletal: Negative for back pain and myalgias.   Skin:        Painful bump on mons pubis, feeling of bump on clitoris   Neurological: Negative for dizziness and headaches.   Psychiatric/Behavioral: Negative for depression. The patient is not nervous/anxious.          Objective:      Vitals:    07/01/22 1048   BP: 115/71   Pulse: 84     Body mass index is 32.72 kg/m².    Physical Exam  Constitutional:       Appearance: Normal appearance. She is normal weight.   Cardiovascular:      Rate and Rhythm: Normal rate and regular rhythm.      Pulses: Normal pulses.      Heart sounds: Normal heart sounds.   Pulmonary:      Effort: Pulmonary effort is normal.      Breath sounds: Normal breath sounds.   Abdominal:      General: Abdomen is flat. Bowel sounds are normal.      Palpations: Abdomen is soft.   Genitourinary:     General: Normal vulva.      Comments: Flesh colored 0.25 cm bump r L mons pubis, To L lateral clitoris, possible small linear lesion excoriation vs small fissure, does not appear  inflamed.  Musculoskeletal:         General: Normal range of motion.   Skin:     General: Skin is warm and dry.   Neurological:      General: No focal deficit present.      Mental Status: She is alert and oriented to person, place, and time.   Psychiatric:         Mood and Affect: Mood normal.         Behavior: Behavior normal.            Assessment:       1. Female genital problem - possible cyst vs early abscess on mon pubis, very small, non-inflamed. To L lateral clitoris, possible small linear lesion excoriation vs small fissure. Does not feel inflamed. Don't feel at this time that there is any abscess to drain, clitoral area at this time appearing grossly WNL.       Plan:       Ray was seen today for cyst.    Diagnoses and all orders for this visit:    Female genital problem  -     Ambulatory referral/consult to Obstetrics / Gynecology; Future       Case discussed with staff: Dr. Yarely Bryan MD  [facility] PGY-2  07/01/2022

## 2022-08-01 ENCOUNTER — HOSPITAL ENCOUNTER (EMERGENCY)
Facility: HOSPITAL | Age: 27
Discharge: HOME | End: 2022-08-01
Attending: EMERGENCY MEDICINE
Payer: MEDICAID

## 2022-08-01 VITALS
WEIGHT: 194 LBS | DIASTOLIC BLOOD PRESSURE: 67 MMHG | RESPIRATION RATE: 17 BRPM | SYSTOLIC BLOOD PRESSURE: 137 MMHG | OXYGEN SATURATION: 100 % | HEART RATE: 90 BPM | TEMPERATURE: 99 F | BODY MASS INDEX: 32.28 KG/M2

## 2022-08-01 DIAGNOSIS — R10.9 ABDOMINAL PAIN, UNSPECIFIED ABDOMINAL LOCATION: ICD-10-CM

## 2022-08-01 DIAGNOSIS — R11.10 VOMITING, INTRACTABILITY OF VOMITING NOT SPECIFIED, PRESENCE OF NAUSEA NOT SPECIFIED, UNSPECIFIED VOMITING TYPE: Primary | ICD-10-CM

## 2022-08-01 DIAGNOSIS — N92.1 MENORRHAGIA WITH IRREGULAR CYCLE: ICD-10-CM

## 2022-08-01 LAB
ALBUMIN SERPL BCP-MCNC: 4.1 G/DL (ref 3.5–5.2)
ALP SERPL-CCNC: 45 U/L (ref 38–126)
ALT SERPL W/O P-5'-P-CCNC: 12 U/L (ref 10–44)
ANION GAP SERPL CALC-SCNC: 9 MMOL/L (ref 8–16)
AST SERPL-CCNC: 17 U/L (ref 15–46)
B-HCG UR QL: NEGATIVE
BASOPHILS # BLD AUTO: 0.05 K/UL (ref 0–0.2)
BASOPHILS NFR BLD: 0.6 % (ref 0–1.9)
BILIRUB SERPL-MCNC: 0.5 MG/DL (ref 0.1–1)
BILIRUB UR QL STRIP: NEGATIVE
CALCIUM SERPL-MCNC: 8.4 MG/DL (ref 8.7–10.5)
CHLORIDE SERPL-SCNC: 107 MMOL/L (ref 95–110)
CLARITY UR REFRACT.AUTO: ABNORMAL
CO2 SERPL-SCNC: 22 MMOL/L (ref 23–29)
COLOR UR AUTO: YELLOW
CREAT SERPL-MCNC: 0.95 MG/DL (ref 0.5–1.4)
DIFFERENTIAL METHOD: ABNORMAL
EOSINOPHIL # BLD AUTO: 0.3 K/UL (ref 0–0.5)
EOSINOPHIL NFR BLD: 3.6 % (ref 0–8)
ERYTHROCYTE [DISTWIDTH] IN BLOOD BY AUTOMATED COUNT: 14.9 % (ref 11.5–14.5)
EST. GFR  (NO RACE VARIABLE): >60 ML/MIN/1.73 M^2
GLUCOSE SERPL-MCNC: 119 MG/DL (ref 70–110)
GLUCOSE UR QL STRIP: NEGATIVE
HCT VFR BLD AUTO: 34.4 % (ref 37–48.5)
HGB BLD-MCNC: 10.8 G/DL (ref 12–16)
HGB UR QL STRIP: ABNORMAL
IMM GRANULOCYTES # BLD AUTO: 0.02 K/UL (ref 0–0.04)
IMM GRANULOCYTES NFR BLD AUTO: 0.2 % (ref 0–0.5)
KETONES UR QL STRIP: NEGATIVE
LEUKOCYTE ESTERASE UR QL STRIP: NEGATIVE
LIPASE SERPL-CCNC: 39 U/L (ref 23–300)
LYMPHOCYTES # BLD AUTO: 2.5 K/UL (ref 1–4.8)
LYMPHOCYTES NFR BLD: 30.3 % (ref 18–48)
MCH RBC QN AUTO: 25.1 PG (ref 27–31)
MCHC RBC AUTO-ENTMCNC: 31.4 G/DL (ref 32–36)
MCV RBC AUTO: 80 FL (ref 82–98)
MICROSCOPIC COMMENT: ABNORMAL
MONOCYTES # BLD AUTO: 0.6 K/UL (ref 0.3–1)
MONOCYTES NFR BLD: 7.1 % (ref 4–15)
NEUTROPHILS # BLD AUTO: 4.8 K/UL (ref 1.8–7.7)
NEUTROPHILS NFR BLD: 58.2 % (ref 38–73)
NITRITE UR QL STRIP: NEGATIVE
NRBC BLD-RTO: 0 /100 WBC
PH UR STRIP: 6 [PH] (ref 5–8)
PLATELET # BLD AUTO: 240 K/UL (ref 150–450)
PMV BLD AUTO: 11 FL (ref 9.2–12.9)
POTASSIUM SERPL-SCNC: 3.8 MMOL/L (ref 3.5–5.1)
PROT SERPL-MCNC: 7.1 G/DL (ref 6–8.4)
PROT UR QL STRIP: NEGATIVE
RBC # BLD AUTO: 4.31 M/UL (ref 4–5.4)
RBC #/AREA URNS AUTO: >100 /HPF (ref 0–4)
SODIUM SERPL-SCNC: 138 MMOL/L (ref 136–145)
SP GR UR STRIP: 1.02 (ref 1–1.03)
URN SPEC COLLECT METH UR: ABNORMAL
UROBILINOGEN UR STRIP-ACNC: 1 EU/DL
UUN UR-MCNC: 10 MG/DL (ref 7–17)
WBC # BLD AUTO: 8.32 K/UL (ref 3.9–12.7)

## 2022-08-01 PROCEDURE — 85025 COMPLETE CBC W/AUTO DIFF WBC: CPT | Mod: ER | Performed by: PHYSICIAN ASSISTANT

## 2022-08-01 PROCEDURE — 81000 URINALYSIS NONAUTO W/SCOPE: CPT | Mod: ER | Performed by: PHYSICIAN ASSISTANT

## 2022-08-01 PROCEDURE — 99285 EMERGENCY DEPT VISIT HI MDM: CPT | Mod: 25,ER

## 2022-08-01 PROCEDURE — 63600175 PHARM REV CODE 636 W HCPCS: Mod: ER | Performed by: PHYSICIAN ASSISTANT

## 2022-08-01 PROCEDURE — 80053 COMPREHEN METABOLIC PANEL: CPT | Mod: ER | Performed by: PHYSICIAN ASSISTANT

## 2022-08-01 PROCEDURE — 25000003 PHARM REV CODE 250: Mod: ER | Performed by: PHYSICIAN ASSISTANT

## 2022-08-01 PROCEDURE — 83690 ASSAY OF LIPASE: CPT | Mod: ER | Performed by: PHYSICIAN ASSISTANT

## 2022-08-01 PROCEDURE — 81025 URINE PREGNANCY TEST: CPT | Mod: ER | Performed by: PHYSICIAN ASSISTANT

## 2022-08-01 PROCEDURE — 96374 THER/PROPH/DIAG INJ IV PUSH: CPT | Mod: ER

## 2022-08-01 PROCEDURE — 96361 HYDRATE IV INFUSION ADD-ON: CPT | Mod: ER

## 2022-08-01 RX ORDER — KETOROLAC TROMETHAMINE 30 MG/ML
15 INJECTION, SOLUTION INTRAMUSCULAR; INTRAVENOUS
Status: COMPLETED | OUTPATIENT
Start: 2022-08-01 | End: 2022-08-01

## 2022-08-01 RX ORDER — ACETAMINOPHEN 500 MG
1000 TABLET ORAL
Status: COMPLETED | OUTPATIENT
Start: 2022-08-01 | End: 2022-08-01

## 2022-08-01 RX ORDER — METHOCARBAMOL 750 MG/1
1500 TABLET, FILM COATED ORAL 3 TIMES DAILY
Qty: 30 TABLET | Refills: 0 | Status: SHIPPED | OUTPATIENT
Start: 2022-08-01 | End: 2022-08-06

## 2022-08-01 RX ORDER — ONDANSETRON 4 MG/1
4 TABLET, ORALLY DISINTEGRATING ORAL EVERY 6 HOURS PRN
Qty: 30 TABLET | Refills: 0 | Status: SHIPPED | OUTPATIENT
Start: 2022-08-01 | End: 2023-01-24

## 2022-08-01 RX ORDER — METHOCARBAMOL 500 MG/1
1000 TABLET, FILM COATED ORAL
Status: COMPLETED | OUTPATIENT
Start: 2022-08-01 | End: 2022-08-01

## 2022-08-01 RX ORDER — KETOROLAC TROMETHAMINE 10 MG/1
10 TABLET, FILM COATED ORAL EVERY 6 HOURS
Qty: 12 TABLET | Refills: 0 | Status: SHIPPED | OUTPATIENT
Start: 2022-08-01 | End: 2022-08-04

## 2022-08-01 RX ADMIN — KETOROLAC TROMETHAMINE 15 MG: 30 INJECTION, SOLUTION INTRAMUSCULAR; INTRAVENOUS at 02:08

## 2022-08-01 RX ADMIN — METHOCARBAMOL 1000 MG: 500 TABLET ORAL at 02:08

## 2022-08-01 RX ADMIN — SODIUM CHLORIDE 1000 ML: 0.9 INJECTION, SOLUTION INTRAVENOUS at 01:08

## 2022-08-01 RX ADMIN — ACETAMINOPHEN 1000 MG: 500 TABLET ORAL at 02:08

## 2022-08-01 NOTE — ED PROVIDER NOTES
Encounter Date: 8/1/2022       History     Chief Complaint   Patient presents with    Abdominal Pain    Back Pain     I am cramping in my lower back and stomach. It started on Thursday 7-28. I got off birth control two years ago and since then have a period for like a day or so but this is the first heavy bleeding with clots and pain. I just started birth control again a month ago and this is the first period since having started it     HPI: Ray Knox, a 27 y.o. female  has a past medical history of Anemia, Asthma, Bilateral renal cysts, Breast disorder, Congenital absence of one kidney, and Kidney cysts.     She presents to the ED for evaluation of abdominal cramping in back and stomach.  States that she's suffered with amenorrhea recently and is on medications to induce her cycle.  Cycle started on 7/28 and currently she is changing an over night pad and tampon every hour.  Attest to loose stools with vomiting yesterday.  Denies previous h/o abdominal surgeries.  Treatments tried include administration of midol, tylenol, motrin with little improvement of her symptoms.        The history is provided by the patient.     Review of patient's allergies indicates:   Allergen Reactions    Lisinopril Other (See Comments), Shortness Of Breath and Swelling     angioedema       Past Medical History:   Diagnosis Date    Anemia     Asthma     Bilateral renal cysts     Breast disorder     Congenital absence of one kidney     Kidney cysts     pt born with 1 kidney     Past Surgical History:   Procedure Laterality Date    FINGER MASS EXCISION Left 3/8/2022    Procedure: EXCISION, MASS, FINGER;  Surgeon: Bk Doty Jr., MD;  Location: [facility];  Service: Orthopedics;  Laterality: Left;     Family History   Problem Relation Age of Onset    Diabetes Paternal Aunt     Hypertension Maternal Grandfather     Cancer Paternal Grandmother     Breast cancer Paternal Grandmother     Leukemia Paternal Grandmother      Kidney disease Maternal Grandmother     Hypertension Mother      Social History     Tobacco Use    Smoking status: Current Every Day Smoker     Years: 0.50    Smokeless tobacco: Never Used   Substance Use Topics    Alcohol use: Yes     Comment: OCC    Drug use: No     Review of Systems   Constitutional: Negative for fever.   Cardiovascular: Negative for chest pain.   Gastrointestinal: Positive for abdominal pain, nausea (resolved) and vomiting (resolved).   Genitourinary: Positive for vaginal bleeding. Negative for decreased urine volume and dysuria.   Allergic/Immunologic: Negative for immunocompromised state.   Neurological: Negative for dizziness.   Psychiatric/Behavioral: Negative for agitation.   All other systems reviewed and are negative.      Physical Exam     Initial Vitals [08/01/22 1250]   BP Pulse Resp Temp SpO2   125/74 84 15 98.6 °F (37 °C) 100 %      MAP       --         Physical Exam    Nursing note and vitals reviewed.  Constitutional: She appears well-developed and well-nourished. She is not diaphoretic. No distress.   HENT:   Head: Normocephalic and atraumatic.   Right Ear: External ear normal.   Left Ear: External ear normal.   Nose: Nose normal.   Eyes: Conjunctivae and EOM are normal.   Neck:   Normal range of motion.  Cardiovascular: Normal rate and regular rhythm.   Pulmonary/Chest: Breath sounds normal. No respiratory distress. She has no wheezes. She has no rhonchi. She has no rales. She exhibits no tenderness.   Abdominal: Abdomen is soft. Bowel sounds are normal. She exhibits no distension. There is no abdominal tenderness. There is no rebound and no guarding.   Musculoskeletal:         General: Normal range of motion.      Cervical back: Normal range of motion.     Neurological: She is alert and oriented to person, place, and time.   Skin: Skin is warm and dry. Capillary refill takes less than 2 seconds. No rash noted. No erythema.   Psychiatric: She has a normal mood and affect.  Thought content normal.         ED Course   Procedures  Labs Reviewed   URINALYSIS, REFLEX TO URINE CULTURE - Abnormal; Notable for the following components:       Result Value    Appearance, UA Cloudy (*)     Occult Blood UA 3+ (*)     All other components within normal limits    Narrative:     Preferred Collection Type->Urine, Clean Catch  Specimen Source->Urine  Collection Type->Urine, Clean Catch   CBC W/ AUTO DIFFERENTIAL - Abnormal; Notable for the following components:    Hemoglobin 10.8 (*)     Hematocrit 34.4 (*)     MCV 80 (*)     MCH 25.1 (*)     MCHC 31.4 (*)     RDW 14.9 (*)     All other components within normal limits   COMPREHENSIVE METABOLIC PANEL - Abnormal; Notable for the following components:    CO2 22 (*)     Glucose 119 (*)     Calcium 8.4 (*)     All other components within normal limits   URINALYSIS MICROSCOPIC - Abnormal; Notable for the following components:    RBC, UA >100 (*)     All other components within normal limits    Narrative:     Preferred Collection Type->Urine, Clean Catch  Specimen Source->Urine  Collection Type->Urine, Clean Catch   PREGNANCY TEST, URINE RAPID    Narrative:     Specimen Source->Urine   LIPASE          Imaging Results          US Abdomen Limited (Final result)  Result time 08/01/22 14:19:15    Final result by Rajat Brush MD (08/01/22 14:19:15)                 Impression:      Benign right upper pole renal cyst measuring 2.9 cm.  Normal sonographic appearance of the liver.  No cholelithiasis or biliary ductal dilatation.      Electronically signed by: Rajat Brush MD  Date:    08/01/2022  Time:    14:19             Narrative:    EXAMINATION:  US ABDOMEN LIMITED    CLINICAL HISTORY:  right upper quadrant abdominal pain;    FINDINGS:  The liver size and echotexture is normal. No focal liver lesion identified. Gallbladder appears normal without stones or wall thickening. The common duct measures 3 mm. The right kidney measures 8.5cm in long axis and has a normal  sonographic appearance.  Incidental 2.9 cm right upper pole renal cyst.  Visualized pancreas and inferior vena cava appear normal. No free fluid in the upper abdomen. Hepatopedal flow noted in the portal vein.                                 Medications   ketorolac injection 15 mg (15 mg Intravenous Given 8/1/22 1404)   sodium chloride 0.9% bolus 1,000 mL (0 mLs Intravenous Stopped 8/1/22 1454)   acetaminophen tablet 1,000 mg (1,000 mg Oral Given 8/1/22 1445)   methocarbamoL tablet 1,000 mg (1,000 mg Oral Given 8/1/22 1446)     Medical Decision Making:   Initial Assessment:   Menorrhagia, abdominal pain with N/V   Differential Diagnosis:   Differential Diagnosis includes, but is not limited to:  Bowel obstruction, incarcerated/strangulated hernia, ileus, appendicitis, cholecystitis, aspirated foreign body, esophageal food impaction, biliary colic, colitis/diverticulitis, gastroenteritis, esophagitis, hepatitis, pancreatitis, GERD, PUD, constipation, nephrolithiasis, UTI/pyelonephritis, anemia     Clinical Tests:   Lab Tests: Reviewed and Ordered  The following lab test(s) were unremarkable: CBC, CMP, Urinalysis and Lipase  Radiological Study: Ordered and Reviewed  ED Management:  Pt presents to ED for evaluation of heavy vaginal bleeding as well as RUQ abdominal pain.  Given Zofran and fluids with improvement of vomiting.  No concerning abnormalities on blood work, UA or ultrasound.  Patient was given information on symptomatic control reasons to return, she verbalized understanding and agreement with plan.                      Clinical Impression:   Final diagnoses:  [R11.10] Vomiting, intractability of vomiting not specified, presence of nausea not specified, unspecified vomiting type (Primary)  [R10.9] Abdominal pain, unspecified abdominal location  [N92.1] Menorrhagia with irregular cycle          ED Disposition Condition    Discharge Stable        ED Prescriptions     Medication Sig Dispense Start Date End Date  Auth. Provider    ondansetron (ZOFRAN-ODT) 4 MG TbDL Take 1 tablet (4 mg total) by mouth every 6 (six) hours as needed. 30 tablet 8/1/2022  Pamlea Mcginnis PA-C    ketorolac (TORADOL) 10 mg tablet Take 1 tablet (10 mg total) by mouth every 6 (six) hours. for 3 days 12 tablet 8/1/2022 8/4/2022 Pamela Mcginnis PA-C    methocarbamoL (ROBAXIN) 750 MG Tab Take 2 tablets (1,500 mg total) by mouth 3 (three) times daily. for 5 days 30 tablet 8/1/2022 8/6/2022 Pamela Mcginnis PA-C        Follow-up Information     Follow up With Specialties Details Why Contact Info    Marty Moser MD Obstetrics and Gynecology   [address] [place] [address]  [address] [place] [address] [place] [address]  [phone]             Pamela Mcginnis PA-C  08/01/22 2523

## 2022-08-10 ENCOUNTER — PATIENT MESSAGE (OUTPATIENT)
Dept: SLEEP MEDICINE | Facility: CLINIC | Age: 27
End: 2022-08-10
Payer: MEDICAID

## 2022-08-26 ENCOUNTER — OFFICE VISIT (OUTPATIENT)
Dept: OBSTETRICS AND GYNECOLOGY | Facility: CLINIC | Age: 27
End: 2022-08-26
Payer: MEDICAID

## 2022-08-26 VITALS
SYSTOLIC BLOOD PRESSURE: 119 MMHG | DIASTOLIC BLOOD PRESSURE: 85 MMHG | WEIGHT: 205.25 LBS | HEIGHT: 65 IN | BODY MASS INDEX: 34.2 KG/M2

## 2022-08-26 DIAGNOSIS — N94.9 FEMALE GENITAL PROBLEM: ICD-10-CM

## 2022-08-26 DIAGNOSIS — N96 RECURRENT PREGNANCY LOSS: Primary | ICD-10-CM

## 2022-08-26 DIAGNOSIS — N97.9 INFERTILITY, FEMALE: ICD-10-CM

## 2022-08-26 PROCEDURE — 99213 OFFICE O/P EST LOW 20 MIN: CPT | Mod: S$PBB,,, | Performed by: OBSTETRICS & GYNECOLOGY

## 2022-08-26 PROCEDURE — 1159F PR MEDICATION LIST DOCUMENTED IN MEDICAL RECORD: ICD-10-PCS | Mod: CPTII,,, | Performed by: OBSTETRICS & GYNECOLOGY

## 2022-08-26 PROCEDURE — 1159F MED LIST DOCD IN RCRD: CPT | Mod: CPTII,,, | Performed by: OBSTETRICS & GYNECOLOGY

## 2022-08-26 PROCEDURE — 99214 OFFICE O/P EST MOD 30 MIN: CPT | Mod: PBBFAC,PO | Performed by: OBSTETRICS & GYNECOLOGY

## 2022-08-26 PROCEDURE — 99213 PR OFFICE/OUTPT VISIT, EST, LEVL III, 20-29 MIN: ICD-10-PCS | Mod: S$PBB,,, | Performed by: OBSTETRICS & GYNECOLOGY

## 2022-08-26 PROCEDURE — 3074F SYST BP LT 130 MM HG: CPT | Mod: CPTII,,, | Performed by: OBSTETRICS & GYNECOLOGY

## 2022-08-26 PROCEDURE — 3079F PR MOST RECENT DIASTOLIC BLOOD PRESSURE 80-89 MM HG: ICD-10-PCS | Mod: CPTII,,, | Performed by: OBSTETRICS & GYNECOLOGY

## 2022-08-26 PROCEDURE — 99999 PR PBB SHADOW E&M-EST. PATIENT-LVL IV: ICD-10-PCS | Mod: PBBFAC,,, | Performed by: OBSTETRICS & GYNECOLOGY

## 2022-08-26 PROCEDURE — 3008F BODY MASS INDEX DOCD: CPT | Mod: CPTII,,, | Performed by: OBSTETRICS & GYNECOLOGY

## 2022-08-26 PROCEDURE — 3074F PR MOST RECENT SYSTOLIC BLOOD PRESSURE < 130 MM HG: ICD-10-PCS | Mod: CPTII,,, | Performed by: OBSTETRICS & GYNECOLOGY

## 2022-08-26 PROCEDURE — 3079F DIAST BP 80-89 MM HG: CPT | Mod: CPTII,,, | Performed by: OBSTETRICS & GYNECOLOGY

## 2022-08-26 PROCEDURE — 99999 PR PBB SHADOW E&M-EST. PATIENT-LVL IV: CPT | Mod: PBBFAC,,, | Performed by: OBSTETRICS & GYNECOLOGY

## 2022-08-26 PROCEDURE — 3008F PR BODY MASS INDEX (BMI) DOCUMENTED: ICD-10-PCS | Mod: CPTII,,, | Performed by: OBSTETRICS & GYNECOLOGY

## 2022-08-26 RX ORDER — CLOMIPHENE CITRATE 50 MG/1
TABLET ORAL
Qty: 5 TABLET | Refills: 2 | Status: SHIPPED | OUTPATIENT
Start: 2022-08-26 | End: 2022-10-10 | Stop reason: SDUPTHER

## 2022-08-26 NOTE — PROGRESS NOTES
"  28 yo now  female who presents to discuss trying to get pregnant.  Patient has h/o Polycytic kidney disease and asthma.  Reports h/o SAB in the first trimester in 2016 and 2019/  She is now s/p consult with genetics (normal) and [facility].  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 Will repeat today.  Reports that partner is 26 yrs old. Has children from previous relationship.  Reports h/o irregular menstrual cycles. Reports cycle about 4 times yearly  Pelvic uS from 2018 does not suggest PCOS.    H/o previous STD is positive.    PE  /85   Ht 5' 5" (1.651 m)   Wt 93.1 kg (205 lb 4 oz)   LMP 2022   BMI 34.16 kg/m²   Exam: deferred    AP:  Previous notes from [facility], and genetic reviewed  Imaging reviewed    1) RPL: tsh, thyroid antibody, cardiolipin antibody  2) irregular cycles: prolactin, tsh, clomid    estefania pritchett MD  "

## 2022-10-04 ENCOUNTER — HOSPITAL ENCOUNTER (EMERGENCY)
Facility: HOSPITAL | Age: 27
Discharge: HOME | End: 2022-10-04
Attending: EMERGENCY MEDICINE
Payer: MEDICAID

## 2022-10-04 VITALS
TEMPERATURE: 98 F | HEART RATE: 78 BPM | SYSTOLIC BLOOD PRESSURE: 118 MMHG | BODY MASS INDEX: 31.62 KG/M2 | DIASTOLIC BLOOD PRESSURE: 72 MMHG | OXYGEN SATURATION: 99 % | RESPIRATION RATE: 18 BRPM | WEIGHT: 190 LBS

## 2022-10-04 DIAGNOSIS — R07.9 CHEST PAIN: Primary | ICD-10-CM

## 2022-10-04 LAB
ALBUMIN SERPL BCP-MCNC: 4 G/DL (ref 3.5–5.2)
ALP SERPL-CCNC: 56 U/L (ref 55–135)
ALT SERPL W/O P-5'-P-CCNC: 8 U/L (ref 10–44)
ANION GAP SERPL CALC-SCNC: 10 MMOL/L (ref 8–16)
AST SERPL-CCNC: 13 U/L (ref 10–40)
B-HCG UR QL: NEGATIVE
BASOPHILS # BLD AUTO: 0.07 K/UL (ref 0–0.2)
BASOPHILS NFR BLD: 0.6 % (ref 0–1.9)
BILIRUB SERPL-MCNC: 0.3 MG/DL (ref 0.1–1)
BUN SERPL-MCNC: 12 MG/DL (ref 6–20)
CALCIUM SERPL-MCNC: 9 MG/DL (ref 8.7–10.5)
CHLORIDE SERPL-SCNC: 108 MMOL/L (ref 95–110)
CO2 SERPL-SCNC: 23 MMOL/L (ref 23–29)
CREAT SERPL-MCNC: 0.9 MG/DL (ref 0.5–1.4)
CTP QC/QA: YES
D DIMER PPP IA.FEU-MCNC: 0.66 MG/L FEU
DIFFERENTIAL METHOD: ABNORMAL
EOSINOPHIL # BLD AUTO: 0.4 K/UL (ref 0–0.5)
EOSINOPHIL NFR BLD: 3.5 % (ref 0–8)
ERYTHROCYTE [DISTWIDTH] IN BLOOD BY AUTOMATED COUNT: 14.2 % (ref 11.5–14.5)
EST. GFR  (NO RACE VARIABLE): >60 ML/MIN/1.73 M^2
GLUCOSE SERPL-MCNC: 98 MG/DL (ref 70–110)
HCT VFR BLD AUTO: 35 % (ref 37–48.5)
HGB BLD-MCNC: 11.1 G/DL (ref 12–16)
IMM GRANULOCYTES # BLD AUTO: 0.02 K/UL (ref 0–0.04)
IMM GRANULOCYTES NFR BLD AUTO: 0.2 % (ref 0–0.5)
LYMPHOCYTES # BLD AUTO: 4.5 K/UL (ref 1–4.8)
LYMPHOCYTES NFR BLD: 37.4 % (ref 18–48)
MCH RBC QN AUTO: 25.5 PG (ref 27–31)
MCHC RBC AUTO-ENTMCNC: 31.7 G/DL (ref 32–36)
MCV RBC AUTO: 80 FL (ref 82–98)
MONOCYTES # BLD AUTO: 0.9 K/UL (ref 0.3–1)
MONOCYTES NFR BLD: 7.5 % (ref 4–15)
NEUTROPHILS # BLD AUTO: 6.2 K/UL (ref 1.8–7.7)
NEUTROPHILS NFR BLD: 50.8 % (ref 38–73)
NRBC BLD-RTO: 0 /100 WBC
PLATELET # BLD AUTO: 257 K/UL (ref 150–450)
PMV BLD AUTO: 11.1 FL (ref 9.2–12.9)
POTASSIUM SERPL-SCNC: 3.7 MMOL/L (ref 3.5–5.1)
PROT SERPL-MCNC: 7.3 G/DL (ref 6–8.4)
RBC # BLD AUTO: 4.36 M/UL (ref 4–5.4)
SODIUM SERPL-SCNC: 141 MMOL/L (ref 136–145)
TROPONIN I SERPL DL<=0.01 NG/ML-MCNC: <0.006 NG/ML (ref 0–0.03)
WBC # BLD AUTO: 12.13 K/UL (ref 3.9–12.7)

## 2022-10-04 PROCEDURE — 85379 FIBRIN DEGRADATION QUANT: CPT | Performed by: EMERGENCY MEDICINE

## 2022-10-04 PROCEDURE — 81025 URINE PREGNANCY TEST: CPT | Performed by: EMERGENCY MEDICINE

## 2022-10-04 PROCEDURE — 93010 EKG 12-LEAD: ICD-10-PCS | Mod: ,,, | Performed by: INTERNAL MEDICINE

## 2022-10-04 PROCEDURE — 80053 COMPREHEN METABOLIC PANEL: CPT | Performed by: EMERGENCY MEDICINE

## 2022-10-04 PROCEDURE — 99284 EMERGENCY DEPT VISIT MOD MDM: CPT | Mod: 25

## 2022-10-04 PROCEDURE — 96374 THER/PROPH/DIAG INJ IV PUSH: CPT

## 2022-10-04 PROCEDURE — 84484 ASSAY OF TROPONIN QUANT: CPT | Performed by: EMERGENCY MEDICINE

## 2022-10-04 PROCEDURE — 93005 ELECTROCARDIOGRAM TRACING: CPT

## 2022-10-04 PROCEDURE — 63600175 PHARM REV CODE 636 W HCPCS: Performed by: EMERGENCY MEDICINE

## 2022-10-04 PROCEDURE — 85025 COMPLETE CBC W/AUTO DIFF WBC: CPT | Performed by: EMERGENCY MEDICINE

## 2022-10-04 PROCEDURE — 25000003 PHARM REV CODE 250: Performed by: EMERGENCY MEDICINE

## 2022-10-04 PROCEDURE — 93010 ELECTROCARDIOGRAM REPORT: CPT | Mod: ,,, | Performed by: INTERNAL MEDICINE

## 2022-10-04 RX ORDER — KETOROLAC TROMETHAMINE 30 MG/ML
30 INJECTION, SOLUTION INTRAMUSCULAR; INTRAVENOUS
Status: DISCONTINUED | OUTPATIENT
Start: 2022-10-04 | End: 2022-10-04

## 2022-10-04 RX ORDER — KETOROLAC TROMETHAMINE 30 MG/ML
15 INJECTION, SOLUTION INTRAMUSCULAR; INTRAVENOUS
Status: COMPLETED | OUTPATIENT
Start: 2022-10-04 | End: 2022-10-04

## 2022-10-04 RX ORDER — PROPRANOLOL HYDROCHLORIDE 10 MG/1
10 TABLET ORAL 2 TIMES DAILY
Qty: 60 TABLET | Refills: 11 | Status: SHIPPED | OUTPATIENT
Start: 2022-10-04 | End: 2022-10-04 | Stop reason: SDUPTHER

## 2022-10-04 RX ORDER — NAPROXEN 500 MG/1
500 TABLET ORAL 2 TIMES DAILY WITH MEALS
Qty: 20 TABLET | Refills: 0 | Status: SHIPPED | OUTPATIENT
Start: 2022-10-04 | End: 2022-10-04 | Stop reason: SDUPTHER

## 2022-10-04 RX ORDER — PROPRANOLOL HYDROCHLORIDE 10 MG/1
10 TABLET ORAL 2 TIMES DAILY
Qty: 60 TABLET | Refills: 11 | Status: SHIPPED | OUTPATIENT
Start: 2022-10-04 | End: 2023-10-18

## 2022-10-04 RX ORDER — ACETAMINOPHEN 500 MG
1000 TABLET ORAL
Status: COMPLETED | OUTPATIENT
Start: 2022-10-04 | End: 2022-10-04

## 2022-10-04 RX ORDER — NAPROXEN 500 MG/1
500 TABLET ORAL 2 TIMES DAILY WITH MEALS
Qty: 20 TABLET | Refills: 0 | Status: SHIPPED | OUTPATIENT
Start: 2022-10-04 | End: 2023-03-22

## 2022-10-04 RX ADMIN — KETOROLAC TROMETHAMINE 15 MG: 30 INJECTION, SOLUTION INTRAMUSCULAR at 10:10

## 2022-10-04 RX ADMIN — ACETAMINOPHEN 1000 MG: 500 TABLET ORAL at 11:10

## 2022-10-05 ENCOUNTER — PATIENT OUTREACH (OUTPATIENT)
Dept: EMERGENCY MEDICINE | Facility: HOSPITAL | Age: 27
End: 2022-10-05
Payer: MEDICAID

## 2022-10-05 NOTE — PROGRESS NOTES
Jerome Soto  ED Navigator  Emergency Department    [place] [facility] [place]  Role: Community Health Worker    Date: 10/05/2022  Patient Name: Ray Knox  MRN: [id number]  PCP: Les Horton MD    Assessment:     Ray Knox is a 27 y.o. female who has presented to ED for chest pain. Patient has visited the ED 2 times in the past 3 months. Patient did not contact PCP.     ED Navigator Initial Assessment    ED Navigator Enrollment Documentation  Consent to Services  Does patient consent to completing the assessment?: Yes  Contact  Method of Initial Contact: Phone  Transportation  Does the patient have issues with Transportation?: No  Does the patient have transportation to and from healthcare appointments?: Yes  Insurance Coverage  Do you have coverage/adequate coverage?: Yes  Type/kind of coverage: MEDICAID [facility] COMMUNITY PLAN  Is patient able to afford co-pays/deductibles?: Yes  Is patient able to afford HME or supplies?: Yes  Does patient have an established [facility] PCP?: Yes  Able to access?: Yes  Does the patient have a lack of adequate coverage?: No  Specialist Appointment  Did the patient come to the ED to see a specialist?: No  Does the patient have a pending specialist referral?: No  Does the patient have a specialist appointment made?: No  PCP Follow Up Appointment  Has the patient had an appointment with a primary care provider in the past year?: Yes  Approximate date: 7/1/22  Provider: Les Bryan MD  Does the patient have a follow up appontment with a PCP?: No  When was the last time you saw your PCP?: 7/1/22  Why does the patient not have a follow up scheduled?: Inconvenient appointment times  Medications  Is patient able to afford medication?: Yes  Is patient unable to get medication due to lack of transportation?: No  Psychological  Does the patient have psycho-social concerns?: No  Food  Does the patient have concerns about food?: No  Communication/Education  Does the patient have limited  English proficiency/English not primary language?: No  Does patient have low literacy and/or low health literacy?: No  Does patient have concerns with care?: No  Does patient have dissatisfaction with care?: No  Other Financial Concerns  Does the patient have immediate financial distress?: No  Does the patient have general financial concerns?: No  Other Social Barriers/Concerns  Does the patient have any additional barriers or concerns?: Work  Primary Barrier  Barriers identified: Structural barrier (service availability, waiting times, etc.)  Root Cause of ED Utilization: Lack of Access to Primary Care  Plan to address Lack of Access to Primary Care: Provided patient with information about the [facility] in their area, Provided [facility] PCP assistance line [phone], Provided information for [facility] ([facility] - [facility], [employer], etc.), Provided information for [facility] 24/7 Nurse Triage line [phone] or 1-866-[facility] (1-911.576.4562), Provided information on [employer] resources and hotline (953-012-5431)  Next steps: Provided Education  Was education/educational materials provided surrounding PCP services/creating a medical home?: Yes Was education verbal or written?: Verbal     Was education/educational materials provided surrounding low cost, healthy foods?: No      Was education/educational materials provided surrounding other items? If so, use comment to explain.: No    Plan: Provided information for [facility] 24/7 Nurse triage line, [phone] or [facility] ([phone])  Expected Date of Follow Up 1: 10/19/22  Additional Documentation: Spoke with patient that presented to the ED for chest pain. Patient stated she feels no relief, no change since her ED visit. Patient stated she has been stressed for a few days. Patient was asked if she has a PCP to follow up with she stated she does. Patient  was advised to contact her PCP to schedule a follow up appointment. Patient was educated on stress management. Patient denied needing any other assistance at this time.         Social History     Socioeconomic History    Marital status: Single   Tobacco Use    Smoking status: Every Day     Years: 0.50     Types: Cigarettes    Smokeless tobacco: Never   Substance and Sexual Activity    Alcohol use: Yes     Comment: OCC    Drug use: No    Sexual activity: Yes     Partners: Male     Birth control/protection: None     Comment: Not current on Depo     Social Determinants of Health     Financial Resource Strain: Low Risk     Difficulty of Paying Living Expenses: Not very hard   Food Insecurity: No Food Insecurity    Worried About Running Out of Food in the Last Year: Never true    Ran Out of Food in the Last Year: Never true   Transportation Needs: No Transportation Needs    Lack of Transportation (Medical): No    Lack of Transportation (Non-Medical): No   Physical Activity: Inactive    Days of Exercise per Week: 0 days    Minutes of Exercise per Session: 0 min   Stress: Stress Concern Present    Feeling of Stress : Rather much   Social Connections: Moderately Isolated    Frequency of Communication with Friends and Family: More than three times a week    Frequency of Social Gatherings with Friends and Family: More than three times a week    Attends [employer]ars[employer] Services: 1 to 4 times per year    Active Member of Clubs or Organizations: No    Attends Club or Organization Meetings: Never    Marital Status: Never    Housing Stability: Unknown    Unable to Pay for Housing in the Last Year: No    Unstable Housing in the Last Year: No       Plan:   Spoke with patient that presented to the ED for chest pain. Patient stated she feels no relief, no change since her ED visit. Patient stated she has been stressed for a few days. Patient was asked if she has a PCP to follow up with she stated she does. Patient was advised to  contact her PCP to schedule a follow up appointment. Patient was educated on stress management. Patient denied needing any other assistance at this time.      Appointment made with: Les Horton MD

## 2022-10-05 NOTE — ED NOTES
Patient requesting medicine for headache prior to being discharged. Dr. Huang is aware, and is speaking to the patient at the bedside.
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
## 2022-10-10 ENCOUNTER — LAB VISIT (OUTPATIENT)
Dept: LAB | Facility: HOSPITAL | Age: 27
End: 2022-10-10
Attending: OBSTETRICS & GYNECOLOGY
Payer: MEDICAID

## 2022-10-10 ENCOUNTER — PATIENT MESSAGE (OUTPATIENT)
Dept: OBSTETRICS AND GYNECOLOGY | Facility: CLINIC | Age: 27
End: 2022-10-10
Payer: MEDICAID

## 2022-10-10 DIAGNOSIS — N97.9 INFERTILITY, FEMALE: ICD-10-CM

## 2022-10-10 DIAGNOSIS — N96 RECURRENT PREGNANCY LOSS: ICD-10-CM

## 2022-10-10 LAB — TSH SERPL DL<=0.005 MIU/L-ACNC: 1.82 UIU/ML (ref 0.4–4)

## 2022-10-10 PROCEDURE — 84146 ASSAY OF PROLACTIN: CPT | Performed by: OBSTETRICS & GYNECOLOGY

## 2022-10-10 PROCEDURE — 84443 ASSAY THYROID STIM HORMONE: CPT | Performed by: OBSTETRICS & GYNECOLOGY

## 2022-10-10 PROCEDURE — 86376 MICROSOMAL ANTIBODY EACH: CPT | Performed by: OBSTETRICS & GYNECOLOGY

## 2022-10-10 PROCEDURE — 86147 CARDIOLIPIN ANTIBODY EA IG: CPT | Performed by: OBSTETRICS & GYNECOLOGY

## 2022-10-10 RX ORDER — CLOMIPHENE CITRATE 50 MG/1
TABLET ORAL
Qty: 5 TABLET | Refills: 2 | Status: SHIPPED | OUTPATIENT
Start: 2022-10-10 | End: 2023-05-30

## 2022-10-11 LAB
PROLACTIN SERPL IA-MCNC: 11.5 NG/ML (ref 5.2–26.5)
THYROPEROXIDASE IGG SERPL-ACNC: <6 IU/ML

## 2022-10-13 LAB
CARDIOLIPIN IGG SER IA-ACNC: <9.4 GPL (ref 0–14.99)
CARDIOLIPIN IGM SER IA-ACNC: 12.8 MPL (ref 0–12.49)

## 2022-10-19 ENCOUNTER — PATIENT OUTREACH (OUTPATIENT)
Dept: EMERGENCY MEDICINE | Facility: HOSPITAL | Age: 27
End: 2022-10-19
Payer: MEDICAID

## 2022-11-05 ENCOUNTER — PATIENT MESSAGE (OUTPATIENT)
Dept: ORTHOPEDICS | Facility: CLINIC | Age: 27
End: 2022-11-05
Payer: MEDICAID

## 2022-11-08 ENCOUNTER — HOSPITAL ENCOUNTER (EMERGENCY)
Facility: HOSPITAL | Age: 27
Discharge: HOME | End: 2022-11-08
Attending: EMERGENCY MEDICINE
Payer: MEDICAID

## 2022-11-08 VITALS
WEIGHT: 190 LBS | SYSTOLIC BLOOD PRESSURE: 121 MMHG | RESPIRATION RATE: 18 BRPM | DIASTOLIC BLOOD PRESSURE: 74 MMHG | BODY MASS INDEX: 31.65 KG/M2 | TEMPERATURE: 100 F | OXYGEN SATURATION: 100 % | HEART RATE: 95 BPM | HEIGHT: 65 IN

## 2022-11-08 DIAGNOSIS — N39.0 URINARY TRACT INFECTION WITHOUT HEMATURIA, SITE UNSPECIFIED: Primary | ICD-10-CM

## 2022-11-08 DIAGNOSIS — I88.0 MESENTERIC ADENITIS: ICD-10-CM

## 2022-11-08 LAB
ALBUMIN SERPL BCP-MCNC: 4.3 G/DL (ref 3.5–5.2)
ALP SERPL-CCNC: 57 U/L (ref 55–135)
ALT SERPL W/O P-5'-P-CCNC: 12 U/L (ref 10–44)
ANION GAP SERPL CALC-SCNC: 16 MMOL/L (ref 8–16)
AST SERPL-CCNC: 14 U/L (ref 10–40)
B-HCG UR QL: NEGATIVE
BACTERIA #/AREA URNS HPF: ABNORMAL /HPF
BASOPHILS # BLD AUTO: 0.05 K/UL (ref 0–0.2)
BASOPHILS NFR BLD: 0.4 % (ref 0–1.9)
BILIRUB SERPL-MCNC: 0.8 MG/DL (ref 0.1–1)
BILIRUB UR QL STRIP: NEGATIVE
BUN SERPL-MCNC: 11 MG/DL (ref 6–20)
CALCIUM SERPL-MCNC: 9.3 MG/DL (ref 8.7–10.5)
CHLORIDE SERPL-SCNC: 106 MMOL/L (ref 95–110)
CLARITY UR: ABNORMAL
CO2 SERPL-SCNC: 19 MMOL/L (ref 23–29)
COLOR UR: YELLOW
CREAT SERPL-MCNC: 1 MG/DL (ref 0.5–1.4)
CTP QC/QA: YES
DIFFERENTIAL METHOD: ABNORMAL
EOSINOPHIL # BLD AUTO: 0 K/UL (ref 0–0.5)
EOSINOPHIL NFR BLD: 0.4 % (ref 0–8)
ERYTHROCYTE [DISTWIDTH] IN BLOOD BY AUTOMATED COUNT: 14.4 % (ref 11.5–14.5)
EST. GFR  (NO RACE VARIABLE): >60 ML/MIN/1.73 M^2
GLUCOSE SERPL-MCNC: 82 MG/DL (ref 70–110)
GLUCOSE UR QL STRIP: NEGATIVE
HCT VFR BLD AUTO: 39 % (ref 37–48.5)
HGB BLD-MCNC: 12.1 G/DL (ref 12–16)
HGB UR QL STRIP: NEGATIVE
IMM GRANULOCYTES # BLD AUTO: 0.03 K/UL (ref 0–0.04)
IMM GRANULOCYTES NFR BLD AUTO: 0.3 % (ref 0–0.5)
INFLUENZA A, MOLECULAR: NEGATIVE
INFLUENZA B, MOLECULAR: NEGATIVE
KETONES UR QL STRIP: NEGATIVE
LEUKOCYTE ESTERASE UR QL STRIP: ABNORMAL
LYMPHOCYTES # BLD AUTO: 3 K/UL (ref 1–4.8)
LYMPHOCYTES NFR BLD: 26.5 % (ref 18–48)
MCH RBC QN AUTO: 25.1 PG (ref 27–31)
MCHC RBC AUTO-ENTMCNC: 31 G/DL (ref 32–36)
MCV RBC AUTO: 81 FL (ref 82–98)
MICROSCOPIC COMMENT: ABNORMAL
MONOCYTES # BLD AUTO: 0.6 K/UL (ref 0.3–1)
MONOCYTES NFR BLD: 5.2 % (ref 4–15)
NEUTROPHILS # BLD AUTO: 7.6 K/UL (ref 1.8–7.7)
NEUTROPHILS NFR BLD: 67.2 % (ref 38–73)
NITRITE UR QL STRIP: POSITIVE
NRBC BLD-RTO: 0 /100 WBC
PH UR STRIP: 6 [PH] (ref 5–8)
PLATELET # BLD AUTO: 282 K/UL (ref 150–450)
PMV BLD AUTO: 10.9 FL (ref 9.2–12.9)
POTASSIUM SERPL-SCNC: 3.9 MMOL/L (ref 3.5–5.1)
PROT SERPL-MCNC: 8.1 G/DL (ref 6–8.4)
PROT UR QL STRIP: ABNORMAL
RBC # BLD AUTO: 4.82 M/UL (ref 4–5.4)
RBC #/AREA URNS HPF: 2 /HPF (ref 0–4)
SARS-COV-2 RDRP RESP QL NAA+PROBE: NEGATIVE
SODIUM SERPL-SCNC: 141 MMOL/L (ref 136–145)
SP GR UR STRIP: 1.02 (ref 1–1.03)
SPECIMEN SOURCE: NORMAL
SQUAMOUS #/AREA URNS HPF: 8 /HPF
URN SPEC COLLECT METH UR: ABNORMAL
UROBILINOGEN UR STRIP-ACNC: NEGATIVE EU/DL
WBC # BLD AUTO: 11.33 K/UL (ref 3.9–12.7)
WBC #/AREA URNS HPF: 8 /HPF (ref 0–5)

## 2022-11-08 PROCEDURE — 87502 INFLUENZA DNA AMP PROBE: CPT | Performed by: PHYSICIAN ASSISTANT

## 2022-11-08 PROCEDURE — 99285 EMERGENCY DEPT VISIT HI MDM: CPT | Mod: 25

## 2022-11-08 PROCEDURE — 25000003 PHARM REV CODE 250: Performed by: EMERGENCY MEDICINE

## 2022-11-08 PROCEDURE — 63600175 PHARM REV CODE 636 W HCPCS: Performed by: EMERGENCY MEDICINE

## 2022-11-08 PROCEDURE — U0002 COVID-19 LAB TEST NON-CDC: HCPCS | Performed by: PHYSICIAN ASSISTANT

## 2022-11-08 PROCEDURE — 81025 URINE PREGNANCY TEST: CPT | Performed by: PHYSICIAN ASSISTANT

## 2022-11-08 PROCEDURE — 81000 URINALYSIS NONAUTO W/SCOPE: CPT | Performed by: PHYSICIAN ASSISTANT

## 2022-11-08 PROCEDURE — 87088 URINE BACTERIA CULTURE: CPT | Performed by: PHYSICIAN ASSISTANT

## 2022-11-08 PROCEDURE — 96365 THER/PROPH/DIAG IV INF INIT: CPT

## 2022-11-08 PROCEDURE — 85025 COMPLETE CBC W/AUTO DIFF WBC: CPT | Performed by: EMERGENCY MEDICINE

## 2022-11-08 PROCEDURE — 87086 URINE CULTURE/COLONY COUNT: CPT | Performed by: PHYSICIAN ASSISTANT

## 2022-11-08 PROCEDURE — 25500020 PHARM REV CODE 255: Performed by: EMERGENCY MEDICINE

## 2022-11-08 PROCEDURE — 87186 SC STD MICRODIL/AGAR DIL: CPT | Performed by: PHYSICIAN ASSISTANT

## 2022-11-08 PROCEDURE — 87077 CULTURE AEROBIC IDENTIFY: CPT | Performed by: PHYSICIAN ASSISTANT

## 2022-11-08 PROCEDURE — 80053 COMPREHEN METABOLIC PANEL: CPT | Performed by: EMERGENCY MEDICINE

## 2022-11-08 PROCEDURE — 96375 TX/PRO/DX INJ NEW DRUG ADDON: CPT

## 2022-11-08 RX ORDER — HYDROMORPHONE HYDROCHLORIDE 1 MG/ML
0.5 INJECTION, SOLUTION INTRAMUSCULAR; INTRAVENOUS; SUBCUTANEOUS
Status: COMPLETED | OUTPATIENT
Start: 2022-11-08 | End: 2022-11-08

## 2022-11-08 RX ORDER — IBUPROFEN 800 MG/1
800 TABLET ORAL EVERY 6 HOURS PRN
Qty: 20 TABLET | Refills: 0 | Status: SHIPPED | OUTPATIENT
Start: 2022-11-08 | End: 2022-11-13

## 2022-11-08 RX ORDER — HYDROCODONE BITARTRATE AND ACETAMINOPHEN 5; 325 MG/1; MG/1
1 TABLET ORAL EVERY 6 HOURS PRN
Qty: 12 TABLET | Refills: 0 | Status: SHIPPED | OUTPATIENT
Start: 2022-11-08 | End: 2022-11-18

## 2022-11-08 RX ORDER — ACETAMINOPHEN 500 MG
1000 TABLET ORAL
Status: COMPLETED | OUTPATIENT
Start: 2022-11-08 | End: 2022-11-08

## 2022-11-08 RX ORDER — KETOROLAC TROMETHAMINE 30 MG/ML
15 INJECTION, SOLUTION INTRAMUSCULAR; INTRAVENOUS
Status: COMPLETED | OUTPATIENT
Start: 2022-11-08 | End: 2022-11-08

## 2022-11-08 RX ORDER — AMOXICILLIN AND CLAVULANATE POTASSIUM 875; 125 MG/1; MG/1
1 TABLET, FILM COATED ORAL 2 TIMES DAILY
Qty: 14 TABLET | Refills: 0 | Status: SHIPPED | OUTPATIENT
Start: 2022-11-08 | End: 2022-11-18

## 2022-11-08 RX ADMIN — ACETAMINOPHEN 1000 MG: 500 TABLET ORAL at 03:11

## 2022-11-08 RX ADMIN — IOHEXOL 30 ML: 350 INJECTION, SOLUTION INTRAVENOUS at 04:11

## 2022-11-08 RX ADMIN — HYDROMORPHONE HYDROCHLORIDE 0.5 MG: 1 INJECTION, SOLUTION INTRAMUSCULAR; INTRAVENOUS; SUBCUTANEOUS at 05:11

## 2022-11-08 RX ADMIN — KETOROLAC TROMETHAMINE 15 MG: 30 INJECTION, SOLUTION INTRAMUSCULAR at 05:11

## 2022-11-08 RX ADMIN — CEFTRIAXONE 1 G: 1 INJECTION, SOLUTION INTRAVENOUS at 03:11

## 2022-11-08 NOTE — FIRST PROVIDER EVALUATION
Emergency Department TeleTriage Encounter Note      CHIEF COMPLAINT    Chief Complaint   Patient presents with    Abdominal Pain     Abd cramping x 1 day with vaginal spotting. Spotting is dark red. Pt reports having irregular cycles. LMP was 2 montsh ago. Pt is unsure of pregnancy status. Pt also reports uri s/s last week that have presently resolved. Fever x 2 days with tmax of 104 yesterday. Pt took tylenol this am upon waking.        VITAL SIGNS   Initial Vitals [11/08/22 1212]   BP Pulse Resp Temp SpO2   121/74 95 16 100.1 °F (37.8 °C) 100 %      MAP       --            ALLERGIES    Review of patient's allergies indicates:   Allergen Reactions    Lisinopril Other (See Comments), Shortness Of Breath and Swelling     angioedema         PROVIDER TRIAGE NOTE  This is a teletriage evaluation of a 27 y.o. female presenting to the ED complaining of body aches, congestion, vaginal spotting, and fever since yesterday.  Denies nasuea/vomiting.  Denies urinary symptoms.  Denies pelvic pain or vaginal discharge.  Reports she may be pregnant.     Initial orders will be placed and care will be transferred to an alternate provider when patient is roomed for a full evaluation. Any additional orders and the final disposition will be determined by that provider.         ORDERS  Labs Reviewed   INFLUENZA A & B BY MOLECULAR   SARS-COV-2 RNA AMPLIFICATION, QUAL   URINALYSIS, REFLEX TO URINE CULTURE   POCT URINE PREGNANCY       ED Orders (720h ago, onward)      Start Ordered     Status Ordering Provider    11/08/22 1230 11/08/22 1229  POCT urine pregnancy  Once         Ordered MICHAEL DUNN    11/08/22 1230 11/08/22 1229  Urinalysis, Reflex to Urine Culture Urine, Clean Catch  STAT         Ordered MICHAEL DUNN    11/08/22 1229 11/08/22 1229  COVID-19 Rapid Screening  STAT         Ordered MICHAEL DUNN    11/08/22 1229 11/08/22 1229  Influenza A & B by Molecular  STAT         Ordered  MICHAEL DUNN              Virtual Visit Note: The provider triage portion of this emergency department evaluation and documentation was performed via [employer], a HIPAA-compliant telemedicine application, in concert with a tele-presenter in the room. A face to face patient evaluation with one of my colleagues will occur once the patient is placed in an emergency department room.      DISCLAIMER: This note was prepared with [employer] voice recognition transcription software. Garbled syntax, mangled pronouns, and other bizarre constructions may be attributed to that software system.

## 2022-11-08 NOTE — ED NOTES
Aram stated that she started with midsternal chest pain intermittent described as squeezing yesterday, with LLQ abdominal pain with spotting vaginally, urine collected and negative pregnancy. Patient connected to monitor and ekg performed, no n/v/d

## 2022-11-09 NOTE — ED PROVIDER NOTES
Encounter Date: 11/8/2022       History     Chief Complaint   Patient presents with    Abdominal Pain     Abd cramping x 1 day with vaginal spotting. Spotting is dark red. Pt reports having irregular cycles. LMP was 2 montsh ago. Pt is unsure of pregnancy status. Pt also reports uri s/s last week that have presently resolved. Fever x 2 days with tmax of 104 yesterday. Pt took tylenol this am upon waking.      28 yo female presents with LLQ abdominal pain and fever for 2 days.  Had some chest pain yesterday atypical and since resolved. Pain worse with walking and bumps in the care. No vaginal discharge or concern for STI.     Review of patient's allergies indicates:   Allergen Reactions    Lisinopril Other (See Comments), Shortness Of Breath and Swelling     angioedema       Past Medical History:   Diagnosis Date    Anemia     Asthma     Bilateral renal cysts     Breast disorder     Congenital absence of one kidney     Kidney cysts     pt born with 1 kidney     Past Surgical History:   Procedure Laterality Date    FINGER MASS EXCISION Left 3/8/2022    Procedure: EXCISION, MASS, FINGER;  Surgeon: Bk Doty Jr., MD;  Location: [facility] OR;  Service: Orthopedics;  Laterality: Left;     Family History   Problem Relation Age of Onset    Diabetes Paternal Aunt     Hypertension Maternal Grandfather     Cancer Paternal Grandmother     Breast cancer Paternal Grandmother     Leukemia Paternal Grandmother     Kidney disease Maternal Grandmother     Hypertension Mother      Social History     Tobacco Use    Smoking status: Every Day     Years: 0.50     Types: Cigarettes    Smokeless tobacco: Never   Substance Use Topics    Alcohol use: Yes     Comment: OCC    Drug use: No     Review of Systems   Constitutional:  Negative for fever.   HENT:  Negative for sore throat.    Eyes:  Negative for visual disturbance.   Respiratory:  Negative for shortness of breath.    Cardiovascular:  Negative for chest pain.   Gastrointestinal:   Positive for abdominal pain and nausea. Negative for abdominal distention and vomiting.   Genitourinary:  Negative for difficulty urinating.   Musculoskeletal:  Negative for back pain.   Skin:  Negative for rash.   Neurological:  Negative for headaches.     Physical Exam     Initial Vitals [11/08/22 1212]   BP Pulse Resp Temp SpO2   121/74 95 16 100.1 °F (37.8 °C) 100 %      MAP       --         Physical Exam    Nursing note and vitals reviewed.  Constitutional: She appears well-developed and well-nourished.   Eyes: EOM are normal. Pupils are equal, round, and reactive to light.   Neck: Neck supple. No thyromegaly present. No JVD present.   Normal range of motion.  Cardiovascular:  Normal rate, regular rhythm, normal heart sounds and intact distal pulses.     Exam reveals no gallop and no friction rub.       No murmur heard.  Pulmonary/Chest: Breath sounds normal. No respiratory distress.   Abdominal: Abdomen is soft. Bowel sounds are normal.   TTP LLQ with rebound.    Musculoskeletal:         General: No tenderness or edema. Normal range of motion.      Cervical back: Normal range of motion and neck supple.     Neurological: She is alert and oriented to person, place, and time. She has normal strength.   Skin: Skin is warm and dry.       ED Course   Procedures  Labs Reviewed   URINALYSIS, REFLEX TO URINE CULTURE - Abnormal; Notable for the following components:       Result Value    Appearance, UA Hazy (*)     Protein, UA Trace (*)     Nitrite, UA Positive (*)     Leukocytes, UA 1+ (*)     All other components within normal limits    Narrative:     Specimen Source->Urine   URINALYSIS MICROSCOPIC - Abnormal; Notable for the following components:    WBC, UA 8 (*)     Bacteria Many (*)     All other components within normal limits    Narrative:     Specimen Source->Urine   CBC W/ AUTO DIFFERENTIAL - Abnormal; Notable for the following components:    MCV 81 (*)     MCH 25.1 (*)     MCHC 31.0 (*)     All other  components within normal limits   COMPREHENSIVE METABOLIC PANEL - Abnormal; Notable for the following components:    CO2 19 (*)     All other components within normal limits   INFLUENZA A & B BY MOLECULAR   CULTURE, URINE   CULTURE, URINE   SARS-COV-2 RNA AMPLIFICATION, QUAL   POCT URINE PREGNANCY          Imaging Results              CT Abdomen Pelvis  Without Contrast (Final result)  Result time 11/08/22 18:04:42      Final result by Beny Schaefer MD (11/08/22 18:04:42)                   Impression:      No acute findings by CT of the abdomen and pelvis.    Stable lymph nodes in the pericecal region.  Correlate for mesenteric adenitis.    No acute findings evident to explain left lower quadrant pain.      Electronically signed by: Beny Schaefer  Date:    11/08/2022  Time:    18:04               Narrative:    EXAMINATION:  CT ABDOMEN PELVIS WITHOUT CONTRAST    CLINICAL HISTORY:  LLQ abdominal pain;    TECHNIQUE:  Low dose axial images, sagittal and coronal reformations were obtained from the lung bases to the pubic symphysis, 30 mL of oral Omnipaque 350 was administered..    COMPARISON:  CT abdomen and pelvis, 08/11/2020    FINDINGS:  Heart: Normal in size. No pericardial effusion.    Lung Bases: Well aerated, without consolidation or pleural fluid.    Liver: Normal in size and attenuation, with no focal hepatic lesions.    Gallbladder: No calcified gallstones.    Bile Ducts: No evidence of dilated ducts.    Pancreas: No mass or peripancreatic fat stranding.    Spleen: Unremarkable.    Adrenals: Right adrenal gland not confidently identified.  It may be high against the liver behind the IVC, better seen on 2020 contrast enhanced CT.  Left adrenal gland appears normal.    Kidneys/ Ureters: Right kidney is markedly atrophic with dystrophic calcifications and cysts the largest measuring 2.5 cm and 8 Hounsfield units in the upper pole.  Left kidney appears unremarkable.  Ureters are inconspicuous with no lower  tract stone or hydroureter.    Bladder: No evidence of wall thickening.  No evidence of stone.    Reproductive organs: Unremarkable.    GI Tract/Mesentery: No evidence of bowel obstruction or inflammation.  No evidence of diverticulosis or diverticulitis.  Normal appendix.  A few normal sized mesenteric lymph nodes in the right lower quadrant.  The largest measures 5 x 13 mm.    Peritoneal Space: No ascites. No free air.    Retroperitoneum: No significant adenopathy.    Abdominal wall: Unremarkable.    Vasculature: No significant atherosclerosis or aneurysm.    Bones: No acute fracture.                                       Medications   cefTRIAXone (ROCEPHIN) 1 g/50 mL D5W IVPB (0 g Intravenous Stopped 11/8/22 1630)   acetaminophen tablet 1,000 mg (1,000 mg Oral Given 11/8/22 1537)   ketorolac injection 15 mg (15 mg Intravenous Given 11/8/22 1721)   HYDROmorphone injection 0.5 mg (0.5 mg Intravenous Given 11/8/22 1721)   iohexoL (OMNIPAQUE 350) injection 30 mL (30 mLs Oral Given 11/8/22 1630)     LAB work reassuring. UA suggests UTI. CT scan shows mesenteric adenitis. Will treat UTI and mesenteric adenitis with Augmentin and pain medications.                            Clinical Impression:   Final diagnoses:  [N39.0] Urinary tract infection without hematuria, site unspecified (Primary)  [I88.0] Mesenteric adenitis        ED Disposition Condition    Discharge Stable          ED Prescriptions       Medication Sig Dispense Start Date End Date Auth. Provider    amoxicillin-clavulanate 875-125mg (AUGMENTIN) 875-125 mg per tablet Take 1 tablet by mouth 2 (two) times daily. for 10 days 14 tablet 11/8/2022 11/18/2022 Leonardo Mccann MD    HYDROcodone-acetaminophen (NORCO) 5-325 mg per tablet Take 1 tablet by mouth every 6 (six) hours as needed for Pain. 12 tablet 11/8/2022 11/18/2022 Leonardo Mccann MD    ibuprofen (ADVIL,MOTRIN) 800 MG tablet Take 1 tablet (800 mg total) by mouth every 6 (six) hours as needed  for Pain or Temperature greater than (100.4). 20 tablet 11/8/2022 11/13/2022 Leonardo Mccann MD          Follow-up Information       Follow up With Specialties Details Why Contact Info    Les Horton MD Family Medicine In 1 week  [address] [facility] [address]  [phone]      [facility] Emergency Medicine  As needed, If symptoms worsen [address] [facility] [address] [phone]             Leonardo Mccann MD  11/08/22 3925

## 2022-11-10 LAB — BACTERIA UR CULT: ABNORMAL

## 2022-11-11 ENCOUNTER — TELEPHONE (OUTPATIENT)
Dept: ORTHOPEDICS | Facility: CLINIC | Age: 27
End: 2022-11-11
Payer: MEDICAID

## 2022-11-11 DIAGNOSIS — M79.642 LEFT HAND PAIN: Primary | ICD-10-CM

## 2022-11-11 NOTE — PROGRESS NOTES
Subjective:      Patient ID: Ray Knox is a 27 y.o. female.    Chief Complaint: Pain and Numbness of the Left Hand (Patient presents today complaining she has pain and numbness in her left hand and that she also drop things. )      HPI  (French)    History of Excisional biopsy soft tissue mass left index finger (2 cm) by Dr. Doty on 3/8/22.    She presents today with numbness and tingling in left hand x 4-6 weeks that is more constant. She is dropping things at work ([employer]). She is right hand dominant. No known injury to her hand. She has intermittent pain that shoots up her arm to her shoulder. She rates her pain as a 0 on a scale of 1-10. No symptoms in right hand.     She is taking motrin or naproxen prn. She is on augmentin for a UTI. No recent OT or injections in her hand.       Past Medical History:   Diagnosis Date    Anemia     Asthma     Bilateral renal cysts     Breast disorder     Congenital absence of one kidney     Kidney cysts     pt born with 1 kidney         Current Outpatient Medications:     albuterol (PROVENTIL) 2.5 mg /3 mL (0.083 %) nebulizer solution, 1 mL., Disp: , Rfl:     albuterol (PROVENTIL/VENTOLIN HFA) 90 mcg/actuation inhaler, Inhale 2 puffs into the lungs every 4 (four) hours as needed., Disp: , Rfl:     amoxicillin-clavulanate 875-125mg (AUGMENTIN) 875-125 mg per tablet, Take 1 tablet by mouth 2 (two) times daily. for 10 days, Disp: 14 tablet, Rfl: 0    clomiPHENE (CLOMID) 50 mg tablet, Take 1 tablet in the am from Day 3 to Day 7 of your cycle. Have sex every other day for one week starting five days after last pill., Disp: 5 tablet, Rfl: 2    dicyclomine (BENTYL) 20 mg tablet, Take 1 tablet (20 mg total) by mouth 2 (two) times daily., Disp: 20 tablet, Rfl: 0    ferrous sulfate 325 (65 FE) MG EC tablet, Take by mouth once daily., Disp: , Rfl:     HYDROcodone-acetaminophen (NORCO) 5-325 mg per tablet, Take 1 tablet by mouth every 6 (six) hours as needed for Pain., Disp: 12  "tablet, Rfl: 0    naproxen (NAPROSYN) 500 MG tablet, Take 1 tablet (500 mg total) by mouth 2 (two) times daily with meals., Disp: 20 tablet, Rfl: 0    ondansetron (ZOFRAN-ODT) 4 MG TbDL, Take 1 tablet (4 mg total) by mouth every 6 (six) hours as needed., Disp: 30 tablet, Rfl: 0    propranoloL (INDERAL) 10 MG tablet, Take 1 tablet (10 mg total) by mouth 2 (two) times daily., Disp: 60 tablet, Rfl: 11    levonorgestrel-ethinyl estradiol (AVIANE,ALESSE,LESSINA) 0.1-20 mg-mcg per tablet, Take 1 tablet by mouth once daily., Disp: 28 tablet, Rfl: 12    valACYclovir (VALTREX) 500 MG tablet, Take 1 tablet (500 mg total) by mouth once daily., Disp: 30 tablet, Rfl: 12    Review of patient's allergies indicates:   Allergen Reactions    Lisinopril Other (See Comments), Shortness Of Breath and Swelling     angioedema         Review of Systems   Constitutional: Negative for chills, fever, night sweats and weight gain.   Gastrointestinal:  Negative for bowel incontinence, nausea and vomiting.   Genitourinary:  Negative for bladder incontinence.   Neurological:  Negative for disturbances in coordination and loss of balance.         Objective:        Ht 5' 5" (1.651 m)   Wt 92.4 kg (203 lb 12.8 oz)   BMI 33.91 kg/m²     Ortho/SPM Exam    RIGHT Hand/Wrist Examination:    Observation/Inspection:  Swelling  none    Deformity  none  Discoloration  none     Scars   none    Atrophy  none    HAND/WRIST EXAMINATION:  Finkelstein's Test   Neg  WHAT Test    Neg  Snuff box tenderness   Neg  Hook of Hamate Tenderness  Neg  CMC grind    Neg    Neurovascular Exam:  Digits WWP, brisk CR < 3s throughout  NVI motor/LTS to M/R/U nerves, radial pulse 2+  Tinel's Test - Carpal Tunnel  Neg  Tinel's Test - Cubital Tunnel  Neg  Phalen's Test    Neg  Median Nerve Compression Test Neg    ROM hand/wrist/elbow full, painless      LEFT Hand/Wrist Examination:    Observation/Inspection:  Swelling  none    Deformity  none  Discoloration  none "     Scars   none    Atrophy  none    HAND/WRIST EXAMINATION:  Finkelstein's Test   Neg  WHAT Test    Neg  Snuff box tenderness   Neg  Hook of Hamate Tenderness  Neg  CMC grind    Neg    Neurovascular Exam:  Digits WWP, brisk CR < 3s throughout  NVI motor/LTS to M/R/U nerves, radial pulse 2+  Tinel's Test - Carpal Tunnel  positive  Tinel's Test - Cubital Tunnel  Neg  Phalen's Test    positive  Median Nerve Compression Test positive    ROM hand/wrist/elbow full, painless    No masses noted in fingers. Well healed incision left index finger.       XRAY INTERPRETATION:   X-rays of left hand dated 11/14/22 are personally reviewed and show no fracture or dislocation.          Assessment:       Encounter Diagnosis   Name Primary?    Numbness and tingling in left hand Yes          Plan:       Ray was seen today for pain and numbness.    Diagnoses and all orders for this visit:    Numbness and tingling in left hand  -     EMG W/ ULTRASOUND AND NERVE CONDUCTION TEST 2 Extremities; Future    History of Excisional biopsy soft tissue mass left index finger (2 cm) by Dr. Doty on 3/8/22.    4-6 week history of more constant numbness and tingling in left hand. She is dropping things at work ([employer]). She is right hand dominant. No known injury to her hand. She has intermittent pain that shoots up her arm to her shoulder. No symptoms in right hand.     XRs of left hand show no fracture or dislocation. Symptoms and exam are suspicious for carpal tunnel syndrome.     Treatment options reviewed with patient along with above left hand xrays. Following plan made:     - Okay to continue with prn motrin OR naproxen OTC. Reviewed dosing and side effects. Take with food.   - Given gel wrist brace to wear at night. Can wear during the day prn.  - EMG/NCS of bilateral UEs to further evaluate LEFT UE numbness/tingling.   - Note for work.   - Will follow up after EMG to discuss results and further recommendations.   - If left shoulder  pain gets worse, will get referral from PCP. Will need left shoulder XRs prior to seeing me.     Follow up in about 6 weeks (around 12/26/2022).

## 2022-11-11 NOTE — TELEPHONE ENCOUNTER
Called pt to inform her she has an xray appt scheduled on 11/14 for 1:00pm. Patient repiled ok. But she might be a lil late.

## 2022-11-11 NOTE — TELEPHONE ENCOUNTER
----- Message from Phoebe Barber PA-C sent at 11/11/2022 11:51 AM CST -----  She has appt on Monday. She needs XRs of whichever hand is having symptoms prior to seeing me.     Thanks.

## 2022-11-14 ENCOUNTER — OFFICE VISIT (OUTPATIENT)
Dept: ORTHOPEDICS | Facility: CLINIC | Age: 27
End: 2022-11-14
Payer: MEDICAID

## 2022-11-14 VITALS — BODY MASS INDEX: 33.96 KG/M2 | WEIGHT: 203.81 LBS | HEIGHT: 65 IN

## 2022-11-14 DIAGNOSIS — R20.0 NUMBNESS AND TINGLING IN LEFT HAND: Primary | ICD-10-CM

## 2022-11-14 DIAGNOSIS — R20.2 NUMBNESS AND TINGLING IN LEFT HAND: Primary | ICD-10-CM

## 2022-11-14 PROCEDURE — 3008F PR BODY MASS INDEX (BMI) DOCUMENTED: ICD-10-PCS | Mod: CPTII,,, | Performed by: PHYSICIAN ASSISTANT

## 2022-11-14 PROCEDURE — 1159F MED LIST DOCD IN RCRD: CPT | Mod: CPTII,,, | Performed by: PHYSICIAN ASSISTANT

## 2022-11-14 PROCEDURE — 1159F PR MEDICATION LIST DOCUMENTED IN MEDICAL RECORD: ICD-10-PCS | Mod: CPTII,,, | Performed by: PHYSICIAN ASSISTANT

## 2022-11-14 PROCEDURE — 99213 PR OFFICE/OUTPT VISIT, EST, LEVL III, 20-29 MIN: ICD-10-PCS | Mod: S$PBB,,, | Performed by: PHYSICIAN ASSISTANT

## 2022-11-14 PROCEDURE — 1160F PR REVIEW ALL MEDS BY PRESCRIBER/CLIN PHARMACIST DOCUMENTED: ICD-10-PCS | Mod: CPTII,,, | Performed by: PHYSICIAN ASSISTANT

## 2022-11-14 PROCEDURE — 1160F RVW MEDS BY RX/DR IN RCRD: CPT | Mod: CPTII,,, | Performed by: PHYSICIAN ASSISTANT

## 2022-11-14 PROCEDURE — 99999 PR PBB SHADOW E&M-EST. PATIENT-LVL III: ICD-10-PCS | Mod: PBBFAC,,, | Performed by: PHYSICIAN ASSISTANT

## 2022-11-14 PROCEDURE — 99213 OFFICE O/P EST LOW 20 MIN: CPT | Mod: PBBFAC,PN | Performed by: PHYSICIAN ASSISTANT

## 2022-11-14 PROCEDURE — 99213 OFFICE O/P EST LOW 20 MIN: CPT | Mod: S$PBB,,, | Performed by: PHYSICIAN ASSISTANT

## 2022-11-14 PROCEDURE — 99999 PR PBB SHADOW E&M-EST. PATIENT-LVL III: CPT | Mod: PBBFAC,,, | Performed by: PHYSICIAN ASSISTANT

## 2022-11-14 PROCEDURE — 3008F BODY MASS INDEX DOCD: CPT | Mod: CPTII,,, | Performed by: PHYSICIAN ASSISTANT

## 2022-11-14 NOTE — PATIENT INSTRUCTIONS
It was nice to see you again today!     Left hand xrays looked good. I think the numbness and tingling in coming from carpal tunnel syndrome on left.     I want to get a nerve test/EMG to look into your symptoms further. I sent orders to [facility] on the [address]. They will call you to set this up.     Wear the wrist brace as needed to help with pain.     I will see you back after the nerve test to review it.    You can continue with motrin OR naproxen as directed to help with pain/inflammation. Take as directed with food.     If shoulder continues to bother you, you will need to get a referral from your PCP.     Please stay in touch and call me if you need anything. You can also send me a message in [facility].     Phoebe   [phone]

## 2022-11-25 ENCOUNTER — TELEPHONE (OUTPATIENT)
Dept: NEUROLOGY | Facility: CLINIC | Age: 27
End: 2022-11-25
Payer: MEDICAID

## 2022-12-02 ENCOUNTER — HOSPITAL ENCOUNTER (EMERGENCY)
Facility: HOSPITAL | Age: 27
Discharge: HOME | End: 2022-12-02
Attending: EMERGENCY MEDICINE
Payer: MEDICAID

## 2022-12-02 VITALS
WEIGHT: 204 LBS | HEART RATE: 88 BPM | RESPIRATION RATE: 16 BRPM | HEIGHT: 65 IN | OXYGEN SATURATION: 100 % | SYSTOLIC BLOOD PRESSURE: 116 MMHG | BODY MASS INDEX: 33.99 KG/M2 | DIASTOLIC BLOOD PRESSURE: 82 MMHG | TEMPERATURE: 98 F

## 2022-12-02 DIAGNOSIS — U07.1 COVID-19: Primary | ICD-10-CM

## 2022-12-02 LAB
CTP QC/QA: YES
CTP QC/QA: YES
POC MOLECULAR INFLUENZA A AGN: NEGATIVE
POC MOLECULAR INFLUENZA B AGN: NEGATIVE
SARS-COV-2 RDRP RESP QL NAA+PROBE: POSITIVE

## 2022-12-02 PROCEDURE — 87635 SARS-COV-2 COVID-19 AMP PRB: CPT | Mod: ER | Performed by: EMERGENCY MEDICINE

## 2022-12-02 PROCEDURE — 99282 EMERGENCY DEPT VISIT SF MDM: CPT | Mod: ER

## 2022-12-02 PROCEDURE — 87502 INFLUENZA DNA AMP PROBE: CPT | Mod: ER

## 2022-12-02 NOTE — ED PROVIDER NOTES
"Encounter Date: 12/2/2022       History     Chief Complaint   Patient presents with    Fever     Pt ambulatory to the ER with chest pain, cough, SOB, sore throat, loss of taste, and lost of smell x 2 days. Pt reports pain is constant, but "worse with cough." Pt took Tylenol last night and Robitussin with no relief     HPI  27f hx congenital solitary kidney pw cough, sore throat, loss of taste and smell, cough  [place] the [place] at [employer], no masking    Review of patient's allergies indicates:   Allergen Reactions    Lisinopril Other (See Comments), Shortness Of Breath and Swelling     angioedema       Past Medical History:   Diagnosis Date    Anemia     Asthma     Bilateral renal cysts     Breast disorder     Congenital absence of one kidney     Kidney cysts     pt born with 1 kidney     Past Surgical History:   Procedure Laterality Date    FINGER MASS EXCISION Left 3/8/2022    Procedure: EXCISION, MASS, FINGER;  Surgeon: Bk Doty Jr., MD;  Location: [facility];  Service: Orthopedics;  Laterality: Left;     Family History   Problem Relation Age of Onset    Diabetes Paternal Aunt     Hypertension Maternal Grandfather     Cancer Paternal Grandmother     Breast cancer Paternal Grandmother     Leukemia Paternal Grandmother     Kidney disease Maternal Grandmother     Hypertension Mother      Social History     Tobacco Use    Smoking status: Every Day     Types: Vaping with nicotine    Smokeless tobacco: Never   Substance Use Topics    Alcohol use: Yes     Comment: OCC    Drug use: No     Review of Systems   Constitutional:  Positive for fever.   HENT:  Positive for sore throat.    Respiratory:  Positive for cough and shortness of breath.    Cardiovascular:  Negative for leg swelling.   Gastrointestinal:  Negative for nausea.   Genitourinary:  Negative for dysuria.   Musculoskeletal:  Negative for back pain.   Skin:  Negative for rash.   Neurological:  Negative for weakness.   Hematological:  Does not " bruise/bleed easily.     Physical Exam     Initial Vitals [12/02/22 0405]   BP Pulse Resp Temp SpO2   116/82 88 16 98.2 °F (36.8 °C) 100 %      MAP       --         Physical Exam    Nursing note and vitals reviewed.  Constitutional:   EXAM  General: Awake, alert and oriented. No acute distress.     Head: normocephalic and atraumatic     Eyes: Conjunctivae are clear without exudates or hemorrhage. Sclera is non-icteric. EOM are intact. Eyelids are normal in appearance without swelling or lesions.     Ears: The external ear and ear canal are non-tender and without swelling. The canal is clear without discharge. Hearing intact.     Nose: Nares are patent bilaterally.     Neck: The neck is supple. Trachea is midline. Full ROM.     Cardiac: Regular rate.     Respiratory: No signs of respiratory distress. No audible wheezes.     Abdominal: Non-distended.     Extremities: Upper and lower extremities are atraumatic in appearance without tenderness or deformity. No swelling or erythema. Full range of motion.     Skin: Appropriate color for ethnicity.     Neurological: The patient is awake, alert and oriented to person, place, and time with normal speech.     Psychiatric: Appropriate mood and affect.     In light of current/ongoing global covid-19 pandemic, all my encounters w pt were with full ppe including but not limited to gown, gloves, n95, eye protection OR from >6 ft away.           ED Course   Procedures  Labs Reviewed   SARS-COV-2 RDRP GENE - Abnormal; Notable for the following components:       Result Value    POC Rapid COVID Positive (*)     All other components within normal limits   INFLUENZA A & B BY MOLECULAR   SARS-COV-2 RNA AMPLIFICATION, QUAL   POCT INFLUENZA A/B MOLECULAR          Imaging Results    None          Medications - No data to display  Medical Decision Making:   ED Management:  +Covid. Ox sat 100%. Otherwise well appearing. No indication for blood work or imaging. Expectant mgmt, work note,  strict return precautions discussed with patient expressing understanding of instructions, and all questions answered.                           Clinical Impression:   Final diagnoses:  [U07.1] COVID-19 (Primary)      ED Disposition Condition    Discharge Stable          ED Prescriptions    None       Follow-up Information       Follow up With Specialties Details Why Contact Info    Les Horton MD Family Medicine   [address] [place] [id number]  [phone]               Juana Almonte MD  12/02/22 0449

## 2022-12-02 NOTE — ED TRIAGE NOTES
Reports to ED c c/o covid symptoms x 2 days. States loss of taste and smell. Also reports cough that causes pain to chest and SOB. +Sore throat. States took tylenol and robitussin; no relief. Pt states [place].

## 2022-12-03 ENCOUNTER — HOSPITAL ENCOUNTER (EMERGENCY)
Facility: HOSPITAL | Age: 27
Discharge: HOME | End: 2022-12-03
Attending: FAMILY MEDICINE
Payer: MEDICAID

## 2022-12-03 VITALS
BODY MASS INDEX: 33.95 KG/M2 | OXYGEN SATURATION: 98 % | SYSTOLIC BLOOD PRESSURE: 124 MMHG | WEIGHT: 204 LBS | DIASTOLIC BLOOD PRESSURE: 68 MMHG | RESPIRATION RATE: 18 BRPM | TEMPERATURE: 98 F | HEART RATE: 84 BPM

## 2022-12-03 DIAGNOSIS — J45.909 ASTHMA, UNSPECIFIED ASTHMA SEVERITY, UNSPECIFIED WHETHER COMPLICATED, UNSPECIFIED WHETHER PERSISTENT: Primary | ICD-10-CM

## 2022-12-03 DIAGNOSIS — U07.1 COVID: ICD-10-CM

## 2022-12-03 DIAGNOSIS — R05.9 COUGH: ICD-10-CM

## 2022-12-03 PROCEDURE — 94640 AIRWAY INHALATION TREATMENT: CPT | Mod: ER

## 2022-12-03 PROCEDURE — 99284 EMERGENCY DEPT VISIT MOD MDM: CPT | Mod: 25,ER

## 2022-12-03 PROCEDURE — 25000242 PHARM REV CODE 250 ALT 637 W/ HCPCS: Mod: ER | Performed by: PHYSICIAN ASSISTANT

## 2022-12-03 RX ORDER — ALBUTEROL SULFATE 2.5 MG/.5ML
2.5 SOLUTION RESPIRATORY (INHALATION) EVERY 4 HOURS PRN
Qty: 100 EACH | Refills: 0 | Status: SHIPPED | OUTPATIENT
Start: 2022-12-03 | End: 2023-07-20

## 2022-12-03 RX ORDER — PREDNISONE 20 MG/1
40 TABLET ORAL DAILY
Qty: 10 TABLET | Refills: 0 | Status: SHIPPED | OUTPATIENT
Start: 2022-12-03 | End: 2022-12-08

## 2022-12-03 RX ORDER — ALBUTEROL SULFATE 90 UG/1
2 AEROSOL, METERED RESPIRATORY (INHALATION)
Status: COMPLETED | OUTPATIENT
Start: 2022-12-03 | End: 2022-12-03

## 2022-12-03 RX ADMIN — ALBUTEROL SULFATE 2 PUFF: 90 AEROSOL, METERED RESPIRATORY (INHALATION) at 06:12

## 2022-12-04 NOTE — ED PROVIDER NOTES
Encounter Date: 12/3/2022       History     Chief Complaint   Patient presents with    Cough     States tested + for covid 12/2 and states cough and breathing has gotten worse. +chest wall pain +dizziness +headache      Patient is a 27-year-old female with history of asthma who presents to the emergency department with cough and wheezing.  Patient reports 2 days ago she was diagnosed with COVID.  She reports she has chest tightness in her asthma is acting up.  She reports she does not have a nebulizer at home.  She reports she feels as if her albuterol inhaler is not helping.  She denies chest pain.    The history is provided by the patient.   Review of patient's allergies indicates:   Allergen Reactions    Lisinopril Other (See Comments), Shortness Of Breath and Swelling     angioedema       Past Medical History:   Diagnosis Date    Anemia     Asthma     Bilateral renal cysts     Breast disorder     Congenital absence of one kidney     Kidney cysts     pt born with 1 kidney     Past Surgical History:   Procedure Laterality Date    FINGER MASS EXCISION Left 3/8/2022    Procedure: EXCISION, MASS, FINGER;  Surgeon: Bk Doty Jr., MD;  Location: [facility];  Service: Orthopedics;  Laterality: Left;     Family History   Problem Relation Age of Onset    Diabetes Paternal Aunt     Hypertension Maternal Grandfather     Cancer Paternal Grandmother     Breast cancer Paternal Grandmother     Leukemia Paternal Grandmother     Kidney disease Maternal Grandmother     Hypertension Mother      Social History     Tobacco Use    Smoking status: Every Day     Types: Vaping with nicotine    Smokeless tobacco: Never   Substance Use Topics    Alcohol use: Yes     Comment: OCC    Drug use: No     Review of Systems   Constitutional:  Negative for activity change, appetite change, chills, fatigue and fever.   HENT:  Negative for congestion, ear discharge, ear pain, postnasal drip, rhinorrhea and sore throat.    Respiratory:  Positive  for cough and wheezing.    Cardiovascular:  Negative for chest pain.   Gastrointestinal:  Negative for abdominal pain.   Genitourinary:  Negative for dysuria, flank pain and hematuria.   Musculoskeletal:  Negative for back pain.   Skin:  Negative for rash and wound.   Neurological:  Negative for dizziness and headaches.     Physical Exam     Initial Vitals [12/03/22 1736]   BP Pulse Resp Temp SpO2   126/85 88 17 98.4 °F (36.9 °C) 100 %      MAP       --         Physical Exam    Nursing note and vitals reviewed.  Constitutional: She appears well-developed and well-nourished. She is not diaphoretic.  Non-toxic appearance. No distress.   HENT:   Head: Normocephalic.   Right Ear: External ear normal.   Left Ear: External ear normal.   Nose: Nose normal.   Eyes: Conjunctivae are normal. Pupils are equal, round, and reactive to light.   Neck:   Normal range of motion.  Cardiovascular:  Normal rate and regular rhythm.           No lower extremity edema   Pulmonary/Chest: No respiratory distress. She has wheezes. She has no rhonchi. She has no rales. She exhibits no tenderness.   Decreased breath sounds bilaterally   Abdominal: Abdomen is soft. Bowel sounds are normal. There is no abdominal tenderness.   Musculoskeletal:      Cervical back: Normal range of motion.     Neurological: She is oriented to person, place, and time.   Skin: Skin is warm and dry. Capillary refill takes less than 2 seconds.   Psychiatric: She has a normal mood and affect.       ED Course   Procedures  Labs Reviewed - No data to display       Imaging Results              X-Ray Chest AP Portable (Final result)  Result time 12/03/22 18:21:28      Final result by Trey Sampson MD (12/03/22 18:21:28)                   Impression:      No acute abnormality.      Electronically signed by: Trey Sampson  Date:    12/03/2022  Time:    18:21               Narrative:    EXAMINATION:  XR CHEST AP PORTABLE    CLINICAL HISTORY:  Cough,  unspecified    TECHNIQUE:  Single frontal view of the chest was performed.    COMPARISON:  Multiple priors.    FINDINGS:  The lungs are clear, with normal appearance of pulmonary vasculature and no pleural effusion or pneumothorax.    The cardiac silhouette is normal in size. The hilar and mediastinal contours are unremarkable.    Bones are intact.                                    X-Rays:   Independently Interpreted Readings:   Other Readings:  No acute cardiopulmonary process  Medications   albuterol inhaler 2 puff (2 puffs Inhalation Given 12/3/22 1830)     Medical Decision Making:   Initial Assessment:   Urgent evaluation of a 27-year-old female who presents to the emergency department with wheezing and cough.  Patient is known to be COVID positive.  On lung auscultation, patient has decreased lung sounds bilaterally with faint diffuse wheezing.  Patient is given albuterol MDI.  Chest x-ray confirms no underlying pneumonia.  She will be given a dose of steroids to get her through this asthma exacerbation.  She will be sent home with a burst.  Advised follow-up with PCP.  Advised to return to the emergency department with any worsening symptoms or concerns                        Clinical Impression:   Final diagnoses:  [R05.9] Cough  [J45.909] Asthma, unspecified asthma severity, unspecified whether complicated, unspecified whether persistent (Primary)  [U07.1] COVID        ED Disposition Condition    Discharge Stable          ED Prescriptions       Medication Sig Dispense Start Date End Date Auth. Provider    predniSONE (DELTASONE) 20 MG tablet Take 2 tablets (40 mg total) by mouth once daily. for 5 days 10 tablet 12/3/2022 12/8/2022 Lilly Goode PA-C    albuterol sulfate 2.5 mg/0.5 mL Nebu Take 2.5 mg by nebulization every 4 (four) hours as needed (wheezing). Rescue 100 each 12/3/2022 12/3/2023 Lilly Goode PA-C          Follow-up Information    None          Lilly BASS  FAYE Goode  12/03/22 1922

## 2022-12-27 ENCOUNTER — HOSPITAL ENCOUNTER (EMERGENCY)
Facility: HOSPITAL | Age: 27
Discharge: HOME | End: 2022-12-27
Attending: EMERGENCY MEDICINE
Payer: MEDICAID

## 2022-12-27 VITALS
DIASTOLIC BLOOD PRESSURE: 71 MMHG | RESPIRATION RATE: 17 BRPM | TEMPERATURE: 98 F | SYSTOLIC BLOOD PRESSURE: 125 MMHG | OXYGEN SATURATION: 99 % | HEART RATE: 78 BPM

## 2022-12-27 DIAGNOSIS — R10.31 RIGHT LOWER QUADRANT ABDOMINAL PAIN: Primary | ICD-10-CM

## 2022-12-27 LAB
ALBUMIN SERPL BCP-MCNC: 4.1 G/DL (ref 3.5–5.2)
ALP SERPL-CCNC: 53 U/L (ref 55–135)
ALT SERPL W/O P-5'-P-CCNC: 11 U/L (ref 10–44)
ANION GAP SERPL CALC-SCNC: 4 MMOL/L (ref 8–16)
AST SERPL-CCNC: 11 U/L (ref 10–40)
B-HCG UR QL: NEGATIVE
BASOPHILS # BLD AUTO: 0.05 K/UL (ref 0–0.2)
BASOPHILS NFR BLD: 0.5 % (ref 0–1.9)
BILIRUB SERPL-MCNC: 0.3 MG/DL (ref 0.1–1)
BILIRUB UR QL STRIP: NEGATIVE
BUN SERPL-MCNC: 14 MG/DL (ref 6–20)
CALCIUM SERPL-MCNC: 9.1 MG/DL (ref 8.7–10.5)
CHLORIDE SERPL-SCNC: 106 MMOL/L (ref 95–110)
CLARITY UR: ABNORMAL
CO2 SERPL-SCNC: 26 MMOL/L (ref 23–29)
COLOR UR: YELLOW
CREAT SERPL-MCNC: 0.9 MG/DL (ref 0.5–1.4)
CTP QC/QA: YES
DIFFERENTIAL METHOD: ABNORMAL
EOSINOPHIL # BLD AUTO: 0.4 K/UL (ref 0–0.5)
EOSINOPHIL NFR BLD: 4.5 % (ref 0–8)
ERYTHROCYTE [DISTWIDTH] IN BLOOD BY AUTOMATED COUNT: 14.2 % (ref 11.5–14.5)
EST. GFR  (NO RACE VARIABLE): >60 ML/MIN/1.73 M^2
GLUCOSE SERPL-MCNC: 92 MG/DL (ref 70–110)
GLUCOSE UR QL STRIP: NEGATIVE
HCT VFR BLD AUTO: 38.5 % (ref 37–48.5)
HGB BLD-MCNC: 12 G/DL (ref 12–16)
HGB UR QL STRIP: NEGATIVE
IMM GRANULOCYTES # BLD AUTO: 0.04 K/UL (ref 0–0.04)
IMM GRANULOCYTES NFR BLD AUTO: 0.4 % (ref 0–0.5)
KETONES UR QL STRIP: NEGATIVE
LEUKOCYTE ESTERASE UR QL STRIP: ABNORMAL
LIPASE SERPL-CCNC: 20 U/L (ref 4–60)
LYMPHOCYTES # BLD AUTO: 2.9 K/UL (ref 1–4.8)
LYMPHOCYTES NFR BLD: 29.8 % (ref 18–48)
MCH RBC QN AUTO: 25.4 PG (ref 27–31)
MCHC RBC AUTO-ENTMCNC: 31.2 G/DL (ref 32–36)
MCV RBC AUTO: 82 FL (ref 82–98)
MICROSCOPIC COMMENT: ABNORMAL
MONOCYTES # BLD AUTO: 0.6 K/UL (ref 0.3–1)
MONOCYTES NFR BLD: 6.2 % (ref 4–15)
NEUTROPHILS # BLD AUTO: 5.7 K/UL (ref 1.8–7.7)
NEUTROPHILS NFR BLD: 58.6 % (ref 38–73)
NITRITE UR QL STRIP: NEGATIVE
NRBC BLD-RTO: 0 /100 WBC
PH UR STRIP: 6 [PH] (ref 5–8)
PLATELET # BLD AUTO: 252 K/UL (ref 150–450)
PMV BLD AUTO: 10.7 FL (ref 9.2–12.9)
POTASSIUM SERPL-SCNC: 4.1 MMOL/L (ref 3.5–5.1)
PROT SERPL-MCNC: 7.6 G/DL (ref 6–8.4)
PROT UR QL STRIP: NEGATIVE
RBC # BLD AUTO: 4.72 M/UL (ref 4–5.4)
RBC #/AREA URNS HPF: 5 /HPF (ref 0–4)
SODIUM SERPL-SCNC: 136 MMOL/L (ref 136–145)
SP GR UR STRIP: 1.02 (ref 1–1.03)
SQUAMOUS #/AREA URNS HPF: 6 /HPF
URN SPEC COLLECT METH UR: ABNORMAL
UROBILINOGEN UR STRIP-ACNC: NEGATIVE EU/DL
WBC # BLD AUTO: 9.65 K/UL (ref 3.9–12.7)
WBC #/AREA URNS HPF: 0 /HPF (ref 0–5)

## 2022-12-27 PROCEDURE — 85025 COMPLETE CBC W/AUTO DIFF WBC: CPT | Performed by: EMERGENCY MEDICINE

## 2022-12-27 PROCEDURE — 25000003 PHARM REV CODE 250: Performed by: EMERGENCY MEDICINE

## 2022-12-27 PROCEDURE — 99285 EMERGENCY DEPT VISIT HI MDM: CPT | Mod: 25

## 2022-12-27 PROCEDURE — 25500020 PHARM REV CODE 255: Performed by: EMERGENCY MEDICINE

## 2022-12-27 PROCEDURE — 81025 URINE PREGNANCY TEST: CPT | Performed by: EMERGENCY MEDICINE

## 2022-12-27 PROCEDURE — 81000 URINALYSIS NONAUTO W/SCOPE: CPT | Performed by: EMERGENCY MEDICINE

## 2022-12-27 PROCEDURE — 83690 ASSAY OF LIPASE: CPT | Performed by: EMERGENCY MEDICINE

## 2022-12-27 PROCEDURE — 80053 COMPREHEN METABOLIC PANEL: CPT | Performed by: EMERGENCY MEDICINE

## 2022-12-27 RX ORDER — IBUPROFEN 400 MG/1
400 TABLET ORAL
Status: COMPLETED | OUTPATIENT
Start: 2022-12-27 | End: 2022-12-27

## 2022-12-27 RX ADMIN — IOHEXOL 100 ML: 350 INJECTION, SOLUTION INTRAVENOUS at 11:12

## 2022-12-27 RX ADMIN — IBUPROFEN 400 MG: 400 TABLET ORAL at 01:12

## 2022-12-28 NOTE — ED PROVIDER NOTES
Encounter Date: 12/27/2022       History     Chief Complaint   Patient presents with    Abdominal Pain     Intermittent abd pain that began yesterday, described as cramping, last bm this am, + nausea and x1 emesis yesterday, denies urinary complications      Patient is a 27-year-old female who complains of intermittent lower abdominal pain that began yesterday.  She is had nausea with 1 episode of vomiting yesterday.  No fever or chills.  No urinary complaints.  Her last bowel movement was this morning.    Review of patient's allergies indicates:   Allergen Reactions    Lisinopril Other (See Comments), Shortness Of Breath and Swelling     angioedema       Past Medical History:   Diagnosis Date    Anemia     Asthma     Bilateral renal cysts     Breast disorder     Congenital absence of one kidney     Kidney cysts     pt born with 1 kidney     Past Surgical History:   Procedure Laterality Date    FINGER MASS EXCISION Left 3/8/2022    Procedure: EXCISION, MASS, FINGER;  Surgeon: Bk Doty Jr., MD;  Location: [facility] OR;  Service: Orthopedics;  Laterality: Left;     Family History   Problem Relation Age of Onset    Diabetes Paternal Aunt     Hypertension Maternal Grandfather     Cancer Paternal Grandmother     Breast cancer Paternal Grandmother     Leukemia Paternal Grandmother     Kidney disease Maternal Grandmother     Hypertension Mother      Social History     Tobacco Use    Smoking status: Every Day     Types: Vaping with nicotine    Smokeless tobacco: Never   Substance Use Topics    Alcohol use: Yes     Comment: OCC    Drug use: No     Review of Systems   Constitutional:  Negative for chills and fever.   Cardiovascular:  Negative for chest pain.   Gastrointestinal:  Positive for abdominal pain, nausea and vomiting.   Genitourinary:  Negative for dysuria and hematuria.     Physical Exam     Initial Vitals [12/27/22 0730]   BP Pulse Resp Temp SpO2   113/73 88 18 98 °F (36.7 °C) 100 %      MAP       --          Physical Exam    Nursing note and vitals reviewed.  Constitutional: No distress.   HENT:   Head: Atraumatic.   Eyes: EOM are normal.   Neck: Neck supple.   Cardiovascular:  Normal rate, regular rhythm and normal heart sounds.           Pulmonary/Chest: Breath sounds normal.   Abdominal: Abdomen is soft.   Mild tenderness across the lower abdomen mainly to the right lower quadrant, without guarding or rebound.   Musculoskeletal:         General: Normal range of motion.      Cervical back: Neck supple.     Neurological: She is alert and oriented to person, place, and time.   Skin: Skin is warm and dry.   Psychiatric: Thought content normal.       ED Course   Procedures  Labs Reviewed   CBC W/ AUTO DIFFERENTIAL - Abnormal; Notable for the following components:       Result Value    MCH 25.4 (*)     MCHC 31.2 (*)     All other components within normal limits   COMPREHENSIVE METABOLIC PANEL - Abnormal; Notable for the following components:    Alkaline Phosphatase 53 (*)     Anion Gap 4 (*)     All other components within normal limits   URINALYSIS - Abnormal; Notable for the following components:    Appearance, UA Hazy (*)     Leukocytes, UA 1+ (*)     All other components within normal limits   URINALYSIS MICROSCOPIC - Abnormal; Notable for the following components:    RBC, UA 5 (*)     All other components within normal limits   LIPASE   POCT URINE PREGNANCY          Imaging Results              CT Abdomen Pelvis With Contrast (Final result)  Result time 12/27/22 12:23:05      Final result by Garland Almendarez MD (12/27/22 12:23:05)                   Impression:      1. No acute process or CT findings identified to explain patient's symptoms of right lower quadrant pain.  Specifically, appendix is normal.  2. 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  Grossly similar left renal subcentimeter hypoattenuating cortical foci which are too small to characterize but likely  represent cyst.  3. Small volume nonspecific free pelvic fluid, commonly physiologic in this age group.  4. Few additional findings as above.      Electronically signed by: Garland Almendarez MD  Date:    12/27/2022  Time:    12:23               Narrative:    EXAMINATION:  CT ABDOMEN PELVIS WITH CONTRAST    CLINICAL HISTORY:  RLQ abdominal pain (Age >= 14y);    TECHNIQUE:  Low dose axial images, sagittal and coronal reformations were obtained from the lung bases to the pubic symphysis following the IV administration of 100 mL of Omnipaque 350 .  Oral contrast was not given.    COMPARISON:  CT abdomen and pelvis 11/08/2022, right upper quadrant ultrasound 08/01/2022, renal ultrasound 10/14/2020, pelvic ultrasound 02/08/2018    FINDINGS:  Imaged lung bases show minimal dependent atelectasis and stable platelike scarring versus atelectasis within the medial right middle lobe.  Base of the heart is normal in size noting grossly similar small volume nonspecific pericardial fluid.    Small hiatal hernia.    Liver, gallbladder, pancreas, spleen, stomach, duodenum and bilateral adrenal glands are within normal limits.  No biliary ductal dilatation.  Main portal vein is patent.    Grossly similar appearance of the right kidney which is markedly atrophic with dystrophic calcifications and cysts, largest measuring 2.5 cm with fluid attenuation at the upper pole.  Left kidney is normal in size noting few scattered subcentimeter hypoattenuating cortical foci which are too small to characterize.  No hydronephrosis at either kidney.  Ureters are normal in course and caliber.  Urinary bladder is within normal limits.    The uterus is retroflexed.  No adnexal mass seen.  Small volume nonspecific free pelvic fluid, commonly physiologic in this age group.    Appendix and terminal ileum are within normal limits.  Mild amount of scattered fecal material throughout the colon.  No evidence of bowel obstruction or acute inflammation.  No  pneumatosis or portal venous gas.    No ascites, free air or lymphadenopathy.  No significant atherosclerosis.  No aortic aneurysm or dissection.    Tiny fat containing umbilical hernia.    Osseous structures appear stable without acute process seen.                                       Medications   iohexoL (OMNIPAQUE 350) injection 100 mL (100 mLs Intravenous Given 12/27/22 1136)   ibuprofen tablet 400 mg (400 mg Oral Given 12/27/22 1300)     Medical Decision Making:   Initial Assessment:   27-year-old female with abdominal pain.  Differential Diagnosis:   Urinary tract infection, nonspecific abdominal pain, ovarian cyst, appendicitis.  Clinical Tests:   Lab Tests: Ordered and Reviewed  Radiological Study: Ordered and Reviewed  ED Management:  CT without acute findings.  Lab work is stable.  Etiology may be muscular.  Patient will take Motrin and Tylenol for pain and follow up with the primary physician as soon as able for recheck.  She may return to the ED for any possible worsening.                        Clinical Impression:   Final diagnoses:  [R10.31] Right lower quadrant abdominal pain (Primary)        ED Disposition Condition    Discharge Stable          ED Prescriptions    None       Follow-up Information       Follow up With Specialties Details Why Contact Info    Les Horton MD Family Medicine  As needed [address] [facility] [address]  [phone]      [facility] Emergency Dept Emergency Medicine  If symptoms worsen [address] [facility] [address] [phone]             Carlo Ho MD  12/28/22 6895

## 2023-01-12 ENCOUNTER — TELEPHONE (OUTPATIENT)
Dept: ORTHOPEDICS | Facility: CLINIC | Age: 28
End: 2023-01-12
Payer: MEDICAID

## 2023-01-12 ENCOUNTER — PATIENT MESSAGE (OUTPATIENT)
Dept: ORTHOPEDICS | Facility: CLINIC | Age: 28
End: 2023-01-12
Payer: MEDICAID

## 2023-01-12 NOTE — TELEPHONE ENCOUNTER
----- Message from Phoebe Barber PA-C sent at 1/12/2023  2:37 PM CST -----  Domingo called her earlier to cancel her appt tomorrow. I have to leave early for an appointment. Looks like she sent a message back in [facility].     Please reschedule her appt to any open slot and let her know.     Thanks.

## 2023-01-12 NOTE — TELEPHONE ENCOUNTER
Called pt to inform her that her appt that was scheduled on tomorrow have been cancel no answer.. Left message stating information and to give office a call back for any further question. A [employer] message was sent also.

## 2023-01-24 ENCOUNTER — OFFICE VISIT (OUTPATIENT)
Dept: FAMILY MEDICINE | Facility: HOSPITAL | Age: 28
End: 2023-01-24
Payer: MEDICAID

## 2023-01-24 VITALS
HEIGHT: 65 IN | DIASTOLIC BLOOD PRESSURE: 82 MMHG | WEIGHT: 209.44 LBS | HEART RATE: 90 BPM | SYSTOLIC BLOOD PRESSURE: 120 MMHG | BODY MASS INDEX: 34.89 KG/M2

## 2023-01-24 DIAGNOSIS — M75.21 BICEPS TENDONITIS ON RIGHT: Primary | ICD-10-CM

## 2023-01-24 PROCEDURE — 99214 OFFICE O/P EST MOD 30 MIN: CPT | Performed by: STUDENT IN AN ORGANIZED HEALTH CARE EDUCATION/TRAINING PROGRAM

## 2023-01-24 NOTE — PROGRESS NOTES
"Progress Note  [facility] Family Medicine    Subjective:      Ray Knox is a 27 y.o. female here     #Shoulder Pain: R>L; onset 3mo ago; has been taking Motrin 800s with minimal relief; hasn't done PT yet; states that she is seeing ortho in 2 days for her left wrist suspected carpal tunnel and that the PA told her to come to her PCP and ask for a referral for her right shoulder; patient has no other complaints at this visit and does not want further workup or management from FM at this time. Stable     Review of Systems   Constitutional:  Negative for chills and fever.   Respiratory:  Negative for cough and shortness of breath.    Cardiovascular:  Negative for chest pain and leg swelling.   Gastrointestinal:  Negative for abdominal pain, constipation, diarrhea, nausea and vomiting.   Genitourinary:  Negative for dysuria.   Musculoskeletal:  Positive for joint pain (b/l anterior shoulders worst on right). Negative for myalgias.   Neurological:  Positive for tingling (median distribution).       Objective:   /82   Pulse 90   Ht 5' 5" (1.651 m)   Wt 95 kg (209 lb 7 oz)   BMI 34.85 kg/m²      Physical Exam  Vitals reviewed.   Constitutional:       General: She is not in acute distress.     Appearance: Normal appearance. She is not ill-appearing, toxic-appearing or diaphoretic.   HENT:      Head: Normocephalic and atraumatic.      Right Ear: External ear normal.      Left Ear: External ear normal.      Nose: Nose normal.      Mouth/Throat:      Mouth: Mucous membranes are moist.   Eyes:      Extraocular Movements: Extraocular movements intact.   Cardiovascular:      Rate and Rhythm: Normal rate.   Pulmonary:      Effort: Pulmonary effort is normal. No respiratory distress.   Abdominal:      Tenderness: There is no guarding.   Musculoskeletal:         General: Normal range of motion.      Cervical back: Normal range of motion.      Comments: Right shoulder: Positive speed test, positive biceps load test    Skin:   "   General: Skin is warm.      Capillary Refill: Capillary refill takes less than 2 seconds.   Neurological:      Mental Status: She is alert and oriented to person, place, and time.   Psychiatric:         Mood and Affect: Mood normal.         Behavior: Behavior normal.        Assessment:   27 y.o. with        1. Biceps tendonitis on right         Plan:   Ray was seen today for shoulder pain.    Diagnoses and all orders for this visit:    Biceps tendonitis on right  -     Ambulatory referral/consult to Orthopedics; Future        Patient instructed to receive or provide proof of all deficient vaccines in chart, including Flu, COVID #2, Tdap, and Pneumococcal - patient verbalized understanding       Follow up with regular PCP as needed     Ravi Mckeon DO  [facility] Family [facility], PGY-3  2:52 PM, 01/24/2023    *This note was dictated using the M*Modal Fluency Direct word recognition program. There are word rescognition mistakes that are occasionally missed on review.     The following information is provided to all patients.  This information is to help you find resources for any of the problems found today that may be affecting your health:                Living healthy guide: www.[facility].[place].gov       Understanding Diabetes: www.diabetes.org       Eating healthy: www.cdc.gov/healthyweight      CDC home safety checklist: www.cdc.gov/steadi/patient.html      Agency on Aging: www.goea.[place].gov       Alcoholics anonymous (AA): www.aa.org      Physical Activity: www.say.nih.gov/qs5dzaz       Tobacco use: www.quitwithusla.org

## 2023-01-27 ENCOUNTER — TELEPHONE (OUTPATIENT)
Dept: MATERNAL FETAL MEDICINE | Facility: CLINIC | Age: 28
End: 2023-01-27
Payer: MEDICAID

## 2023-01-27 NOTE — TELEPHONE ENCOUNTER
Spoke with patient today to discuss what it is she is looking to be consulted on and she is having trouble conceiving. I told the patient that we do not help people with conceiving and that would be a reproductive endocrinologist and she verbalized her understanding.  She has had 2 miscarriages and was worked up with blood work and she said it was normal.  Patient will call us back if a MFM consult is needed once she talks with reproductive endocrinology.

## 2023-01-30 ENCOUNTER — HOSPITAL ENCOUNTER (EMERGENCY)
Facility: HOSPITAL | Age: 28
Discharge: HOME | End: 2023-01-30
Attending: EMERGENCY MEDICINE
Payer: MEDICAID

## 2023-01-30 ENCOUNTER — HOSPITAL ENCOUNTER (EMERGENCY)
Facility: HOSPITAL | Age: 28
Discharge: LEFT BEFORE DISCHARGE | End: 2023-01-30
Payer: MEDICAID

## 2023-01-30 VITALS
OXYGEN SATURATION: 99 % | SYSTOLIC BLOOD PRESSURE: 112 MMHG | HEART RATE: 79 BPM | RESPIRATION RATE: 17 BRPM | HEIGHT: 65 IN | WEIGHT: 209 LBS | BODY MASS INDEX: 34.82 KG/M2 | DIASTOLIC BLOOD PRESSURE: 70 MMHG | TEMPERATURE: 99 F

## 2023-01-30 VITALS
HEIGHT: 65 IN | HEART RATE: 82 BPM | SYSTOLIC BLOOD PRESSURE: 138 MMHG | RESPIRATION RATE: 16 BRPM | WEIGHT: 209 LBS | DIASTOLIC BLOOD PRESSURE: 79 MMHG | BODY MASS INDEX: 34.82 KG/M2 | OXYGEN SATURATION: 99 % | TEMPERATURE: 98 F

## 2023-01-30 DIAGNOSIS — K59.00 CONSTIPATION: ICD-10-CM

## 2023-01-30 LAB
ALBUMIN SERPL BCP-MCNC: 4.5 G/DL (ref 3.5–5.2)
ALP SERPL-CCNC: 54 U/L (ref 38–126)
ALT SERPL W/O P-5'-P-CCNC: 14 U/L (ref 10–44)
ANION GAP SERPL CALC-SCNC: 7 MMOL/L (ref 8–16)
AST SERPL-CCNC: 18 U/L (ref 15–46)
B-HCG UR QL: NEGATIVE
BASOPHILS # BLD AUTO: 0.04 K/UL (ref 0–0.2)
BASOPHILS NFR BLD: 0.4 % (ref 0–1.9)
BILIRUB SERPL-MCNC: 0.4 MG/DL (ref 0.1–1)
BILIRUB UR QL STRIP: NEGATIVE
CALCIUM SERPL-MCNC: 8.5 MG/DL (ref 8.7–10.5)
CHLORIDE SERPL-SCNC: 108 MMOL/L (ref 95–110)
CLARITY UR REFRACT.AUTO: CLEAR
CO2 SERPL-SCNC: 25 MMOL/L (ref 23–29)
COLOR UR AUTO: YELLOW
CREAT SERPL-MCNC: 0.85 MG/DL (ref 0.5–1.4)
CTP QC/QA: YES
DIFFERENTIAL METHOD: ABNORMAL
EOSINOPHIL # BLD AUTO: 0.2 K/UL (ref 0–0.5)
EOSINOPHIL NFR BLD: 2.4 % (ref 0–8)
ERYTHROCYTE [DISTWIDTH] IN BLOOD BY AUTOMATED COUNT: 13.6 % (ref 11.5–14.5)
EST. GFR  (NO RACE VARIABLE): >60 ML/MIN/1.73 M^2
GLUCOSE SERPL-MCNC: 95 MG/DL (ref 70–110)
GLUCOSE UR QL STRIP: NEGATIVE
HCT VFR BLD AUTO: 36 % (ref 37–48.5)
HGB BLD-MCNC: 11.6 G/DL (ref 12–16)
HGB UR QL STRIP: NEGATIVE
IMM GRANULOCYTES # BLD AUTO: 0.04 K/UL (ref 0–0.04)
IMM GRANULOCYTES NFR BLD AUTO: 0.4 % (ref 0–0.5)
KETONES UR QL STRIP: NEGATIVE
LEUKOCYTE ESTERASE UR QL STRIP: NEGATIVE
LYMPHOCYTES # BLD AUTO: 3 K/UL (ref 1–4.8)
LYMPHOCYTES NFR BLD: 31.7 % (ref 18–48)
MCH RBC QN AUTO: 26.2 PG (ref 27–31)
MCHC RBC AUTO-ENTMCNC: 32.2 G/DL (ref 32–36)
MCV RBC AUTO: 81 FL (ref 82–98)
MONOCYTES # BLD AUTO: 0.6 K/UL (ref 0.3–1)
MONOCYTES NFR BLD: 6.7 % (ref 4–15)
NEUTROPHILS # BLD AUTO: 5.5 K/UL (ref 1.8–7.7)
NEUTROPHILS NFR BLD: 58.4 % (ref 38–73)
NITRITE UR QL STRIP: NEGATIVE
NRBC BLD-RTO: 0 /100 WBC
PH UR STRIP: 7 [PH] (ref 5–8)
PLATELET # BLD AUTO: 237 K/UL (ref 150–450)
PMV BLD AUTO: 10.4 FL (ref 9.2–12.9)
POTASSIUM SERPL-SCNC: 3.9 MMOL/L (ref 3.5–5.1)
PROT SERPL-MCNC: 7.5 G/DL (ref 6–8.4)
PROT UR QL STRIP: NEGATIVE
RBC # BLD AUTO: 4.42 M/UL (ref 4–5.4)
SODIUM SERPL-SCNC: 140 MMOL/L (ref 136–145)
SP GR UR STRIP: 1.02 (ref 1–1.03)
URN SPEC COLLECT METH UR: NORMAL
UROBILINOGEN UR STRIP-ACNC: 1 EU/DL
UUN UR-MCNC: 10 MG/DL (ref 7–17)
WBC # BLD AUTO: 9.41 K/UL (ref 3.9–12.7)

## 2023-01-30 PROCEDURE — 81003 URINALYSIS AUTO W/O SCOPE: CPT | Mod: ER | Performed by: PHYSICIAN ASSISTANT

## 2023-01-30 PROCEDURE — 85025 COMPLETE CBC W/AUTO DIFF WBC: CPT | Mod: ER

## 2023-01-30 PROCEDURE — 81025 URINE PREGNANCY TEST: CPT | Mod: ER | Performed by: PHYSICIAN ASSISTANT

## 2023-01-30 PROCEDURE — 25000003 PHARM REV CODE 250: Mod: ER

## 2023-01-30 PROCEDURE — 99284 EMERGENCY DEPT VISIT MOD MDM: CPT | Mod: ER

## 2023-01-30 PROCEDURE — 99282 EMERGENCY DEPT VISIT SF MDM: CPT | Mod: 27

## 2023-01-30 PROCEDURE — 80053 COMPREHEN METABOLIC PANEL: CPT | Mod: ER | Performed by: PHYSICIAN ASSISTANT

## 2023-01-30 RX ORDER — POLYETHYLENE GLYCOL 3350 17 G/17G
17 POWDER, FOR SOLUTION ORAL DAILY
Qty: 7 EACH | Refills: 0 | Status: SHIPPED | OUTPATIENT
Start: 2023-01-30 | End: 2023-02-06

## 2023-01-30 RX ORDER — DOCUSATE SODIUM 100 MG/1
100 CAPSULE, LIQUID FILLED ORAL 2 TIMES DAILY PRN
Qty: 30 CAPSULE | Refills: 0 | Status: SHIPPED | OUTPATIENT
Start: 2023-01-30 | End: 2023-02-14

## 2023-01-30 RX ORDER — ONDANSETRON 4 MG/1
4 TABLET, ORALLY DISINTEGRATING ORAL
Status: COMPLETED | OUTPATIENT
Start: 2023-01-30 | End: 2023-01-30

## 2023-01-30 RX ADMIN — ONDANSETRON 4 MG: 4 TABLET, ORALLY DISINTEGRATING ORAL at 03:01

## 2023-01-30 NOTE — Clinical Note
"Ray Beckadan Knox was seen and treated in our emergency department on 1/30/2023.  She may return to work on 01/31/2023.       If you have any questions or concerns, please don't hesitate to call.      Cassandra Perkins PA-C"

## 2023-01-30 NOTE — ED NOTES
Review of patient's allergies indicates:   Allergen Reactions    Lisinopril Other (See Comments), Shortness Of Breath and Swelling     angioedema          Patient has verified the spelling of the name and  on armband.   APPEARANCE: Patient is alert, calm, oriented x 4, and does not appear distressed.  SKIN: Skin is normal for race, warm, and dry. Normal skin turgor and mucous membranes moist.  CARDIAC: Normal rate and rhythm, no murmur heard.   RESPIRATORY:Normal rate and effort. Breath sounds clear bilaterally throughout chest. Respirations are equal and unlabored.    GASTRO: Bowel sounds normal, abdomen is soft, no tenderness, and no abdominal distention.  MUSCLE: Full ROM. No bony tenderness or soft tissue tenderness. No obvious deformity.  PERIPHERAL VASCULAR: peripheral pulses present. Normal cap refill. No edema. Warm to touch.  NEURO: 5/5 strength major flexors/extensors bilaterally. Sensory intact to light touch bilaterally. [place] coma scale: eyes open spontaneously-4, oriented & converses-5, obeys commands-6. No neurological abnormalities.   MENTAL STATUS: awake, alert and aware of environment.  EYE: No overt deficits noted. No drainage. Sclera WNL  ENT: EARS: no obvious drainage. NOSE: no active bleeding. THROAT: no redness or swelling.  : Voids without complication. Reports blood clots when she urinates but upon talking to her she stated that they are coming from her vagina and that she has not had a normal period since November but reports that it is  normal for her.

## 2023-01-30 NOTE — ED PROVIDER NOTES
Encounter Date: 1/30/2023       History     Chief Complaint   Patient presents with    Hematuria     Pt reports when I pee I have clots in my urine. I was at [facility] and left there because they said it would be 2 hours before I was seen. I had a miscarriage in Nov and havent had a period since.      Patient is a 27 year old female who presents to the ED with hematuria onset yesterday. Patient reports 2 episodes of noticing clots of blood in her urine over the past day.  Patient reports having a miscarriage in November.  Patient reports not having regular periods since she got off the Depo shot a couple years ago.  She complains of being constipated for the last 4 days.  She reports abdominal pain to her left upper quadrant.  She denies nausea, vomiting, diarrhea or fevers.    A ten point review of systems was completed and is negative except as documented above.  Patient denies any other acute medical complaint.    The patients available PMH, PSH, Social History, medications, allergies, and triage vital signs were reviewed just prior to their medical evaluation.      The history is provided by the patient.   Review of patient's allergies indicates:   Allergen Reactions    Lisinopril Other (See Comments), Shortness Of Breath and Swelling     angioedema       Past Medical History:   Diagnosis Date    Anemia     Asthma     Bilateral renal cysts     Breast disorder     Congenital absence of one kidney     Kidney cysts     pt born with 1 kidney     Past Surgical History:   Procedure Laterality Date    FINGER MASS EXCISION Left 3/8/2022    Procedure: EXCISION, MASS, FINGER;  Surgeon: Bk Doty Jr., MD;  Location: [facility] OR;  Service: Orthopedics;  Laterality: Left;     Family History   Problem Relation Age of Onset    Diabetes Paternal Aunt     Hypertension Maternal Grandfather     Cancer Paternal Grandmother     Breast cancer Paternal Grandmother     Leukemia Paternal Grandmother     Kidney disease Maternal  Grandmother     Hypertension Mother      Social History     Tobacco Use    Smoking status: Every Day     Types: Vaping with nicotine    Smokeless tobacco: Never   Substance Use Topics    Alcohol use: Yes     Comment: OCC    Drug use: No     Review of Systems   Constitutional:  Negative for fever.   HENT:  Negative for sore throat.    Respiratory:  Negative for shortness of breath.    Cardiovascular:  Negative for chest pain.   Gastrointestinal:  Positive for abdominal pain and constipation. Negative for nausea and vomiting.   Genitourinary:  Positive for hematuria. Negative for dysuria.   Musculoskeletal:  Negative for back pain.   Skin:  Negative for rash.   Neurological:  Negative for weakness.   Hematological:  Does not bruise/bleed easily.     Physical Exam     Initial Vitals [01/30/23 1332]   BP Pulse Resp Temp SpO2   126/76 76 15 98.7 °F (37.1 °C) 99 %      MAP       --         Physical Exam    Nursing note and vitals reviewed.  Constitutional: She appears well-developed and well-nourished. No distress.   HENT:   Head: Normocephalic and atraumatic.   Eyes: EOM are normal. Pupils are equal, round, and reactive to light. Right eye exhibits no discharge. Left eye exhibits no discharge.   Neck: Neck supple.   Normal range of motion.  Cardiovascular:  Normal rate and regular rhythm.           Pulmonary/Chest: Breath sounds normal. No respiratory distress. She has no wheezes. She has no rales.   Abdominal: Abdomen is soft. Bowel sounds are normal. She exhibits no distension. There is no abdominal tenderness. There is no guarding.   Musculoskeletal:         General: Normal range of motion.      Cervical back: Normal range of motion and neck supple.     Neurological: She is alert and oriented to person, place, and time.   Skin: Skin is warm and dry.   Psychiatric: She has a normal mood and affect. Her behavior is normal.       ED Course   Procedures  Labs Reviewed   COMPREHENSIVE METABOLIC PANEL - Abnormal; Notable  for the following components:       Result Value    Calcium 8.5 (*)     Anion Gap 7 (*)     All other components within normal limits   CBC W/ AUTO DIFFERENTIAL - Abnormal; Notable for the following components:    Hemoglobin 11.6 (*)     Hematocrit 36.0 (*)     MCV 81 (*)     MCH 26.2 (*)     All other components within normal limits   URINALYSIS, REFLEX TO URINE CULTURE    Narrative:     Preferred Collection Type->Urine, Clean Catch  Specimen Source->Urine   POCT URINE PREGNANCY          Imaging Results              X-Ray Abdomen AP 1 View (KUB) (Final result)  Result time 01/30/23 16:12:17      Final result by MOI Fountain Sr., MD (01/30/23 16:12:17)                   Impression:      There is a prominent amount of fecal material within the ascending and transverse portions of the colon.      Electronically signed by: Russ Fountain MD  Date:    01/30/2023  Time:    16:12               Narrative:    EXAMINATION:  XR ABDOMEN AP 1 VIEW    CLINICAL HISTORY:  Constipation, unspecified    COMPARISON:  08/21/2020    FINDINGS:  There is a prominent amount of fecal material within the ascending and transverse portions of the colon.  There is no pneumoperitoneum. The bony structures appear intact.                                       Medications   ondansetron disintegrating tablet 4 mg (4 mg Oral Given 1/30/23 4545)     Medical Decision Making:   Initial Assessment:   Hematuria onset yesterday  Differential Diagnosis:   UTI, menstrual cycle bleeding, constipation  ED Management:  Hematuria  -Afebrile, vital signs stable, no apparent distress  -CBC remarkable for mild anemia, CMP unremarkable  -UA unremarkable  -Abdomen xray resulted prominent amount of fecal material within the ascending and transverse portions of the colon    Plan:  -Glycolax daily  -Colace as needed  -Increase water intake and fiber  -Patient is in stable condition to be discharged home. Advised patient to follow up with primary care doctor and  return to the ED if experiencing any worsening of symptoms.                          Clinical Impression:   Final diagnoses:  [K59.00] Constipation        ED Disposition Condition    Discharge Stable          ED Prescriptions       Medication Sig Dispense Start Date End Date Auth. Provider    polyethylene glycol (GLYCOLAX) 17 gram PwPk Take 17 g by mouth once daily. for 7 days 7 each 1/30/2023 2/6/2023 Cassandra Perkins PA-C    docusate sodium (COLACE) 100 MG capsule Take 1 capsule (100 mg total) by mouth 2 (two) times daily as needed for Constipation. 30 capsule 1/30/2023 2/14/2023 Cassandra Perkins PA-C          Follow-up Information       Follow up With Specialties Details Why Contact Info    Les Horton MD Family Medicine   [address] [place] [id number]  [phone]               Cassandra Perkins PA-C  01/30/23 6610

## 2023-01-30 NOTE — FIRST PROVIDER EVALUATION
Emergency Department TeleTriage Encounter Note      CHIEF COMPLAINT    Chief Complaint   Patient presents with    Hematuria     Noticed blood clots in her urine. Started yesterday and continued to today. Abdominal pain when passing the clots. Some dizziness associated with the pain.        VITAL SIGNS   Initial Vitals [01/30/23 1142]   BP Pulse Resp Temp SpO2   138/79 82 16 98.3 °F (36.8 °C) 99 %      MAP       --            ALLERGIES    Review of patient's allergies indicates:   Allergen Reactions    Lisinopril Other (See Comments), Shortness Of Breath and Swelling     angioedema         PROVIDER TRIAGE NOTE  This is a teletriage evaluation of a 27 y.o. female presenting to the ED with c/o passing blood clots in her urine with abdominal pain that started yesterday. Limited physical exam via telehealth: The patient is awake, alert, answering questions appropriately and is not in respiratory distress. Initial orders will be placed and care will be transferred to an alternate provider when patient is roomed for a full evaluation. Any additional orders and the final disposition will be determined by that provider.     ORDERS  Labs Reviewed - No data to display    ED Orders (720h ago, onward)      Start Ordered     Status Ordering Provider    01/30/23 1157 01/30/23 1156  POCT urine pregnancy  Once         Ordered AC ESTRADA    01/30/23 1157 01/30/23 1156  Urinalysis, Reflex to Urine Culture Urine, Clean Catch  STAT         Ordered AC ESTRADA          Virtual Visit Note: The provider triage portion of this emergency department evaluation and documentation was performed via [employer], a HIPAA-compliant telemedicine application, in concert with a tele-presenter in the room. A face to face patient evaluation with one of my colleagues will occur once the patient is placed in an emergency department room.      DISCLAIMER: This note was prepared with [employer] voice recognition transcription software. Garbled syntax,  mangled pronouns, and other bizarre constructions may be attributed to that software system.

## 2023-01-30 NOTE — FIRST PROVIDER EVALUATION
Emergency Department TeleTriage Encounter Note      CHIEF COMPLAINT    Chief Complaint   Patient presents with    Hematuria     Pt reports when I pee I have clots in my urine. I was at [facility] and left there because they said it would be 2 hours before I was seen. I had a miscarriage in Nov and havent had a period since.        VITAL SIGNS   Initial Vitals [01/30/23 1332]   BP Pulse Resp Temp SpO2   126/76 76 15 98.7 °F (37.1 °C) 99 %      MAP       --            ALLERGIES    Review of patient's allergies indicates:   Allergen Reactions    Lisinopril Other (See Comments), Shortness Of Breath and Swelling     angioedema         PROVIDER TRIAGE NOTE  Patient presents with complaint of hematuria. She also reports having a miscarriage in Nov and has not had a normal period since. She denies any vaginal bleeding at this time.       ORDERS  Labs Reviewed   URINALYSIS, REFLEX TO URINE CULTURE   POCT URINE PREGNANCY       ED Orders (720h ago, onward)      Start Ordered     Status Ordering Provider    01/30/23 1336 01/30/23 1335  POCT urine pregnancy  Once         Ordered SARAHY HERNDON    01/30/23 1336 01/30/23 1335  Urinalysis, Reflex to Urine Culture Urine, Clean Catch  STAT         Ordered SARAHY HERNDON              Virtual Visit Note: The provider triage portion of this emergency department evaluation and documentation was performed via [employer], a HIPAA-compliant telemedicine application, in concert with a tele-presenter in the room. A face to face patient evaluation with one of my colleagues will occur once the patient is placed in an emergency department room.      DISCLAIMER: This note was prepared with [employer] voice recognition transcription software. Garbled syntax, mangled pronouns, and other bizarre constructions may be attributed to that software system.

## 2023-01-30 NOTE — DISCHARGE INSTRUCTIONS
-Follow up with primary care doctor and return to the ER if you are experiencing any worsening of symptoms    Thank you for letting myself and our team take care for you today! It was nice meeting you, and I hope you feel better very soon. Please come back to [facility] for all of your future medical needs.   Our goal in the ER is to always give you outstanding care and exceptional service. You may receive a survey by mail or email in the next week about your experience in our ED. We would greatly appreciate you completing and returning the survey. Your feedback provides us with a way to recognize our staff who give very good care and it helps us learn how to improve when your experience was below our aspiration of excellence.     Sincerely,     Cassandra Perkins PA-C  Emergency Room Physician Assistant  [facility]

## 2023-01-31 NOTE — PROGRESS NOTES
I assume primary medical responsibility for this patient. I have reviewed the case history, findings, diagnosis and treatment plan with the resident and agree that the care is reasonable and necessary. This service has been performed by a resident without the presence of a teaching physician under the primary care exception.  See below addendum for my evaluation and additional findings.   Pt referred to [facility] by Judson Mcadams MD  for claudication.    Recent MIKEL:    ULTRASOUND MIKEL DOPPLER NO EXERCISE, 1-2 LEVELS, BILATERAL July 24, 2018 at 0952 hours     HISTORY: 89-year-old patient with claudication.     COMPARISON: None.     FINDINGS: The arteries are noncompressible, therefore unable to obtain  resting MIKEL values. Distal posterior tibial and dorsalis pedis  waveforms appear biphasic, though with somewhat irregular baseline.  Distal posterior tibial and dorsalis pedis waveforms on the left are  biphasic. First digital waveforms are monophasic with mild diminished  amplitude on the right and moderate diminished amplitude on the left.         IMPRESSION:  1. Unable to obtain resting MIKEL values given noncompressible arteries.  2. Suggestion of mild arterial insufficiency in the right lower  extremity as demonstrated by distal and a digital waveforms.  3. Suggestion of mild to moderate arterial insufficiency in the left lower extremity as demonstrated by distal waveforms.     Pt needs to be scheduled for consult with vascular surgery.  Will route to scheduling to coordinate an appointment at next available.    NATHAN Lerma, RN

## 2023-03-02 ENCOUNTER — PATIENT MESSAGE (OUTPATIENT)
Dept: OBSTETRICS AND GYNECOLOGY | Facility: CLINIC | Age: 28
End: 2023-03-02
Payer: MEDICAID

## 2023-03-06 DIAGNOSIS — N93.9 ABNORMAL UTERINE BLEEDING (AUB): Primary | ICD-10-CM

## 2023-03-06 RX ORDER — MEDROXYPROGESTERONE ACETATE 10 MG/1
10 TABLET ORAL DAILY
Qty: 30 TABLET | Refills: 0 | Status: SHIPPED | OUTPATIENT
Start: 2023-03-06 | End: 2023-05-30

## 2023-03-06 NOTE — PROGRESS NOTES
Patient with AUB>  Rx for provera 10mg sent.  Will let me know if this helps with her bleeding.    MADHAV England

## 2023-03-15 ENCOUNTER — PROCEDURE VISIT (OUTPATIENT)
Dept: NEUROLOGY | Facility: CLINIC | Age: 28
End: 2023-03-15
Payer: MEDICAID

## 2023-03-15 ENCOUNTER — OFFICE VISIT (OUTPATIENT)
Dept: NEUROLOGY | Facility: CLINIC | Age: 28
End: 2023-03-15
Payer: MEDICAID

## 2023-03-15 VITALS
WEIGHT: 213.88 LBS | SYSTOLIC BLOOD PRESSURE: 113 MMHG | WEIGHT: 213.88 LBS | BODY MASS INDEX: 35.63 KG/M2 | DIASTOLIC BLOOD PRESSURE: 75 MMHG | HEIGHT: 65 IN | HEART RATE: 90 BPM | RESPIRATION RATE: 18 BRPM | HEART RATE: 90 BPM | RESPIRATION RATE: 18 BRPM | BODY MASS INDEX: 35.63 KG/M2 | SYSTOLIC BLOOD PRESSURE: 113 MMHG | HEIGHT: 65 IN | DIASTOLIC BLOOD PRESSURE: 75 MMHG

## 2023-03-15 DIAGNOSIS — R20.0 NUMBNESS AND TINGLING IN LEFT HAND: ICD-10-CM

## 2023-03-15 DIAGNOSIS — M79.602 PAIN IN BOTH UPPER EXTREMITIES: Primary | ICD-10-CM

## 2023-03-15 DIAGNOSIS — M79.601 PAIN IN BOTH UPPER EXTREMITIES: Primary | ICD-10-CM

## 2023-03-15 DIAGNOSIS — R20.0 NUMBNESS OF UPPER LIMB: ICD-10-CM

## 2023-03-15 DIAGNOSIS — R20.2 NUMBNESS AND TINGLING IN LEFT HAND: ICD-10-CM

## 2023-03-15 PROCEDURE — 99203 OFFICE O/P NEW LOW 30 MIN: CPT | Mod: 25,S$PBB,, | Performed by: PSYCHIATRY & NEUROLOGY

## 2023-03-15 PROCEDURE — 3078F DIAST BP <80 MM HG: CPT | Mod: CPTII,,, | Performed by: PSYCHIATRY & NEUROLOGY

## 2023-03-15 PROCEDURE — 3008F PR BODY MASS INDEX (BMI) DOCUMENTED: ICD-10-PCS | Mod: CPTII,,, | Performed by: PSYCHIATRY & NEUROLOGY

## 2023-03-15 PROCEDURE — 95913 NRV CNDJ TEST 13/> STUDIES: CPT | Mod: PBBFAC,PN | Performed by: PSYCHIATRY & NEUROLOGY

## 2023-03-15 PROCEDURE — 3008F BODY MASS INDEX DOCD: CPT | Mod: CPTII,,, | Performed by: PSYCHIATRY & NEUROLOGY

## 2023-03-15 PROCEDURE — 99999 PR PBB SHADOW E&M-EST. PATIENT-LVL III: ICD-10-PCS | Mod: PBBFAC,,, | Performed by: PSYCHIATRY & NEUROLOGY

## 2023-03-15 PROCEDURE — 1160F PR REVIEW ALL MEDS BY PRESCRIBER/CLIN PHARMACIST DOCUMENTED: ICD-10-PCS | Mod: CPTII,,, | Performed by: PSYCHIATRY & NEUROLOGY

## 2023-03-15 PROCEDURE — 95886 MUSC TEST DONE W/N TEST COMP: CPT | Mod: 26,S$PBB,, | Performed by: PSYCHIATRY & NEUROLOGY

## 2023-03-15 PROCEDURE — 99213 OFFICE O/P EST LOW 20 MIN: CPT | Mod: PBBFAC,PN,25 | Performed by: PSYCHIATRY & NEUROLOGY

## 2023-03-15 PROCEDURE — 3078F PR MOST RECENT DIASTOLIC BLOOD PRESSURE < 80 MM HG: ICD-10-PCS | Mod: CPTII,,, | Performed by: PSYCHIATRY & NEUROLOGY

## 2023-03-15 PROCEDURE — 99203 PR OFFICE/OUTPT VISIT, NEW, LEVL III, 30-44 MIN: ICD-10-PCS | Mod: 25,S$PBB,, | Performed by: PSYCHIATRY & NEUROLOGY

## 2023-03-15 PROCEDURE — 3074F PR MOST RECENT SYSTOLIC BLOOD PRESSURE < 130 MM HG: ICD-10-PCS | Mod: CPTII,,, | Performed by: PSYCHIATRY & NEUROLOGY

## 2023-03-15 PROCEDURE — 99999 PR PBB SHADOW E&M-EST. PATIENT-LVL III: CPT | Mod: PBBFAC,,, | Performed by: PSYCHIATRY & NEUROLOGY

## 2023-03-15 PROCEDURE — 1159F MED LIST DOCD IN RCRD: CPT | Mod: CPTII,,, | Performed by: PSYCHIATRY & NEUROLOGY

## 2023-03-15 PROCEDURE — 95913 NRV CNDJ TEST 13/> STUDIES: CPT | Mod: 26,S$PBB,, | Performed by: PSYCHIATRY & NEUROLOGY

## 2023-03-15 PROCEDURE — 1159F PR MEDICATION LIST DOCUMENTED IN MEDICAL RECORD: ICD-10-PCS | Mod: CPTII,,, | Performed by: PSYCHIATRY & NEUROLOGY

## 2023-03-15 PROCEDURE — 95886 MUSC TEST DONE W/N TEST COMP: CPT | Mod: PBBFAC,PN | Performed by: PSYCHIATRY & NEUROLOGY

## 2023-03-15 PROCEDURE — 3074F SYST BP LT 130 MM HG: CPT | Mod: CPTII,,, | Performed by: PSYCHIATRY & NEUROLOGY

## 2023-03-15 PROCEDURE — 95913 PR NERVE CONDUCTION STUDY; 13 OR MORE STUDIES: ICD-10-PCS | Mod: 26,S$PBB,, | Performed by: PSYCHIATRY & NEUROLOGY

## 2023-03-15 PROCEDURE — 95886 PR EMG COMPLETE, W/ NERVE CONDUCTION STUDIES, 5+ MUSCLES: ICD-10-PCS | Mod: 26,S$PBB,, | Performed by: PSYCHIATRY & NEUROLOGY

## 2023-03-15 PROCEDURE — 1160F RVW MEDS BY RX/DR IN RCRD: CPT | Mod: CPTII,,, | Performed by: PSYCHIATRY & NEUROLOGY

## 2023-03-15 NOTE — PROGRESS NOTES
[facility] Neurology [address]     Subjective:       Patient ID: Ray Knox is a 27 y.o. female here for a EMG focused evaluation for arm pain. Previous visits and diagnostic evaluation reviewed.  Patient states she had a cyst removed on her left forefinger 1 year ago.  She describes numbness of the left forefinger.  She also describes intermittent numbness involving the 1st 3 digits of bilateral hands.  Symptoms have persisted since that time.  She denies severe neck pain.    HPI  Review of patient's allergies indicates:   Allergen Reactions    Lisinopril Other (See Comments), Shortness Of Breath and Swelling     angioedema        Vitals:    03/15/23 1555   BP: 113/75   Pulse: 90   Resp: 18      Chief Complaint: Numbness (Of left hand)    Past Medical History:   Diagnosis Date    Anemia     Asthma     Bilateral renal cysts     Breast disorder     Congenital absence of one kidney     Kidney cysts     pt born with 1 kidney      Social History     Socioeconomic History    Marital status: Single   Tobacco Use    Smoking status: Every Day     Types: Vaping with nicotine    Smokeless tobacco: Never   Substance and Sexual Activity    Alcohol use: Yes     Comment: OCC    Drug use: No    Sexual activity: Yes     Partners: Male     Birth control/protection: None     Comment: Not current on Depo     Social Determinants of Health     Financial Resource Strain: Low Risk     Difficulty of Paying Living Expenses: Not very hard   Food Insecurity: No Food Insecurity    Worried About Running Out of Food in the Last Year: Never true    Ran Out of Food in the Last Year: Never true   Transportation Needs: No Transportation Needs    Lack of Transportation (Medical): No    Lack of Transportation (Non-Medical): No   Physical Activity: Inactive    Days of Exercise per Week: 0 days    Minutes of Exercise per Session: 0 min   Stress: Stress Concern Present    Feeling of Stress : Rather  much   Social Connections: Moderately Isolated    Frequency of Communication with Friends and Family: More than three times a week    Frequency of Social Gatherings with Friends and Family: More than three times a week    Attends Hinduism Services: 1 to 4 times per year    Active Member of Clubs or Organizations: No    Attends Club or Organization Meetings: Never    Marital Status: Never    Housing Stability: Unknown    Unable to Pay for Housing in the Last Year: No    Unstable Housing in the Last Year: No      Review of Systems:   No Fever  No SOB  No vomiting  No visual disturbance      Objective:      Physical Exam    Constitutional: Patient appears well-nourished.   Head: Normocephalic and atraumatic.   Mouth/Throat: Oropharynx is clear and moist.   Pulmonary/Chest: Effort normal.   Abdominal: Soft.   Skin: Skin is warm and dry.      General:  Patient is alert and cooperative.  Affect:  Patient is appropriate to surroundings and environment.  Language:  Speech is fluent.  HEENT:  There are no outward signs of trauma to head or face.  Cranial Nerves:  Pupils are equal round and reactive to light. Extra-ocular movements are intact. Face, tongue, and palate are symmetrical.  Motor:  Patient exhibits normal strength testing in bilateral proximal and distal upper extremities.  Reflexes:  Symmetrical in bilateral upper extremities.  Gait:  Ambulation is independent without use of cane or walker without signs of ataxia or circumduction.  Cerebellar:  Normal finger to nose testing without dysmetria.  Sensory:  Patient states decreased light touch involving the left forefinger both medially and laterally otherwise intact to sensory modalities tested.  Musculoskeletal:  There is no severe tenderness to palpation and manipulation of the cervical spine region.   Assessment:       We reviewed and discussed at length results of EMG performed today notable only for a mild left ulnar neuropathy best localized to the  across elbow segment. These findings are available via media section of chart review.   1. Pain in both upper extremities    2. Numbness of upper limb              Plan:       We discussed treatment options at length. Recommend patient keep appointment with referring provider.         Homero German MD   03/15/2023   4:36 PM

## 2023-03-15 NOTE — PROGRESS NOTES
[facility] Neurology [address]       Patient received an EMG and nerve conduction test today.  Please refer to the full procedure report which is found in the media section of chart review.    Homero German MD, FAAN  Neurology    
mother/father

## 2023-03-17 NOTE — PROGRESS NOTES
Subjective:      Patient ID: Ray Knox is a 27 y.o. female.    Chief Complaint: Pain of the Left Hand (Patient presents today for her EMG results for both hands. ) and Pain of the Right Hand      HPI  (French)    History of Excisional biopsy soft tissue mass left index finger (2 cm) by Dr. Doty on 3/8/22.    Last seen by me on 11/14/22 with numbness and tingling in left hand. Previous left hand XRs looked good.     She was given wrist brace to wear at night. She is here to review her EMG.     She continues with more constant numbness and tingling in left hand. She is right hand dominant. No known injury to her hand. She rates her pain as a 0 on a scale of 1-10. No symptoms in right hand.     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 No known injury. She is right hand dominant as above. Pain is more aching in nature. Cannot sleep on right side at night.     She is taking naproxen prn. Had some relief with toradol. No injections, surgery, or PT for her shoulder.     History of renal cysts and HTN.  She was born with only 1 kidney.     PCP: Les Horton MD      Past Medical History:   Diagnosis Date    Anemia     Asthma     Bilateral renal cysts     Breast disorder     Congenital absence of one kidney     Kidney cysts     pt born with 1 kidney         Current Outpatient Medications:     albuterol sulfate 2.5 mg/0.5 mL Nebu, Take 2.5 mg by nebulization every 4 (four) hours as needed (wheezing). Rescue, Disp: 100 each, Rfl: 0    clomiPHENE (CLOMID) 50 mg tablet, Take 1 tablet in the am from Day 3 to Day 7 of your cycle. Have sex every other day for one week starting five days after last pill., Disp: 5 tablet, Rfl: 2    medroxyPROGESTERone (PROVERA) 10 MG tablet, Take 1 tablet (10 mg total) by mouth once daily., Disp: 30 tablet, Rfl: 0    naproxen (NAPROSYN) 500 MG tablet, Take 1 tablet (500 mg total) by mouth 2 (two) times daily with meals.,  "Disp: 20 tablet, Rfl: 0    ondansetron (ZOFRAN) 4 MG tablet, Take 4 mg by mouth every 4 (four) hours as needed., Disp: , Rfl:     propranoloL (INDERAL) 10 MG tablet, Take 1 tablet (10 mg total) by mouth 2 (two) times daily., Disp: 60 tablet, Rfl: 11    dicyclomine (BENTYL) 20 mg tablet, Take 1 tablet (20 mg total) by mouth 2 (two) times daily. (Patient not taking: Reported on 3/21/2023), Disp: 20 tablet, Rfl: 0    levonorgestrel-ethinyl estradiol (AVIANE,ALESSE,LESSINA) 0.1-20 mg-mcg per tablet, Take 1 tablet by mouth once daily., Disp: 28 tablet, Rfl: 12    Review of patient's allergies indicates:   Allergen Reactions    Lisinopril Other (See Comments), Shortness Of Breath and Swelling     angioedema         Review of Systems   Constitutional: Negative for chills, fever, night sweats and weight gain.   Gastrointestinal:  Negative for bowel incontinence, nausea and vomiting.   Genitourinary:  Negative for bladder incontinence.   Neurological:  Negative for disturbances in coordination and loss of balance.         Objective:        Ht 5' 5" (1.651 m)   Wt 95.3 kg (210 lb 0.8 oz)   BMI 34.95 kg/m²     Ortho/SPM Exam    Well developed, well nourished patient in no acute distress  Alert and oriented x 3  HEENT- Normal exam  Lungs- Clear to auscultation  Heart- Regular rate and rhythm  Abdomen- Soft nontender    LEFT Hand/Wrist Examination:    Observation/Inspection:  Swelling  none    Deformity  none  Discoloration  none     Scars   none    Atrophy  none    HAND/WRIST EXAMINATION:  Finkelstein's Test   Neg  WHAT Test    Neg  Snuff box tenderness   Neg  Hook of Hamate Tenderness  Neg  CMC grind    Neg    Neurovascular Exam:  Digits WWP, brisk CR < 3s throughout  NVI motor/LTS to M/R/U nerves, radial pulse 2+  Tinel's Test - Carpal Tunnel  negative  Tinel's Test - Cubital Tunnel  negative  Phalen's Test    positive  Median Nerve Compression Test positive    ROM hand/wrist/elbow full, painless    Well healed incision " left index finger.       ROM OF BOTH SHOULDERS:  Active flexion to 180° on left and 120° on right.   Active abduction to 180° on left and 120° on right.    Pain with IR/ER of right shoulder.     Strength Testing of both UEs:  Deltoid - 5/5 on left and 5/5 on right  Biceps - 5/5 on left and 5/5 on right  Triceps - 5/5 on left and 5/5 on right  Wrist extension - 5/5 on left and 5/5 on right  Wrist flexion - 5/5 on left and 5/5 on right   - 5/5 on left and 5/5 on right    RIGHT SHOULDER EXAM:  Tenderness:  No tenderness at the SC or AC joint  No tenderness over the clavicle   No tenderness over biceps tendon or bicipital groove  No tenderness over subacromial space    Special Tests:  Empty can test - positive for pain  Full can test - positive for pain  Resisted internal rotation - negative  Resisted external rotation - positive    Neer's test - positive  Hawkin's-Albin test - positive    Speed's test - negative  Yergason's test - negative    Sulcus sign - none  AP load and shift laxity - none    LEFT SHOULDER EXAM:  Tenderness:  No tenderness at the SC or AC joint  No tenderness over the clavicle   No tenderness over biceps tendon or bicipital groove  No tenderness over subacromial space    Special Tests:  Empty can test - negative  Full can test - negative  Resisted internal rotation - negative  Resisted external rotation - negative    Neer's test - negative  Hawkin's-Albin test - negative    Speed's test - negative  Yergason's test - negative    Sulcus sign - none  AP load and shift laxity - none    Neurovascular Exam Bilateral UEs:  Sensation intact to light touch in the distal median, radial, and ulnar nerve distributions bilaterally.  Capillary refill intact <2 seconds in all digits bilaterally      XRAY INTERPRETATION:   X-rays of right shoulder dated 3/21/23 are personally reviewed and show no fracture or dislocation.     EMG of bilateral UEs dated 3/15/23 are personally reviewed and show:   mild left  ulnar neuropathy best localized to the across elbow segment.     Above EMG results reviewed with Dr. Doty prior to her visit.        Assessment:       Encounter Diagnoses   Name Primary?    Bursitis of right shoulder     Right shoulder pain, unspecified chronicity     Ulnar neuropathy at elbow, left Yes            Plan:       Ray was seen today for pain and pain.    Diagnoses and all orders for this visit:    Ulnar neuropathy at elbow, left    Bursitis of right shoulder  -     Ambulatory referral/consult to Orthopedics  -     Ambulatory referral/consult to Physical/Occupational Therapy; Future    Right shoulder pain, unspecified chronicity  -     X-ray Shoulder 2 or More Views Right; Future  -     Ambulatory referral/consult to Physical/Occupational Therapy; Future        History of Excisional biopsy soft tissue mass left index finger (2 cm) by Dr. Doty on 3/8/22.    She continues with more constant numbness and tingling in left hand. She is dropping things at work ([employer]). She is right hand dominant. No symptoms in right hand.     XRs of left hand show no fracture or dislocation. EMG shows mild left ulnar neuropathy best localized to the across elbow segment.    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 No known injury. She is right hand dominant as above.     XRs of right shoulder show no fracture or dislocation.     Treatment options reviewed with patient along with above left hand xrays. Following plan made:     - I reviewed all options with the patient and the patient wants to proceed with surgery intervention, specifically ulnar nerve decompression at left elbow.   - I went over the procedure in detail, the risk and benefits of the procedure and all questions were answered. Informed consent was obtained and an H&P and consent was completed.     - Okay to continue with prn naproxen OTC. Reviewed dosing and side effects. Take with  food.   - PT for right shoulder. Orders to [facility].   - Discussed right shoulder injection. She declined.   - If no improvement with PT, may need to consider MRI of right shoulder.     - Dr. Doty's staff to contact her regarding surgery.     Follow up in about 3 months (around 6/21/2023).

## 2023-03-21 ENCOUNTER — OFFICE VISIT (OUTPATIENT)
Dept: ORTHOPEDICS | Facility: CLINIC | Age: 28
End: 2023-03-21
Payer: MEDICAID

## 2023-03-21 ENCOUNTER — TELEPHONE (OUTPATIENT)
Dept: ORTHOPEDICS | Facility: CLINIC | Age: 28
End: 2023-03-21
Payer: MEDICAID

## 2023-03-21 VITALS — HEIGHT: 65 IN | WEIGHT: 210.06 LBS | BODY MASS INDEX: 35 KG/M2

## 2023-03-21 DIAGNOSIS — G56.22 ULNAR NEUROPATHY AT ELBOW, LEFT: Primary | ICD-10-CM

## 2023-03-21 DIAGNOSIS — M25.511 RIGHT SHOULDER PAIN, UNSPECIFIED CHRONICITY: ICD-10-CM

## 2023-03-21 DIAGNOSIS — M75.51 BURSITIS OF RIGHT SHOULDER: ICD-10-CM

## 2023-03-21 PROCEDURE — 3008F PR BODY MASS INDEX (BMI) DOCUMENTED: ICD-10-PCS | Mod: CPTII,,, | Performed by: PHYSICIAN ASSISTANT

## 2023-03-21 PROCEDURE — 99999 PR PBB SHADOW E&M-EST. PATIENT-LVL IV: ICD-10-PCS | Mod: PBBFAC,,, | Performed by: PHYSICIAN ASSISTANT

## 2023-03-21 PROCEDURE — 1159F MED LIST DOCD IN RCRD: CPT | Mod: CPTII,,, | Performed by: PHYSICIAN ASSISTANT

## 2023-03-21 PROCEDURE — 99999 PR PBB SHADOW E&M-EST. PATIENT-LVL IV: CPT | Mod: PBBFAC,,, | Performed by: PHYSICIAN ASSISTANT

## 2023-03-21 PROCEDURE — 99214 OFFICE O/P EST MOD 30 MIN: CPT | Mod: PBBFAC,PN | Performed by: PHYSICIAN ASSISTANT

## 2023-03-21 PROCEDURE — 99214 OFFICE O/P EST MOD 30 MIN: CPT | Mod: S$PBB,,, | Performed by: PHYSICIAN ASSISTANT

## 2023-03-21 PROCEDURE — 1159F PR MEDICATION LIST DOCUMENTED IN MEDICAL RECORD: ICD-10-PCS | Mod: CPTII,,, | Performed by: PHYSICIAN ASSISTANT

## 2023-03-21 PROCEDURE — 3008F BODY MASS INDEX DOCD: CPT | Mod: CPTII,,, | Performed by: PHYSICIAN ASSISTANT

## 2023-03-21 PROCEDURE — 1160F PR REVIEW ALL MEDS BY PRESCRIBER/CLIN PHARMACIST DOCUMENTED: ICD-10-PCS | Mod: CPTII,,, | Performed by: PHYSICIAN ASSISTANT

## 2023-03-21 PROCEDURE — 1160F RVW MEDS BY RX/DR IN RCRD: CPT | Mod: CPTII,,, | Performed by: PHYSICIAN ASSISTANT

## 2023-03-21 PROCEDURE — 99214 PR OFFICE/OUTPT VISIT, EST, LEVL IV, 30-39 MIN: ICD-10-PCS | Mod: S$PBB,,, | Performed by: PHYSICIAN ASSISTANT

## 2023-03-21 RX ORDER — KETOROLAC TROMETHAMINE 10 MG/1
10 TABLET, FILM COATED ORAL 3 TIMES DAILY
COMMUNITY
Start: 2023-03-19 | End: 2023-03-21

## 2023-03-21 RX ORDER — ONDANSETRON 4 MG/1
4 TABLET, FILM COATED ORAL EVERY 4 HOURS PRN
COMMUNITY
Start: 2023-03-19 | End: 2023-06-22

## 2023-03-21 NOTE — TELEPHONE ENCOUNTER
----- Message from Lizet Richter sent at 3/21/2023 10:01 AM CDT -----  Type:late    Who Called:pt   Who Left Message for Patient:office  Does the patient know what this is regarding?:running late in traffic  Would the patient rather a call back or a response via [facility]? Call   Best Call Back Number:[phone]  Additional Information:

## 2023-03-21 NOTE — PATIENT INSTRUCTIONS
It was nice to see you again today.     Your nerve test shows pressure on the ulnar nerve (funny bone at elbow) on the left. This is likely causing numbness and tingling in left hand.     Dr. Doty's staff will call you to schedule and ulnar nerve decompression at left elbow.  Surgery likely will be here in [place] on a Wednesday.     Your right shoulder xrays look okay. You likely have some inflammation/bursitis in the shoulder.     Continue on naproxen as needed to help with pain/inflammation. Take as directed with food.     I sent physical therapy orders to [facility] for your shoulder. They should call you to set up, but if not you can call [phone].     I will see you back in 3 months, but please stay in touch and call me if you need anything. You can also send me a message in [facility].     Phoebe   [phone]

## 2023-03-22 ENCOUNTER — PATIENT MESSAGE (OUTPATIENT)
Dept: OBSTETRICS AND GYNECOLOGY | Facility: CLINIC | Age: 28
End: 2023-03-22
Payer: MEDICAID

## 2023-03-22 DIAGNOSIS — N93.9 ABNORMAL UTERINE BLEEDING (AUB): Primary | ICD-10-CM

## 2023-03-22 NOTE — PROGRESS NOTES
Oh, no!    You should call  to schedule an ultrasound so that we can see what is going on inside the uterus.    I also put order for a CBC to see if you are anemic from the bleeding.    You can go to any [facility] lab to get that done.    Dr haynes    ===View-only below this line===      ----- Message -----       From:Ray Knox       Sent:3/22/2023  4:56 PM CDT         To:Rachel Haynes MD    Subject:Bleeding     Hey i been bleeding since January 30th the medicine is not working & Im so exhausted & get light headed when big blood cots comes out and at this point I dont know what to do

## 2023-03-23 PROBLEM — R52 PAIN AGGRAVATED BY ACTIVITIES OF DAILY LIVING: Status: ACTIVE | Noted: 2023-03-23

## 2023-03-24 DIAGNOSIS — G56.20 CUBITAL TUNNEL SYNDROME, UNSPECIFIED LATERALITY: Primary | ICD-10-CM

## 2023-03-24 DIAGNOSIS — G56.22 CUBITAL TUNNEL SYNDROME ON LEFT: ICD-10-CM

## 2023-03-24 RX ORDER — CEFAZOLIN SODIUM 2 G/50ML
2 SOLUTION INTRAVENOUS
Status: CANCELLED | OUTPATIENT
Start: 2023-03-24

## 2023-03-27 DIAGNOSIS — N93.9 ABNORMAL UTERINE BLEEDING (AUB): Primary | ICD-10-CM

## 2023-03-27 RX ORDER — NORELGESTROMIN AND ETHINYL ESTRADIOL 35; 150 UG/MG; UG/MG
PATCH TRANSDERMAL
Qty: 3 PATCH | Refills: 11 | Status: SHIPPED | OUTPATIENT
Start: 2023-03-27 | End: 2023-05-30

## 2023-03-27 NOTE — PROGRESS NOTES
I spoke to the patient today about her ultrasound results and blood work.  Hgb is about 11. Unchanged from 1 month ago.  Pelvic US today shows normal size uterus with no fibroids, normal appearing ovaries, endometrial stripe at 8mm.  Patient reassured.    Recommend restarting combined hormonal therapy to better regulate cycle.  She is ok with using ortho evra patch again.  Rx sent.    CORTES pritchett< MD

## 2023-03-29 PROBLEM — G56.22 CUBITAL TUNNEL SYNDROME ON LEFT: Status: ACTIVE | Noted: 2023-03-29

## 2023-03-30 ENCOUNTER — PATIENT MESSAGE (OUTPATIENT)
Dept: ORTHOPEDICS | Facility: CLINIC | Age: 28
End: 2023-03-30
Payer: MEDICAID

## 2023-03-30 DIAGNOSIS — G56.22 CUBITAL TUNNEL SYNDROME ON LEFT: Primary | ICD-10-CM

## 2023-03-30 DIAGNOSIS — Z98.890 S/P DECOMPRESSION OF ULNAR NERVE AT ELBOW: ICD-10-CM

## 2023-03-30 RX ORDER — IBUPROFEN 800 MG/1
800 TABLET ORAL
Qty: 90 TABLET | Refills: 1 | Status: SHIPPED | OUTPATIENT
Start: 2023-03-30 | End: 2023-07-05 | Stop reason: SDUPTHER

## 2023-04-05 ENCOUNTER — PATIENT MESSAGE (OUTPATIENT)
Dept: ORTHOPEDICS | Facility: CLINIC | Age: 28
End: 2023-04-05
Payer: MEDICAID

## 2023-04-05 ENCOUNTER — TELEPHONE (OUTPATIENT)
Dept: ORTHOPEDICS | Facility: CLINIC | Age: 28
End: 2023-04-05
Payer: MEDICAID

## 2023-04-12 ENCOUNTER — OFFICE VISIT (OUTPATIENT)
Dept: ORTHOPEDICS | Facility: CLINIC | Age: 28
End: 2023-04-12
Payer: MEDICAID

## 2023-04-12 VITALS — BODY MASS INDEX: 34.85 KG/M2 | HEIGHT: 65 IN

## 2023-04-12 DIAGNOSIS — G56.22 CUBITAL TUNNEL SYNDROME ON LEFT: Primary | ICD-10-CM

## 2023-04-12 PROCEDURE — 1159F PR MEDICATION LIST DOCUMENTED IN MEDICAL RECORD: ICD-10-PCS | Mod: CPTII,,, | Performed by: ORTHOPAEDIC SURGERY

## 2023-04-12 PROCEDURE — 99024 PR POST-OP FOLLOW-UP VISIT: ICD-10-PCS | Mod: ,,, | Performed by: ORTHOPAEDIC SURGERY

## 2023-04-12 PROCEDURE — 99999 PR PBB SHADOW E&M-EST. PATIENT-LVL III: CPT | Mod: PBBFAC,,, | Performed by: ORTHOPAEDIC SURGERY

## 2023-04-12 PROCEDURE — 99213 OFFICE O/P EST LOW 20 MIN: CPT | Mod: PBBFAC,PN | Performed by: ORTHOPAEDIC SURGERY

## 2023-04-12 PROCEDURE — 3008F PR BODY MASS INDEX (BMI) DOCUMENTED: ICD-10-PCS | Mod: CPTII,,, | Performed by: ORTHOPAEDIC SURGERY

## 2023-04-12 PROCEDURE — 99024 POSTOP FOLLOW-UP VISIT: CPT | Mod: ,,, | Performed by: ORTHOPAEDIC SURGERY

## 2023-04-12 PROCEDURE — 99999 PR PBB SHADOW E&M-EST. PATIENT-LVL III: ICD-10-PCS | Mod: PBBFAC,,, | Performed by: ORTHOPAEDIC SURGERY

## 2023-04-12 PROCEDURE — 3008F BODY MASS INDEX DOCD: CPT | Mod: CPTII,,, | Performed by: ORTHOPAEDIC SURGERY

## 2023-04-12 PROCEDURE — 1159F MED LIST DOCD IN RCRD: CPT | Mod: CPTII,,, | Performed by: ORTHOPAEDIC SURGERY

## 2023-04-12 NOTE — PROGRESS NOTES
Subjective:      Patient ID: Ray Knox is a 27 y.o. female.  Chief Complaint: Post-op Evaluation (Left ulnar ( Sx 3/29))      HPI  Ray Knox is a  27 y.o. female presenting today for post op visit.  She is s/p left ulnar nerve transposition 2 weeks postop taken out of the dressing today   Numbness have it has improved since surgery.     Review of patient's allergies indicates:   Allergen Reactions    Lisinopril Other (See Comments), Shortness Of Breath and Swelling     angioedema           Current Outpatient Medications   Medication Sig Dispense Refill    albuterol sulfate 2.5 mg/0.5 mL Nebu Take 2.5 mg by nebulization every 4 (four) hours as needed (wheezing). Rescue 100 each 0    clomiPHENE (CLOMID) 50 mg tablet Take 1 tablet in the am from Day 3 to Day 7 of your cycle. Have sex every other day for one week starting five days after last pill. 5 tablet 2    dicyclomine (BENTYL) 20 mg tablet Take 1 tablet (20 mg total) by mouth 2 (two) times daily. 20 tablet 0    HYDROcodone-acetaminophen (NORCO) 5-325 mg per tablet Take 1 tablet by mouth every 4 (four) hours as needed for Pain. 20 tablet 0    ibuprofen (ADVIL,MOTRIN) 800 MG tablet Take 1 tablet (800 mg total) by mouth after meals as needed for Pain. 90 tablet 1    medroxyPROGESTERone (PROVERA) 10 MG tablet Take 1 tablet (10 mg total) by mouth once daily. 30 tablet 0    norelgestromin-ethinyl estradiol 150-35 mcg/24 hr Apply 1 new patch weekly for 3 weeks, then remove patch for the 4th week and you will have your menstrual cycle that week 3 patch 11    ondansetron (ZOFRAN) 4 MG tablet Take 4 mg by mouth every 4 (four) hours as needed.      propranoloL (INDERAL) 10 MG tablet Take 1 tablet (10 mg total) by mouth 2 (two) times daily. 60 tablet 11     No current facility-administered medications for this visit.       Past Medical History:   Diagnosis Date    Anemia     Asthma     Bilateral renal cysts     Breast disorder     Congenital absence of one kidney   "   General anesthetics causing adverse effect in therapeutic use     Kidney cysts     pt born with 1 kidney       Past Surgical History:   Procedure Laterality Date    FINGER MASS EXCISION Left 3/8/2022    Procedure: EXCISION, MASS, FINGER;  Surgeon: Bk Doty Jr., MD;  Location: [facility] OR;  Service: Orthopedics;  Laterality: Left;    ULNAR NERVE TRANSPOSITION Left 3/29/2023    Procedure: TRANSPOSITION, NERVE, ULNAR;  Surgeon: Bk Doty Jr., MD;  Location: [facility] OR;  Service: Orthopedics;  Laterality: Left;       OBJECTIVE:   PHYSICAL EXAM:  Height: 5' 5" (165.1 cm)    Vitals:    04/12/23 1301   Height: 5' 5" (1.651 m)   PainSc: 0-No pain   PainLoc: Elbow     Ortho/SPM Exam  Examination left elbow the incision well healed no evidence of infection range of motion elbow slightly decreased sensation intact left hand    RADIOGRAPHS:  None  Comments: I have personally reviewed the imaging and I agree with the above radiologist's report.    ASSESSMENT/PLAN:     IMPRESSION:  Status post ulnar nerve transposition left elbow    PLAN:  Routine wound care start gentle range of motion   No heavy lifting      FOLLOW UP:  3 weeks    Disclaimer: This note has been generated using voice-recognition software. There may be typographical errors that have been missed during proof-reading.     "

## 2023-04-18 ENCOUNTER — PATIENT MESSAGE (OUTPATIENT)
Dept: ORTHOPEDICS | Facility: CLINIC | Age: 28
End: 2023-04-18
Payer: MEDICAID

## 2023-04-18 NOTE — LETTER
04/18/2023                 [facility]  [address], [address]  [place] [address]  Phone: [phone]  Fax: [phone]   04/18/2023    Patient: Ray Knox   YOB: 1995       To Whom it May Concern:    Ray Knox  had surgery for transposition left ulnar nerve on 3/29/23 with Dr. Doty.     She may return back to work on 4/19/23 with no heavy lifting left arm. These restrictions are pending her follow up with me on 5/4/23.     If you have any questions or concerns, please don't hesitate to call.    Sincerely,      Phoebe Barber PA-C

## 2023-05-04 ENCOUNTER — OFFICE VISIT (OUTPATIENT)
Dept: ORTHOPEDICS | Facility: CLINIC | Age: 28
End: 2023-05-04
Payer: MEDICAID

## 2023-05-04 ENCOUNTER — PATIENT MESSAGE (OUTPATIENT)
Dept: ORTHOPEDICS | Facility: CLINIC | Age: 28
End: 2023-05-04

## 2023-05-04 VITALS — WEIGHT: 210 LBS | BODY MASS INDEX: 34.99 KG/M2 | HEIGHT: 65 IN
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
## 2023-05-04 DIAGNOSIS — Z98.890 S/P DECOMPRESSION OF ULNAR NERVE AT ELBOW: ICD-10-CM

## 2023-05-04 DIAGNOSIS — G56.22 CUBITAL TUNNEL SYNDROME ON LEFT: Primary | ICD-10-CM

## 2023-05-04 DIAGNOSIS — M25.511 RIGHT SHOULDER PAIN, UNSPECIFIED CHRONICITY: ICD-10-CM

## 2023-05-04 PROCEDURE — 99214 OFFICE O/P EST MOD 30 MIN: CPT | Mod: PBBFAC,PN | Performed by: PHYSICIAN ASSISTANT

## 2023-05-04 PROCEDURE — 99213 PR OFFICE/OUTPT VISIT, EST, LEVL III, 20-29 MIN: ICD-10-PCS | Mod: S$PBB,,, | Performed by: PHYSICIAN ASSISTANT

## 2023-05-04 PROCEDURE — 99999 PR PBB SHADOW E&M-EST. PATIENT-LVL IV: CPT | Mod: PBBFAC,,, | Performed by: PHYSICIAN ASSISTANT

## 2023-05-04 PROCEDURE — 99999 PR PBB SHADOW E&M-EST. PATIENT-LVL IV: ICD-10-PCS | Mod: PBBFAC,,, | Performed by: PHYSICIAN ASSISTANT

## 2023-05-04 PROCEDURE — 99213 OFFICE O/P EST LOW 20 MIN: CPT | Mod: S$PBB,,, | Performed by: PHYSICIAN ASSISTANT

## 2023-05-04 NOTE — PATIENT INSTRUCTIONS
It is always good to see you.     I want you to get xrays of your right shoulder and left elbow. I will put results on [facility].     Continue on motrin as directed. Take with food.     I did orders for OT for your elbow. They should call you or you can call [phone].     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     I will see you back as scheduled. Let me know if you need anything.     Phoebe   [phone]

## 2023-05-04 NOTE — LETTER
05/04/2023                 [facility]  [address], [address]  [place] [address]  Phone: [phone]  Fax: [phone]   05/04/2023    Patient: Ray Knox   YOB: 1995   Date of Visit: 5/4/2023       To Whom it May Concern:    Ray Knox was seen in my clinic on 5/4/2023. She had ulnar nerve transposition left elbow by Dr. Doty on 3/29/23. She may return back to work with no restrictions on Friday 5/5/23.     If you have any questions or concerns, please don't hesitate to call.    Sincerely,          Phoebe Barber PA-C

## 2023-05-12 ENCOUNTER — HOSPITAL ENCOUNTER (EMERGENCY)
Facility: HOSPITAL | Age: 28
Discharge: HOME | End: 2023-05-12
Attending: FAMILY MEDICINE
Payer: MEDICAID

## 2023-05-12 VITALS
SYSTOLIC BLOOD PRESSURE: 128 MMHG | RESPIRATION RATE: 18 BRPM | DIASTOLIC BLOOD PRESSURE: 86 MMHG | TEMPERATURE: 99 F | OXYGEN SATURATION: 99 % | BODY MASS INDEX: 35.49 KG/M2 | HEIGHT: 65 IN | HEART RATE: 72 BPM | WEIGHT: 213 LBS

## 2023-05-12 DIAGNOSIS — J02.9 PHARYNGITIS, UNSPECIFIED ETIOLOGY: ICD-10-CM

## 2023-05-12 DIAGNOSIS — R11.10 VOMITING, UNSPECIFIED VOMITING TYPE, UNSPECIFIED WHETHER NAUSEA PRESENT: Primary | ICD-10-CM

## 2023-05-12 DIAGNOSIS — R05.9 COUGH: ICD-10-CM

## 2023-05-12 LAB
B-HCG UR QL: NEGATIVE
CTP QC/QA: YES
GROUP A STREP, MOLECULAR: NEGATIVE

## 2023-05-12 PROCEDURE — 81025 URINE PREGNANCY TEST: CPT | Mod: ER | Performed by: PHYSICIAN ASSISTANT

## 2023-05-12 PROCEDURE — 25000003 PHARM REV CODE 250: Mod: ER | Performed by: PHYSICIAN ASSISTANT

## 2023-05-12 PROCEDURE — 87651 STREP A DNA AMP PROBE: CPT | Mod: ER | Performed by: PHYSICIAN ASSISTANT

## 2023-05-12 PROCEDURE — 99283 EMERGENCY DEPT VISIT LOW MDM: CPT | Mod: 25,ER

## 2023-05-12 RX ORDER — ACETAMINOPHEN 160 MG/5ML
650 SOLUTION ORAL
Status: COMPLETED | OUTPATIENT
Start: 2023-05-12 | End: 2023-05-12

## 2023-05-12 RX ORDER — ONDANSETRON 4 MG/1
4 TABLET, ORALLY DISINTEGRATING ORAL EVERY 6 HOURS PRN
Qty: 30 TABLET | Refills: 0 | OUTPATIENT
Start: 2023-05-12 | End: 2023-07-20

## 2023-05-12 RX ORDER — CETIRIZINE HYDROCHLORIDE 10 MG/1
10 TABLET ORAL DAILY
Qty: 30 TABLET | Refills: 0 | Status: SHIPPED | OUTPATIENT
Start: 2023-05-12 | End: 2023-06-22

## 2023-05-12 RX ORDER — ONDANSETRON 4 MG/1
4 TABLET, ORALLY DISINTEGRATING ORAL
Status: COMPLETED | OUTPATIENT
Start: 2023-05-12 | End: 2023-05-12

## 2023-05-12 RX ADMIN — ONDANSETRON 4 MG: 4 TABLET, ORALLY DISINTEGRATING ORAL at 02:05

## 2023-05-12 RX ADMIN — ACETAMINOPHEN 649.6 MG: 160 SUSPENSION ORAL at 02:05

## 2023-05-12 NOTE — ED PROVIDER NOTES
Encounter Date: 5/12/2023       History     Chief Complaint   Patient presents with    Cough     Cough and sore throat that started last week. Pt reports she coughed up blood today     HPI: Ray Knox, a 27 y.o. female  has a past medical history of Anemia, Asthma, Bilateral renal cysts, Breast disorder, Congenital absence of one kidney, General anesthetics causing adverse effect in therapeutic use, and Kidney cysts.     She presents to the ED for evaluation of cough a with sore throat. States upon waking, she coughed up a small amount of blood.  Denies fevers.  Tried tylenol with little improvement.  No known sick contacts.          The history is provided by the patient.   Review of patient's allergies indicates:   Allergen Reactions    Lisinopril Other (See Comments), Shortness Of Breath and Swelling     angioedema       Past Medical History:   Diagnosis Date    Anemia     Asthma     Bilateral renal cysts     Breast disorder     Congenital absence of one kidney     General anesthetics causing adverse effect in therapeutic use     Kidney cysts     pt born with 1 kidney     Past Surgical History:   Procedure Laterality Date    FINGER MASS EXCISION Left 3/8/2022    Procedure: EXCISION, MASS, FINGER;  Surgeon: Bk Doty Jr., MD;  Location: [facility] OR;  Service: Orthopedics;  Laterality: Left;    ULNAR NERVE TRANSPOSITION Left 3/29/2023    Procedure: TRANSPOSITION, NERVE, ULNAR;  Surgeon: Bk Doty Jr., MD;  Location: [facility] OR;  Service: Orthopedics;  Laterality: Left;     Family History   Problem Relation Age of Onset    Diabetes Paternal Aunt     Hypertension Maternal Grandfather     Cancer Paternal Grandmother     Breast cancer Paternal Grandmother     Leukemia Paternal Grandmother     Kidney disease Maternal Grandmother     Hypertension Mother      Social History     Tobacco Use    Smoking status: Every Day     Types: Vaping with nicotine    Smokeless tobacco: Never   Substance Use Topics    Alcohol  use: Yes     Comment: occasionally    Drug use: No     Review of Systems   Constitutional:  Negative for fever.   HENT:  Positive for sore throat. Negative for congestion and trouble swallowing.    Respiratory:  Positive for cough. Negative for shortness of breath.    Musculoskeletal:  Negative for arthralgias.   Skin:  Negative for rash.   Allergic/Immunologic: Negative for immunocompromised state.   Neurological:  Negative for dizziness.   Hematological:  Negative for adenopathy.   Psychiatric/Behavioral:  Negative for agitation.    All other systems reviewed and are negative.    Physical Exam     Initial Vitals   BP Pulse Resp Temp SpO2   05/12/23 1341 05/12/23 1331 05/12/23 1331 05/12/23 1331 05/12/23 1331   130/68 85 18 98.8 °F (37.1 °C) 99 %      MAP       --                Physical Exam    Nursing note and vitals reviewed.  Constitutional: She appears well-developed and well-nourished. She is not diaphoretic. No distress.   HENT:   Head: Normocephalic and atraumatic.   Right Ear: External ear normal.   Left Ear: External ear normal.   Nose: Nose normal.   Mouth/Throat: Oropharynx is clear and moist.   Eyes: Conjunctivae and EOM are normal.   Neck:   Normal range of motion.  Cardiovascular:  Normal rate and regular rhythm.           Pulmonary/Chest: No respiratory distress. She has no wheezes. She has no rhonchi. She has no rales.   Musculoskeletal:         General: Normal range of motion.      Cervical back: Normal range of motion.     Neurological: She is alert and oriented to person, place, and time.   Skin: Capillary refill takes less than 2 seconds. No rash noted.   Psychiatric: She has a normal mood and affect. Thought content normal.       ED Course   Procedures  Labs Reviewed   GROUP A STREP, MOLECULAR   POCT URINE PREGNANCY          Imaging Results              X-Ray Chest PA And Lateral (Final result)  Result time 05/12/23 15:46:43      Final result by Beny Lawton MD (05/12/23 15:46:43)                    Impression:      No acute process seen.      Electronically signed by: Beny Lawton MD  Date:    05/12/2023  Time:    15:46               Narrative:    EXAMINATION:  XR CHEST PA AND LATERAL    CLINICAL HISTORY:  Cough, unspecified    COMPARISON:  12/03/2022    FINDINGS:  Cardiac silhouette is normal.  The lungs demonstrate no evidence of active disease.  No evidence of pleural effusion or pneumothorax.  Bones appear intact.                                       Medications   acetaminophen 32 mg/mL liquid (PEDS) 649.6 mg (649.6 mg Oral Given 5/12/23 1400)   ondansetron disintegrating tablet 4 mg (4 mg Oral Given 5/12/23 1425)     Medical Decision Making:   Initial Assessment:   Sore throat, cough   Differential Diagnosis:   Strep, viral URI, PNA  Clinical Tests:   Radiological Study: Ordered and Reviewed  ED Management:  Patient presents to the ER for evaluation of sore throat with cough and 1 episode of a small amount of hemoptysis, likely secondary to phlegm accumulation from sleep.  Upon reassessment of patient, she states that she also had vomiting this morning.  She was given Zofran and Tylenol.  No further vomiting was noted.  Her strep was negative.  No acute abnormality on chest x-ray.  Work note provided.  Information on reasons to return was also provided.                        Clinical Impression:   Final diagnoses:  [R05.9] Cough  [R11.10] Vomiting, unspecified vomiting type, unspecified whether nausea present (Primary)  [J02.9] Pharyngitis, unspecified etiology        ED Disposition Condition    Discharge Stable          ED Prescriptions       Medication Sig Dispense Start Date End Date Auth. Provider    ondansetron (ZOFRAN-ODT) 4 MG TbDL Take 1 tablet (4 mg total) by mouth every 6 (six) hours as needed. 30 tablet 5/12/2023 -- Pamela Mcginnis PA-C    cetirizine (ZYRTEC) 10 MG tablet Take 1 tablet (10 mg total) by mouth once daily. 30 tablet 5/12/2023 6/11/2023 Pamela Mcginnis PA-C           Follow-up Information    None          Pamela Mcginnis PA-C  05/12/23 8968

## 2023-05-12 NOTE — Clinical Note
"Ray Beckadan Knox was seen and treated in our emergency department on 5/12/2023.  She may return to work on 05/13/2023.       If you have any questions or concerns, please don't hesitate to call.      Pamela Mcginnis PA-C"

## 2023-05-16 ENCOUNTER — PATIENT MESSAGE (OUTPATIENT)
Dept: ORTHOPEDICS | Facility: CLINIC | Age: 28
End: 2023-05-16
Payer: MEDICAID

## 2023-05-24 ENCOUNTER — PATIENT MESSAGE (OUTPATIENT)
Dept: ORTHOPEDICS | Facility: CLINIC | Age: 28
End: 2023-05-24
Payer: MEDICAID

## 2023-05-30 ENCOUNTER — LAB VISIT (OUTPATIENT)
Dept: LAB | Facility: HOSPITAL | Age: 28
End: 2023-05-30
Attending: OBSTETRICS & GYNECOLOGY
Payer: MEDICAID

## 2023-05-30 ENCOUNTER — OFFICE VISIT (OUTPATIENT)
Dept: OBSTETRICS AND GYNECOLOGY | Facility: CLINIC | Age: 28
End: 2023-05-30
Payer: MEDICAID

## 2023-05-30 VITALS
WEIGHT: 213.31 LBS | BODY MASS INDEX: 35.49 KG/M2 | SYSTOLIC BLOOD PRESSURE: 117 MMHG | DIASTOLIC BLOOD PRESSURE: 80 MMHG

## 2023-05-30 DIAGNOSIS — Z12.4 ROUTINE CERVICAL SMEAR: ICD-10-CM

## 2023-05-30 DIAGNOSIS — Z01.419 WELL WOMAN EXAM WITH ROUTINE GYNECOLOGICAL EXAM: Primary | ICD-10-CM

## 2023-05-30 DIAGNOSIS — Z11.3 SCREEN FOR STD (SEXUALLY TRANSMITTED DISEASE): ICD-10-CM

## 2023-05-30 DIAGNOSIS — N93.9 ABNORMAL UTERINE BLEEDING (AUB): ICD-10-CM

## 2023-05-30 LAB
B-HCG UR QL: NEGATIVE
CTP QC/QA: YES

## 2023-05-30 PROCEDURE — 3074F PR MOST RECENT SYSTOLIC BLOOD PRESSURE < 130 MM HG: ICD-10-PCS | Mod: CPTII,,, | Performed by: OBSTETRICS & GYNECOLOGY

## 2023-05-30 PROCEDURE — 99999 PR PBB SHADOW E&M-EST. PATIENT-LVL II: ICD-10-PCS | Mod: PBBFAC,,, | Performed by: OBSTETRICS & GYNECOLOGY

## 2023-05-30 PROCEDURE — 81025 URINE PREGNANCY TEST: CPT | Mod: PBBFAC,PO | Performed by: OBSTETRICS & GYNECOLOGY

## 2023-05-30 PROCEDURE — 3079F PR MOST RECENT DIASTOLIC BLOOD PRESSURE 80-89 MM HG: ICD-10-PCS | Mod: CPTII,,, | Performed by: OBSTETRICS & GYNECOLOGY

## 2023-05-30 PROCEDURE — 3008F BODY MASS INDEX DOCD: CPT | Mod: CPTII,,, | Performed by: OBSTETRICS & GYNECOLOGY

## 2023-05-30 PROCEDURE — 87340 HEPATITIS B SURFACE AG IA: CPT | Performed by: OBSTETRICS & GYNECOLOGY

## 2023-05-30 PROCEDURE — 3079F DIAST BP 80-89 MM HG: CPT | Mod: CPTII,,, | Performed by: OBSTETRICS & GYNECOLOGY

## 2023-05-30 PROCEDURE — 99395 PREV VISIT EST AGE 18-39: CPT | Mod: S$PBB,,, | Performed by: OBSTETRICS & GYNECOLOGY

## 2023-05-30 PROCEDURE — 87389 HIV-1 AG W/HIV-1&-2 AB AG IA: CPT | Performed by: OBSTETRICS & GYNECOLOGY

## 2023-05-30 PROCEDURE — 86592 SYPHILIS TEST NON-TREP QUAL: CPT | Performed by: OBSTETRICS & GYNECOLOGY

## 2023-05-30 PROCEDURE — 1159F PR MEDICATION LIST DOCUMENTED IN MEDICAL RECORD: ICD-10-PCS | Mod: CPTII,,, | Performed by: OBSTETRICS & GYNECOLOGY

## 2023-05-30 PROCEDURE — 36415 COLL VENOUS BLD VENIPUNCTURE: CPT | Performed by: OBSTETRICS & GYNECOLOGY

## 2023-05-30 PROCEDURE — 86803 HEPATITIS C AB TEST: CPT | Performed by: OBSTETRICS & GYNECOLOGY

## 2023-05-30 PROCEDURE — 3008F PR BODY MASS INDEX (BMI) DOCUMENTED: ICD-10-PCS | Mod: CPTII,,, | Performed by: OBSTETRICS & GYNECOLOGY

## 2023-05-30 PROCEDURE — 88141 CYTOPATH C/V INTERPRET: CPT | Mod: ,,, | Performed by: PATHOLOGY

## 2023-05-30 PROCEDURE — 99395 PR PREVENTIVE VISIT,EST,18-39: ICD-10-PCS | Mod: S$PBB,,, | Performed by: OBSTETRICS & GYNECOLOGY

## 2023-05-30 PROCEDURE — 88175 CYTOPATH C/V AUTO FLUID REDO: CPT | Performed by: PATHOLOGY

## 2023-05-30 PROCEDURE — 99999 PR PBB SHADOW E&M-EST. PATIENT-LVL II: CPT | Mod: PBBFAC,,, | Performed by: OBSTETRICS & GYNECOLOGY

## 2023-05-30 PROCEDURE — 88141 PR  CYTOPATH CERV/VAG INTERPRET: ICD-10-PCS | Mod: ,,, | Performed by: PATHOLOGY

## 2023-05-30 PROCEDURE — 1159F MED LIST DOCD IN RCRD: CPT | Mod: CPTII,,, | Performed by: OBSTETRICS & GYNECOLOGY

## 2023-05-30 PROCEDURE — 99212 OFFICE O/P EST SF 10 MIN: CPT | Mod: PBBFAC,PO | Performed by: OBSTETRICS & GYNECOLOGY

## 2023-05-30 PROCEDURE — 87591 N.GONORRHOEAE DNA AMP PROB: CPT | Performed by: OBSTETRICS & GYNECOLOGY

## 2023-05-30 PROCEDURE — 3074F SYST BP LT 130 MM HG: CPT | Mod: CPTII,,, | Performed by: OBSTETRICS & GYNECOLOGY

## 2023-05-30 PROCEDURE — 81514 NFCT DS BV&VAGINITIS DNA ALG: CPT | Performed by: OBSTETRICS & GYNECOLOGY

## 2023-05-30 RX ORDER — LEVONORGESTREL AND ETHINYL ESTRADIOL 0.1-0.02MG
1 KIT ORAL DAILY
Qty: 28 TABLET | Refills: 11 | Status: SHIPPED | OUTPATIENT
Start: 2023-05-30 | End: 2023-07-24

## 2023-05-30 NOTE — PROGRESS NOTES
OBSTETRIC HISTORY:   OB History          2    Para   0    Term   0       0    AB   2    Living   0         SAB   2    IAB   0    Ectopic   0    Multiple   0    Live Births   0                  COMPREHENSIVE GYN HISTORY:  PAP History: Denies abnormal Paps.  Infection History: Denies STDs. Denies PID.  Benign History: Denies uterine fibroids. Denies ovarian cysts. Denies endometriosis.   Cancer History: Denies cervical cancer. Denies uterine cancer or hyperplasia. Denies ovarian cancer. Denies vulvar cancer or pre-cancer. Denies vaginal cancer or pre-cancer. Denies breast cancer. Denies colon cancer.  Sexual Activity History:   reports being sexually active and has had partner(s) who are male. She reports using the following method of birth control/protection: None.   Menstrual History: Irregular (bled with patch January to April because patch did not stay on)  Contraception: Patch did not stay on    HPI:   27 y.o.  Patient's last menstrual period was 2023 (approximate).   Patient is  here for her annual gynecologic exam.  She has no GYN complaints. Wants STD testing. She denies bladder, breast and bowel complaints.    ROS:  GENERAL: Denies weight gain or weight loss. Feeling well overall.   SKIN: Denies rash or lesions.   HEAD: Denies headache.   NODES: Denies enlarged lymph nodes.   CHEST: Denies shortness of breath.   ABDOMEN: No abdominal pain, constipation, diarrhea, nausea, vomiting or rectal bleeding.   URINARY: No frequency, dysuria, hematuria, or burning on urination.  REPRODUCTIVE: See HPI.   BREASTS: The patient denies pain, lumps, or nipple discharge.   HEMATOLOGIC: No easy bruisability.   MUSCULOSKELETAL: Denies joint pain or back pain.   NEUROLOGIC: Denies weakness.   PSYCHIATRIC: Denies depression, anxiety or mood swings.    PE:   /80   Wt 96.7 kg (213 lb 4.7 oz)   LMP 2023 (Approximate) Comment: last period lasted from  to April  BMI 35.49 kg/m²   APPEARANCE:  Well nourished, well developed, in no acute distress.  NECK: Neck symmetric without  thyromegaly.  NODES: No inguinal, cervical lymph node enlargement.  CHEST: Lungs clear to auscultation.  HEART: Regular rate and rhythm, no murmurs, rubs or gallops.  ABDOMEN: Soft. No tenderness or masses. No hernias.  BREASTS: Symmetrical, no skin changes or visible lesions. No palpable masses, nipple discharge or adenopathy bilaterally.  PELVIC:   VULVA: No lesions. Normal female genitalia.  URETHRAL MEATUS: Normal size and location, no lesions, no prolapse.  URETHRA: No masses, tenderness, prolapse or scarring.  VAGINA: Moist and well rugated, some discharge, no significant cystocele or rectocele.  CERVIX: No lesions and discharge.  UTERUS: Normal size, regular shape, mobile, non-tender, bladder base nontender.  ADNEXA: No masses or tenderness.    PROCEDURES:  Pap smear  STD testing-- Affirm, GC/CT, HIV, RPR, Hepatitis B and C       Assessment:  Normal Gynecologic Exam  Contraceptive counseling--patch did not stay on-- wants old OCP if UPT negative start today    Plan:  Mammogram and Colonoscopy if indicated by current recommendations.  Return to clinic in one year or for any problems or complaints.    Counseling:  Patient was counseled today on A.C.S. Pap guidelines and recommendations for yearly pelvic exams and monthly self breast exams; to see her PCP for other health maintenance. Regular exercise and healthy diet.     As of April 1, 2021, the Cures Act has been passed nationally. This new law requires that all doctors progress notes, lab results, pathology reports and radiology reports be released IMMEDIATELY to the patient in the patient portal. That means that the results are released to you at the EXACT same time they are released to me. Therefore, with all of the patients that I have I am not able to reply to each patient exactly when the results come in. So there will be a delay from when you see the results to when I  see them and have time to come up with a response to send you. Also I only see these results when I am on the computer at work. So if the results come in over the weekend or after 5 pm of a work day, I will not see them until the next business day. As you can tell, this is a challenge as a physician to give every patient the quick response they hope for and deserve. So please be patient!     Thanks for understanding,

## 2023-05-31 LAB
HBV SURFACE AG SERPL QL IA: NORMAL
HCV AB SERPL QL IA: NORMAL
HIV 1+2 AB+HIV1 P24 AG SERPL QL IA: NORMAL
RPR SER QL: NORMAL

## 2023-06-01 ENCOUNTER — PATIENT MESSAGE (OUTPATIENT)
Dept: OBSTETRICS AND GYNECOLOGY | Facility: CLINIC | Age: 28
End: 2023-06-01
Payer: MEDICAID

## 2023-06-01 LAB
BACTERIAL VAGINOSIS DNA: NEGATIVE
C TRACH DNA SPEC QL NAA+PROBE: NOT DETECTED
CANDIDA GLABRATA DNA: NEGATIVE
CANDIDA KRUSEI DNA: NEGATIVE
CANDIDA RRNA VAG QL PROBE: POSITIVE
N GONORRHOEA DNA SPEC QL NAA+PROBE: NOT DETECTED
T VAGINALIS RRNA GENITAL QL PROBE: POSITIVE

## 2023-06-01 RX ORDER — METRONIDAZOLE 500 MG/1
2000 TABLET ORAL ONCE
Qty: 4 TABLET | Refills: 0 | Status: SHIPPED | OUTPATIENT
Start: 2023-06-01 | End: 2023-06-01

## 2023-06-01 RX ORDER — TERCONAZOLE 4 MG/G
1 CREAM VAGINAL NIGHTLY
Qty: 45 G | Refills: 0 | Status: SHIPPED | OUTPATIENT
Start: 2023-06-01 | End: 2023-06-08

## 2023-06-04 ENCOUNTER — HOSPITAL ENCOUNTER (EMERGENCY)
Facility: HOSPITAL | Age: 28
Discharge: HOME | End: 2023-06-04
Attending: EMERGENCY MEDICINE
Payer: MEDICAID

## 2023-06-04 VITALS
BODY MASS INDEX: 35.45 KG/M2 | DIASTOLIC BLOOD PRESSURE: 85 MMHG | TEMPERATURE: 99 F | RESPIRATION RATE: 18 BRPM | SYSTOLIC BLOOD PRESSURE: 132 MMHG | OXYGEN SATURATION: 99 % | WEIGHT: 213 LBS | HEART RATE: 81 BPM

## 2023-06-04 DIAGNOSIS — R51.9 NONINTRACTABLE HEADACHE, UNSPECIFIED CHRONICITY PATTERN, UNSPECIFIED HEADACHE TYPE: Primary | ICD-10-CM

## 2023-06-04 LAB
B-HCG UR QL: NEGATIVE
CTP QC/QA: YES

## 2023-06-04 PROCEDURE — 96374 THER/PROPH/DIAG INJ IV PUSH: CPT

## 2023-06-04 PROCEDURE — 96375 TX/PRO/DX INJ NEW DRUG ADDON: CPT

## 2023-06-04 PROCEDURE — 99285 EMERGENCY DEPT VISIT HI MDM: CPT | Mod: 25

## 2023-06-04 PROCEDURE — 81025 URINE PREGNANCY TEST: CPT | Performed by: NURSE PRACTITIONER

## 2023-06-04 PROCEDURE — 63600175 PHARM REV CODE 636 W HCPCS: Performed by: NURSE PRACTITIONER

## 2023-06-04 RX ORDER — BUTALBITAL, ACETAMINOPHEN AND CAFFEINE 50; 325; 40 MG/1; MG/1; MG/1
1 TABLET ORAL EVERY 4 HOURS PRN
Qty: 12 TABLET | Refills: 0 | Status: SHIPPED | OUTPATIENT
Start: 2023-06-04 | End: 2023-06-07

## 2023-06-04 RX ORDER — PROCHLORPERAZINE EDISYLATE 5 MG/ML
10 INJECTION INTRAMUSCULAR; INTRAVENOUS
Status: COMPLETED | OUTPATIENT
Start: 2023-06-04 | End: 2023-06-04

## 2023-06-04 RX ORDER — DIPHENHYDRAMINE HYDROCHLORIDE 50 MG/ML
25 INJECTION INTRAMUSCULAR; INTRAVENOUS
Status: COMPLETED | OUTPATIENT
Start: 2023-06-04 | End: 2023-06-04

## 2023-06-04 RX ADMIN — DIPHENHYDRAMINE HYDROCHLORIDE 25 MG: 50 INJECTION INTRAMUSCULAR; INTRAVENOUS at 11:06

## 2023-06-04 RX ADMIN — PROCHLORPERAZINE EDISYLATE 10 MG: 5 INJECTION INTRAMUSCULAR; INTRAVENOUS at 11:06

## 2023-06-04 NOTE — Clinical Note
"Ray Hornecristina" Abilio was seen and treated in our emergency department on 6/4/2023.  She may return to work on 06/06/2023.       If you have any questions or concerns, please don't hesitate to call.      Cynthia Jiménez NP"

## 2023-06-04 NOTE — ED PROVIDER NOTES
Encounter Date: 6/4/2023       History     Chief Complaint   Patient presents with    Headache     Pt coming from airport works at [employer], c/o of chronic HA x a few weeks that never goes away, unrelieved by tylenol and ibuprofen. Fell yesterday hitting back of head. Pt endorses photophobia. Takes propanolol, born with 1 kidney.     27-year-old female presents emergency room with reports of a headache that began a few weeks ago.  Patient states that she is been taking over-the-counter medication with minimal relief of her symptoms.  She denies fever, rash or neck stiffness.  Patient states yesterday she also fell back and hit her head, denies loss of consciousness.  Denies bowel or bladder loss.  Denies numbness or tingling.  No vision changes.  Past medical history of anemia, asthma, renal cysts, congenital absence of 1 kidney.     The history is provided by the patient. No  was used.   Review of patient's allergies indicates:   Allergen Reactions    Lisinopril Other (See Comments), Shortness Of Breath and Swelling     angioedema      Shellfish containing products Swelling     Past Medical History:   Diagnosis Date    Anemia     Asthma     Bilateral renal cysts     Breast disorder     Congenital absence of one kidney     General anesthetics causing adverse effect in therapeutic use     Kidney cysts     pt born with 1 kidney     Past Surgical History:   Procedure Laterality Date    FINGER MASS EXCISION Left 3/8/2022    Procedure: EXCISION, MASS, FINGER;  Surgeon: Bk Doty Jr., MD;  Location: [facility] OR;  Service: Orthopedics;  Laterality: Left;    ULNAR NERVE TRANSPOSITION Left 3/29/2023    Procedure: TRANSPOSITION, NERVE, ULNAR;  Surgeon: Bk Doty Jr., MD;  Location: [facility] OR;  Service: Orthopedics;  Laterality: Left;     Family History   Problem Relation Age of Onset    Diabetes Paternal Aunt     Hypertension Maternal Grandfather     Cancer Paternal Grandmother     Breast cancer  Paternal Grandmother     Leukemia Paternal Grandmother     Kidney disease Maternal Grandmother     Hypertension Mother      Social History     Tobacco Use    Smoking status: Every Day     Types: Vaping with nicotine    Smokeless tobacco: Never   Substance Use Topics    Alcohol use: Yes     Comment: occasionally    Drug use: No     Review of Systems   Neurological:  Positive for headaches.   All other systems reviewed and are negative.    Physical Exam     Initial Vitals   BP Pulse Resp Temp SpO2   06/04/23 1030 06/04/23 1030 06/04/23 1028 06/04/23 1029 06/04/23 1030   132/85 88 18 98.8 °F (37.1 °C) 99 %      MAP       --                Physical Exam    Constitutional: She appears well-developed and well-nourished.   HENT:   Head: Normocephalic.   Right Ear: Hearing and tympanic membrane normal.   Left Ear: Hearing and tympanic membrane normal.   Nose: Nose normal.   Mouth/Throat: Oropharynx is clear and moist.   Eyes: Lids are normal. Pupils are equal, round, and reactive to light.   Neck:   Normal range of motion.  Cardiovascular:  Normal rate.           Pulmonary/Chest: Breath sounds normal. No respiratory distress. She has no wheezes. She has no rhonchi.   Abdominal: Abdomen is soft. There is no abdominal tenderness.   Musculoskeletal:         General: Normal range of motion.      Cervical back: Normal range of motion. No edema, erythema or rigidity. No spinous process tenderness or muscular tenderness. Normal range of motion.     Neurological: She is alert and oriented to person, place, and time.   Skin: Skin is warm and dry. No rash noted.   Psychiatric: She has a normal mood and affect. Her behavior is normal. Judgment and thought content normal.       ED Course   Procedures  Labs Reviewed   POCT URINE PREGNANCY          Imaging Results              CT Head Without Contrast (Final result)  Result time 06/04/23 11:07:41      Final result by Trey Liu MD (06/04/23 11:07:41)                   Impression:       No acute intracranial findings.      Electronically signed by: Trey Liu  Date:    06/04/2023  Time:    11:07               Narrative:    EXAMINATION:  CT HEAD WITHOUT CONTRAST    CLINICAL HISTORY:  Head trauma, moderate-severe;Headache, new or worsening, neuro deficit (Age 19-49y);    TECHNIQUE:  Low dose axial images were obtained through the head.  Coronal and sagittal reformations were also performed. Contrast was not administered.    COMPARISON:  CT head performed 01/17/2021.    FINDINGS:  Blood: No acute intracranial hemorrhage.    Parenchyma: No definite loss of gray-white differentiation to suggest acute or subacute transcortical infarct.    Ventricles/Extra-axial spaces: No abnormal extra-axial fluid collection. Basal cisterns are patent.    Vessels: Grossly unremarkable by unenhanced technique.    Orbits: Unremarkable.    Scalp: Unremarkable.    Skull: There are no depressed skull fractures or destructive bone lesions.    Sinuses and mastoids: Scattered minor paranasal sinus mucosal thickening.    Other findings: Partially empty sella configuration.                                       Medications   prochlorperazine injection Soln 10 mg (10 mg Intravenous Given 6/4/23 1101)   diphenhydrAMINE injection 25 mg (25 mg Intravenous Given 6/4/23 1101)     Medical Decision Making:   Differential Diagnosis:   Differential Diagnosis includes, but is not limited to:  Ischemic stroke, hemorrhagic stroke, subarachnoid hemorrhage/ruptured aneurysm, intracranial lesion/mass, meningitis/encephalitis, epidural hematoma, subdural hematoma, pseudotumor cerebri, venous sinus thrombosis, CO poisoning, hypertensive encephalopathy, MI/ACS, head trauma/contusion, concussion, sinus headache, dehydration, anxiety, medication non-compliance, primary headache (tension/cluster/migraine).   Clinical Tests:   Lab Tests: Reviewed and Ordered  Radiological Study: Ordered and Reviewed           ED Course as of 06/04/23 5942    Mary Jun 04, 2023   1122 FINDINGS:  Blood: No acute intracranial hemorrhage.     Parenchyma: No definite loss of gray-white differentiation to suggest acute or subacute transcortical infarct.     Ventricles/Extra-axial spaces: No abnormal extra-axial fluid collection. Basal cisterns are patent.     Vessels: Grossly unremarkable by unenhanced technique.     Orbits: Unremarkable.     Scalp: Unremarkable.     Skull: There are no depressed skull fractures or destructive bone lesions.     Sinuses and mastoids: Scattered minor paranasal sinus mucosal thickening.     Other findings: Partially empty sella configuration.     Impression:     No acute intracranial findings.    [DT]   1122 UPT negative.  [DT]   1219 Patient reports complete resolution of her headache at this time.  Patient notified of the need for follow-up care with the primary care provider in addition to a neurology referral that was sent.  I also prescribed her some Fioricet to take at home as needed for headaches.  She is otherwise stable at this time for discharge. [DT]      ED Course User Index  [DT] Cynthia Jiménez NP                 Clinical Impression:   Final diagnoses:  [R51.9] Nonintractable headache, unspecified chronicity pattern, unspecified headache type (Primary)        ED Disposition Condition    Discharge Stable          ED Prescriptions       Medication Sig Dispense Start Date End Date Auth. Provider    butalbital-acetaminophen-caffeine -40 mg (FIORICET, ESGIC) -40 mg per tablet Take 1 tablet by mouth every 4 (four) hours as needed for Headaches. 12 tablet 6/4/2023 6/7/2023 Cynthia Jiménez NP          Follow-up Information       Follow up With Specialties Details Why Contact Info Additional Information    [facility] Schedule an appointment as soon as possible for a visit in 2 days  [address], [address]  [facility] [address] [phone] Please park in [address] or D and use  [address]. [facility] Medical Office Bldg. elevators.             Cynthia Jiménez, ALEJANDRINA  06/04/23 0923

## 2023-06-04 NOTE — ED NOTES
Pt ambulatory to bed with assist. C/o dizziness and photophobia. Pt currently otp with mother, VSS.

## 2023-06-04 NOTE — DISCHARGE INSTRUCTIONS

## 2023-06-06 LAB
FINAL PATHOLOGIC DIAGNOSIS: NORMAL
Lab: NORMAL

## 2023-06-16 ENCOUNTER — PATIENT MESSAGE (OUTPATIENT)
Dept: PODIATRY | Facility: CLINIC | Age: 28
End: 2023-06-16
Payer: MEDICAID
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
## 2023-06-22 ENCOUNTER — OFFICE VISIT (OUTPATIENT)
Dept: ORTHOPEDICS | Facility: CLINIC | Age: 28
End: 2023-06-22
Payer: MEDICAID

## 2023-06-22 ENCOUNTER — TELEPHONE (OUTPATIENT)
Dept: ORTHOPEDICS | Facility: CLINIC | Age: 28
End: 2023-06-22

## 2023-06-22 VITALS — WEIGHT: 209.13 LBS | BODY MASS INDEX: 34.84 KG/M2 | HEIGHT: 65 IN

## 2023-06-22 DIAGNOSIS — M25.512 LEFT SHOULDER PAIN, UNSPECIFIED CHRONICITY: Primary | ICD-10-CM

## 2023-06-22 DIAGNOSIS — M25.511 RIGHT SHOULDER PAIN, UNSPECIFIED CHRONICITY: ICD-10-CM

## 2023-06-22 DIAGNOSIS — G56.22 CUBITAL TUNNEL SYNDROME ON LEFT: ICD-10-CM

## 2023-06-22 DIAGNOSIS — Z98.890 S/P DECOMPRESSION OF ULNAR NERVE AT ELBOW: ICD-10-CM

## 2023-06-22 PROCEDURE — 99999 PR PBB SHADOW E&M-EST. PATIENT-LVL IV: ICD-10-PCS | Mod: PBBFAC,,, | Performed by: PHYSICIAN ASSISTANT

## 2023-06-22 PROCEDURE — 1159F MED LIST DOCD IN RCRD: CPT | Mod: CPTII,,, | Performed by: PHYSICIAN ASSISTANT

## 2023-06-22 PROCEDURE — 99214 OFFICE O/P EST MOD 30 MIN: CPT | Mod: PBBFAC,PN | Performed by: PHYSICIAN ASSISTANT

## 2023-06-22 PROCEDURE — 99999 PR PBB SHADOW E&M-EST. PATIENT-LVL IV: CPT | Mod: PBBFAC,,, | Performed by: PHYSICIAN ASSISTANT

## 2023-06-22 PROCEDURE — 1160F RVW MEDS BY RX/DR IN RCRD: CPT | Mod: CPTII,,, | Performed by: PHYSICIAN ASSISTANT

## 2023-06-22 PROCEDURE — 99214 PR OFFICE/OUTPT VISIT, EST, LEVL IV, 30-39 MIN: ICD-10-PCS | Mod: S$PBB,,, | Performed by: PHYSICIAN ASSISTANT

## 2023-06-22 PROCEDURE — 1159F PR MEDICATION LIST DOCUMENTED IN MEDICAL RECORD: ICD-10-PCS | Mod: CPTII,,, | Performed by: PHYSICIAN ASSISTANT

## 2023-06-22 PROCEDURE — 1160F PR REVIEW ALL MEDS BY PRESCRIBER/CLIN PHARMACIST DOCUMENTED: ICD-10-PCS | Mod: CPTII,,, | Performed by: PHYSICIAN ASSISTANT

## 2023-06-22 PROCEDURE — 99214 OFFICE O/P EST MOD 30 MIN: CPT | Mod: S$PBB,,, | Performed by: PHYSICIAN ASSISTANT

## 2023-06-22 PROCEDURE — 3008F PR BODY MASS INDEX (BMI) DOCUMENTED: ICD-10-PCS | Mod: CPTII,,, | Performed by: PHYSICIAN ASSISTANT

## 2023-06-22 PROCEDURE — 3008F BODY MASS INDEX DOCD: CPT | Mod: CPTII,,, | Performed by: PHYSICIAN ASSISTANT

## 2023-06-22 RX ORDER — DICLOFENAC SODIUM 10 MG/G
GEL TOPICAL
COMMUNITY
Start: 2023-06-05

## 2023-06-22 NOTE — PATIENT INSTRUCTIONS
It is always good to see you.     I want you to get xrays of your left shoulder on your way out. I will put results on [facility].     Continue on motrin as directed. Take with food.     I did orders for OT for your shoulders. They should call you or you can call [phone].     Dr. Doty will see you back in 3-4 months. Call if you need anything.     It was a pleasure taking care of you!     Phoebe   [phone]

## 2023-07-05 ENCOUNTER — HOSPITAL ENCOUNTER (EMERGENCY)
Facility: HOSPITAL | Age: 28
Discharge: HOME | End: 2023-07-05
Attending: EMERGENCY MEDICINE
Payer: MEDICAID

## 2023-07-05 VITALS
WEIGHT: 215 LBS | HEART RATE: 85 BPM | BODY MASS INDEX: 35.82 KG/M2 | TEMPERATURE: 98 F | HEIGHT: 65 IN | SYSTOLIC BLOOD PRESSURE: 132 MMHG | OXYGEN SATURATION: 99 % | RESPIRATION RATE: 16 BRPM | DIASTOLIC BLOOD PRESSURE: 82 MMHG

## 2023-07-05 DIAGNOSIS — G44.209 ACUTE NON INTRACTABLE TENSION-TYPE HEADACHE: Primary | ICD-10-CM

## 2023-07-05 DIAGNOSIS — Z98.890 S/P DECOMPRESSION OF ULNAR NERVE AT ELBOW: ICD-10-CM

## 2023-07-05 DIAGNOSIS — G56.22 CUBITAL TUNNEL SYNDROME ON LEFT: ICD-10-CM

## 2023-07-05 LAB
B-HCG UR QL: NEGATIVE
CTP QC/QA: YES

## 2023-07-05 PROCEDURE — 96361 HYDRATE IV INFUSION ADD-ON: CPT

## 2023-07-05 PROCEDURE — 25000003 PHARM REV CODE 250: Performed by: EMERGENCY MEDICINE

## 2023-07-05 PROCEDURE — 96374 THER/PROPH/DIAG INJ IV PUSH: CPT

## 2023-07-05 PROCEDURE — 99284 EMERGENCY DEPT VISIT MOD MDM: CPT | Mod: 25

## 2023-07-05 PROCEDURE — 81025 URINE PREGNANCY TEST: CPT | Performed by: PHYSICIAN ASSISTANT

## 2023-07-05 PROCEDURE — 96375 TX/PRO/DX INJ NEW DRUG ADDON: CPT

## 2023-07-05 PROCEDURE — 63600175 PHARM REV CODE 636 W HCPCS: Performed by: EMERGENCY MEDICINE

## 2023-07-05 RX ORDER — SODIUM CHLORIDE 9 MG/ML
1000 INJECTION, SOLUTION INTRAVENOUS
Status: COMPLETED | OUTPATIENT
Start: 2023-07-05 | End: 2023-07-05

## 2023-07-05 RX ORDER — METOCLOPRAMIDE HYDROCHLORIDE 5 MG/ML
10 INJECTION INTRAMUSCULAR; INTRAVENOUS
Status: COMPLETED | OUTPATIENT
Start: 2023-07-05 | End: 2023-07-05

## 2023-07-05 RX ORDER — DIPHENHYDRAMINE HYDROCHLORIDE 50 MG/ML
25 INJECTION INTRAMUSCULAR; INTRAVENOUS
Status: COMPLETED | OUTPATIENT
Start: 2023-07-05 | End: 2023-07-05

## 2023-07-05 RX ORDER — KETOROLAC TROMETHAMINE 30 MG/ML
15 INJECTION, SOLUTION INTRAMUSCULAR; INTRAVENOUS
Status: COMPLETED | OUTPATIENT
Start: 2023-07-05 | End: 2023-07-05

## 2023-07-05 RX ADMIN — DIPHENHYDRAMINE HYDROCHLORIDE 25 MG: 50 INJECTION INTRAMUSCULAR; INTRAVENOUS at 07:07

## 2023-07-05 RX ADMIN — KETOROLAC TROMETHAMINE 15 MG: 30 INJECTION, SOLUTION INTRAMUSCULAR; INTRAVENOUS at 07:07

## 2023-07-05 RX ADMIN — SODIUM CHLORIDE 1000 ML: 9 INJECTION, SOLUTION INTRAVENOUS at 07:07

## 2023-07-05 RX ADMIN — METOCLOPRAMIDE 10 MG: 5 INJECTION, SOLUTION INTRAMUSCULAR; INTRAVENOUS at 07:07

## 2023-07-05 NOTE — FIRST PROVIDER EVALUATION
Emergency Department TeleTriage Encounter Note      CHIEF COMPLAINT    Chief Complaint   Patient presents with    Headache     Severe headache since this morning. Pain is made worse by light. OTC and RX meds have not helped. Has been nauseous with blurry vision.        VITAL SIGNS   Initial Vitals [07/05/23 1821]   BP Pulse Resp Temp SpO2   132/82 85 16 98.4 °F (36.9 °C) 99 %      MAP       --            ALLERGIES    Review of patient's allergies indicates:   Allergen Reactions    Lisinopril Other (See Comments), Shortness Of Breath and Swelling     angioedema      Shellfish containing products Swelling       PROVIDER TRIAGE NOTE  Patient presents with complaint of headache this am with sensitivity to light. History of migraines with similar symptoms. No trauma. No fever. Nausea with no vomiting.       Phy:   Constitutional: well nourished, well developed, appearing stated age, NAD   HEENT: NCAT, symmetrical lids, No obvious facial deformity.  Normal phonation. Normal Conjunctiva   Neck: NAROM   Respiratory: Normal effort.  No obvious use of accessory muscles   Musculoskeletal: Moved upper extremities well   Neuro: Alert, answers questions appropriately    Psych: appropriate mood and affect      Initial orders will be placed and care will be transferred to an alternate provider when patient is roomed for a full evaluation. Any additional orders and the final disposition will be determined by that provider.        ORDERS  Labs Reviewed - No data to display    ED Orders (720h ago, onward)      Start Ordered     Status Ordering Provider    Unscheduled 07/05/23 1830  POCT urine pregnancy  Once         Ordered [facility] LADD              Virtual Visit Note: The provider triage portion of this emergency department evaluation and documentation was performed via [employer], a HIPAA-compliant telemedicine application, in concert with a tele-presenter in the room. A face to face patient evaluation with one of  my colleagues will occur once the patient is placed in an emergency department room.      DISCLAIMER: This note was prepared with [employer] voice recognition transcription software. Garbled syntax, mangled pronouns, and other bizarre constructions may be attributed to that software system.     done

## 2023-07-05 NOTE — ED NOTES
Pt arrived from home with complaint of headache that started today. Pt tried medication at home with no relief. Pt also reports nausea, vomiting and photophobia. Plan of care reviewed, room assignment ending.

## 2023-07-06 NOTE — ED PROVIDER NOTES
Encounter Date: 7/5/2023       History     Chief Complaint   Patient presents with    Headache     Severe headache since this morning. Pain is made worse by light. OTC and RX meds have not helped. Has been nauseous with blurry vision.      Patient is a 27-year-old female presents to the ED with complaint of headache.  Patient reports 1 day of gradually worsening frontal type headache that began this morning.  She describes as pressure and tension like to her forehead.  She does report photophobia with associated nausea.  She denies any neck pain neck stiffness, fever, vomiting, double vision.  She does report blurry vision earlier today that has since resolved.  She reports no improvement of her headache despite use of previously prescribed Fioricet in ibuprofen.  Patient was seen this ED earlier in June at which point she underwent CT scan of the head that was unremarkable.  Patient states that her headache today is typical of her previous headache without any new or worsening factors.  She states that nothing alleviates her headache but states that is somewhat exacerbated by exposure to light.    Review of patient's allergies indicates:   Allergen Reactions    Lisinopril Other (See Comments), Shortness Of Breath and Swelling     angioedema      Shellfish containing products Swelling     Past Medical History:   Diagnosis Date    Anemia     Asthma     Bilateral renal cysts     Breast disorder     Congenital absence of one kidney     General anesthetics causing adverse effect in therapeutic use     Kidney cysts     pt born with 1 kidney     Past Surgical History:   Procedure Laterality Date    FINGER MASS EXCISION Left 3/8/2022    Procedure: EXCISION, MASS, FINGER;  Surgeon: Bk Doty Jr., MD;  Location: [facility] OR;  Service: Orthopedics;  Laterality: Left;    ULNAR NERVE TRANSPOSITION Left 3/29/2023    Procedure: TRANSPOSITION, NERVE, ULNAR;  Surgeon: Bk Doty Jr., MD;  Location: [facility] OR;  Service:  Orthopedics;  Laterality: Left;     Family History   Problem Relation Age of Onset    Diabetes Paternal Aunt     Hypertension Maternal Grandfather     Cancer Paternal Grandmother     Breast cancer Paternal Grandmother     Leukemia Paternal Grandmother     Kidney disease Maternal Grandmother     Hypertension Mother      Social History     Tobacco Use    Smoking status: Every Day     Types: Vaping with nicotine    Smokeless tobacco: Never   Substance Use Topics    Alcohol use: Yes     Comment: occasionally    Drug use: No     Review of Systems   Constitutional:  Negative for chills and fatigue.   HENT:  Negative for congestion and sore throat.    Respiratory:  Negative for chest tightness and shortness of breath.    Cardiovascular:  Negative for chest pain, palpitations and leg swelling.   Gastrointestinal:  Negative for abdominal pain and nausea.   Genitourinary:  Negative for flank pain.   Musculoskeletal:  Negative for back pain, neck pain and neck stiffness.   Neurological:  Positive for headaches. Negative for dizziness, seizures, facial asymmetry, speech difficulty and numbness.   Psychiatric/Behavioral:  Negative for agitation and confusion.      Physical Exam     Initial Vitals [07/05/23 1821]   BP Pulse Resp Temp SpO2   132/82 85 16 98.4 °F (36.9 °C) 99 %      MAP       --         Physical Exam    Nursing note and vitals reviewed.  Constitutional: She appears well-developed and well-nourished.   Well-appearing though appears somewhat uncomfortable; no distress noted   Eyes: Conjunctivae and EOM are normal. Pupils are equal, round, and reactive to light.   Neck: Neck supple.   Normal range of motion; no meningeal signs; patient able to touch chin to chest without difficulty   Normal range of motion.  Cardiovascular:  Normal rate, regular rhythm, normal heart sounds and intact distal pulses.           Pulmonary/Chest: Breath sounds normal.   Abdominal: Abdomen is soft. Bowel sounds are normal.    Musculoskeletal:         General: Normal range of motion.      Cervical back: Normal range of motion and neck supple.     Neurological: She is alert and oriented to person, place, and time. She has normal strength.   No focal neurological deficit   Strength 5/5   Sensation grossly in tact  MAEW  GCS 15  AAOx3    Skin: Skin is warm. Capillary refill takes less than 2 seconds.       ED Course   Procedures  Labs Reviewed   POCT URINE PREGNANCY          Imaging Results    None          Medications   0.9%  NaCl infusion (0 mLs Intravenous Stopped 7/5/23 2035)   metoclopramide HCl injection 10 mg (10 mg Intravenous Given 7/5/23 1942)   diphenhydrAMINE injection 25 mg (25 mg Intravenous Given 7/5/23 1941)   ketorolac injection 15 mg (15 mg Intravenous Given 7/5/23 1939)     Medical Decision Making:   Initial Assessment:   27-year-old female with past history of migraine headaches presents ED with gradually worsening headache typical previous migraine; she denies any acute onset of headache or headache with maximal intensity with onset; denies any neurological complaints, neck pain or neck stiffness or fever.  Patient use previously prescribed Fioricet ibuprofen at home prior to arrival without improvement.  Will obtain UPT, administer so-called headache cocktail and reassess.  Very low suspicion for intracranial abnormality based on previous normal CT head and the fact that her patient's headache is typical previous; low suspicion for meningitis  Differential Diagnosis:   Meningitis, pseudotumor cerebri, tension HA, cluster HA, migraine HA   Clinical Tests:   Lab Tests: Ordered and Reviewed  ED Management:  - UPT negative  - headache completely resolved with IVF, ketorolac, diphenhydramine and Reglan  - pt stable for discharge at this time  - will recommend Excedrin migraine as an OP; pt reports no improvement at home with previously-prescribed Fiorcet  - No further intervention is indicated at this time after having  taken into account the patient's history, physical exam findings, and empirical and objective data obtained during the patient's emergency department workup.   - The patient is at low risk for an emergent medical condition at this time, and I am of the belief that that it is safe to discharge the patient from the emergency department.   - The patient is instructed to follow up as outpatient as indicated on the discharge paperwork.    - I have discussed the specifics of the workup with the patient and the patient has verbalized understanding of the details of the workup, the diagnosis, the treatment plan, and the need for outpatient follow-up.    - Although the patient has no emergent etiology today this does not preclude the development of an emergent condition so, in addition, I have advised the patient that they can return to the ED and/or activate EMS at any time with worsening of their symptoms, change of their symptoms, or with any other medical complaint.    - The patient remained comfortable and stable during their visit in the ED.    - Discharge and follow-up instructions discussed with the patient who expressed understanding and willingness to comply with my recommendations.  - Results of all emergency department tests  discussed thoroughly with patient; all patient questions answered; pt in agreement with plan  - Pt instructed to follow up with PCP in 2-3 days for recheck of today's complaints  - Pt given strict emergency department return precautions for any new or worsening of symptoms  - Pt discharged from the emergency department in stable condition, in no acute distress               ED Course as of 07/05/23 2059 Wed Jul 05, 2023 2056 On re-evaluation, pt reports complete resolution of headache.  [LC]      ED Course User Index  [LC] Kaleb Masterson MD                 Clinical Impression:   Final diagnoses:  [G44.209] Acute non intractable tension-type headache (Primary)        ED Disposition  Condition    Discharge Stable          ED Prescriptions    None       Follow-up Information       Follow up With Specialties Details Why Contact Info    Les Horton MD Family Medicine Schedule an appointment as soon as possible for a visit   [address] [facility] [address]  [phone]               Kaleb Masterson MD  07/05/23 2100

## 2023-07-07 RX ORDER — IBUPROFEN 800 MG/1
800 TABLET ORAL
Qty: 90 TABLET | Refills: 1 | Status: SHIPPED | OUTPATIENT
Start: 2023-07-07

## 2023-07-20 ENCOUNTER — HOSPITAL ENCOUNTER (EMERGENCY)
Facility: HOSPITAL | Age: 28
Discharge: HOME | End: 2023-07-20
Attending: EMERGENCY MEDICINE
Payer: MEDICAID

## 2023-07-20 VITALS
OXYGEN SATURATION: 98 % | RESPIRATION RATE: 17 BRPM | DIASTOLIC BLOOD PRESSURE: 76 MMHG | WEIGHT: 200.75 LBS | TEMPERATURE: 98 F | SYSTOLIC BLOOD PRESSURE: 141 MMHG | BODY MASS INDEX: 33.4 KG/M2 | HEART RATE: 98 BPM

## 2023-07-20 DIAGNOSIS — J06.9 VIRAL URI WITH COUGH: Primary | ICD-10-CM

## 2023-07-20 LAB
B-HCG UR QL: NEGATIVE
CTP QC/QA: YES
GROUP A STREP, MOLECULAR: NEGATIVE
INFLUENZA A, MOLECULAR: NEGATIVE
INFLUENZA B, MOLECULAR: NEGATIVE
SARS-COV-2 RDRP RESP QL NAA+PROBE: NEGATIVE
SPECIMEN SOURCE: NORMAL

## 2023-07-20 PROCEDURE — 99284 EMERGENCY DEPT VISIT MOD MDM: CPT

## 2023-07-20 PROCEDURE — U0002 COVID-19 LAB TEST NON-CDC: HCPCS | Performed by: EMERGENCY MEDICINE

## 2023-07-20 PROCEDURE — 87502 INFLUENZA DNA AMP PROBE: CPT | Performed by: EMERGENCY MEDICINE

## 2023-07-20 PROCEDURE — 81025 URINE PREGNANCY TEST: CPT | Performed by: EMERGENCY MEDICINE

## 2023-07-20 PROCEDURE — 87651 STREP A DNA AMP PROBE: CPT | Performed by: EMERGENCY MEDICINE

## 2023-07-20 RX ORDER — ONDANSETRON 4 MG/1
4 TABLET, FILM COATED ORAL EVERY 6 HOURS
Qty: 12 TABLET | Refills: 0 | Status: SHIPPED | OUTPATIENT
Start: 2023-07-20

## 2023-07-20 RX ORDER — ALBUTEROL SULFATE 90 UG/1
1-2 AEROSOL, METERED RESPIRATORY (INHALATION) EVERY 6 HOURS PRN
Qty: 6.7 G | Refills: 0 | Status: SHIPPED | OUTPATIENT
Start: 2023-07-20 | End: 2024-07-19

## 2023-07-20 NOTE — DISCHARGE INSTRUCTIONS
You tested negative for COVID, flu, and strep. You have a cold. You can take Tylenol 1000 mg every 6-8 hours. Les Horton MD  [address]  [facility] [id number] [phone]    Schedule an appointment as soon as possible for a visit

## 2023-07-20 NOTE — ED TRIAGE NOTES
Patient presents to the ED with complaints of a sore throat, headaches, cough, congestion, abdominal pain with N/V x 3 days. States she has not had fever. Boyfriend is at the bedside who is also sick.      APPEARANCE: Patient is alert, calm, oriented x 4, and does not appear distressed.  SKIN: Skin is normal for race, warm, and dry. Normal skin turgor and mucous membranes moist.  RESPIRATORY:Normal rate and effort. Breath sounds clear bilaterally throughout chest. Respirations are equal and unlabored.  +cough  GASTRO: Bowel sounds normal, abdomen is soft, no tenderness, and no abdominal distention. +abdominal pain, N/V  ENT: EARS: no obvious drainage. NOSE: no active bleeding. +nasal congestion THROAT: no redness or swelling. +sore   : Voids without complication  HEAD: +headache

## 2023-07-20 NOTE — ED PROVIDER NOTES
Emergency Department Encounter  Provider Note  Encounter Date: 7/20/2023    Patient Name: Ray Knox  MRN: [id number]    History of Present Illness   HPI  History of Present Illness:    Chief Complaint:   Chief Complaint   Patient presents with    Cough     Sore throat, cough, congestion, abdominal pain, N/V, headaches x 3 days. Denies fever. Lost taste and smell.       28-year-old female presenting with cough, sore throat, headaches, congestion, nausea vomiting.  Boyfriend at bedside also sick with similar symptoms.  Has tried TheraFlu without improvement in symptoms.    The following PMH/PSH/SocHx/FamHx has been reviewed by myself:    Past Medical History:   Diagnosis Date    Anemia     Asthma     Bilateral renal cysts     Breast disorder     Congenital absence of one kidney     General anesthetics causing adverse effect in therapeutic use     Kidney cysts     pt born with 1 kidney     Past Surgical History:   Procedure Laterality Date    FINGER MASS EXCISION Left 3/8/2022    Procedure: EXCISION, MASS, FINGER;  Surgeon: Bk Doty Jr., MD;  Location: [facility] OR;  Service: Orthopedics;  Laterality: Left;    ULNAR NERVE TRANSPOSITION Left 3/29/2023    Procedure: TRANSPOSITION, NERVE, ULNAR;  Surgeon: Bk Doty Jr., MD;  Location: [facility] OR;  Service: Orthopedics;  Laterality: Left;     Social History     Tobacco Use    Smoking status: Every Day     Types: Vaping with nicotine    Smokeless tobacco: Never   Substance Use Topics    Alcohol use: Yes     Comment: occasionally    Drug use: No     Family History   Problem Relation Age of Onset    Diabetes Paternal Aunt     Hypertension Maternal Grandfather     Cancer Paternal Grandmother     Breast cancer Paternal Grandmother     Leukemia Paternal Grandmother     Kidney disease Maternal Grandmother     Hypertension Mother        Allergies reviewed:  Review of patient's allergies indicates:   Allergen Reactions    Lisinopril Other (See Comments), Shortness Of Breath  and Swelling     angioedema      Shellfish containing products Swelling       Medications reviewed:  Discharge Medication List as of 7/20/2023  4:20 AM        START taking these medications    Details   albuterol (PROVENTIL/VENTOLIN HFA) 90 mcg/actuation inhaler Inhale 1-2 puffs into the lungs every 6 (six) hours as needed for Wheezing. Rescue, Starting Thu 7/20/2023, Until Fri 7/19/2024 at 2359, Normal      ondansetron (ZOFRAN) 4 MG tablet Take 1 tablet (4 mg total) by mouth every 6 (six) hours., Starting Thu 7/20/2023, Normal           CONTINUE these medications which have NOT CHANGED    Details   diclofenac sodium (VOLTAREN) 1 % Gel APPLY 2 GRAMS TOPICALLY TO THE AFFECTED AREA THREE TIMES DAILY, Historical Med      dicyclomine (BENTYL) 20 mg tablet Take 1 tablet (20 mg total) by mouth 2 (two) times daily., Starting Tue 5/10/2022, Print      HYDROcodone-acetaminophen (NORCO) 5-325 mg per tablet Take 1 tablet by mouth every 4 (four) hours as needed for Pain., Starting Wed 3/29/2023, Print      ibuprofen (ADVIL,MOTRIN) 800 MG tablet Take 1 tablet (800 mg total) by mouth after meals as needed for Pain., Starting Fri 7/7/2023, Normal      levonorgestrel-ethinyl estradiol (AVIANE,ALESSE,LESSINA) 0.1-20 mg-mcg per tablet Take 1 tablet by mouth once daily., Starting Tue 5/30/2023, Normal      propranoloL (INDERAL) 10 MG tablet Take 1 tablet (10 mg total) by mouth 2 (two) times daily., Starting Tue 10/4/2022, Until Wed 10/4/2023, Normal           STOP taking these medications       albuterol sulfate 2.5 mg/0.5 mL Nebu Comments:   Reason for Stopping:         ondansetron (ZOFRAN-ODT) 4 MG TbDL Comments:   Reason for Stopping:               ROS  Review of Systems:    Constitutional:  Negative for fever.   HENT:  Positive for sore throat.    Eyes:  Negative for redness.   Respiratory:  Negative for shortness of breath.    Cardiovascular:  Negative for chest pain.   Gastrointestinal:  Positive for nausea.   Genitourinary:   Negative for dysuria.   Musculoskeletal:  Negative for back pain.   Skin:  Negative for rash.   Neurological:  Negative for weakness.   Hematological:  Does not bruise/bleed easily.   Psychiatric/Behavioral:  The patient is not nervous/anxious.      Physical Exam   Physical Exam    Initial Vitals [07/20/23 0305]   BP Pulse Resp Temp SpO2   (!) 141/76 98 17 98.2 °F (36.8 °C) 98 %      MAP       --           Triage vital signs reviewed.    Constitutional: Well-nourished, well-developed. Not in acute distress.  Appears that she is a cold.  Congested.  HENT: Normocephalic, atraumatic. Moist mucous membranes.  Eyes: No conjunctival injection.  Resp: Normal respiratory effort, breathing unlabored.  Cardio: Regular rate and rhythm.  GI: Abdomen non-distended.   MSK: Extremities without any deformities noted. No lower extremity edema.  Skin: Warm and dry. No rashes or lesions noted.  Neuro: Awake and alert. Moves all extremities.    ED Course   Procedures    Medical Decision Making    History Acquisition     The history is provided by the patient.     Review of prior external/non ED notes:  Multiple previous ED visits, orthopedics visits.  Not relevant to today's visit.    Differential Diagnoses   Based on available information and initial assessment, the working Differential Diagnosis includes, but is not limited to:  Sepsis, meningitis, cavernous sinus thrombosis, nasal foreign body, otitis media/external, nasal polyp, bacterial sinusitis, allergic rhinitis, influenza, bacterial/viral pharyngitis, peritonsillar abscess, retropharyngeal abscess, bacterial/viral pneumonia.  .    EKG   EKG ordered and independently reviewed by me:       Labs   Lab tests ordered and independently reviewed by me:    Labs Reviewed   GROUP A STREP, MOLECULAR   INFLUENZA A & B BY MOLECULAR   SARS-COV-2 RNA AMPLIFICATION, QUAL   POCT URINE PREGNANCY         Imaging   Imaging ordered and independently reviewed by me:   Imaging Results    None               Additional Consideration   Ray Knox  presents to the emergency Department today with cold symptoms.  Unremarkable vital signs, oxygen saturation knee 8% and patient is speaking in full sentences.  Negative flu, COVID, strep.  Likely virus.  Expectant management discussed.  Zofran sent to pharmacy.  Albuterol sent to pharmacy per patient request.    Additional testing considered but not indicated during the course of this workup: further imaging and labwork, not indicated  Co-morbidities/chronic illness/exacerbation of chronic illness considered which impacted clinical decision making:  Solitary kidney  Procedures done in the ED or plan for the OR: No  Social determinants of care considered during development of treatment plan include: Decreased medical literacy  Discussion of management or test interpretation with external provider: No  DNR discussion: No    The patient's list of active medications and allergies as documented per RN staff has been reviewed.  Medications given in the ED and/or prescribed: Medications - No data to display               Explanation of disposition: Discharge    Clinical Impression:     1. Viral URI with cough         All results from the workup were reviewed with the patient/family in detail. I discussed with the patient and/or the family/caregiver that today's evaluation in the ED does not suggest any emergent or life-threatening medical conditions that would require hospitalization or immediate intervention beyond what was provided in the ED. I believe the patient is safe for discharge.  However, a reassuring evaluation in the ED does not preclude the presence or development of a serious or life-threatening condition. As such, strict return precautions were discussed with the patient expressing understanding of instructions, and all questions answered. The patient/family communicates understanding of all this information and all remaining questions and concerns were  addressed at this time.    The patient/family has been provided with verbal and printed direction regarding our final diagnosis(es) as well as instructions regarding use of OTC and/or Rx medications intended to manage the patient's aforementioned conditions including:  ED Prescriptions       Medication Sig Dispense Start Date End Date Auth. Provider    albuterol (PROVENTIL/VENTOLIN HFA) 90 mcg/actuation inhaler Inhale 1-2 puffs into the lungs every 6 (six) hours as needed for Wheezing. Rescue 6.7 g 7/20/2023 7/19/2024 Juana Almonte MD    ondansetron (ZOFRAN) 4 MG tablet Take 1 tablet (4 mg total) by mouth every 6 (six) hours. 12 tablet 7/20/2023 -- Juana Almonte MD          The patient's condition does not warrant review of the  and prescription of controlled substances.      ED Disposition Condition    Discharge Stable               Juana Almonte MD  07/20/23 6604

## 2023-07-24 ENCOUNTER — OFFICE VISIT (OUTPATIENT)
Dept: OBSTETRICS AND GYNECOLOGY | Facility: CLINIC | Age: 28
End: 2023-07-24
Payer: MEDICAID

## 2023-07-24 VITALS
SYSTOLIC BLOOD PRESSURE: 119 MMHG | WEIGHT: 209.88 LBS | BODY MASS INDEX: 34.93 KG/M2 | DIASTOLIC BLOOD PRESSURE: 80 MMHG

## 2023-07-24 DIAGNOSIS — Z20.2 TRICHOMONAS CONTACT, TREATED: Primary | ICD-10-CM

## 2023-07-24 DIAGNOSIS — N91.1 SECONDARY AMENORRHEA: ICD-10-CM

## 2023-07-24 DIAGNOSIS — N97.9 INFERTILITY, FEMALE: ICD-10-CM

## 2023-07-24 PROCEDURE — 1159F MED LIST DOCD IN RCRD: CPT | Mod: CPTII,,, | Performed by: OBSTETRICS & GYNECOLOGY

## 2023-07-24 PROCEDURE — 99212 OFFICE O/P EST SF 10 MIN: CPT | Mod: S$PBB,,, | Performed by: OBSTETRICS & GYNECOLOGY

## 2023-07-24 PROCEDURE — 3074F PR MOST RECENT SYSTOLIC BLOOD PRESSURE < 130 MM HG: ICD-10-PCS | Mod: CPTII,,, | Performed by: OBSTETRICS & GYNECOLOGY

## 2023-07-24 PROCEDURE — 99999 PR PBB SHADOW E&M-EST. PATIENT-LVL III: ICD-10-PCS | Mod: PBBFAC,,, | Performed by: OBSTETRICS & GYNECOLOGY

## 2023-07-24 PROCEDURE — 3074F SYST BP LT 130 MM HG: CPT | Mod: CPTII,,, | Performed by: OBSTETRICS & GYNECOLOGY

## 2023-07-24 PROCEDURE — 81514 NFCT DS BV&VAGINITIS DNA ALG: CPT | Performed by: OBSTETRICS & GYNECOLOGY

## 2023-07-24 PROCEDURE — 3008F BODY MASS INDEX DOCD: CPT | Mod: CPTII,,, | Performed by: OBSTETRICS & GYNECOLOGY

## 2023-07-24 PROCEDURE — 3079F DIAST BP 80-89 MM HG: CPT | Mod: CPTII,,, | Performed by: OBSTETRICS & GYNECOLOGY

## 2023-07-24 PROCEDURE — 3008F PR BODY MASS INDEX (BMI) DOCUMENTED: ICD-10-PCS | Mod: CPTII,,, | Performed by: OBSTETRICS & GYNECOLOGY

## 2023-07-24 PROCEDURE — 99213 OFFICE O/P EST LOW 20 MIN: CPT | Mod: PBBFAC,PO | Performed by: OBSTETRICS & GYNECOLOGY

## 2023-07-24 PROCEDURE — 3079F PR MOST RECENT DIASTOLIC BLOOD PRESSURE 80-89 MM HG: ICD-10-PCS | Mod: CPTII,,, | Performed by: OBSTETRICS & GYNECOLOGY

## 2023-07-24 PROCEDURE — 99999 PR PBB SHADOW E&M-EST. PATIENT-LVL III: CPT | Mod: PBBFAC,,, | Performed by: OBSTETRICS & GYNECOLOGY

## 2023-07-24 PROCEDURE — 1159F PR MEDICATION LIST DOCUMENTED IN MEDICAL RECORD: ICD-10-PCS | Mod: CPTII,,, | Performed by: OBSTETRICS & GYNECOLOGY

## 2023-07-24 PROCEDURE — 99212 PR OFFICE/OUTPT VISIT, EST, LEVL II, 10-19 MIN: ICD-10-PCS | Mod: S$PBB,,, | Performed by: OBSTETRICS & GYNECOLOGY

## 2023-07-24 RX ORDER — CLOMIPHENE CITRATE 50 MG/1
TABLET ORAL
Qty: 10 TABLET | Refills: 2 | OUTPATIENT
Start: 2023-07-24 | End: 2023-12-05

## 2023-07-24 RX ORDER — MEDROXYPROGESTERONE ACETATE 10 MG/1
10 TABLET ORAL DAILY
Qty: 10 TABLET | Refills: 0 | Status: SHIPPED | OUTPATIENT
Start: 2023-07-24 | End: 2023-09-18

## 2023-07-24 NOTE — PROGRESS NOTES
27 yo female who presents for std testing.  Noted to have trichomonas in may 2023.   S/p txment.  Returns today for SHAYNE.  Affirm collected.  Patient reports no cycle since may 2023.  Office UPT is negative.  Rx for provera sent to help with withdrawal bleed.  Desires fertility so clomid resent but now at 100mg.    CORTES pritchett MD

## 2023-07-26 DIAGNOSIS — N76.0 ACUTE VAGINITIS: Primary | ICD-10-CM

## 2023-07-26 LAB
BACTERIAL VAGINOSIS DNA: POSITIVE
CANDIDA GLABRATA DNA: NEGATIVE
CANDIDA KRUSEI DNA: NEGATIVE
CANDIDA RRNA VAG QL PROBE: NEGATIVE
T VAGINALIS RRNA GENITAL QL PROBE: POSITIVE

## 2023-07-26 RX ORDER — METRONIDAZOLE 500 MG/1
TABLET ORAL
Qty: 30 TABLET | Refills: 0 | Status: SHIPPED | OUTPATIENT
Start: 2023-07-26 | End: 2023-09-18

## 2023-07-26 NOTE — PROGRESS NOTES
Patient continues to have trichomonas.  Rx for flagyl sent.  Instructed to take 2 pills on day one then 1 pill twice a day for 7 days.  Patient's partner needs to be treated as well.    Recommend f/u in 2-3 wks for SHAYNE.    CORTES pritchett MD

## 2023-08-05 ENCOUNTER — PATIENT MESSAGE (OUTPATIENT)
Dept: ORTHOPEDICS | Facility: CLINIC | Age: 28
End: 2023-08-05
Payer: MEDICAID

## 2023-09-06 ENCOUNTER — TELEPHONE (OUTPATIENT)
Dept: NEUROLOGY | Facility: CLINIC | Age: 28
End: 2023-09-06
Payer: MEDICAID

## 2023-09-06 NOTE — TELEPHONE ENCOUNTER
Called Ptt to reschedule her concussion appt. Pt will be rescheduled for September 18 at [place].     ----- Message from Asha Stallings sent at 9/6/2023  2:20 PM CDT -----  Regarding: question  Contact: @ [phone]  Pt called in regards to a upcoming appointment she has for Tuesday she need to change the date .....Please call and adv @ [phone]

## 2023-09-18 ENCOUNTER — OFFICE VISIT (OUTPATIENT)
Dept: NEUROLOGY | Facility: CLINIC | Age: 28
End: 2023-09-18
Payer: MEDICAID

## 2023-09-18 VITALS
BODY MASS INDEX: 34.85 KG/M2 | DIASTOLIC BLOOD PRESSURE: 80 MMHG | SYSTOLIC BLOOD PRESSURE: 121 MMHG | HEART RATE: 84 BPM | WEIGHT: 209.44 LBS

## 2023-09-18 DIAGNOSIS — G47.33 OSA (OBSTRUCTIVE SLEEP APNEA): ICD-10-CM

## 2023-09-18 DIAGNOSIS — H53.8 BLURRED VISION, LEFT EYE: ICD-10-CM

## 2023-09-18 DIAGNOSIS — G47.9 FATIGUE DUE TO SLEEP PATTERN DISTURBANCE: ICD-10-CM

## 2023-09-18 DIAGNOSIS — S06.0X0S CONCUSSION WITHOUT LOSS OF CONSCIOUSNESS, SEQUELA: Primary | ICD-10-CM

## 2023-09-18 DIAGNOSIS — S09.90XA HEAD TRAUMA, INITIAL ENCOUNTER: ICD-10-CM

## 2023-09-18 DIAGNOSIS — G43.009 MIGRAINE WITHOUT AURA AND WITHOUT STATUS MIGRAINOSUS, NOT INTRACTABLE: ICD-10-CM

## 2023-09-18 DIAGNOSIS — G93.2 IIH (IDIOPATHIC INTRACRANIAL HYPERTENSION): ICD-10-CM

## 2023-09-18 DIAGNOSIS — M54.81 OCCIPITAL NEURITIS: ICD-10-CM

## 2023-09-18 DIAGNOSIS — G44.86 CERVICOGENIC HEADACHE: ICD-10-CM

## 2023-09-18 DIAGNOSIS — H51.11 CONVERGENCE INSUFFICIENCY: ICD-10-CM

## 2023-09-18 DIAGNOSIS — R41.89 COGNITIVE CHANGE: ICD-10-CM

## 2023-09-18 DIAGNOSIS — R53.83 FATIGUE DUE TO SLEEP PATTERN DISTURBANCE: ICD-10-CM

## 2023-09-18 DIAGNOSIS — R26.89 IMBALANCE: ICD-10-CM

## 2023-09-18 PROCEDURE — 99999 PR PBB SHADOW E&M-EST. PATIENT-LVL IV: ICD-10-PCS | Mod: PBBFAC,,, | Performed by: PSYCHIATRY & NEUROLOGY

## 2023-09-18 PROCEDURE — 3008F PR BODY MASS INDEX (BMI) DOCUMENTED: ICD-10-PCS | Mod: CPTII,,, | Performed by: PSYCHIATRY & NEUROLOGY

## 2023-09-18 PROCEDURE — 1160F RVW MEDS BY RX/DR IN RCRD: CPT | Mod: CPTII,,, | Performed by: PSYCHIATRY & NEUROLOGY

## 2023-09-18 PROCEDURE — 3079F DIAST BP 80-89 MM HG: CPT | Mod: CPTII,,, | Performed by: PSYCHIATRY & NEUROLOGY

## 2023-09-18 PROCEDURE — 1159F MED LIST DOCD IN RCRD: CPT | Mod: CPTII,,, | Performed by: PSYCHIATRY & NEUROLOGY

## 2023-09-18 PROCEDURE — 1160F PR REVIEW ALL MEDS BY PRESCRIBER/CLIN PHARMACIST DOCUMENTED: ICD-10-PCS | Mod: CPTII,,, | Performed by: PSYCHIATRY & NEUROLOGY

## 2023-09-18 PROCEDURE — 99214 OFFICE O/P EST MOD 30 MIN: CPT | Mod: PBBFAC | Performed by: PSYCHIATRY & NEUROLOGY

## 2023-09-18 PROCEDURE — 3074F SYST BP LT 130 MM HG: CPT | Mod: CPTII,,, | Performed by: PSYCHIATRY & NEUROLOGY

## 2023-09-18 PROCEDURE — 3074F PR MOST RECENT SYSTOLIC BLOOD PRESSURE < 130 MM HG: ICD-10-PCS | Mod: CPTII,,, | Performed by: PSYCHIATRY & NEUROLOGY

## 2023-09-18 PROCEDURE — 3079F PR MOST RECENT DIASTOLIC BLOOD PRESSURE 80-89 MM HG: ICD-10-PCS | Mod: CPTII,,, | Performed by: PSYCHIATRY & NEUROLOGY

## 2023-09-18 PROCEDURE — 99999 PR PBB SHADOW E&M-EST. PATIENT-LVL IV: CPT | Mod: PBBFAC,,, | Performed by: PSYCHIATRY & NEUROLOGY

## 2023-09-18 PROCEDURE — 99215 OFFICE O/P EST HI 40 MIN: CPT | Mod: S$PBB,,, | Performed by: PSYCHIATRY & NEUROLOGY

## 2023-09-18 PROCEDURE — 99215 PR OFFICE/OUTPT VISIT, EST, LEVL V, 40-54 MIN: ICD-10-PCS | Mod: S$PBB,,, | Performed by: PSYCHIATRY & NEUROLOGY

## 2023-09-18 PROCEDURE — 1159F PR MEDICATION LIST DOCUMENTED IN MEDICAL RECORD: ICD-10-PCS | Mod: CPTII,,, | Performed by: PSYCHIATRY & NEUROLOGY

## 2023-09-18 PROCEDURE — 3008F BODY MASS INDEX DOCD: CPT | Mod: CPTII,,, | Performed by: PSYCHIATRY & NEUROLOGY

## 2023-09-18 RX ORDER — METHYLPREDNISOLONE 4 MG/1
TABLET ORAL
Qty: 1 EACH | Refills: 0 | Status: SHIPPED | OUTPATIENT
Start: 2023-09-18

## 2023-09-18 NOTE — PROGRESS NOTES
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 apnea  MRI Brain without contrast    61 minutes were spent on the date of this patient encounter, which includes: preparing to see the patient, reviewing previous history, obtaining new patient history, performing the physical exam, counseling and educating the patient and/or family/caregiver, ordering necessary medications or tests or referrals, documenting in the electronic medical record, coordinating care.    Elroy Islas MD  Sports Neurology

## 2023-09-18 NOTE — PATIENT INSTRUCTIONS
Medrol dose pack prescribed. Take as instructed on package insert.  The patient was instructed to ice the occipital region for no more than 20 minutes at least once a day but may repeat this as many times as they would like.   Discussed ergonomic accommodations for occipital neuritis/neuralgia. Mainly perform all work at eye level to minimize continued neck flexion which will aggravate the nerve.  Patient was encouraged to do daily light cardio exercise but instructed to limit physical activities to walking, walking in water up to the waist only or riding a stationary bike, recumbent preferred. No weight lifting in upper body, no neck massage, no acupuncture of neck, and no dry needling of upper neck. No neck PT unless otherwise stated. No chiropractor work on neck. Lower body strengthening with resistant bands, leg machines, and strapping weights to legs okay. No core body workouts. No running or use of cardio equipment other than stationary bike. No swimming or body surfing. No amusement park rides. No lifting more than 5-10 lbs and bend at the knees, not the waist.  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 To be referred for consultation for occipital nerve release procedure if initially clinically responsive to injections but always with a return of symptoms.  Referral for vestibular PT for balance and eye movements  Referral for ST for cognition  Referral for neuro-ophthalmology for IIH evaluation and left eye blurred vision  Referral for sleep medicine for sleep apnea  MRI Brain without contrast

## 2023-09-20 ENCOUNTER — CLINICAL SUPPORT (OUTPATIENT)
Dept: REHABILITATION | Facility: HOSPITAL | Age: 28
End: 2023-09-20
Attending: PSYCHIATRY & NEUROLOGY
Payer: MEDICAID

## 2023-09-20 DIAGNOSIS — S06.0X0S CONCUSSION WITHOUT LOSS OF CONSCIOUSNESS, SEQUELA: ICD-10-CM

## 2023-09-20 DIAGNOSIS — R41.841 COGNITIVE COMMUNICATION DEFICIT: ICD-10-CM

## 2023-09-20 DIAGNOSIS — R41.89 COGNITIVE CHANGE: ICD-10-CM

## 2023-09-20 PROCEDURE — 96125 COGNITIVE TEST BY HC PRO: CPT | Mod: PN

## 2023-09-20 NOTE — PLAN OF CARE
"[facility]  Speech Therapy Evaluation -Neurological Rehabilitation    Date: 9/20/2023     Name: Ray Knxo   MRN: [id number]    Therapy Diagnosis: Cognitive-Communicative Deficits  Physician: Elroy Islas MD  Physician Orders: Ambulatory Referral to Speech Therapy   Medical Diagnosis: S06.0X0S (ICD-10-CM) - Concussion without loss of consciousness, sequela R41.89 (ICD-10-CM) - Cognitive change     Visit # / Visits Authorized:  1 / 1   Date of Evaluation:  9/20/2023   Insurance Authorization Period: 9/18/23 to 9/17/24  Plan of Care Certification:    9/20/2023 to 11/1/2023      Time In:2:38   Time Out: 3:15   Total time: 37 minutes    Procedure   Cognitive Communication Evaluation including scoring and interpretation (Total time: 60 minutes)     Precautions: Standard and Fall  Subjective   Date of Onset: 1.5 years ago   History of Current Condition:  Ray Knox is a 28 y.o. female who presents to [facility] Outpatient Speech Therapy for evaluation secondary to concussion and cognitive change. Patient was referred to therapy by Elroy Islas MD , which is the patient's neurologist. Patient reports trouble with memory. "We could have a full conversation and 10 minutes later I won't remember a thing." She reports that she has headaches daily that do not resolve until she goes to sleep.    Patient attended the evaluation alone.  Past Medical History: Ray Knox  has a past medical history of Anemia, Asthma, Bilateral renal cysts, Breast disorder, Congenital absence of one kidney, General anesthetics causing adverse effect in therapeutic use, and Kidney cysts.  Ray Knox  has a past surgical history that includes Finger mass excision (Left, 3/8/2022) and Ulnar nerve transposition (Left, 3/29/2023).  Medical Hx and Allergies: Ray has a current medication list which includes the following prescription(s): albuterol, clomiphene, diclofenac sodium, ibuprofen, " methylprednisolone, ondansetron, and propranolol.   Review of patient's allergies indicates:   Allergen Reactions    Lisinopril Other (See Comments), Shortness Of Breath and Swelling     angioedema      Shellfish containing products Swelling     Imaging: CT Head 6/4/23   Impression:   No acute intracranial findings.  Prior Therapy:  none  Social History:  Patient lives with her mother in [facility], Patient is currently driving  Occupation:  behavior technician   Prior Level of Function: Had to close eyes to remember things, but able to do it.    Current Level of Function: Memory and attention problems, daily headaches  Pain Scale: 4/10 on a Visual Analog Scale currently.  Pain Location: Headache: 7/10 pain at the end of the assessment  Patient's Therapy Goals: Improve memory skills.  Objective   Formal Assessment:  Cognitive Linguistic Quick Test (CLQT), Aphasia Administration was administered to quickly assess the patient's overall cognitive-linguistic function and to determine cognitive strengths and weaknesses.     Cognitive Domain  Score  Severity Rating    Attention 101/215 moderate   Memory 170/185 WFL   Executive Functions 35/40 WFL   Language 32/37 WFL   Visuospatial Skills 79/105 mild   Clock Drawing 13/13 WFL          Composite Score = 3.4/4 mild     Task Score Ages 18-69 Cut Score Ages 70-89 Cut Score Below?   Personal Facts  8  8 8 average   Symbol Cancellation 0  11 10 below average   Confrontational naming  10  10 10 Average    Clock drawing  13  12 11 above average   Story Retelling 8  6 5  above average   Symbol Trails 10  9 6 above average   Generative Naming 6  5 4  above average   Design Memory 6  5 4 above average   Mazes 8  7 4 above average   Design Generation  11  6 5 above average          Gelesia showed strengths in her executive functions and language skills. She presents with average and above average scores on all subtests with the exception of symbol cancellation. Patient unable to recall  symbol presented and received a score of 0. Patient's primary complaint is short-term memory, which was informally assessed via a list of random words she was required to recall within a 2-minute time delay. Given her rise in pain score at end of assessment, patient will benefit from education and use of strategies for executive functioning and planning.     Treatment   No treatment completed due to time constraint of assessment.     Education provided:   -role of Speech Therapy, goals/plan of care, scheduling/cancellations, insurance limitations with patient    Patient expressed understanding.     Home Program: Not yet established.  Assessment     Ray presents to [facility] status post medical diagnosis of concussion and cognitive change.   She presents with a mild cognitive communication disorder characterized by deficits in attention primarily and subjective reports of memory deficits in her daily environments. 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 Patient will benefit from individualized treatment to address planning and execution of job tasks.     Demonstrates impairments including limitations as described in the problem list.     Positive prognostic factors: Patient motivation and engagement   Negative prognostic factors: None   Barriers to therapy: No barriers to therapy identified.     Patient's spiritual, cultural, and educational needs considered and patient agreeable to plan of care and goals.    Patient will benefit from skilled therapy.    Rehab Potential: good    Short Term Goals: (3 weeks) Current Progress:   Patient will recall memory strategies with 100% accuracy independently within 2-3 sessions in order to use in daily environments.    Progressing/ Not Met 9/20/2023   Established this date   2. Patient will complete alternating and sustained attention tasks with >90% task  accuracy independently to improve job performance with data collection.      Progressing/ Not Met 9/20/2023   Established this date   3. Patient will participate in delayed recall tasks incorporating functional information with 100% accuracy independently to improve job performance.     Progressing/ Not Met 9/20/2023   Established this date    4. Patient will participate in goal, plan, do, review to independently execute 3 hypothetical scenarios with >90% accuracy to improve executive function skills.     Progressing/ Not Met 9/20/2023   Established this date        Long Term Goals: (6 weeks) Current Progress:   Using compensatory strategies independently, patient will complete a variety of functional, individualized tasks with >90% accuracy in order to increase task accuracy and recall in a variety of environments.    Established this date         Plan     Recommended Treatment Plan:  Patient will participate in the [facility] rehabilitation program for speech therapy 2 times per week for 6 weeks to address her Cognition deficits, to educate patient and their family, and to participate in a home exercise program.    Therapist's Name:   ANDRES Hemphill  Student Speech-Language Pathologist    9/20/2023     I certify that I was present in the room directing the student in service delivery and guiding them using my skilled judgment. As the co-signing therapist I have reviewed the students documentation and am responsible for the treatment, assessment, and plan.   Alcira Sousa MS, CCC-SLP, CBIS  Speech-Language Pathologist  Certified Brain Injury Specialist  9/20/2023

## 2023-09-25 ENCOUNTER — CLINICAL SUPPORT (OUTPATIENT)
Dept: REHABILITATION | Facility: HOSPITAL | Age: 28
End: 2023-09-25
Payer: MEDICAID

## 2023-09-25 ENCOUNTER — PATIENT MESSAGE (OUTPATIENT)
Dept: OBSTETRICS AND GYNECOLOGY | Facility: CLINIC | Age: 28
End: 2023-09-25
Payer: MEDICAID

## 2023-09-25 DIAGNOSIS — B00.9 HSV INFECTION: Primary | ICD-10-CM

## 2023-09-25 DIAGNOSIS — R41.841 COGNITIVE COMMUNICATION DEFICIT: Primary | ICD-10-CM

## 2023-09-25 DIAGNOSIS — N76.0 ACUTE VAGINITIS: ICD-10-CM

## 2023-09-25 PROCEDURE — 92507 TX SP LANG VOICE COMM INDIV: CPT | Mod: PN

## 2023-09-25 RX ORDER — METRONIDAZOLE 500 MG/1
500 TABLET ORAL
Qty: 14 TABLET | Refills: 0 | Status: SHIPPED | OUTPATIENT
Start: 2023-09-25 | End: 2023-10-02

## 2023-09-25 RX ORDER — VALACYCLOVIR HYDROCHLORIDE 500 MG/1
TABLET, FILM COATED ORAL
Qty: 30 TABLET | Refills: 4 | Status: SHIPPED | OUTPATIENT
Start: 2023-09-25

## 2023-09-25 NOTE — PROGRESS NOTES
Oh, no!    So sorry - you can try neosporin!  But rx for valtrex sent.  Rx for flagyl resent to treat trichmonas!    Dr haynes  ===View-only below this line===      ----- Message -----       From:Ray Knox       Sent:9/25/2023  9:35 AM CDT         To:Rachel Haynes MD    Subject:Shaved    Hey I shaved & cut myself now theres bumps where I shaved & its itching so bad can you give me a call please

## 2023-09-25 NOTE — PROGRESS NOTES
"[facility]  Speech Therapy Treatment Note- Neurological Rehabilitation  Date: 9/25/2023     Name: Ray Knox   MRN: [id number]    Therapy Diagnosis: Cognitive-Communicative Deficits  Physician: Elroy Islas MD  Physician Orders: Ambulatory Referral to Speech Therapy   Medical Diagnosis: S06.0X0S (ICD-10-CM) - Concussion without loss of consciousness, sequela R41.89 (ICD-10-CM) - Cognitive change      Visit # / Visits Authorized:  1 / 20 (plus evaluation)   Date of Evaluation:  9/20/2023   Insurance Authorization Period: 9/22/23-12/31/23  Plan of Care Certification:    9/20/2023 to 11/1/2023   Plan of Care Expiration Date:    11/1/23  Extended Plan of Care:  n/a   Progress Note: 10/25/23     Time In:  2:34  Time Out:  3:15  Total Billable Time: 41 minutes      Precautions: Standard  Subjective:   Patient reports: that she had to leave work early today because of a headache. Her headaches continue to happen daily. She reports that they are at a pain level of 10+ when they happen. Patient reports that her cognitive deficits are also affecting her driving ("Even with GPS and seeing the street sign, I still get lost"). Possibly secondary to attention.   She was compliant to home exercise program.   Response to previous treatment: good  Pain Scale: 0/10 on a Visual Analog Scale currently.  Pain Location: n/a  Objective:         UNTIMED  Procedure   Speech- Language- Voice Therapy         Short Term Goals: (3 weeks) Current Progress:   Patient will recall memory strategies with 100% accuracy independently within 2-3 sessions in order to use in daily environments.     Progressing/ Not Met 9/25/2023   Reviewed WRAP- write, repeat, associate, picture  Reviewed strategies of slowing down and paying attention to where we place things.      2. Patient will complete alternating and sustained attention tasks with >90% task accuracy independently to improve job performance with data collection.  " "    Progressing/ Not Met 2023    Patient was given picture and described it out loud with focus on attention to detail. Patient then looked at picture again and studied it silently. She answered questions about the picture with 100% accuracy (10/10) independently.     Repeated activity with her calendar, which she answered questions about with 100% accuracy (5/5) independently.    3. Patient will participate in delayed recall tasks incorporating functional information with 100% accuracy independently to improve job performance.      Progressing/ Not Met 2023   Recalled date and time of next appointment after ~2 minute delay and at end of session (~20 minute delay) independently.     Patient reported "I feel good about it" (being able to recall with strategies of picture it and repetition).   4. Patient will participate in goal, plan, do, review to independently execute 3 hypothetical scenarios with >90% accuracy to improve executive function skills.      Progressing/ Not Met 2023   Not addressed this session.       Patient Education/Response:   Patient educated regarding the followin. Memory strategies   2. Cognitive strategies   3. Sleep strategies  4. Eating throughout the day     Home program established: yes-eat small meals throughout the day, eliminate distractions while driving, create bedtime routine.   Patient verbalized understanding to all above education provided.     See Electronic Medical Record under Patient Instructions for exercises provided throughout therapy.  Assessment:   Session today focused on attention, memory, and education regarding post concussion strategies (see patient instructions section - print out provided). Patient was able to independently answer questions about a picture and her schedule. She recalled the date of her next appointment independently after a 2 minute and 20 minute delay. Education was provided about memory strategies, cognitive strategies, ways to " "improve sleep, strategies for eliminating distractions, and increasing the amount of times she eats. Patient and speech-language pathologist reviewed strategies together to figure out which ones most apply to her.     Ray is progressing well towards her goals. Current goals remain appropriate. Goals to be updated as necessary.     Patient prognosis is Good. Patient will continue to benefit from skilled outpatient speech and language therapy to address the deficits listed in the problem list on initial evaluation, provide patient/family education and to maximize patient's level of independence in the home and community environment.   Medical necessity is demonstrated by the following IMPAIRMENTS:  Cognition: Deficits in executive functioning, attention, and memory prevent the pt from relaying medically and safety relevant information in a timely manner in a state of emergency.   Barriers to Therapy: none  Patient's spiritual, cultural and educational needs considered and patient agreeable to plan of care and goals.  Plan:   Continue Plan of Care with focus on rehabilitation and compensation for cognition.     Venessa Barragan, [facility]  Student Speech-Language Pathologist    9/25/2023     "I, Cassandra Reza, certify that I was present in the room directing the student and service delivery and guiding them using my skilled judgement. As the co-signing therapist, I have reviewed the student's documentation, and am responsible for the treatment, assessment and plan."     Cassandra Reza M.S.CCC-SLP  Speech Language Pathologist        "

## 2023-09-25 NOTE — PATIENT INSTRUCTIONS
STRATEGIES FOR COGNITIVE FUNCTIONS  Attention:   Reduce distractions in the area as much as possible.  Look at the person you are talking to   Write down important pieces of information which you may need to reference later  Ask them to repeat if you find yourself not paying attention  Have visual cues to remind you if you need to do something later.  Attention in Conversation  Listen Actively  Eliminate distractions  Ask Questions   Paraphrase  Processing Speed:   Using multiple ways of learning new information- e.g., listening, writing notes, asking questions, recording  Allowing sufficient time to complete tasks will reduce frustration and help to ensure completion.  Executive Functioning:  Dont attempt to multi-task.  Separate tasks so that each of the tasks has separate, designated times  Consider using a calendar/day planner, as that may be effective to help you plan and stay on track.  Color-coding specific tasks by importance may add additional benefit to your planner.  Break down large projects into smaller tasks and write down the steps to completing the task.    Taking notes while reading can help with recall.  Make the environment the best you can (e.g., turning off the TV, reducing background noise, lighting)  Memory  Rehearse - Immediately after seeing/hearing something, try to recall it.  Wait a few minutes, then check again.  Gradually lengthen the intervals between rehearsals.  Repetition -- reciting the information you heard silently or aloud  Write down important information to improve your attention and focus and to have something to look back on when you need to recall it.  Asking for Clarification - asking the person to repeat or rephrase what they said  Visualization - picturing information in your head to help the brain better organize the information  The weirder, funnier, and more elaborate, the better  Use a cue - Symbolic reminders (the proverbial string around the finger) are helpful.   "So too are memos, timers, calendar notes, etc.--keep them in visible, appropriate places.  Get organized - Have fixed locations for all important papers, key phone numbers, medications, keys, wallet, glasses, tools, etc.  Develop routines - Routines can anchor memories so they do not drift away.    Use a Calendar system - Benefits include:  All information is in one place  You rely less on your memory alone  Will help with establishing routines  Will have a record of what you need to to, but also what you have completed  Word Finding:  Try to think of the first letter  Describe it  Think of related words  State the category or topic  Use a similar word    TIPS FOR CONSERVING ENERGY  Sleep Enough  Take Breaks  Exercise  Pace yourself    Be open to help  Avoid interruptions  Cut distractions  Keep it simple     References:SHELIA Naidu, RAJENDRA Kendrick, JESUS Jett, IAN Ramon, &amp; HUGH Marrero (2009, July). Cognitive Symptom Management and Rehabilitation Therapy (CogSMART) for Traumatic Brain Injury.     Twelve Simple Tips to Improve Your Sleep  [email or web address]    Falling asleep may seem like an impossible dream when youre awake at 3 a.m., but good sleep is more under your control than you might think. Following healthy sleep habits can make the difference between restlessness and restful slumber. Researchers have identified a variety of practices and habits--known as sleep hygiene"--that can help anyone maximize the hours they spend sleeping, even those whose sleep is affected by insomnia, jet lag, or shift work.    Sleep hygiene may sound unimaginative, but it just may be the best way to get the sleep you need in this 24/7 age. Here are some simple tips for making the sleep of your dreams a nightly reality:    #1 Avoid Caffeine, Alcohol, Nicotine, and Other Chemicals that Interfere with Sleep  Caffeinated products decrease a persons quality of sleep. As any coffee lover " "knows, caffeine is a stimulant that can keep you awake. So avoid caffeine (found in coffee, tea, chocolate, cola, and some pain relievers) for four to six hours before bedtime. Similarly, smokers should refrain from using tobacco products too close to bedtime.    Although alcohol may help bring on sleep, after a few hours it acts as a stimulant, increasing the number of awakenings and generally decreasing the quality of sleep later in the night. It is therefore best to limit alcohol consumption to one to two drinks per day, or less, and to avoid drinking within three hours of bedtime.    #2 Turn Your Bedroom into a Sleep-Inducing Environment  A quiet, dark, and cool environment can help promote sound slumber. Why do you think bats congregate in caves for their daytime sleep? To achieve such an environment, lower the volume of outside noise with earplugs or a "white noise" appliance. Use heavy curtains, blackout shades, or an eye mask to block light, a powerful cue that tells the brain that it's time to wake up. Keep the temperature comfortably cool--between 60 and 75°F--and the room well ventilated. And make sure your bedroom is equipped with a comfortable mattress and pillows. (Remember that most mattresses wear out after ten years.)    Also, if a pet regularly wakes you during the night, you may want to consider keeping it out of your bedroom.     It may help to limit your bedroom activities to sleep and sex only. Keeping computers, TVs, and work materials out of the room will strengthen the mental association between your bedroom and sleep.    #3 Establish a Soothing Pre-Sleep Routine  Light reading before bed is a good way to prepare yourself for sleep.  Ease the transition from wake time to sleep time with a period of relaxing activities an hour or so before bed. Take a bath (the rise, then fall in body temperature promotes drowsiness), read a book, watch television, or practice relaxation exercises. Avoid " "stressful, stimulating activities--doing work, discussing emotional issues. Physically and psychologically stressful activities can cause the body to secrete the stress hormone cortisol, which is associated with increasing alertness. If you tend to take your problems to bed, try writing them down--and then putting them aside.     #4 Go to Sleep When Youre Truly Tired  Struggling to fall sleep just leads to frustration. If youre not asleep after 20 minutes, get out of bed, go to another room, and do something relaxing, like reading or listening to music until you are tired enough to sleep.    #5 Dont Be a Nighttime Clock-Watcher  Staring at a clock in your bedroom, either when you are trying to fall asleep or when you wake in the middle of the night, can actually increase stress, making it harder to fall asleep. Turn your clocks face away from you.     And if you wake up in the middle of the night and cant get back to sleep in about 20 minutes, get up and engage in a quiet, restful activity such as reading or listening to music. And keep the lights dim; bright light can stimulate your internal clock. When your eyelids are drooping and you are ready to sleep, return to bed.    #6 Use Light to Your Advantage  Natural light keeps your internal clock on a healthy sleep-wake cycle. So let in the light first thing in the morning and get out of the office for a sun break during the day.     #7 Keep Your Internal Clock Set with a Consistent Sleep Schedule  Having a regular sleep schedule helps to ensure better quality and consistent sleep.  Going to bed and waking up at the same time each day sets the bodys "internal clock" to expect sleep at a certain time night after night. Try to stick as closely as possible to your routine on weekends to avoid a Monday morning sleep hangover. Waking up at the same time each day is the very best way to set your clock, and even if you did not sleep well the night before, the extra " sleep drive will help you consolidate sleep the following night. Learn more about the importance of synchronizing the clock in The Drive to Sleep and Our Internal Clock.     #8 Nap Early--Or Not at All  Many people make naps a regular part of their day. However, for those who find falling asleep or staying asleep through the night problematic, afternoon napping may be one of the culprits. This is because late-day naps decrease sleep drive. If you must nap, its better to keep it short and before 5 p.m.    #9 Lighten Up on Evening Meals  Eating a pepperoni pizza at 10 p.m. may be a recipe for insomnia. Finish dinner several hours before bedtime and avoid foods that cause indigestion. If you get hungry at night, snack on foods that (in your experience) won't disturb your sleep, perhaps dairy foods and carbohydrates.    #10 Balance Fluid Intake  Drink enough fluid at night to keep from waking up thirsty--but not so much and so close to bedtime that you will be awakened by the need for a trip to the bathroom.    #11 Exercise Early  Exercise helps promote restful sleep if it is done several hours before you go to bed.  Exercise can help you fall asleep faster and sleep more soundly--as long as it's done at the right time. Exercise stimulates the body to secrete the stress hormone cortisol, which helps activate the alerting mechanism in the brain. This is fine, unless you're trying to fall asleep. Try to finish exercising at least three hours before bed or work out earlier in the day.    #12 Follow Through  Some of these tips will be easier to include in your daily and nightly routine than others. However, if you stick with them, your chances of achieving restful sleep will improve. That said, not all sleep problems are so easily treated and could signify the presence of a sleep disorder such as apnea, restless legs syndrome, narcolepsy, or another clinical sleep problem. If your sleep difficulties dont improve through  good sleep hygiene, you may want to consult your physician or a sleep specialist.

## 2023-10-02 ENCOUNTER — HOSPITAL ENCOUNTER (EMERGENCY)
Facility: HOSPITAL | Age: 28
Discharge: HOME | End: 2023-10-02
Attending: EMERGENCY MEDICINE
Payer: MEDICAID

## 2023-10-02 VITALS
BODY MASS INDEX: 36.37 KG/M2 | HEIGHT: 64 IN | TEMPERATURE: 100 F | WEIGHT: 213 LBS | OXYGEN SATURATION: 98 % | DIASTOLIC BLOOD PRESSURE: 80 MMHG | SYSTOLIC BLOOD PRESSURE: 125 MMHG | RESPIRATION RATE: 20 BRPM | HEART RATE: 93 BPM

## 2023-10-02 DIAGNOSIS — J06.9 VIRAL URI WITH COUGH: Primary | ICD-10-CM

## 2023-10-02 PROCEDURE — 87502 INFLUENZA DNA AMP PROBE: CPT | Mod: ER | Performed by: PHYSICIAN ASSISTANT

## 2023-10-02 PROCEDURE — U0002 COVID-19 LAB TEST NON-CDC: HCPCS | Mod: ER | Performed by: PHYSICIAN ASSISTANT

## 2023-10-02 PROCEDURE — 87651 STREP A DNA AMP PROBE: CPT | Mod: ER | Performed by: PHYSICIAN ASSISTANT

## 2023-10-02 PROCEDURE — 99282 EMERGENCY DEPT VISIT SF MDM: CPT | Mod: ER

## 2023-10-02 NOTE — ED PROVIDER NOTES
Encounter Date: 10/2/2023       History     Chief Complaint   Patient presents with    COVID-19 Concerns     Fever, sore throat, cough and body aches that began last night. No OTC meds today.      This is a 28-year-old  female that presents the ED with complaint of acute onset of subjective fever, runny nose, congestion, sore throat, and cough that began last night.  She denies any known sick contacts.  She states she is taken NyQuil for her symptoms which only briefly helped.  She denies any chest pain, shortness of breath, abdominal pain, nausea, vomiting, diarrhea, or urinary symptoms.      Review of patient's allergies indicates:   Allergen Reactions    Lisinopril Other (See Comments), Shortness Of Breath and Swelling     angioedema      Shellfish containing products Swelling     Past Medical History:   Diagnosis Date    Anemia     Asthma     Bilateral renal cysts     Breast disorder     Congenital absence of one kidney     General anesthetics causing adverse effect in therapeutic use     Kidney cysts     pt born with 1 kidney     Past Surgical History:   Procedure Laterality Date    FINGER MASS EXCISION Left 3/8/2022    Procedure: EXCISION, MASS, FINGER;  Surgeon: Bk Doty Jr., MD;  Location: [facility] OR;  Service: Orthopedics;  Laterality: Left;    ULNAR NERVE TRANSPOSITION Left 3/29/2023    Procedure: TRANSPOSITION, NERVE, ULNAR;  Surgeon: Bk Doty Jr., MD;  Location: [facility] OR;  Service: Orthopedics;  Laterality: Left;     Family History   Problem Relation Age of Onset    Diabetes Paternal Aunt     Hypertension Maternal Grandfather     Cancer Paternal Grandmother     Breast cancer Paternal Grandmother     Leukemia Paternal Grandmother     Kidney disease Maternal Grandmother     Hypertension Mother      Social History     Tobacco Use    Smoking status: Every Day     Types: Vaping with nicotine    Smokeless tobacco: Never   Substance Use Topics    Alcohol use: Yes     Comment:  occasionally    Drug use: Not Currently     Review of Systems   Constitutional:  Positive for fever.   HENT:  Positive for congestion, rhinorrhea and sore throat.    Respiratory:  Negative for cough and shortness of breath.    Cardiovascular:  Negative for chest pain and palpitations.   Gastrointestinal:  Negative for abdominal pain, nausea and vomiting.   Psychiatric/Behavioral:  Negative for agitation and behavioral problems.        Physical Exam     Initial Vitals [10/02/23 0901]   BP Pulse Resp Temp SpO2   125/80 93 20 100 °F (37.8 °C) 98 %      MAP       --         Physical Exam    Constitutional: She appears well-developed and well-nourished.   HENT:   Head: Normocephalic and atraumatic.   Right Ear: Hearing, tympanic membrane, external ear and ear canal normal.   Left Ear: Hearing, tympanic membrane, external ear and ear canal normal.   Nose: Rhinorrhea present.   Mouth/Throat: Mucous membranes are normal. Posterior oropharyngeal erythema present. No oropharyngeal exudate, posterior oropharyngeal edema or tonsillar abscesses.   Cardiovascular:  Normal rate, regular rhythm and normal heart sounds.           Pulmonary/Chest: Breath sounds normal. She has no wheezes.     Neurological: She is alert and oriented to person, place, and time.   Psychiatric: She has a normal mood and affect. Thought content normal.         ED Course   Procedures  Labs Reviewed   INFLUENZA A & B BY MOLECULAR   GROUP A STREP, MOLECULAR   SARS-COV-2 RNA AMPLIFICATION, QUAL    Narrative:     Is the patient symptomatic?->Yes          Imaging Results    None          Medications - No data to display  Medical Decision Making  This is a 28-year-old  female that presents the ED with 1 day of rhinorrhea, congestion, sore throat, and cough.  On arrival the patient has a temperature of 100° F but her vital signs were stable otherwise.  Differential diagnoses include but are not limited to:  COVID-19, influenza, strep pharyngitis,  viral URI with cough.  Please see physical exam for further details.  COVID-19, influenza, and rapid strep screens are all negative.  I believe this patient likely has a viral URI with cough.  I do not suspect any emergent or life-threatening HEENT or cardiopulmonary disease process.  The patient will be treated symptomatically and has been instructed to take Tylenol/Motrin for her fever and sore throat.  She is to have PCP follow up in the next 2-3 days or return to the ED for worsening.  She verbalized understanding and was agreeable to the treatment plan.                               Clinical Impression:   Final diagnoses:  [J06.9] Viral URI with cough (Primary)        ED Disposition Condition    Discharge Stable          ED Prescriptions    None       Follow-up Information       Follow up With Specialties Details Why Contact Info    Les Horton MD Family Medicine In 2 days  [address] [facility] [address]  [phone]      [facility] - Emergency Dept Emergency Medicine  If symptoms worsen [address] [facility] [facility] [address] [phone]             Sudhakar Preston PA-C  10/02/23 1001

## 2023-10-03 ENCOUNTER — HOSPITAL ENCOUNTER (OUTPATIENT)
Dept: RADIOLOGY | Facility: HOSPITAL | Age: 28
Discharge: HOME | End: 2023-10-03
Attending: PSYCHIATRY & NEUROLOGY
Payer: MEDICAID

## 2023-10-03 DIAGNOSIS — S09.90XA HEAD TRAUMA, INITIAL ENCOUNTER: ICD-10-CM

## 2023-10-04 ENCOUNTER — HOSPITAL ENCOUNTER (OUTPATIENT)
Dept: RADIOLOGY | Facility: HOSPITAL | Age: 28
Discharge: HOME | End: 2023-10-04
Attending: PSYCHIATRY & NEUROLOGY
Payer: MEDICAID

## 2023-10-04 PROCEDURE — 70551 MRI BRAIN WITHOUT CONTRAST: ICD-10-PCS | Mod: 26,,, | Performed by: RADIOLOGY

## 2023-10-04 PROCEDURE — 70551 MRI BRAIN STEM W/O DYE: CPT | Mod: 26,,, | Performed by: RADIOLOGY

## 2023-10-04 PROCEDURE — 70551 MRI BRAIN STEM W/O DYE: CPT | Mod: TC

## 2023-10-10 ENCOUNTER — CLINICAL SUPPORT (OUTPATIENT)
Dept: REHABILITATION | Facility: HOSPITAL | Age: 28
End: 2023-10-10
Payer: MEDICAID

## 2023-10-10 DIAGNOSIS — R41.841 COGNITIVE COMMUNICATION DEFICIT: Primary | ICD-10-CM

## 2023-10-10 PROCEDURE — 92507 TX SP LANG VOICE COMM INDIV: CPT | Mod: PN

## 2023-10-10 NOTE — PROGRESS NOTES
[facility]  Speech Therapy Treatment Note- Neurological Rehabilitation  Date: 10/10/2023     Name: Ray Knox   MRN: [id number]    Therapy Diagnosis: Cognitive-Communicative Deficits  Physician: Elroy Islas MD  Physician Orders: Ambulatory Referral to Speech Therapy   Medical Diagnosis: S06.0X0S (ICD-10-CM) - Concussion without loss of consciousness, sequela R41.89 (ICD-10-CM) - Cognitive change      Visit # / Visits Authorized:  2 / 20 (plus evaluation)   Date of Evaluation:  9/20/2023   Insurance Authorization Period: 9/22/23-12/31/23  Plan of Care Certification:    9/20/2023 to 11/1/2023   Plan of Care Expiration Date:    11/1/23  Extended Plan of Care:  n/a   Progress Note: 10/25/23     Time In:  2:37  Time Out:  3:15   Total Billable Time: 38 minutes      Precautions: Standard  Subjective:   Patient reports: Her headaches were worse than usual last week, as she was dealing with an illness. Last weekend was patient's last weekend working at the [employer]. 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  Reports she is still having trouble sleeping, getting around 4 hours of sleep a night.     Reports being sick last week and having 2 deaths in the family in the last week which is why she missed therapy appointments.     She was compliant to home exercise program. By decreasing cognitive load and stress level to improve cognition.  Response to previous treatment: good  Pain Scale: 6/10 on a Visual Analog Scale currently.  Pain Location: throat   Objective:         UNTIMED  Procedure   Speech- Language- Voice Therapy         Short Term Goals: (3 weeks) Current Progress:   Patient will recall memory strategies with 100% accuracy independently within 2-3 sessions in order to use in daily environments.     Progressing/ Not Met 10/10/2023   Reviewed WRAP- write, repeat, associate, picture at beginning of session.     Focus on repeat and picture since patient is unable to take  time to write things down that she needs to remember at work.     At end of session, patient independently recalled acronym.    2. Patient will complete alternating and sustained attention tasks with >90% task accuracy independently to improve job performance with data collection.      Progressing/ Not Met 10/10/2023      Patient was given her calendar for October. Studied and discussed calendar with focus on attention to detail.   Patient independently answered questions about her calendar with 90%(9/10) accuracy.   Utilized repeating and picturing strategy during this task, patient reports that it was helpful.     Patient given paper with appointments & due dates to be organized and added to calendar. Patient independently added items to calendar with 100% accuracy. She answered questions about these items with 80% (4/5) accuracy independently.      3. Patient will participate in delayed recall tasks incorporating functional information with 100% accuracy independently to improve job performance.      Progressing/ Not Met 10/10/2023   Patient independently recalled date of next ST appointment and date of neurology appointment after ~5 minute delay.     Discussed strategies to help with data collection at work since this was patient's concern during initial evaluation. Patient will try using [id number] feature in her notes kristen to help her at work.    4. Patient will participate in goal, plan, do, review to independently execute 3 hypothetical scenarios with >90% accuracy to improve executive function skills.      Progressing/ Not Met 10/10/2023   Not addressed this session.       Patient Education/Response:   Patient educated regarding the followin. Memory strategies   2. Cognitive strategies   3. Sleep strategies  4. Eating throughout the day     Home program established: yes-eat small meals throughout the day, eliminate distractions while driving, create bedtime routine.   Patient verbalized  "understanding to all above education provided.     See Electronic Medical Record under Patient Instructions for exercises provided throughout therapy.  Assessment:   Session today focused on alternating attention and memory with emphasis on utilizing compensatory strategies. Patient and  reviewed WRAP acronym at beginning of session, patient independently recalled at end of session. Patient did well answering questions about her calendar after focusing on its details. Patient independently put all appointments into a calendar and did well answering questions about the appointments she had written down. Patient independently able to recall two dates after a 5 minute delay. Patient utilized and benefited from repeat and picture strategies the most during today's session. Discussed strategies she can use to help with her data collection at work.  Improvement in all areas today.    Ray is progressing well towards her goals. Current goals remain appropriate. Goals to be updated as necessary.     Patient prognosis is Good. Patient will continue to benefit from skilled outpatient speech and language therapy to address the deficits listed in the problem list on initial evaluation, provide patient/family education and to maximize patient's level of independence in the home and community environment.   Medical necessity is demonstrated by the following IMPAIRMENTS:  Cognition: Deficits in executive functioning, attention, and memory prevent the pt from relaying medically and safety relevant information in a timely manner in a state of emergency.   Barriers to Therapy: none  Patient's spiritual, cultural and educational needs considered and patient agreeable to plan of care and goals.  Plan:   Continue Plan of Care with focus on rehabilitation and compensation for cognition.     Venessa Barragan, [facility]  Student Speech-Language Pathologist    10/10/2023     "ICassandra, certify that I was present in the " "room directing the student and service delivery and guiding them using my skilled judgement. As the co-signing therapist, I have reviewed the student's documentation, and am responsible for the treatment, assessment and plan."     Cassandra Reza M.S.CCC-SLP  Speech Language Pathologist      "

## 2023-10-11 DIAGNOSIS — Z98.890 S/P DECOMPRESSION OF ULNAR NERVE AT ELBOW: Primary | ICD-10-CM

## 2023-10-11 DIAGNOSIS — M25.511 RIGHT SHOULDER PAIN, UNSPECIFIED CHRONICITY: Primary | ICD-10-CM

## 2023-10-11 DIAGNOSIS — M25.512 LEFT SHOULDER PAIN, UNSPECIFIED CHRONICITY: ICD-10-CM

## 2023-10-18 ENCOUNTER — OFFICE VISIT (OUTPATIENT)
Dept: ORTHOPEDICS | Facility: CLINIC | Age: 28
End: 2023-10-18
Payer: MEDICAID

## 2023-10-18 VITALS — HEIGHT: 64 IN | BODY MASS INDEX: 36.19 KG/M2 | WEIGHT: 212 LBS

## 2023-10-18 DIAGNOSIS — M25.511 CHRONIC RIGHT SHOULDER PAIN: Primary | ICD-10-CM

## 2023-10-18 DIAGNOSIS — G56.22 CUBITAL TUNNEL SYNDROME ON LEFT: ICD-10-CM

## 2023-10-18 DIAGNOSIS — G89.29 CHRONIC RIGHT SHOULDER PAIN: Primary | ICD-10-CM

## 2023-10-18 PROCEDURE — 3008F PR BODY MASS INDEX (BMI) DOCUMENTED: ICD-10-PCS | Mod: CPTII,,, | Performed by: ORTHOPAEDIC SURGERY

## 2023-10-18 PROCEDURE — 1159F MED LIST DOCD IN RCRD: CPT | Mod: CPTII,,, | Performed by: ORTHOPAEDIC SURGERY

## 2023-10-18 PROCEDURE — 3008F BODY MASS INDEX DOCD: CPT | Mod: CPTII,,, | Performed by: ORTHOPAEDIC SURGERY

## 2023-10-18 PROCEDURE — 99999 PR PBB SHADOW E&M-EST. PATIENT-LVL III: ICD-10-PCS | Mod: PBBFAC,,, | Performed by: ORTHOPAEDIC SURGERY

## 2023-10-18 PROCEDURE — 99214 PR OFFICE/OUTPT VISIT, EST, LEVL IV, 30-39 MIN: ICD-10-PCS | Mod: S$PBB,,, | Performed by: ORTHOPAEDIC SURGERY

## 2023-10-18 PROCEDURE — 99214 OFFICE O/P EST MOD 30 MIN: CPT | Mod: S$PBB,,, | Performed by: ORTHOPAEDIC SURGERY

## 2023-10-18 PROCEDURE — 99999 PR PBB SHADOW E&M-EST. PATIENT-LVL III: CPT | Mod: PBBFAC,,, | Performed by: ORTHOPAEDIC SURGERY

## 2023-10-18 PROCEDURE — 99213 OFFICE O/P EST LOW 20 MIN: CPT | Mod: PBBFAC,PN | Performed by: ORTHOPAEDIC SURGERY

## 2023-10-18 PROCEDURE — 1159F PR MEDICATION LIST DOCUMENTED IN MEDICAL RECORD: ICD-10-PCS | Mod: CPTII,,, | Performed by: ORTHOPAEDIC SURGERY

## 2023-10-18 NOTE — PROGRESS NOTES
Subjective:      Patient ID: Ray Knox is a 28 y.o. female.  Chief Complaint: Follow-up of the Left Elbow      HPI  Ray Knox is a  28 y.o. female presenting today for follow up of left ulnar nerve transposition.  She reports that she is now 7 months postop   She completed physical therapy for her left arm   She feels like she improved a little bit but still having some numbness tingling in the left hand   I explained to the patient that these symptoms should have improved after surgery and if not we need to get a nerve conduction study to assess   On unrelated matter she is having ongoing symptoms in the right shoulder   She has been treated before for right shoulder impingement and is currently seeing physical therapy for this   She had an x-ray which was normal but has not had an MRI  She is reporting pain in the shoulder particularly with overhead lifting   .    Review of patient's allergies indicates:   Allergen Reactions    Lisinopril Other (See Comments), Shortness Of Breath and Swelling     angioedema      Shellfish containing products Swelling         Current Outpatient Medications   Medication Sig Dispense Refill    albuterol (PROVENTIL/VENTOLIN HFA) 90 mcg/actuation inhaler Inhale 1-2 puffs into the lungs every 6 (six) hours as needed for Wheezing. Rescue 6.7 g 0    clomiPHENE (CLOMID) 50 mg tablet Take 2 tablet in the am from Day 3 to Day 7 of your cycle. Have sex every other day for one week starting five days after last pill. 10 tablet 2    ibuprofen (ADVIL,MOTRIN) 800 MG tablet Take 1 tablet (800 mg total) by mouth after meals as needed for Pain. 90 tablet 1    methylPREDNISolone (MEDROL DOSEPACK) 4 mg tablet use as directed 1 each 0    ondansetron (ZOFRAN) 4 MG tablet Take 1 tablet (4 mg total) by mouth every 6 (six) hours. 12 tablet 0    propranoloL (INDERAL) 10 MG tablet Take 1 tablet (10 mg total) by mouth 2 (two) times daily. 60 tablet 11    valACYclovir (VALTREX) 500 MG tablet Take 1  "tablet by mouth twice a day for 3 days with outbreaks 30 tablet 4    diclofenac sodium (VOLTAREN) 1 % Gel APPLY 2 GRAMS TOPICALLY TO THE AFFECTED AREA THREE TIMES DAILY       No current facility-administered medications for this visit.       Past Medical History:   Diagnosis Date    Anemia     Asthma     Bilateral renal cysts     Breast disorder     Congenital absence of one kidney     General anesthetics causing adverse effect in therapeutic use     Kidney cysts     pt born with 1 kidney       Past Surgical History:   Procedure Laterality Date    FINGER MASS EXCISION Left 3/8/2022    Procedure: EXCISION, MASS, FINGER;  Surgeon: Bk Doty Jr., MD;  Location: [facility] OR;  Service: Orthopedics;  Laterality: Left;    ULNAR NERVE TRANSPOSITION Left 3/29/2023    Procedure: TRANSPOSITION, NERVE, ULNAR;  Surgeon: Bk Doty Jr., MD;  Location: [facility] OR;  Service: Orthopedics;  Laterality: Left;       OBJECTIVE:   PHYSICAL EXAM:  Height: 5' 4" (162.6 cm) Weight: 96.2 kg (212 lb)  Vitals:    10/18/23 1429   Weight: 96.2 kg (212 lb)   Height: 5' 4" (1.626 m)   PainSc: 0-No pain   PainLoc: Shoulder     Ortho/SPM Exam  Examination left arm the incision well healed left elbow   Tinel sign negative   Range of motion full    strength slightly decreased left hand   No atrophy   Examination right shoulder no tenderness no swelling  Full range of motion   Positive impingement sign no instability       RADIOGRAPHS:  None  Comments: I have personally reviewed the imaging and I agree with the above radiologist's report.    ASSESSMENT/PLAN:     IMPRESSION:  1.  Residual symptoms left hand after ulnar nerve transposition.      2. Right shoulder pain chronic.      3. Possible rotator cuff tear right shoulder      PLAN:  For the right shoulder I have ordered an MRI scan to assess the rotator cuff because of ongoing symptoms   For the left arm I have ordered nerve conduction study to check the ulnar nerve and actually compare to " the previous preop study       FOLLOW UP:  After the nerve test and MRI are complete    Disclaimer: This note has been generated using voice-recognition software. There may be typographical errors that have been missed during proof-reading.

## 2023-10-19 ENCOUNTER — CLINICAL SUPPORT (OUTPATIENT)
Dept: REHABILITATION | Facility: HOSPITAL | Age: 28
End: 2023-10-19
Payer: MEDICAID

## 2023-10-19 ENCOUNTER — TELEPHONE (OUTPATIENT)
Dept: NEUROLOGY | Facility: CLINIC | Age: 28
End: 2023-10-19
Payer: MEDICAID

## 2023-10-19 DIAGNOSIS — R41.841 COGNITIVE COMMUNICATION DEFICIT: Primary | ICD-10-CM

## 2023-10-19 PROCEDURE — 92507 TX SP LANG VOICE COMM INDIV: CPT | Mod: PN

## 2023-10-19 NOTE — TELEPHONE ENCOUNTER
Called Pt to offer an appt for later today but Pt has to go back to school. Pt will be rescheduled for November 1 at 3:20 pm at [place].       ----- Message from Sisi Crenshaw sent at 10/19/2023  9:52 AM CDT -----  Regarding: pt went to wrong location  Contact: pt [phone]  Pt is calling to speak with someone in provider office in regards to appt pt has with provider for 9;40am this morning pt stated she went to the wrong location pt wanted to know if the provider could still see her today pt is asking for a return call please call pt at  [phone]

## 2023-10-19 NOTE — PROGRESS NOTES
"[facility]  Speech Therapy Treatment Note- Neurological Rehabilitation  Date: 10/19/2023     Name: Ray Knox   MRN: [id number]    Therapy Diagnosis: Cognitive-Communicative Deficits  Physician: Elroy Islas MD  Physician Orders: Ambulatory Referral to Speech Therapy   Medical Diagnosis: S06.0X0S (ICD-10-CM) - Concussion without loss of consciousness, sequela R41.89 (ICD-10-CM) - Cognitive change      Visit # / Visits Authorized:   3 / 20 (plus evaluation)   Date of Evaluation:  9/20/2023   Insurance Authorization Period: 9/22/23-12/31/23  Plan of Care Certification:    9/20/2023 to 11/1/2023   Plan of Care Expiration Date:    11/1/23  Extended Plan of Care:  n/a   Progress Note: 10/25/23     Time In:  3:15  Time Out:  4:05  Total Billable Time: 50 minutes      Precautions: Standard  Subjective:   Patient reports: feeling tired. Dealing with the recent death of a school classmate. Patient stated, "I'm here but I'm not here".     She was compliant to home exercise program. By decreasing cognitive load and stress level to improve cognition.  Response to previous treatment: good  Pain Scale: 8/10 on a Visual Analog Scale currently.  Pain Location: head  Objective:         UNTIMED  Procedure   Speech- Language- Voice Therapy         Short Term Goals: (3 weeks) Current Progress:   Patient will recall memory strategies with 100% accuracy independently within 2-3 sessions in order to use in daily environments.     Progressing/ Not Met 10/19/2023   Reviewed WRAP- write, repeat, associate, picture. Focused on repeat for recalling details of a story.    2. Patient will complete alternating and sustained attention tasks with >90% task accuracy independently to improve job performance with data collection.      Progressing/ Not Met 10/19/2023    Not addressed this session.     3. Patient will participate in delayed recall tasks incorporating functional information with 100% accuracy independently to " improve job performance.      Progressing/ Not Met 10/19/2023    read 2 stories pertaining to client data relevant to what patient has to take notes on at work.     First story (4 sentences) was read aloud 2 x after which client was unable to recall any details. Read aloud 2 more times, and patient took notes on key details after which patient answered questions with 100% accuracy (5/5 trials)     Second story (3 sentences) was read aloud 2 times with focus on patient repeating key details to herself. Patient answered questions about this story with 100% accuracy (4/4 details).    4. Patient will participate in goal, plan, do, review to independently execute 3 hypothetical scenarios with >90% accuracy to improve executive function skills.      Progressing/ Not Met 10/19/2023   Patient reported desire to work on life areas of finances and health.     Patient chose to focus on financial area.     Patient's financial goals:  -pay car note on time    -Move out  -Discussed finances being a secondary goal as patient wants to get health in order before she moves out.   -Discussed roadblocks and how to overcome them.    Discussed health goals:   -Patient wants to figure out what is causing her headaches.   -Plan: See neurology, opthalmology, sleep apnea doctor   -Do: Call sleep medicine clinic to get scheduled   -Discussed roadblocks and how to overcome them.        Patient Education/Response:   Patient educated regarding the followin. Memory strategies   2. Cognitive strategies   3. Sleep strategies  4. Eating throughout the day     Home program established: yes-eat small meals throughout the day, eliminate distractions while driving, create bedtime routine.   Patient verbalized understanding to all above education provided.     See Electronic Medical Record under Patient Instructions for exercises provided throughout therapy.  Assessment:   Session today focused on functional recall and  "Goal-Plan-Do-Review (GPDR). Patient with difficulty recalling details of a story about a fictional client. Benefited from repetitions of the story and writing down key details. Patient and speech-language pathologist discussed patient's goals pertaining to certain life areas. Patient expressed desire to create goals in the area of finances and health.  Focused on health goals due to patient wanting to get health in order before focusing on finances. Speech-language pathologist gave patient number to call to schedule appointment with sleep medicine.     Ray is progressing well towards her goals. Current goals remain appropriate. Goals to be updated as necessary.     Patient prognosis is Good. Patient will continue to benefit from skilled outpatient speech and language therapy to address the deficits listed in the problem list on initial evaluation, provide patient/family education and to maximize patient's level of independence in the home and community environment.   Medical necessity is demonstrated by the following IMPAIRMENTS:  Cognition: Deficits in executive functioning, attention, and memory prevent the pt from relaying medically and safety relevant information in a timely manner in a state of emergency.   Barriers to Therapy: none  Patient's spiritual, cultural and educational needs considered and patient agreeable to plan of care and goals.  Plan:   Continue Plan of Care with focus on rehabilitation and compensation for cognition. Put appointments in patient's phone at next appointment.     Venessa Barragan, [facility]  Student Speech-Language Pathologist    10/19/2023     "I, Cassandra Reza, certify that I was present in the room directing the student and service delivery and guiding them using my skilled judgement. As the co-signing therapist, I have reviewed the student's documentation, and am responsible for the treatment, assessment and plan."     Cassandra Reza M.S.CCC-SLP  Speech Language " Pathologist

## 2023-10-26 ENCOUNTER — CLINICAL SUPPORT (OUTPATIENT)
Dept: REHABILITATION | Facility: HOSPITAL | Age: 28
End: 2023-10-26
Payer: MEDICAID

## 2023-10-26 DIAGNOSIS — G44.329 CHRONIC POST-CONCUSSION HEADACHE: ICD-10-CM

## 2023-10-26 DIAGNOSIS — R41.841 COGNITIVE COMMUNICATION DEFICIT: Primary | ICD-10-CM

## 2023-10-26 DIAGNOSIS — R53.1 WEAKNESS: ICD-10-CM

## 2023-10-26 DIAGNOSIS — Z98.890 S/P DECOMPRESSION OF ULNAR NERVE AT ELBOW: ICD-10-CM

## 2023-10-26 DIAGNOSIS — R42 DIZZINESS: ICD-10-CM

## 2023-10-26 DIAGNOSIS — M79.601 PAIN OF RIGHT UPPER EXTREMITY: ICD-10-CM

## 2023-10-26 DIAGNOSIS — M25.60 STIFFNESS IN JOINT: ICD-10-CM

## 2023-10-26 PROCEDURE — 97530 THERAPEUTIC ACTIVITIES: CPT | Mod: 59,PN

## 2023-10-26 PROCEDURE — 97162 PT EVAL MOD COMPLEX 30 MIN: CPT | Mod: PN

## 2023-10-26 PROCEDURE — 92507 TX SP LANG VOICE COMM INDIV: CPT | Mod: PN

## 2023-10-26 PROCEDURE — 97165 OT EVAL LOW COMPLEX 30 MIN: CPT | Mod: PN
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
## 2023-10-27 ENCOUNTER — HOSPITAL ENCOUNTER (OUTPATIENT)
Dept: RADIOLOGY | Facility: HOSPITAL | Age: 28
Discharge: HOME | End: 2023-10-27
Attending: ORTHOPAEDIC SURGERY
Payer: MEDICAID

## 2023-10-27 DIAGNOSIS — G89.29 CHRONIC RIGHT SHOULDER PAIN: ICD-10-CM

## 2023-10-27 DIAGNOSIS — M25.511 CHRONIC RIGHT SHOULDER PAIN: ICD-10-CM

## 2023-10-27 PROCEDURE — 73221 MRI JOINT UPR EXTREM W/O DYE: CPT | Mod: 26,RT,, | Performed by: RADIOLOGY

## 2023-10-27 PROCEDURE — 73221 MRI JOINT UPR EXTREM W/O DYE: CPT | Mod: TC,PN,RT

## 2023-10-27 PROCEDURE — 73221 MRI SHOULDER WITHOUT CONTRAST RIGHT: ICD-10-PCS | Mod: 26,RT,, | Performed by: RADIOLOGY

## 2023-11-01 PROBLEM — S06.0X0A CONCUSSION WITH NO LOSS OF CONSCIOUSNESS: Status: ACTIVE | Noted: 2023-11-01

## 2023-11-02 ENCOUNTER — DOCUMENTATION ONLY (OUTPATIENT)
Dept: REHABILITATION | Facility: HOSPITAL | Age: 28
End: 2023-11-02

## 2023-11-02 NOTE — PROGRESS NOTES
Documentation Only/ No Show    Patient: Ray Knox  Date of Session: 11/02/2023  MRN: [id number]  Ray Knox did not attend his/her scheduled therapy appointment today. Ray Knox did not call to cancel nor reschedule. This is the 1st PT treatment appointment that the patient has not attended. No charges have been posted today.     PT No-Shows: 1      Charla Reese, PT  11/02/2023

## 2023-11-03 ENCOUNTER — CLINICAL SUPPORT (OUTPATIENT)
Dept: REHABILITATION | Facility: HOSPITAL | Age: 28
End: 2023-11-03
Payer: MEDICAID

## 2023-11-03 DIAGNOSIS — R41.841 COGNITIVE COMMUNICATION DEFICIT: Primary | ICD-10-CM

## 2023-11-03 PROCEDURE — 92507 TX SP LANG VOICE COMM INDIV: CPT | Mod: PN
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
## 2023-11-08 NOTE — PLAN OF CARE
[facility]  Speech Therapy Updated Plan of Care-Neurological Rehabilitation    Date: 11/3/2023   Name: Ray Knox  Clinic Number: [id number]    Therapy Diagnosis:   Encounter Diagnosis   Name Primary?    Cognitive communication deficit Yes     Physician: Elroy Islas MD    Physician Orders: Ambulatory Referral to Speech Therapy   Medical Diagnosis: S06.0X0S (ICD-10-CM) - Concussion without loss of consciousness, sequela R41.89 (ICD-10-CM) - Cognitive change     Visit #/ Visits Authorized:  5 /20   Evaluation Date: 9/20/23  Insurance Authorization Period: 9/22/23-12/31/23  Plan of Care Expiration:    11/1/23  New POC Certification Period:  12/1/23    Total Visits Received: 5 + initial evaluation    Precautions:Standard  Subjective     Update: Patient has participated well in therapy since initiation and is motivated to improve her cognitive functioning. She has missed several visits, however, due to either forgetting or with her work schedule.    Objective     Update: see follow up note dated 11/3/2023    Assessment     Update: Ray Knox presents to [facility] status post medical diagnosis of concussion without loss of consciousness. Demonstrates impairments including limitations as described in the problem list. Positive prognostic factors include motivation. Negative prognostic factors include missed visits and lack of compliance. She presents with cognitive-communicative impairments as characterized by attention and memory deficits which hinder her ability to effectively carry out her activities of daily living.  Barriers to therapy include work schedule . Patient will benefit from skilled, outpatient rehabilitation speech therapy.    Rehab Potential: good   Pt's spiritual, cultural, and educational needs considered and patient agreeable to plan of care and goals.    Education: Plan of Care, role of SLP in care, memory strategies, scheduling/ cancellation policy, and  insurance limitations / visit limit      Previous Short Term Goals Status:   Short Term Goals: (3 weeks)   Patient will recall memory strategies with 100% accuracy independently within 2-3 sessions in order to use in daily environments.     Progressing/ Not Met 11/3/2023   2. Patient will complete alternating and sustained attention tasks with >90% task accuracy independently to improve job performance with data collection.       MET 11/3/23      3. Patient will participate in delayed recall tasks incorporating functional information with 100% accuracy independently to improve job performance.      Progressing/ Not Met 11/3/2023      4. Patient will participate in goal, plan, do, review to independently execute 3 hypothetical scenarios with >90% accuracy to improve executive function skills.      Progressing/ Not Met 11/3/2023      New Short Term Goals: 4 weeks  Short Term Goals: (3 weeks)   Patient will recall memory strategies with 100% accuracy independently within 2-3 sessions in order to use in daily environments.     Progressing/ Not Met 11/3/2023   2. Patient will participate in delayed recall tasks incorporating functional information with 100% accuracy independently to improve job performance.      Progressing/ Not Met 11/3/2023   4. Patient will participate in goal, plan, do, review to independently execute 3 hypothetical scenarios with >90% accuracy to improve executive function skills.      Progressing/ Not Met 11/3/2023   5. Patient will attend to 5+ details related to verbally and visually presented information with 100% accuracy independently in order to enhance her recall for future use.  New Goal 11/3/23     Long Term Goals: (4 weeks)   Using compensatory strategies independently, patient will complete a variety of functional, individualized tasks with >90% accuracy in order to increase task accuracy and recall in a variety of environments.         Goals Previously Met:  Patient will complete  alternating and sustained attention tasks with >90% task accuracy independently to improve job performance with data collection.       Reasons for Recertification of Therapy: continued difficulties with attention and memory     Plan     Updated Certification Period: 11/3/2023 to 12/1/23    Recommended Treatment Plan: Patient will participate in the [facility] rehabilitation program for speech therapy 1 times per week to address her Cognition deficits, to educate patient and their family, and to participate in a home exercise program.        Therapist's Name:  Alcira Sousa MS, CCC-SLP, CBIS  Speech-Language Pathologist  Certified Brain Injury Specialist  11/8/2023       I CERTIFY THE NEED FOR THESE SERVICES FURNISHED UNDER THIS PLAN OF TREATMENT AND WHILE UNDER MY CARE      Physician Name: _______________________________    Physician Signature: ____________________________

## 2023-11-09 ENCOUNTER — OFFICE VISIT (OUTPATIENT)
Dept: ORTHOPEDICS | Facility: CLINIC | Age: 28
End: 2023-11-09
Payer: MEDICAID

## 2023-11-09 DIAGNOSIS — G56.22 CUBITAL TUNNEL SYNDROME ON LEFT: Primary | ICD-10-CM

## 2023-11-09 PROCEDURE — 99213 OFFICE O/P EST LOW 20 MIN: CPT | Mod: 95,,, | Performed by: ORTHOPAEDIC SURGERY

## 2023-11-09 PROCEDURE — 99213 PR OFFICE/OUTPT VISIT, EST, LEVL III, 20-29 MIN: ICD-10-PCS | Mod: 95,,, | Performed by: ORTHOPAEDIC SURGERY

## 2023-11-09 NOTE — PROGRESS NOTES
Established Patient - Audio Only Telehealth Visit     The patient location is:  At home  The chief complaint leading to consultation is:  Left shoulder left arm pain  Visit type: Virtual visit with audio only (telephone)  Total time spent with patient:  10 minutes       The reason for the audio only service rather than synchronous audio and video virtual visit was related to technical difficulties or patient preference/necessity.     Each patient to whom I provide medical services by telemedicine is:  (1) informed of the relationship between the physician and patient and the respective role of any other health care provider with respect to management of the patient; and (2) notified that they may decline to receive medical services by telemedicine and may withdraw from such care at any time. Patient verbally consented to receive this service via voice-only telephone call.       HPI:  28-year-old female with right shoulder impingement and left hand numbness after previous ulnar nerve transposition     Assessment and plan:  Recent MRI scan of the right shoulder shows partial tear of the rotator cuff mild with mild impingement   I went over these findings with her today   I recommend she continue therapy for her right shoulder   The nerve conduction study left arm shows some mild residual cubital tunnel which is consistent with previous surgery but her nerve test is much improved compared to the previous preop test I explained that to her today   Continue current treatment   Follow-up 4-6 weeks                        This service was not originating from a related E/M service provided within the previous 7 days nor will  to an E/M service or procedure within the next 24 hours or my soonest available appointment.  Prevailing standard of care was able to be met in this audio-only visit.

## 2023-11-17 ENCOUNTER — TELEPHONE (OUTPATIENT)
Dept: REHABILITATION | Facility: HOSPITAL | Age: 28
End: 2023-11-17
Payer: MEDICAID

## 2023-11-17 ENCOUNTER — DOCUMENTATION ONLY (OUTPATIENT)
Dept: REHABILITATION | Facility: HOSPITAL | Age: 28
End: 2023-11-17
Payer: MEDICAID

## 2023-11-17 ENCOUNTER — OFFICE VISIT (OUTPATIENT)
Dept: OPHTHALMOLOGY | Facility: CLINIC | Age: 28
End: 2023-11-17
Payer: MEDICAID

## 2023-11-17 ENCOUNTER — CLINICAL SUPPORT (OUTPATIENT)
Dept: OPHTHALMOLOGY | Facility: CLINIC | Age: 28
End: 2023-11-17
Payer: MEDICAID

## 2023-11-17 DIAGNOSIS — G93.2 IIH (IDIOPATHIC INTRACRANIAL HYPERTENSION): Primary | ICD-10-CM

## 2023-11-17 DIAGNOSIS — H53.8 BLURRED VISION, LEFT EYE: ICD-10-CM

## 2023-11-17 PROCEDURE — 92083 HUMPHREY VISUAL FIELD - OU - BOTH EYES: ICD-10-PCS | Mod: 26,S$PBB,, | Performed by: OPHTHALMOLOGY

## 2023-11-17 PROCEDURE — 99999 PR PBB SHADOW E&M-EST. PATIENT-LVL III: ICD-10-PCS | Mod: PBBFAC,,, | Performed by: OPHTHALMOLOGY

## 2023-11-17 PROCEDURE — 92133 CPTRZD OPH DX IMG PST SGM ON: CPT | Mod: PBBFAC | Performed by: OPHTHALMOLOGY

## 2023-11-17 PROCEDURE — 99999 PR PBB SHADOW E&M-EST. PATIENT-LVL III: CPT | Mod: PBBFAC,,, | Performed by: OPHTHALMOLOGY

## 2023-11-17 PROCEDURE — 92133 POSTERIOR SEGMENT OCT OPTIC NERVE(OCULAR COHERENCE TOMOGRAPHY) - OU - BOTH EYES: ICD-10-PCS | Mod: 26,S$PBB,, | Performed by: OPHTHALMOLOGY

## 2023-11-17 PROCEDURE — 1160F PR REVIEW ALL MEDS BY PRESCRIBER/CLIN PHARMACIST DOCUMENTED: ICD-10-PCS | Mod: CPTII,,, | Performed by: OPHTHALMOLOGY

## 2023-11-17 PROCEDURE — 1159F MED LIST DOCD IN RCRD: CPT | Mod: CPTII,,, | Performed by: OPHTHALMOLOGY

## 2023-11-17 PROCEDURE — 1160F RVW MEDS BY RX/DR IN RCRD: CPT | Mod: CPTII,,, | Performed by: OPHTHALMOLOGY

## 2023-11-17 PROCEDURE — 99202 PR OFFICE/OUTPT VISIT, NEW, LEVL II, 15-29 MIN: ICD-10-PCS | Mod: S$PBB,,, | Performed by: OPHTHALMOLOGY

## 2023-11-17 PROCEDURE — 99202 OFFICE O/P NEW SF 15 MIN: CPT | Mod: S$PBB,,, | Performed by: OPHTHALMOLOGY

## 2023-11-17 PROCEDURE — 99213 OFFICE O/P EST LOW 20 MIN: CPT | Mod: PBBFAC | Performed by: OPHTHALMOLOGY

## 2023-11-17 PROCEDURE — 92083 EXTENDED VISUAL FIELD XM: CPT | Mod: PBBFAC | Performed by: OPHTHALMOLOGY

## 2023-11-17 PROCEDURE — 1159F PR MEDICATION LIST DOCUMENTED IN MEDICAL RECORD: ICD-10-PCS | Mod: CPTII,,, | Performed by: OPHTHALMOLOGY

## 2023-11-17 RX ORDER — NORELGESTROMIN AND ETHINYL ESTRADIOL 35; 150 UG/D; UG/D
PATCH TRANSDERMAL
COMMUNITY
Start: 2023-06-28 | End: 2023-12-05 | Stop reason: ALTCHOICE

## 2023-11-17 NOTE — PROGRESS NOTES
HVF/OCT rel/fix/coop good OD /chart checked for latex allergy, pirate patch used/plano OU - BJ

## 2023-11-17 NOTE — LETTER
[facility] - [address] [place] [address]  [address]  [place] [address]  Phone: [phone]  Fax: [phone]   November 17, 2023    Elroy Islas MD  [address] [facility] [address]    Patient: Ray Knox   MR Number: [id number]   YOB: 1995   Date of Visit: 11/17/2023       Dear Dr. Islas:    Thank you for referring Ray Knox to me for evaluation. Here is my assessment and plan of care:    Assessment/Plan    For exam results, see Encounter Report.    IIH (idiopathic intracranial hypertension)  -     Becker Visual Field - OU - Extended - Both Eyes; Future  -     Posterior Segment OCT Optic Nerve- Both eyes; Future  -     Ambulatory referral/consult to Ophthalmology    Blurred vision, left eye  -     Ambulatory referral/consult to Ophthalmology      Ms. Knox has no active papilledema or optic atrophy. She will follow up with Dr. Islas and with me as needed.          Below you will find my full exam findings. If you have questions, please do not hesitate to call me. I look forward to following Ms. Ray Knox along with you.    Sincerely,          Elroy Sage MD       CC  No Recipients             Base Eye Exam       Visual Acuity (Snellen - Linear)         Right Left    Dist sc 20/25 -2 20/30 -2    Dist ph sc NI 20/25              Tonometry (Tonopen, 11:10 AM)         Right Left    Pressure 28 24              Pupils         Dark Light Shape React APD    Right 4 2 Round Brisk None    Left 4 2 Round Brisk None              Visual Fields    See HVF report             Extraocular Movement         Right Left     Full, Ortho Full, Ortho              Neuro/Psych       Oriented x3: Yes    Mood/Affect: Normal              Dilation       Both eyes: 2.5% Phenylephrine, 1% Mydriacyl @ 11:08 AM                  Slit Lamp and Fundus Exam       External Exam         Right Left    External Normal Normal              Slit Lamp Exam         Right Left    Lids/Lashes Normal Normal     Conjunctiva/Sclera White and quiet White and quiet    Cornea Clear Clear    Anterior Chamber Deep and quiet Deep and quiet    Iris Round and reactive Round and reactive    Lens Clear Clear    Vitreous Normal Normal              Fundus Exam         Right Left    Disc No edema No edema    C/D Ratio 0.3 0.3    Macula Normal Normal    Vessels Normal Normal    Periphery Normal Normal

## 2023-11-17 NOTE — PATIENT INSTRUCTIONS
Ms. Knox has no active papilledema or optic atrophy. She will follow up with Dr. Islas and with me as needed.

## 2023-11-17 NOTE — PROGRESS NOTES
HPI    Referred by Elroy Guillory  Hx of IIH(idiopathic intracranial hypertension)  Patient states OU vision decreasing mainly OS when she has a headaches.  No eye pain, but does have a headache.    Review HVF/OCT    I have personally interviewed the patient, reviewed the history and   examined the patient and agree with the technician's exam.  Last edited by Elroy Sage MD on 11/17/2023 10:52 AM.            Assessment /Plan     For exam results, see Encounter Report.    IIH (idiopathic intracranial hypertension)  -     Becker Visual Field - OU - Extended - Both Eyes; Future  -     Posterior Segment OCT Optic Nerve- Both eyes; Future  -     Ambulatory referral/consult to Ophthalmology    Blurred vision, left eye  -     Ambulatory referral/consult to Ophthalmology      Ms. Knox has no active papilledema or optic atrophy. She will follow up with Dr. Islas and with me as needed.

## 2023-11-17 NOTE — PROGRESS NOTES
Patient called at 1304 to cancel her 1300 appointment today. Given frequent no-show and cancellations, notified patient that if she cancels her speech therapy appointment on 11/20 at 1430, she will be taken off the schedule. Patient verbalized understanding.

## 2023-11-20 ENCOUNTER — CLINICAL SUPPORT (OUTPATIENT)
Dept: REHABILITATION | Facility: HOSPITAL | Age: 28
End: 2023-11-20
Payer: MEDICAID

## 2023-11-20 DIAGNOSIS — R53.1 WEAKNESS: ICD-10-CM

## 2023-11-20 DIAGNOSIS — M25.60 STIFFNESS IN JOINT: ICD-10-CM

## 2023-11-20 DIAGNOSIS — R41.841 COGNITIVE COMMUNICATION DEFICIT: Primary | ICD-10-CM

## 2023-11-20 DIAGNOSIS — M79.601 PAIN OF RIGHT UPPER EXTREMITY: Primary | ICD-10-CM

## 2023-11-20 PROCEDURE — 92507 TX SP LANG VOICE COMM INDIV: CPT | Mod: PN

## 2023-11-20 PROCEDURE — 97530 THERAPEUTIC ACTIVITIES: CPT | Mod: 59,PN

## 2023-11-20 NOTE — PROGRESS NOTES
"[facility]  Occupational Therapy Treatment Note     Date: 11/20/2023  Name: Ray Knox  Clinic Number: [id number]    Therapy Diagnosis:   Encounter Diagnoses   Name Primary?    Pain of right upper extremity Yes    Weakness     Stiffness in joint      Physician: Sheila Silva PA-C    Physician Orders: OT evaluate and treat  Medical Diagnosis:   M25.511 (ICD-10-CM) - Right shoulder pain, unspecified chronicity   M25.512 (ICD-10-CM) - Left shoulder pain, unspecified chronicity   Date of Injury/Onset: 1 year  Evaluation Date: 10/26/2023  Insurance Authorization Period Expiration: 12/31/2023  Plan of Care Expiration: 11/19/2023  Date of Return to MD: 11/9/2023  Visit # / Visits authorized: 2/ 20  FOTO: 1/3     Precautions:  Standard     Time In: 2:00  Time Out: 2:30  Total Appointment Time (timed & untimed codes): 30 minutes     Subjective     Pt reports: "I saw the doctor, he said I have a tear and to try exercises."  she was compliant with home exercise program given last session.   Response to previous treatment:pain free  Functional change: improved range of motion     Pain: 0/10  Location: right shoulder      Objective     Objective Measures updated at progress report unless specified.      Sensation Test: Patient denies any numbness/tingling     Observation/Inspection: rounded shoulders, forward head     Range of Motion/Strength:   Shoulder Left   Right     Pain/Dysfunction with Movement     AROM MMT AROM PROM MMT     Flexion WNL 5/5 155(+15) WNL 3-/5     Extension WNL 5/5 45(+10) WNL 4/5     Abduction WNL 5/5 120(=) WNL 3-/5     HorizAdduction WNL 5/5 20(-10) WNL 3/5     Internal rotation WNL 5/5 L4(=) WNL 3/5     ER at 90° abd WNL 5/5 90(=) WNL 3/5     ER at 0° abd WNL 5/5 90(+40) WNL 3/5     NT=Not tested  ROM Comments: Pain at end range     Painful Arc: noted with descending motion     Tenderness upon Palpation:      Positive: Bicipital Groove and Supraspinatus Region   "   Special Tests:  AC Joint Left Right   Empty Can Test - +   Drop Arm test - +   Champagne Toast - +   Resisted External Rotation 45* Internal Roatation - +   Bear Hug - -   Filipe's - +   Hawkin's Kenndy - +   Neer's Test - +   Yergason's - -      Scapular Control/Dyskinesis:     Normal / Subtle / Obvious  Comments    Left  Normal -    Right  obvious -      Intake Outcome Measure for FOTO Shoulder Survey     Therapist reviewed FOTO scores for Ray Knox on 10/26/2023.   FOTO documents entered into [employer] - see Media section.     Intake Score: 41%  2nd visit: 43%         Treatment     Ray received the treatments listed below:     Manual therapy techniques: Soft tissue Mobilization were applied to the: right for 10 minutes, including:  -consisting of patient supine for Right biceps and upper trapezius soft tissue mobilization, pectoralis lift, subscapularis myofascial release and stretch, PROM with endrange stretching, glenohumeral joint inferior anterior posterior glides grade I-II, gentle shoulder oscillations    Therapeutic exercises to develop ROM for 10 minutes, including:  -scifit ube forward/reverse Level 1 3minutes each direction  -supine dowel flexion 2# 2/15  -serratus punches 2# 2/15  -sidelying external rotation 1# 2/25  -sidelying abduction 1# 2/15    Neuromuscular re-education activities to improve Posture for 10 minutes. The following activities were included:  -Red band lat pulls 2/15  -red band Rows 2/15  -red band external rotation 2/15  -red band horizontal abduction 2/15    Patient Education and Home Exercises     Education provided:   - on current condition and home exercise program   - Progress towards goals     Written Home Exercises Provided: Patient instructed to cont prior HEP.  Exercises were reviewed and Dennysyalina was able to demonstrate them prior to the end of the session.  Ray demonstrated good  understanding of the HEP provided.   .   See EMR under Patient Instructions for  exercises provided prior visit.      Assessment   Patient to clinic today for her 1st visit after evaluation. Patient has been noncompliant with her schedule and has missed multiple therapy visits due to schedule conflicts and family issues. Objective measures taken. Her range of motion improved from initial evaluation and is overall within functional limits grossly. MRI results reveal partial rotator cuff tear. Patient states she is pain free in today's session. Tolerated progression of treatment well. Issued updated home exercise program with band in which patient was able to complete all in a pain free range of motion and good technique.     Ray is progressing well towards her goals and there are no updates to goals at this time. Pt prognosis is Good.     Pt will continue to benefit from skilled outpatient occupational therapy to address the deficits listed in the problem list on initial evaluation provide pt/family education and to maximize pt's level of independence in the home and community environment.     Anticipated barriers to occupational therapy: none    Pt's spiritual, cultural and educational needs considered and pt agreeable to plan of care and goals.    Goals:  Short Term Goals to be met in 4 weeks: (11/25/2023)  1) Initiate Hep -met, ongoing  2) Pt will increase Right shoulder AROM by 10 degrees grossly for improved performance with overhead activities of daily living's -met  3) Pt will report 6/10 pain in (Right)shoulder at worst -met  4) Pt will demonstrate increased MMT to 4-/5 grossly Right shoulder -Not met, progressing  5) Patient will be able to achieve less than or equal to 30% on FOTO shoulder survey demonstrating overall improved functional ability with upper extremity. -Not met, progressing     Long Term Goals to be met by discharge:  1) Independent with home exercise program -Not met, progressing  2) Pt will demonstrate (Right) shoulder AROM WNL grossly for [place] with  activities of daily living's -Not met, progressing  3) Pt will demonstrate (Right) shoulder MMT WNL grossly for Bertie with functional activities -Not met, progressing  4) Independent and pain free with ADL's and IADL's -Not met, progressing  5) Patient will be able to achieve less than or equal to 15% on FOTO shoulder survey demonstrating overall improved functional ability with upper extremity. -Not met, progressing     Plan     Continue with OT plan of care   Updates/Grading for next session: due to patient's poor attendance plan to trial home exercise program at this time.     SANIA Amador

## 2023-11-20 NOTE — PROGRESS NOTES
[facility]  Speech Therapy Treatment Note - Neurological Rehabilitation  Date: 11/20/2023     Name: Ray Knox   MRN: [id number]    Therapy Diagnosis: Cognitive-Communicative Deficit  Physician: Elroy Islas MD  Physician Orders: Ambulatory Referral to Speech Therapy   Medical Diagnosis: S06.0X0S (ICD-10-CM) - Concussion without loss of consciousness, sequela R41.89 (ICD-10-CM) - Cognitive change      Visit # / Visits Authorized:   6/20 (plus evaluation)   Date of Evaluation:  9/20/2023   Insurance Authorization Period: 9/22/23-12/31/23  Plan of Care Certification:    9/20/2023 to 11/1/2023   Plan of Care Expiration Date:    11/1/23, + 4 weeks til 12/1/23  Extended Plan of Care:  n/a   Progress Note: 10/25/23, 12/2/23      Time In:  2:37   Time Out:  3:05  Total Billable Time: 27 minutes      Precautions: Standard  Subjective:   Patient reports: Dealing with 4 deaths in the family in the last two weeks. Speech-language pathologist and patient discussed plan moving forward- patient will take break from therapy for the time being to be with her family.   She was not compliant to home exercise program.   Response to previous treatment: good  Pain Scale:12/10 on a Visual Analog Scale currently.  Pain Location: arm from OT session   Headache 9/10   Objective:   UNTIMED  Procedure   Speech- Language- Voice Therapy      Short Term Goals: (3 weeks) Current Progress:    Patient will recall memory strategies with 100% accuracy independently within 2-3 sessions in order to use in daily environments.    Discussed WRAP- write, repeat, associate, and plan for patient to implement at home as needed. Also discussed continuing to set reminders on her phone.      Not met   2. Patient will participate in delayed recall tasks incorporating functional information with 100% accuracy independently to improve job performance.     Not addressed this session. Not met   4. Patient will participate in goal, plan, do,  "review to independently execute 3 hypothetical scenarios with >90% accuracy to improve executive function skills.     Not addressed this session. Not met   5. Patient will attend to 5+ details related to verbally and visually presented information with 100% accuracy independently in order to enhance her recall for future use.   Discussed attention strategies including decreasing distractions and taking breaks to reduce cognitive fatigue. Not met       Long Term Goals: (6 weeks) Current Progress   Using compensatory strategies independently, patient will complete a variety of functional, individualized tasks with >90% accuracy in order to increase task accuracy and recall in a variety of environments.     Not met     Patient Education/Response:   Patient educated regarding the followin. Memory strategies   2. Cognitive strategies     Home program established: yes- eliminate distractions while driving and doing tasks   Patient verbalized understanding to all above education provided.     See Electronic Medical Record under Patient Instructions for exercises provided throughout therapy.  Assessment:   Session today consisted of reviewing memory and attention strategies for use at home. Discussed write, repeat, associate, and picture for memory and decreasing distractions and taking breaks as needed in order to improve attention. Provided patient with written handout detailing strategies, patient expressed understanding. Patient would like to take break from therapy for the time being to focus on being with her family. Patient will return to therapy in January, or whenever she feels ready.      Plan:   Discharge from speech therapy.     Venessa Barragan, [facility]  Student Speech-Language Pathologist   2023    "I, Cassandra Reza, certify that I was present in the room directing the student and service delivery and guiding them using my skilled judgement. As the co-signing therapist, I have reviewed the student's " "documentation, and am responsible for the treatment, assessment and plan."     Cassandra Reza M.S.CCC-SLP  Speech Language Pathologist         "

## 2023-11-20 NOTE — PLAN OF CARE
[facility]  Speech Therapy Discharge Note    Name: Ray Knox  Clinic Number: [id number]    Therapy Diagnosis:   Encounter Diagnosis   Name Primary?    Cognitive communication deficit Yes     Physician: Elroy Islas MD    Physician Orders: Ambulatory Referral to Speech Therapy   Medical Diagnosis: S06.0X0S (ICD-10-CM) - Concussion without loss of consciousness, sequela R41.89 (ICD-10-CM) - Cognitive change   Evaluation Date: 9/20/23    Date of Last visit: 11/20/23  Total Visits Received: 7 including evaluation     ASSESSMENT    Patient completed 6 treatment sessions with focus on improving cognitive skills post concussion. Patient has shown good accuracy with therapy tasks, but continues to have difficulty with memory and attention in daily life, reporting that she forgets about appointments and has difficulty focusing despite compensatory strategies. Cognitive communication deficits remain. Patient would like to take break from therapy for personal reasons with goal to return once she is ready. Patient provided with handout detailing memory and attention strategies to be implemented in daily life.     Discharge reason: After discussion with speech-language pathologist, decided on discharge right now due to patient's personal life.     Goals:   Short Term Goals: (3 weeks)   Patient will recall memory strategies with 100% accuracy independently within 2-3 sessions in order to use in daily environments.   Not Met    2. Patient will participate in delayed recall tasks incorporating functional information with 100% accuracy independently to improve job performance.      Not Met    4. Patient will participate in goal, plan, do, review to independently execute 3 hypothetical scenarios with >90% accuracy to improve executive function skills.      Not Met    5. Patient will attend to 5+ details related to verbally and visually presented information with 100% accuracy independently in order  "to enhance her recall for future use.     Not Met      Long-Term Goals   Using compensatory strategies independently, patient will complete a variety of functional, individualized tasks with >90% accuracy in order to increase task accuracy and recall in a variety of environments. - Goal Not Met     PLAN   This patient is discharged from Speech Therapy.    Venessa Barragan, [facility]  Student Speech-Language Pathologist     "I, Cassandra Reza, certify that I was present in the room directing the student and service delivery and guiding them using my skilled judgement. As the co-signing therapist, I have reviewed the student's documentation, and am responsible for the treatment, assessment and plan."     Cassandra Reza M.S.CCC-SLP  Speech Language Pathologist   "

## 2023-11-21 ENCOUNTER — CLINICAL SUPPORT (OUTPATIENT)
Dept: REHABILITATION | Facility: HOSPITAL | Age: 28
End: 2023-11-21
Payer: MEDICAID

## 2023-11-21 DIAGNOSIS — R42 DIZZINESS: ICD-10-CM

## 2023-11-21 DIAGNOSIS — G44.329 CHRONIC POST-CONCUSSION HEADACHE: Primary | ICD-10-CM

## 2023-11-21 PROCEDURE — 97110 THERAPEUTIC EXERCISES: CPT | Mod: PN

## 2023-11-21 NOTE — PROGRESS NOTES
[facility]   Physical Therapy Treatment Note      Name: Ray Knox  Clinic Number: [id number]    Therapy Diagnosis:   Encounter Diagnoses   Name Primary?    Chronic post-concussion headache Yes    Dizziness      Physician: Sheila Silva PA-C    Visit Date: 11/21/2023    Physician Orders: PT Eval and Treat    Medical Diagnosis from Referral: S06.0X0S (ICD-10-CM) - Concussion without loss of consciousness, sequela R26.89 (ICD-10-CM) - Imbalance H51.11 (ICD-10-CM) - Convergence insufficiency   Evaluation Date: 10/26/2023  Authorization Period Expiration: 9/17/24  Plan of Care Expiration: 12/22/23  Progress Note Due: 11/24/23  Visit # / Visits authorized: 1/20 + eval  FOTO: 1/3     Precautions: Standard     Time In: 2:35PM   Time Out: 3:15PM  Total Billable Time: 40 minutes (3TE - Medicaid)    PTA Visit #: 0/5     Subjective     Patient reports: Recently dealing with the death of 5 family members. Due to personal reasons, she would like to put therapy on hold.  She  will be  compliant with home exercise program.  Response to previous treatment: initial evaluation was over 1 months ago  Functional change: improved cervical range of motion and improved near point convergence    Pain: 3/10  Location: left temporal headache    Objective      Objective Measures updated at progress report unless specified.     Near Point Convergence: <10cm    m-CTSIB (each position held 30 seconds for pass)  - Condition 1 (eyes open, solid surface): P no sway  - Condition 2 (eyes closed, solid surface): P min sway  - Condition 3 (eyes open, foam surface): P min sway  - Condition 4 (eyes closed, foam surface): P mod sway    Cervical AROM Updated values in parentheses      Flexion 40 degrees (51)   Extension 45 degrees (55)   Sidebend Right 35 degrees (45)   Sidebend Left 35 degrees (45)   Rotation Right 65 degrees (79)   Rotation Left 50 degrees (70)      Treatment     Ray received the treatments listed  "below:      neuromuscular re-education activities to improve: Coordination and Vestibular and Oculomotor Function for 40 minutes. The following activities were included:  - Smooth Pursuits in seated 2x30" each direction (horizontal and vertical) self paced   - Saccades in standing  2x30" each direction (horizontal and vertical) self paced  - VORx1 in standing 2x30" each direction (horizontal and vertical) self paced   - HEP review  - m-CTSIB, near point convergence, and cervical AROM reassessments  - Patient education on basic tenants of vestibular physical therapy including expected and appropriate symptomatic response to VOR and oculomotor activity, techniques for self-progression, and recommended HEP volume    Patient Education and Home Exercises       Education provided:   - PT to be placed on hold due to patient request/difficulty attending PT but new orders may be needed if authorization expires before she is ready to return  - HEP instruction and basic tenants of vestibular physical therapy    Written Home Exercises Provided: yes. Exercises were reviewed and Ray was able to demonstrate them prior to the end of the session.  Ray demonstrated good  understanding of the education provided. See Electronic Medical Record under Patient Instructions for exercises provided during therapy sessions    Assessment     This is Ray's first follow up PT session despite evaluation being over 1 month ago. Attendance limited due to various reasons documented in chart. Patient electing to place PT on hold due to personal issues/deaths in her family. Today's session consisted of reassessment and HEP instruction to maximize therapeutic potential. Without PT intervention, her cervical AROM and near point convergence have improved significantly and spontaneously. Still with moderate symptom exacerbation with vestibular/oculomotor activity, most notably with horizontal saccades and smooth pursuits. From a quality " standpoint, she displays no visual slippage, no saccadic intrusion, and no dysmetria with pertinent eye exercise. She will be placed on hold and can contact [facility] if she would like to resume PT before authorization expiration.    Ray Is progressing well towards her goals.   Patient prognosis is Fair.     Patient will continue to benefit from skilled outpatient physical therapy to address the deficits listed in the problem list box on initial evaluation, provide pt/family education and to maximize pt's level of independence in the home and community environment.     Patient's spiritual, cultural and educational needs considered and pt agreeable to plan of care and goals.     Anticipated barriers to physical therapy: chronicity, poor attendance history      Goals:     Short Term Goals: 4 weeks   Pt will improve FOTO score to >/= 60% for improved self perception of functional mobility.    Pt will report headache pain entering clinic to </= 3/10 for improved focus and tolerance of activity.   Pt will report headache frequency to </= 5 days over the past week.   Pt will improve bilaterally cervical rotation AROM to at least 75 degrees, with no increase in pain, for improved motion for daily activities and tasks such as driving.   Pt will tolerate VORx1 for 30s without increase in symptoms.     Pt will tolerate saccades for 30s without increase in symptoms.        Long Term Goals: 8 weeks   Pt will improve FOTO score to >/= 65% for improved self perception of functional mobility.    Pt will report headache pain entering clinic to </= 2/10 for improved focus and tolerance of activity.   Pt will report headache frequency to </= 3 days over the past week.   Pt will tolerate VORx1 for 30s without increase in symptoms at 90bpm pace or faster.      Pt will tolerate saccades for 30s without increase in symptoms at 90bpm pace or faster.    Pt will improve postural control with MCTSIB condition 4 score of at least 30s  for decreased fall risk with standing ADLs.(MET 11/21/2023)   Pt will improve tandem stance bilaterally to at least 30s for decreased fall risk.       Plan     Place patient on hold from PT per her request    SRAVANI WRIGHT PT

## 2023-12-03 ENCOUNTER — PATIENT MESSAGE (OUTPATIENT)
Dept: OBSTETRICS AND GYNECOLOGY | Facility: CLINIC | Age: 28
End: 2023-12-03
Payer: MEDICAID

## 2023-12-04 ENCOUNTER — OFFICE VISIT (OUTPATIENT)
Dept: SLEEP MEDICINE | Facility: CLINIC | Age: 28
End: 2023-12-04
Payer: MEDICAID

## 2023-12-04 VITALS
DIASTOLIC BLOOD PRESSURE: 76 MMHG | HEART RATE: 79 BPM | HEIGHT: 64 IN | BODY MASS INDEX: 36.19 KG/M2 | SYSTOLIC BLOOD PRESSURE: 111 MMHG | WEIGHT: 212 LBS

## 2023-12-04 DIAGNOSIS — G47.10 HYPERSOMNOLENCE: Primary | ICD-10-CM

## 2023-12-04 DIAGNOSIS — R51.9 MORNING HEADACHE: ICD-10-CM

## 2023-12-04 DIAGNOSIS — R35.1 NOCTURIA: ICD-10-CM

## 2023-12-04 DIAGNOSIS — R06.83 LOUD SNORING: ICD-10-CM

## 2023-12-04 DIAGNOSIS — F51.09 OTHER INSOMNIA NOT DUE TO A SUBSTANCE OR KNOWN PHYSIOLOGICAL CONDITION: ICD-10-CM

## 2023-12-04 DIAGNOSIS — R06.81 WITNESSED EPISODE OF APNEA: ICD-10-CM

## 2023-12-04 PROCEDURE — 1160F RVW MEDS BY RX/DR IN RCRD: CPT | Mod: CPTII,,, | Performed by: PHYSICIAN ASSISTANT

## 2023-12-04 PROCEDURE — 99214 OFFICE O/P EST MOD 30 MIN: CPT | Mod: S$PBB,,, | Performed by: PHYSICIAN ASSISTANT

## 2023-12-04 PROCEDURE — 3074F SYST BP LT 130 MM HG: CPT | Mod: CPTII,,, | Performed by: PHYSICIAN ASSISTANT

## 2023-12-04 PROCEDURE — 3074F PR MOST RECENT SYSTOLIC BLOOD PRESSURE < 130 MM HG: ICD-10-PCS | Mod: CPTII,,, | Performed by: PHYSICIAN ASSISTANT

## 2023-12-04 PROCEDURE — 3078F DIAST BP <80 MM HG: CPT | Mod: CPTII,,, | Performed by: PHYSICIAN ASSISTANT

## 2023-12-04 PROCEDURE — 3078F PR MOST RECENT DIASTOLIC BLOOD PRESSURE < 80 MM HG: ICD-10-PCS | Mod: CPTII,,, | Performed by: PHYSICIAN ASSISTANT

## 2023-12-04 PROCEDURE — 3008F BODY MASS INDEX DOCD: CPT | Mod: CPTII,,, | Performed by: PHYSICIAN ASSISTANT

## 2023-12-04 PROCEDURE — 1159F PR MEDICATION LIST DOCUMENTED IN MEDICAL RECORD: ICD-10-PCS | Mod: CPTII,,, | Performed by: PHYSICIAN ASSISTANT

## 2023-12-04 PROCEDURE — 99999 PR PBB SHADOW E&M-EST. PATIENT-LVL III: ICD-10-PCS | Mod: PBBFAC,,, | Performed by: PHYSICIAN ASSISTANT

## 2023-12-04 PROCEDURE — 3008F PR BODY MASS INDEX (BMI) DOCUMENTED: ICD-10-PCS | Mod: CPTII,,, | Performed by: PHYSICIAN ASSISTANT

## 2023-12-04 PROCEDURE — 1160F PR REVIEW ALL MEDS BY PRESCRIBER/CLIN PHARMACIST DOCUMENTED: ICD-10-PCS | Mod: CPTII,,, | Performed by: PHYSICIAN ASSISTANT

## 2023-12-04 PROCEDURE — 99214 PR OFFICE/OUTPT VISIT, EST, LEVL IV, 30-39 MIN: ICD-10-PCS | Mod: S$PBB,,, | Performed by: PHYSICIAN ASSISTANT

## 2023-12-04 PROCEDURE — 99213 OFFICE O/P EST LOW 20 MIN: CPT | Mod: PBBFAC | Performed by: PHYSICIAN ASSISTANT

## 2023-12-04 PROCEDURE — 1159F MED LIST DOCD IN RCRD: CPT | Mod: CPTII,,, | Performed by: PHYSICIAN ASSISTANT

## 2023-12-04 PROCEDURE — 99999 PR PBB SHADOW E&M-EST. PATIENT-LVL III: CPT | Mod: PBBFAC,,, | Performed by: PHYSICIAN ASSISTANT

## 2023-12-04 NOTE — PROGRESS NOTES
Referred by Elroy Islas MD     ESTABLISHED PATIENT VISIT  New to me. Previously evaluated by Dr George Bell Abilio  is a pleasant 28 y.o. female  with PMH significant for multicystic kidney disease, hx concussion, chronic post concussion headaches, IIH, BMI 36+ who presents for sleep evaluation       Here today for: follow-up     PLAN last visit 3/9/21:   -will start with home sleep testing (HST) given the patient's high pretest probability of RADHA. If home sleep testing is inconclusive, will need an in-lab diagnostic study to definitively exclude RADHA.  -if sleepiness persists, will need PSG/MSLT   -we discussed trial of PAP therapy  -driving precautions were discussed with the patient  RTC: will arrange RTC depending on results of sleep testing.      Since last visit:   3/26/21 HST: AHI 1, RDI 10    5/4/21 PSG w MSLT: PSG-AHI 1.8, RDI 9.8  MSLT-   1. This test follows an overnight PSG which showed 411.0 minutes of sleep.  2. Urine drug screen performed 5.5.21 is negative for drugs of abuse  3. Mean sleep latency of 15m 42 minutes is within normal limits  4. Sleep-onset REM periods (SOREMP) were seen on 0/5 attempts      Symptoms worsening since initial dx testing. States her PCP recommended re-evaluation for RADHA, which is why she presents today.        Past Medical History:   Diagnosis Date    Anemia     Asthma     Bilateral renal cysts     Breast disorder     Congenital absence of one kidney     General anesthetics causing adverse effect in therapeutic use     Kidney cysts     pt born with 1 kidney     Patient Active Problem List   Diagnosis    Chronic pain of left knee    Muscle weakness of lower extremity    Decreased range of motion (ROM) of left knee    Suspected sleep apnea    Hypersomnolence    Multicystic kidney disease    Unilateral renal atrophy    Renal hypoplasia, unilateral    History of multiple miscarriages    Finger mass, left    Finger stiffness, left    Hand pain, left    Decreased  "pinch strength    Requires assistance with activities of daily living (ADL)    Pain aggravated by activities of daily living    Cubital tunnel syndrome on left    Cognitive communication deficit    Pain of right upper extremity    Weakness    Stiffness in joint    Chronic post-concussion headache    Dizziness    Concussion with no loss of consciousness       Current Outpatient Medications:     albuterol (PROVENTIL/VENTOLIN HFA) 90 mcg/actuation inhaler, Inhale 1-2 puffs into the lungs every 6 (six) hours as needed for Wheezing. Rescue, Disp: 6.7 g, Rfl: 0    clomiPHENE (CLOMID) 50 mg tablet, Take 2 tablet in the am from Day 3 to Day 7 of your cycle. Have sex every other day for one week starting five days after last pill., Disp: 10 tablet, Rfl: 2    diclofenac sodium (VOLTAREN) 1 % Gel, APPLY 2 GRAMS TOPICALLY TO THE AFFECTED AREA THREE TIMES DAILY, Disp: , Rfl:     ibuprofen (ADVIL,MOTRIN) 800 MG tablet, Take 1 tablet (800 mg total) by mouth after meals as needed for Pain., Disp: 90 tablet, Rfl: 1    methylPREDNISolone (MEDROL DOSEPACK) 4 mg tablet, use as directed, Disp: 1 each, Rfl: 0    ondansetron (ZOFRAN) 4 MG tablet, Take 1 tablet (4 mg total) by mouth every 6 (six) hours., Disp: 12 tablet, Rfl: 0    valACYclovir (VALTREX) 500 MG tablet, Take 1 tablet by mouth twice a day for 3 days with outbreaks, Disp: 30 tablet, Rfl: 4    ZAFEMY 150-35 mcg/24 hr, Place onto the skin., Disp: , Rfl:     propranoloL (INDERAL) 10 MG tablet, Take 1 tablet (10 mg total) by mouth 2 (two) times daily., Disp: 60 tablet, Rfl: 11       Vitals:    12/04/23 1057   BP: 111/76   BP Location: Left arm   Patient Position: Sitting   BP Method: Medium (Automatic)   Pulse: 79   Weight: 96.2 kg (212 lb)   Height: 5' 4" (1.626 m)     Physical Exam:    GEN:   Well-appearing  Psych:  Appropriate affect, demonstrates insight  SKIN:  No rash on the face or bridge of the nose      LABS:   No results found for: "HGB", "CO2"    RECORDS REVIEWED " "PREVIOUSLY:    3/26/21 HST: AHI 1, RDI 10    5/4/21 PSG w MSLT: PSG-AHI 1.8, RDI 9.8  MSLT-   1. This test follows an overnight PSG which showed 411.0 minutes of sleep.  2. Urine drug screen performed 5.5.21 is negative for drugs of abuse  3. Mean sleep latency of 15m 42 minutes is within normal limits  4. Sleep-onset REM periods (SOREMP) were seen on 0/5 attempts    ASSESSMENT    [place] Sleepiness Scale:  Sitting and reading:   3  Watching TV:    3  Passenger in a car x 1 hr:  3  Sitting quietly after lunch:  3  Lying down to rest in PM:  3  Sitting, inactive in public:  2  Sitting+ talking to someone:  2  Stopped in traffic:   2  Total    22/24    PROBLEM DESCRIPTION/ Sx on Presentation Interval Hx STATUS   sx RADHA   + loud snoring, denies witnessed apneas  Mallampati 4  + tonsillar hypertrophy (grade 1) Loud snoring persists  Mother now notices witnessed episodes of "paused breaths" New to me   Daytime Sx   + sleepiness when inactive   Does endorse some drowsy driving on occasion   ESS 19/24 on intake (reviewed from 3/9/21)    H/H hallucinations: has woken to feel people are in her room. Every night for the past few weeks  Has some sleep paralysis over the past few weeks  Unclear if cataplexy (has woken up on the floor not knowing what happened)  Some sleep attacks Sleepiness is worse now, has very difficult time staying awake during the day, drinks multiple energy drinks    No longer experiencing H/H hallucinations;  Still endorses rare sleep paralysis  Denies cataplexy New to me   Insomnia   Trouble falling asleep: no issues  Maintenance:         wakes frequently, not usually difficult to return to sleep (30mins)  Prior hypnotics:        Current hypnotics:  Still waking very frequently, no difficulties initiating sleep or maintaining sleep New to me   Nocturia x  3 per week x 1-2 x nightly New to me   AM Headaches On awaking, daily  Lasts for roughly 1 hr  Taking excedrin migraine Still having daily AM " headaches upon waking, was advised to take tylenol instead of Excedrin, occasionally also taking ibuprofen 800mg New to me   Other issues:     PLAN     -recommend sleep testing   -HST ordered  -discussed trial therapy if RADHA present and the patient is open to a trial of CPAP therapy  -discussed RADHA and CPAP with patient in detail, including possible complications of untreated RADHA like heart attack/stroke  -advised on strict driving precautions; advised never to drive drowsy    Advised on plan of care. Answered all patient questions. Patient verbalized understanding and voiced agreement with plan of care.       RTC if dx of RADHA made and CPAP ordered, will need follow up 31-90 days after receiving machine for compliance      The patient was given open opportunity to ask questions and/or express concerns about treatment plan. All questions/concerns were discussed.     Two patient identifiers used prior to evaluation.

## 2023-12-05 ENCOUNTER — PATIENT MESSAGE (OUTPATIENT)
Dept: OBSTETRICS AND GYNECOLOGY | Facility: CLINIC | Age: 28
End: 2023-12-05
Payer: MEDICAID

## 2023-12-05 ENCOUNTER — HOSPITAL ENCOUNTER (EMERGENCY)
Facility: HOSPITAL | Age: 28
Discharge: HOME | End: 2023-12-05
Attending: STUDENT IN AN ORGANIZED HEALTH CARE EDUCATION/TRAINING PROGRAM
Payer: MEDICAID

## 2023-12-05 VITALS
DIASTOLIC BLOOD PRESSURE: 83 MMHG | BODY MASS INDEX: 35.32 KG/M2 | HEIGHT: 65 IN | OXYGEN SATURATION: 99 % | TEMPERATURE: 99 F | WEIGHT: 212 LBS | RESPIRATION RATE: 16 BRPM | HEART RATE: 78 BPM | SYSTOLIC BLOOD PRESSURE: 131 MMHG

## 2023-12-05 DIAGNOSIS — N93.8 DYSFUNCTIONAL UTERINE BLEEDING: Primary | ICD-10-CM

## 2023-12-05 LAB
B-HCG UR QL: NEGATIVE
BASOPHILS # BLD AUTO: 0.04 K/UL (ref 0–0.2)
BASOPHILS NFR BLD: 0.4 % (ref 0–1.9)
CTP QC/QA: YES
DIFFERENTIAL METHOD: ABNORMAL
EOSINOPHIL # BLD AUTO: 0.4 K/UL (ref 0–0.5)
EOSINOPHIL NFR BLD: 3.9 % (ref 0–8)
ERYTHROCYTE [DISTWIDTH] IN BLOOD BY AUTOMATED COUNT: 14.6 % (ref 11.5–14.5)
HCT VFR BLD AUTO: 35.1 % (ref 37–48.5)
HGB BLD-MCNC: 11.2 G/DL (ref 12–16)
IMM GRANULOCYTES # BLD AUTO: 0.03 K/UL (ref 0–0.04)
IMM GRANULOCYTES NFR BLD AUTO: 0.3 % (ref 0–0.5)
LYMPHOCYTES # BLD AUTO: 3.1 K/UL (ref 1–4.8)
LYMPHOCYTES NFR BLD: 29.4 % (ref 18–48)
MCH RBC QN AUTO: 26 PG (ref 27–31)
MCHC RBC AUTO-ENTMCNC: 31.9 G/DL (ref 32–36)
MCV RBC AUTO: 81 FL (ref 82–98)
MONOCYTES # BLD AUTO: 0.6 K/UL (ref 0.3–1)
MONOCYTES NFR BLD: 6.2 % (ref 4–15)
NEUTROPHILS # BLD AUTO: 6.2 K/UL (ref 1.8–7.7)
NEUTROPHILS NFR BLD: 59.8 % (ref 38–73)
NRBC BLD-RTO: 0 /100 WBC
PLATELET # BLD AUTO: 236 K/UL (ref 150–450)
PMV BLD AUTO: 10.5 FL (ref 9.2–12.9)
RBC # BLD AUTO: 4.31 M/UL (ref 4–5.4)
WBC # BLD AUTO: 10.37 K/UL (ref 3.9–12.7)

## 2023-12-05 PROCEDURE — 99284 EMERGENCY DEPT VISIT MOD MDM: CPT | Mod: ER

## 2023-12-05 PROCEDURE — 85025 COMPLETE CBC W/AUTO DIFF WBC: CPT | Mod: ER | Performed by: NURSE PRACTITIONER

## 2023-12-05 PROCEDURE — 81025 URINE PREGNANCY TEST: CPT | Mod: ER | Performed by: NURSE PRACTITIONER

## 2023-12-05 RX ORDER — ONDANSETRON 4 MG/1
4 TABLET, ORALLY DISINTEGRATING ORAL EVERY 6 HOURS PRN
Qty: 15 TABLET | Refills: 0 | Status: SHIPPED | OUTPATIENT
Start: 2023-12-05

## 2023-12-05 NOTE — Clinical Note
"Ray Rochajuan Knox was seen and treated in our emergency department on 12/5/2023.  She may return to work on 12/06/2023.       If you have any questions or concerns, please don't hesitate to call.      Adry Thurston, DO"

## 2023-12-05 NOTE — ED TRIAGE NOTES
C/o lower abdominal cramping with vaginal bleeding x 1 month - heavier over past 1-2 weeks. No N/V, no dizziness. Presents in no distress.

## 2023-12-05 NOTE — ED PROVIDER NOTES
NAME:  Ray Knox  CSN:     [id number]  MRN:    [id number]  ADMIT DATE: 12/5/2023        eMERGENCY dEPARTMENT eNCOUnter    CHIEF COMPLAINT    Chief Complaint   Patient presents with    Vaginal Bleeding     Pt states has been having menstrual cycle x 1 month.  States changing  pads every hour.  Called OB/GYN and told to come to ED.        HPI    Ray Knox is a 28 y.o. female with a past medical history of  has a past medical history of Anemia, Asthma, Bilateral renal cysts, Breast disorder, Congenital absence of one kidney, General anesthetics causing adverse effect in therapeutic use, and Kidney cysts.     she presents to the ED due to persistent vaginal bleeding over a month.  States it is consistently heavy saturating through super tampons as well as super pads.  Has never had this much of an issue in the past.  States she only has 1 functional kidney secondary to cyst on her kidney.  States she has been on birth control and passed impression that this made her cysts worse.  Denies taking Clomid any time recently for fertility reasons.  No pain.  No lightheadedness or dizziness.  Denies any concern for STDs currently.    HPI       PAST MEDICAL HISTORY  Past Medical History:   Diagnosis Date    Anemia     Asthma     Bilateral renal cysts     Breast disorder     Congenital absence of one kidney     General anesthetics causing adverse effect in therapeutic use     Kidney cysts     pt born with 1 kidney       SURGICAL HISTORY    Past Surgical History:   Procedure Laterality Date    FINGER MASS EXCISION Left 3/8/2022    Procedure: EXCISION, MASS, FINGER;  Surgeon: Bk Doty Jr., MD;  Location: [facility] OR;  Service: Orthopedics;  Laterality: Left;    ULNAR NERVE TRANSPOSITION Left 3/29/2023    Procedure: TRANSPOSITION, NERVE, ULNAR;  Surgeon: Bk Doty Jr., MD;  Location: [facility] OR;  Service: Orthopedics;  Laterality: Left;       FAMILY HISTORY    Family History   Problem Relation Age of Onset    Diabetes  Paternal Aunt     Hypertension Maternal Grandfather     Cancer Paternal Grandmother     Breast cancer Paternal Grandmother     Leukemia Paternal Grandmother     Kidney disease Maternal Grandmother     Hypertension Mother        SOCIAL HISTORY    Social History     Socioeconomic History    Marital status: Single   Tobacco Use    Smoking status: Every Day     Types: Vaping with nicotine    Smokeless tobacco: Never   Substance and Sexual Activity    Alcohol use: Yes     Comment: occasionally    Drug use: Not Currently    Sexual activity: Yes     Partners: Male     Birth control/protection: None     Comment: Not current on Depo     Social Determinants of Health     Financial Resource Strain: Low Risk  (10/5/2022)    Overall Financial Resource Strain (CARDIA)     Difficulty of Paying Living Expenses: Not very hard   Food Insecurity: No Food Insecurity (10/5/2022)    Hunger Vital Sign     Worried About Running Out of Food in the Last Year: Never true     Ran Out of Food in the Last Year: Never true   Transportation Needs: No Transportation Needs (10/5/2022)    PRAPARE - Transportation     Lack of Transportation (Medical): No     Lack of Transportation (Non-Medical): No   Physical Activity: Inactive (10/5/2022)    Exercise Vital Sign     Days of Exercise per Week: 0 days     Minutes of Exercise per Session: 0 min   Stress: Stress Concern Present (10/5/2022)    [employer] - Occupational Stress Questionnaire     Feeling of Stress : Rather much   Social Connections: Moderately Isolated (10/5/2022)    Social Connection and Isolation Panel [NHANES]     Frequency of Communication with Friends and Family: More than three times a week     Frequency of Social Gatherings with Friends and Family: More than three times a week     Attends [employer] Services: 1 to 4 times per year     Active Member of Clubs or Organizations: No     Attends Club or Organization Meetings: Never     Marital Status: Never  "   Housing Stability: Unknown (10/5/2022)    Housing Stability Vital Sign     Unable to Pay for Housing in the Last Year: No     Unstable Housing in the Last Year: No       MEDICATIONS  Current Outpatient Medications   Medication Instructions    albuterol (PROVENTIL/VENTOLIN HFA) 90 mcg/actuation inhaler 1-2 puffs, Inhalation, Every 6 hours PRN, Rescue    diclofenac sodium (VOLTAREN) 1 % Gel APPLY 2 GRAMS TOPICALLY TO THE AFFECTED AREA THREE TIMES DAILY    ibuprofen (ADVIL,MOTRIN) 800 mg, Oral, 3 times daily after meals PRN    methylPREDNISolone (MEDROL DOSEPACK) 4 mg tablet use as directed    norgestrel-ethinyl estradioL (LO/OVRAL) 0.3-30 mg-mcg per tablet 1 tablet, Oral, Daily    ondansetron (ZOFRAN) 4 mg, Oral, Every 6 hours    ondansetron (ZOFRAN-ODT) 4 mg, Oral, Every 6 hours PRN    propranoloL (INDERAL) 10 mg, Oral, 2 times daily    valACYclovir (VALTREX) 500 MG tablet Take 1 tablet by mouth twice a day for 3 days with outbreaks       ALLERGIES    Review of patient's allergies indicates:   Allergen Reactions    Lisinopril Other (See Comments), Shortness Of Breath and Swelling     angioedema      Shellfish containing products Swelling         REVIEW OF SYSTEMS   Review of Systems       PHYSICAL EXAM    Reviewed Triage Note    VITAL SIGNS:   ED Triage Vitals [12/05/23 1511]   Enc Vitals Group      /83      Pulse 78      Resp 16      Temp 98.8 °F (37.1 °C)      Temp Source Oral      SpO2 99 %      Weight 212 lb      Height 5' 5"      Head Circumference       Peak Flow       Pain Score       Pain Loc       Pain Edu?       Excl. in GC?        No data found.      Physical Exam    Nursing note and vitals reviewed.  Constitutional: She appears well-developed and well-nourished.   HENT:   Head: Normocephalic and atraumatic.   Eyes: EOM are normal. Pupils are equal, round, and reactive to light.   Neck: Neck supple.   Normal range of motion.  Cardiovascular:  Normal rate and regular rhythm.         "   Pulmonary/Chest: Breath sounds normal. No respiratory distress.   Abdominal: Abdomen is soft. There is no abdominal tenderness.   Genitourinary:    Vagina normal.      Genitourinary Comments: Slow oozing of blood in the vaginal vault.  Cervical os is closed.  No identifiable lesions appreciated.     Musculoskeletal:         General: Normal range of motion.      Cervical back: Normal range of motion and neck supple.     Neurological: She is alert and oriented to person, place, and time.   Skin: Skin is warm and dry.   Psychiatric: She has a normal mood and affect.                EKG     Interpreted by EM physician if performed:               LABS  Pertinent labs reviewed. (See chart for details)   Labs Reviewed   CBC W/ AUTO DIFFERENTIAL - Abnormal; Notable for the following components:       Result Value    Hemoglobin 11.2 (*)     Hematocrit 35.1 (*)     MCV 81 (*)     MCH 26.0 (*)     MCHC 31.9 (*)     RDW 14.6 (*)     All other components within normal limits   POCT URINE PREGNANCY         RADIOLOGY          Imaging Results    None           PROCEDURES    Procedures      ED COURSE & MEDICAL DECISION MAKING    Pertinent Labs & Imaging studies reviewed. (See chart for details and specific orders.)          Summary of review of records:   Pelvic US 3/2023: TECHNIQUE/FINDINGS: Transabdominal and transvaginal ultrasound.  Uterus measures 8.2 x 5.1 x 4.8 cm with normal echogenicity and no focal abnormalities.  Endometrial stripe measures 8 mm which is normal.  Right ovary measures 3.9 cm and the left ovary 4.2 cm each with numerous small follicular cysts.  Flow is noted to each ovary.  No free fluid.    Pt contacted her OB 2 days ago, referred her here for further eval.        Medical Decision Making  28-year-old female presents for 1 month of heavy vaginal bleeding.    Differential diagnosis includes but is not limited to:  Pregnancy, miscarriage, uterine fibroids, dysfunctional uterine bleeding, acute blood loss  anemia, doubt underlying hemorrhagic ovarian cyst.    Problems Addressed:  Dysfunctional uterine bleeding: acute illness or injury     Details: Discussed patient with on-call OB, Dr. Otto, recommends a taper OCPs with 30 mcg of estrogen.  Recommends prescribing Zofran for underlying nausea.  No contrast indications to OCPs identified and it does appear she has been on them intermittently past.  Encouraged close follow-up gynecology reasons to return discussed including lightheadedness weakness, pain or new symptoms    Amount and/or Complexity of Data Reviewed  External Data Reviewed: notes.     Details: Has followed with gynecology closely for infertility issues in abnormal bleeding indications to OCPs brief chart review.  Labs:  Decision-making details documented in ED Course.  Discussion of management or test interpretation with external provider(s): See above.  Did discuss with gynecology.    Risk  Prescription drug management.              Medications - No data to display    ED Course as of 12/07/23 0827   Tue Dec 05, 2023   1633 Preg Test, Ur: Negative [HL]   1646 Hemoglobin(!): 11.2  Hemoglobin stable [HL]      ED Course User Index  [HL] Adry Thurston DO             FINAL IMPRESSION    Final diagnoses:  [N93.8] Dysfunctional uterine bleeding (Primary)       DISPOSITION  Patient discharged in stable condition        ED Prescriptions       Medication Sig Dispense Start Date End Date Auth. Provider    norgestrel-ethinyl estradioL (LO/OVRAL) 0.3-30 mg-mcg per tablet Take 1 tablet by mouth once daily. 30 tablet 12/5/2023 12/4/2024 Adry Thurston DO    ondansetron (ZOFRAN-ODT) 4 MG TbDL Take 1 tablet (4 mg total) by mouth every 6 (six) hours as needed. 15 tablet 12/5/2023 -- Adry Thurston DO          Follow-up Information       Follow up With Specialties Details Why Contact Info    Rachel Haynes MD Obstetrics, Obstetrics and Gynecology Schedule an appointment as soon as possible for a visit   [address]  [address]  [facility] [address]  [phone]      [facility] - Emergency Dept Emergency Medicine  As needed, If symptoms worsen [address] [facility] [address] [phone]              DISCLAIMER: This note was prepared with [employer] voice recognition transcription software. Garbled syntax, mangled pronouns, and other bizarre constructions may be attributed to that software system.             Adry Thurston,   12/07/23 0828

## 2023-12-06 ENCOUNTER — TELEPHONE (OUTPATIENT)
Dept: SLEEP MEDICINE | Facility: OTHER | Age: 28
End: 2023-12-06
Payer: MEDICAID

## 2023-12-22 ENCOUNTER — TELEPHONE (OUTPATIENT)
Dept: SLEEP MEDICINE | Facility: OTHER | Age: 28
End: 2023-12-22
Payer: MEDICAID

## 2024-01-18 ENCOUNTER — HOSPITAL ENCOUNTER (OUTPATIENT)
Dept: SLEEP MEDICINE | Facility: HOSPITAL | Age: 29
Discharge: HOME | End: 2024-01-18
Attending: PHYSICIAN ASSISTANT
Payer: MEDICAID

## 2024-01-18 DIAGNOSIS — R06.83 LOUD SNORING: ICD-10-CM

## 2024-01-18 DIAGNOSIS — F51.09 OTHER INSOMNIA NOT DUE TO A SUBSTANCE OR KNOWN PHYSIOLOGICAL CONDITION: ICD-10-CM

## 2024-01-18 DIAGNOSIS — R51.9 MORNING HEADACHE: ICD-10-CM

## 2024-01-18 DIAGNOSIS — G47.10 HYPERSOMNOLENCE: ICD-10-CM

## 2024-01-18 DIAGNOSIS — R35.1 NOCTURIA: ICD-10-CM

## 2024-01-18 DIAGNOSIS — R06.81 WITNESSED EPISODE OF APNEA: ICD-10-CM

## 2024-01-18 PROCEDURE — 95800 SLP STDY UNATTENDED: CPT

## 2024-01-19 PROBLEM — R06.81 WITNESSED EPISODE OF APNEA: Status: ACTIVE | Noted: 2024-01-19

## 2024-01-19 PROCEDURE — 95806 SLEEP STUDY UNATT&RESP EFFT: CPT | Mod: 26,,, | Performed by: INTERNAL MEDICINE

## 2024-01-22 ENCOUNTER — PATIENT MESSAGE (OUTPATIENT)
Dept: SLEEP MEDICINE | Facility: CLINIC | Age: 29
End: 2024-01-22
Payer: MEDICAID

## 2024-01-22 DIAGNOSIS — G47.33 OSA (OBSTRUCTIVE SLEEP APNEA): Primary | ICD-10-CM

## 2024-01-31 ENCOUNTER — PATIENT MESSAGE (OUTPATIENT)
Dept: OBSTETRICS AND GYNECOLOGY | Facility: CLINIC | Age: 29
End: 2024-01-31
Payer: MEDICAID

## 2024-03-18 ENCOUNTER — PATIENT MESSAGE (OUTPATIENT)
Dept: SLEEP MEDICINE | Facility: CLINIC | Age: 29
End: 2024-03-18
Payer: MEDICAID

## 2024-03-21 ENCOUNTER — PATIENT MESSAGE (OUTPATIENT)
Dept: SLEEP MEDICINE | Facility: CLINIC | Age: 29
End: 2024-03-21
Payer: MEDICAID

## 2024-04-08 ENCOUNTER — PATIENT MESSAGE (OUTPATIENT)
Dept: SLEEP MEDICINE | Facility: CLINIC | Age: 29
End: 2024-04-08
Payer: MEDICAID

## 2024-05-02 ENCOUNTER — PATIENT MESSAGE (OUTPATIENT)
Dept: OBSTETRICS AND GYNECOLOGY | Facility: CLINIC | Age: 29
End: 2024-05-02
Payer: MEDICAID

## 2024-05-03 ENCOUNTER — TELEPHONE (OUTPATIENT)
Dept: OBSTETRICS AND GYNECOLOGY | Facility: CLINIC | Age: 29
End: 2024-05-03
Payer: MEDICAID

## 2024-05-03 NOTE — TELEPHONE ENCOUNTER
----- Message from Hilda Mac sent at 5/3/2024 10:43 AM CDT -----  Contact: pt  Type:  Patient Returning Call    Who Called:pt  Who Left Message for Patient:Nurse  Does the patient know what this is regarding?:yes  Would the patient rather a call back or a response via [facility]? Call   Best Call Back Number: [phone]  Additional Information:

## 2024-06-11 ENCOUNTER — TELEPHONE (OUTPATIENT)
Dept: SLEEP MEDICINE | Facility: CLINIC | Age: 29
End: 2024-06-11
Payer: MEDICAID

## 2024-06-13 ENCOUNTER — PATIENT MESSAGE (OUTPATIENT)
Dept: SLEEP MEDICINE | Facility: CLINIC | Age: 29
End: 2024-06-13
Payer: MEDICAID

## 2024-06-14 ENCOUNTER — TELEPHONE (OUTPATIENT)
Dept: SLEEP MEDICINE | Facility: CLINIC | Age: 29
End: 2024-06-14
Payer: MEDICAID

## 2024-06-17 ENCOUNTER — E-VISIT (OUTPATIENT)
Dept: OBSTETRICS AND GYNECOLOGY | Facility: CLINIC | Age: 29
End: 2024-06-17
Payer: MEDICAID

## 2024-06-17 DIAGNOSIS — N91.2 AMENORRHEA: Primary | ICD-10-CM

## 2024-06-17 PROCEDURE — 99499 UNLISTED E&M SERVICE: CPT | Mod: 95,,, | Performed by: OBSTETRICS & GYNECOLOGY

## 2024-06-17 NOTE — PROGRESS NOTES
Patient ID: Ray Knox is a 28 y.o. female.    Chief Complaint: Amenorrhea    The patient initiated a request through [facility] on 6/17/2024 for evaluation and management with a chief complaint of Amenorrhea     I evaluated the questionnaire submission on 6/17/2024  .    [facility]    6/17/2024  8:43 AM CDT - Filed by Patient   Do you agree to participate in an E-Visit? Yes   If you have any of the following symptoms, please present to your local emergency room or call 911:  I acknowledge   Are you pregnant, could you be pregnant, or are you breast feeding? Could be pregnant   What is the main issue you would like addressed today? I took pregnancy test at home   Please describe your symptoms Vomitting,   Where is your problem located? Stomach   How severe are your symptoms? Moderate   Have you had these symptoms before? Yes   How long have you been having these symptoms? For one to four weeks   Please list any medications or treatments you have used for your condition and indicate if it was effective or not. Xofran   What makes this feel better? Sleeping   What makes this feel worse? Eaty   Are these symptoms related to a condition that you currently have? No   Please describe any probable cause for these symptoms N/a   Provide any additional information you feel is important. I just need some blood work done for pregnancy test   Please attach any relevant images or files    Are you able to take your vital signs? No         Encounter Diagnosis   Name Primary?    Amenorrhea Yes        Orders Placed This Encounter   Procedures    HCG, Quantitative     Standing Status:   Future     Standing Expiration Date:   6/17/2025     Order Specific Question:   Release to patient     Answer:   Immediate            No follow-ups on file.      E-Visit Time Tracking:    Day 1 Time (in minutes): 1    Total Time (in minutes): 1

## 2024-06-19 ENCOUNTER — LAB VISIT (OUTPATIENT)
Dept: LAB | Facility: HOSPITAL | Age: 29
End: 2024-06-19
Attending: OBSTETRICS & GYNECOLOGY
Payer: MEDICAID

## 2024-06-19 DIAGNOSIS — N91.2 AMENORRHEA: ICD-10-CM

## 2024-06-19 LAB — HCG INTACT+B SERPL-ACNC: <2.39 MIU/ML

## 2024-06-19 PROCEDURE — 84702 CHORIONIC GONADOTROPIN TEST: CPT | Mod: PN | Performed by: OBSTETRICS & GYNECOLOGY

## 2024-06-19 PROCEDURE — 36415 COLL VENOUS BLD VENIPUNCTURE: CPT | Mod: PN | Performed by: OBSTETRICS & GYNECOLOGY

## 2024-07-08 ENCOUNTER — OFFICE VISIT (OUTPATIENT)
Dept: OBSTETRICS AND GYNECOLOGY | Facility: CLINIC | Age: 29
End: 2024-07-08
Payer: MEDICAID

## 2024-07-08 ENCOUNTER — LAB VISIT (OUTPATIENT)
Dept: LAB | Facility: HOSPITAL | Age: 29
End: 2024-07-08
Attending: OBSTETRICS & GYNECOLOGY
Payer: MEDICAID

## 2024-07-08 VITALS
WEIGHT: 212.06 LBS | SYSTOLIC BLOOD PRESSURE: 117 MMHG | BODY MASS INDEX: 35.29 KG/M2 | DIASTOLIC BLOOD PRESSURE: 85 MMHG

## 2024-07-08 DIAGNOSIS — Z01.419 ROUTINE GYNECOLOGICAL EXAMINATION: Primary | ICD-10-CM

## 2024-07-08 DIAGNOSIS — Z11.3 SCREEN FOR STD (SEXUALLY TRANSMITTED DISEASE): ICD-10-CM

## 2024-07-08 DIAGNOSIS — R30.0 DYSURIA: ICD-10-CM

## 2024-07-08 DIAGNOSIS — N91.1 SECONDARY AMENORRHEA: ICD-10-CM

## 2024-07-08 DIAGNOSIS — Z01.419 ROUTINE GYNECOLOGICAL EXAMINATION: ICD-10-CM

## 2024-07-08 DIAGNOSIS — Z12.4 CERVICAL CANCER SCREENING: ICD-10-CM

## 2024-07-08 LAB
FSH SERPL-ACNC: 5.13 MIU/ML
HBV SURFACE AB SER-ACNC: <3 MIU/ML
HBV SURFACE AB SER-ACNC: NORMAL M[IU]/ML
HBV SURFACE AG SERPL QL IA: NORMAL
HCV AB SERPL QL IA: NORMAL
HIV 1+2 AB+HIV1 P24 AG SERPL QL IA: NORMAL
PROLACTIN SERPL IA-MCNC: 11.6 NG/ML (ref 5.2–26.5)
TREPONEMA PALLIDUM IGG+IGM AB [PRESENCE] IN SERUM OR PLASMA BY IMMUNOASSAY: NONREACTIVE
TSH SERPL DL<=0.005 MIU/L-ACNC: 1.46 UIU/ML (ref 0.4–4)

## 2024-07-08 PROCEDURE — 99999 PR PBB SHADOW E&M-EST. PATIENT-LVL III: CPT | Mod: PBBFAC,,, | Performed by: OBSTETRICS & GYNECOLOGY

## 2024-07-08 PROCEDURE — 86593 SYPHILIS TEST NON-TREP QUANT: CPT | Performed by: OBSTETRICS & GYNECOLOGY

## 2024-07-08 PROCEDURE — 88175 CYTOPATH C/V AUTO FLUID REDO: CPT | Performed by: OBSTETRICS & GYNECOLOGY

## 2024-07-08 PROCEDURE — 87186 SC STD MICRODIL/AGAR DIL: CPT | Performed by: OBSTETRICS & GYNECOLOGY

## 2024-07-08 PROCEDURE — 86706 HEP B SURFACE ANTIBODY: CPT | Mod: 91 | Performed by: OBSTETRICS & GYNECOLOGY

## 2024-07-08 PROCEDURE — 3074F SYST BP LT 130 MM HG: CPT | Mod: CPTII,,, | Performed by: OBSTETRICS & GYNECOLOGY

## 2024-07-08 PROCEDURE — 84146 ASSAY OF PROLACTIN: CPT | Performed by: OBSTETRICS & GYNECOLOGY

## 2024-07-08 PROCEDURE — 86803 HEPATITIS C AB TEST: CPT | Performed by: OBSTETRICS & GYNECOLOGY

## 2024-07-08 PROCEDURE — 87491 CHLMYD TRACH DNA AMP PROBE: CPT | Performed by: OBSTETRICS & GYNECOLOGY

## 2024-07-08 PROCEDURE — 3008F BODY MASS INDEX DOCD: CPT | Mod: CPTII,,, | Performed by: OBSTETRICS & GYNECOLOGY

## 2024-07-08 PROCEDURE — 87340 HEPATITIS B SURFACE AG IA: CPT | Performed by: OBSTETRICS & GYNECOLOGY

## 2024-07-08 PROCEDURE — 86696 HERPES SIMPLEX TYPE 2 TEST: CPT | Performed by: OBSTETRICS & GYNECOLOGY

## 2024-07-08 PROCEDURE — 83001 ASSAY OF GONADOTROPIN (FSH): CPT | Performed by: OBSTETRICS & GYNECOLOGY

## 2024-07-08 PROCEDURE — 1159F MED LIST DOCD IN RCRD: CPT | Mod: CPTII,,, | Performed by: OBSTETRICS & GYNECOLOGY

## 2024-07-08 PROCEDURE — 86695 HERPES SIMPLEX TYPE 1 TEST: CPT | Performed by: OBSTETRICS & GYNECOLOGY

## 2024-07-08 PROCEDURE — 3079F DIAST BP 80-89 MM HG: CPT | Mod: CPTII,,, | Performed by: OBSTETRICS & GYNECOLOGY

## 2024-07-08 PROCEDURE — 84443 ASSAY THYROID STIM HORMONE: CPT | Performed by: OBSTETRICS & GYNECOLOGY

## 2024-07-08 PROCEDURE — 87088 URINE BACTERIA CULTURE: CPT | Performed by: OBSTETRICS & GYNECOLOGY

## 2024-07-08 PROCEDURE — 99213 OFFICE O/P EST LOW 20 MIN: CPT | Mod: PBBFAC,PO | Performed by: OBSTETRICS & GYNECOLOGY

## 2024-07-08 PROCEDURE — 87591 N.GONORRHOEAE DNA AMP PROB: CPT | Performed by: OBSTETRICS & GYNECOLOGY

## 2024-07-08 PROCEDURE — 87389 HIV-1 AG W/HIV-1&-2 AB AG IA: CPT | Performed by: OBSTETRICS & GYNECOLOGY

## 2024-07-08 PROCEDURE — 87086 URINE CULTURE/COLONY COUNT: CPT | Performed by: OBSTETRICS & GYNECOLOGY

## 2024-07-08 PROCEDURE — 99395 PREV VISIT EST AGE 18-39: CPT | Mod: S$PBB,,, | Performed by: OBSTETRICS & GYNECOLOGY

## 2024-07-08 PROCEDURE — 36415 COLL VENOUS BLD VENIPUNCTURE: CPT | Performed by: OBSTETRICS & GYNECOLOGY

## 2024-07-08 RX ORDER — CIPROFLOXACIN 250 MG/1
250 TABLET, FILM COATED ORAL 2 TIMES DAILY
Qty: 6 TABLET | Refills: 0 | Status: SHIPPED | OUTPATIENT
Start: 2024-07-08 | End: 2024-07-11

## 2024-07-08 RX ORDER — PROGESTERONE 200 MG/1
200 CAPSULE ORAL NIGHTLY
Qty: 30 CAPSULE | Refills: 12 | Status: SHIPPED | OUTPATIENT
Start: 2024-07-08 | End: 2025-07-08

## 2024-07-08 NOTE — PROGRESS NOTES
29 yo now  female with long history of irregular cycles.  Patient's last menstrual period was 2023 (approximate).  Had pelvic US last year that suggests PCOS.  Patient wants pap and std testing today.  Patient has h/o Polycytic kidney disease and asthma.  Reports h/o SAB in the first trimester in 2016 and 2019/    ROS:  GENERAL: Denies weight gain or weight loss. Feeling well overall.   SKIN: Denies rash or lesions.   HEAD: Denies head injury or headache.   CHEST: Denies chest pain or shortness of breath.   CARDIOVASCULAR: Denies palpitations or left sided chest pain.   ABDOMEN: No abdominal pain, constipation, diarrhea, nausea, vomiting or rectal bleeding.   URINARY: No frequency, dysuria, hematuria, or burning on urination.  REPRODUCTIVE: See HPI.   BREASTS: denies pain, lumps, or nipple discharge.   HEMATOLOGIC: No easy bruisability or excessive bleeding.   MUSCULOSKELETAL: Denies joint pain or swelling.   NEUROLOGIC: Denies syncope or weakness.   PSYCHIATRIC: Denies depression, anxiety or mood swings.         PE:   Vitals: /85   Wt 96.2 kg (212 lb 1.3 oz)   LMP 2023 (Approximate)   BMI 35.29 kg/m²   APPEARANCE: Well nourished, well developed, in no acute distress.  SKIN: Normal skin turgor, no lesions.  BREASTS: Symmetrical, no skin changes or visible lesions. No palpable masses, nipple discharge or adenopathy bilaterally.  PELVIC: Normal external female genitalia without lesions. Normal hair distribution. Adequate perineal body, normal urethral meatus. Vagina moist and well rugated without lesions or discharge. Cervix pink and without lesions. No significant cystocele or rectocele. Bimanual exam showed uterus normal size, shape, position, mobile and nontender. Adnexa without masses or tenderness. Urethra and bladder normal.      AP  Routine gyn  -s/p normal breast exam:   -s/p normal pelvic exam:   -Pap collected  -STD testing: collected  -contraception:declines, wants to get  pregnant  -secondary amenorrhea; tsh, prolactin reordered; will start cyclic prometrium  -infertility: wants to try clomid once cycles get started - already has rx      CORTES pritchett MD

## 2024-07-08 NOTE — PATIENT INSTRUCTIONS
Polycystic Ovarian Syndrome    You will need to take prometrium 200mg by mouth at night from Day 1 to day 10 of each month. The first month will result in a very heavy cycle since you haven't had a cycle since dec 2023.    After this, the cycle should  be regular.    You could also use your clomid once your cycle restarts.

## 2024-07-09 ENCOUNTER — LAB VISIT (OUTPATIENT)
Dept: LAB | Facility: HOSPITAL | Age: 29
End: 2024-07-09
Payer: MEDICAID

## 2024-07-09 ENCOUNTER — OFFICE VISIT (OUTPATIENT)
Dept: FAMILY MEDICINE | Facility: HOSPITAL | Age: 29
End: 2024-07-09
Payer: MEDICAID

## 2024-07-09 ENCOUNTER — CLINICAL SUPPORT (OUTPATIENT)
Dept: LAB | Facility: HOSPITAL | Age: 29
End: 2024-07-09
Payer: MEDICAID

## 2024-07-09 VITALS
HEART RATE: 98 BPM | WEIGHT: 213.19 LBS | SYSTOLIC BLOOD PRESSURE: 114 MMHG | BODY MASS INDEX: 35.52 KG/M2 | DIASTOLIC BLOOD PRESSURE: 76 MMHG | OXYGEN SATURATION: 92 % | HEIGHT: 65 IN

## 2024-07-09 DIAGNOSIS — G43.009 ATYPICAL MIGRAINE: ICD-10-CM

## 2024-07-09 DIAGNOSIS — E66.9 OBESITY (BMI 30-39.9): ICD-10-CM

## 2024-07-09 DIAGNOSIS — T78.3XXS ANGIOEDEMA, SEQUELA: ICD-10-CM

## 2024-07-09 DIAGNOSIS — E66.9 OBESITY (BMI 30-39.9): Primary | ICD-10-CM

## 2024-07-09 PROBLEM — K21.9 GASTRO-ESOPHAGEAL REFLUX DISEASE WITHOUT ESOPHAGITIS: Status: ACTIVE | Noted: 2020-03-29

## 2024-07-09 LAB
ALBUMIN SERPL BCP-MCNC: 4.3 G/DL (ref 3.5–5.2)
ALP SERPL-CCNC: 66 U/L (ref 55–135)
ALT SERPL W/O P-5'-P-CCNC: 6 U/L (ref 10–44)
ANION GAP SERPL CALC-SCNC: 10 MMOL/L (ref 8–16)
AST SERPL-CCNC: 13 U/L (ref 10–40)
BASOPHILS # BLD AUTO: 0.05 K/UL (ref 0–0.2)
BASOPHILS NFR BLD: 0.5 % (ref 0–1.9)
BILIRUB SERPL-MCNC: 0.4 MG/DL (ref 0.1–1)
BUN SERPL-MCNC: 11 MG/DL (ref 6–20)
CALCIUM SERPL-MCNC: 9.5 MG/DL (ref 8.7–10.5)
CHLORIDE SERPL-SCNC: 104 MMOL/L (ref 95–110)
CO2 SERPL-SCNC: 24 MMOL/L (ref 23–29)
CREAT SERPL-MCNC: 1.1 MG/DL (ref 0.5–1.4)
DIFFERENTIAL METHOD BLD: ABNORMAL
EOSINOPHIL # BLD AUTO: 0.2 K/UL (ref 0–0.5)
EOSINOPHIL NFR BLD: 1.6 % (ref 0–8)
ERYTHROCYTE [DISTWIDTH] IN BLOOD BY AUTOMATED COUNT: 15.5 % (ref 11.5–14.5)
EST. GFR  (NO RACE VARIABLE): >60 ML/MIN/1.73 M^2
ESTIMATED AVG GLUCOSE: 111 MG/DL (ref 68–131)
GLUCOSE SERPL-MCNC: 96 MG/DL (ref 70–110)
HBA1C MFR BLD: 5.5 % (ref 4–5.6)
HCT VFR BLD AUTO: 42.9 % (ref 37–48.5)
HGB BLD-MCNC: 13.4 G/DL (ref 12–16)
HSV1 IGG SERPL QL IA: NEGATIVE
HSV2 IGG SERPL QL IA: POSITIVE
IMM GRANULOCYTES # BLD AUTO: 0.03 K/UL (ref 0–0.04)
IMM GRANULOCYTES NFR BLD AUTO: 0.3 % (ref 0–0.5)
LYMPHOCYTES # BLD AUTO: 2.9 K/UL (ref 1–4.8)
LYMPHOCYTES NFR BLD: 27.1 % (ref 18–48)
MCH RBC QN AUTO: 24.9 PG (ref 27–31)
MCHC RBC AUTO-ENTMCNC: 31.2 G/DL (ref 32–36)
MCV RBC AUTO: 80 FL (ref 82–98)
MONOCYTES # BLD AUTO: 0.6 K/UL (ref 0.3–1)
MONOCYTES NFR BLD: 5.4 % (ref 4–15)
NEUTROPHILS # BLD AUTO: 6.8 K/UL (ref 1.8–7.7)
NEUTROPHILS NFR BLD: 65.1 % (ref 38–73)
NRBC BLD-RTO: 0 /100 WBC
PLATELET # BLD AUTO: 293 K/UL (ref 150–450)
PMV BLD AUTO: 11.2 FL (ref 9.2–12.9)
POTASSIUM SERPL-SCNC: 4.1 MMOL/L (ref 3.5–5.1)
PROT SERPL-MCNC: 7.9 G/DL (ref 6–8.4)
RBC # BLD AUTO: 5.39 M/UL (ref 4–5.4)
SODIUM SERPL-SCNC: 138 MMOL/L (ref 136–145)
WBC # BLD AUTO: 10.5 K/UL (ref 3.9–12.7)

## 2024-07-09 PROCEDURE — 83036 HEMOGLOBIN GLYCOSYLATED A1C: CPT

## 2024-07-09 PROCEDURE — 99214 OFFICE O/P EST MOD 30 MIN: CPT | Mod: 25

## 2024-07-09 PROCEDURE — 93005 ELECTROCARDIOGRAM TRACING: CPT

## 2024-07-09 PROCEDURE — 85025 COMPLETE CBC W/AUTO DIFF WBC: CPT

## 2024-07-09 PROCEDURE — 93010 ELECTROCARDIOGRAM REPORT: CPT | Mod: ,,, | Performed by: INTERNAL MEDICINE

## 2024-07-09 PROCEDURE — 80053 COMPREHEN METABOLIC PANEL: CPT

## 2024-07-09 PROCEDURE — 36415 COLL VENOUS BLD VENIPUNCTURE: CPT

## 2024-07-09 RX ORDER — TOPIRAMATE 50 MG/1
25 TABLET, FILM COATED ORAL DAILY
Qty: 30 TABLET | Refills: 3 | Status: SHIPPED | OUTPATIENT
Start: 2024-07-09 | End: 2025-07-09

## 2024-07-09 RX ORDER — ONDANSETRON 4 MG/1
4 TABLET, ORALLY DISINTEGRATING ORAL EVERY 6 HOURS PRN
Qty: 15 TABLET | Refills: 0 | Status: SHIPPED | OUTPATIENT
Start: 2024-07-09 | End: 2024-07-09 | Stop reason: SDUPTHER

## 2024-07-09 RX ORDER — ONDANSETRON 4 MG/1
4 TABLET, ORALLY DISINTEGRATING ORAL EVERY 6 HOURS PRN
Qty: 15 TABLET | Refills: 0 | Status: SHIPPED | OUTPATIENT
Start: 2024-07-09

## 2024-07-09 RX ORDER — ESCITALOPRAM OXALATE 5 MG/1
5 TABLET ORAL DAILY
Qty: 90 TABLET | Refills: 3 | Status: SHIPPED | OUTPATIENT
Start: 2024-07-09 | End: 2025-07-09

## 2024-07-09 RX ORDER — ALBUTEROL SULFATE 90 UG/1
1-2 AEROSOL, METERED RESPIRATORY (INHALATION) EVERY 6 HOURS PRN
Qty: 6.7 G | Refills: 0 | Status: SHIPPED | OUTPATIENT
Start: 2024-07-09 | End: 2025-07-09

## 2024-07-09 NOTE — PROGRESS NOTES
[facility]  History & Physical    SUBJECTIVE:     Chief Complaint:   Chief Complaint   Patient presents with    Annual Exam    Hypertension       History of Present Illness:  28 y.o. female who  has a past medical history of Anemia, Asthma, Bilateral renal cysts, Breast disorder, Congenital absence of one kidney, General anesthetics causing adverse effect in therapeutic use, and Kidney cysts. presents to clinic today for establishing care.  She was seen by Ob-Gyn 7/8/24, pos serology for HSV, will follow up with Ob-Gyn office.   She complains about daily or every other day bilat frontal headaches with aura, vomiting, moderate help from NSAIDs. She was told previously by Nephrology that small amounts of NSAIDs are acceptable in setting of single kidney. She will follow up with [facility]. She is interested in weight loss, will consult Nephrology concerning Adipex (added to Topamax for migraines.) Also uses daily marijuana for migraines.  She is interested in allergy testing for recent lip swelling (no difficulty breathing/swallowing.)  She also notes anxiety and is interested in pharmacotherapy and talk-therapy. Denies SI/HI, no weapons in house      Allergies:  Review of patient's allergies indicates:   Allergen Reactions    Lisinopril Other (See Comments), Shortness Of Breath and Swelling     angioedema      Shellfish containing products Swelling       Home Medications:  Current Outpatient Medications on File Prior to Visit   Medication Sig    ciprofloxacin HCl (CIPRO) 250 MG tablet Take 1 tablet (250 mg total) by mouth 2 (two) times daily. for 3 days    diclofenac sodium (VOLTAREN) 1 % Gel APPLY 2 GRAMS TOPICALLY TO THE AFFECTED AREA THREE TIMES DAILY    ondansetron (ZOFRAN) 4 MG tablet Take 1 tablet (4 mg total) by mouth every 6 (six) hours.    progesterone (PROMETRIUM) 200 MG capsule Take 1 capsule (200 mg total) by mouth nightly.    valACYclovir (VALTREX) 500 MG tablet Take 1 tablet by mouth  twice a day for 3 days with outbreaks    methylPREDNISolone (MEDROL DOSEPACK) 4 mg tablet use as directed (Patient not taking: Reported on 7/9/2024)    norgestrel-ethinyl estradioL (LO/OVRAL) 0.3-30 mg-mcg per tablet Take 1 tablet by mouth once daily. (Patient not taking: Reported on 7/9/2024)    propranoloL (INDERAL) 10 MG tablet Take 1 tablet (10 mg total) by mouth 2 (two) times daily.     No current facility-administered medications on file prior to visit.       Past Medical History:   Diagnosis Date    Anemia     Asthma     Bilateral renal cysts     Breast disorder     Congenital absence of one kidney     General anesthetics causing adverse effect in therapeutic use     Kidney cysts     pt born with 1 kidney     Past Surgical History:   Procedure Laterality Date    FINGER MASS EXCISION Left 3/8/2022    Procedure: EXCISION, MASS, FINGER;  Surgeon: Bk Doty Jr., MD;  Location: [facility] OR;  Service: Orthopedics;  Laterality: Left;    ULNAR NERVE TRANSPOSITION Left 3/29/2023    Procedure: TRANSPOSITION, NERVE, ULNAR;  Surgeon: Bk Doty Jr., MD;  Location: [facility] at [facility] OR;  Service: Orthopedics;  Laterality: Left;     Family History   Problem Relation Name Age of Onset    Diabetes Paternal Aunt      Hypertension Maternal Grandfather      Cancer Paternal Grandmother      Breast cancer Paternal Grandmother      Leukemia Paternal Grandmother      Kidney disease Maternal Grandmother      Hypertension Mother       Social History     Tobacco Use    Smoking status: Every Day     Types: Vaping with nicotine    Smokeless tobacco: Never   Substance Use Topics    Alcohol use: Yes     Comment: occasionally    Drug use: Not Currently        Review of Systems   Respiratory:  Negative for shortness of breath.    Cardiovascular:  Negative for chest pain.   Skin:         angioedema   Neurological:  Positive for headaches.   All other systems reviewed and are negative.       OBJECTIVE:     Vital Signs:  Pulse: 98 (07/09/24  1430)  BP: 114/76 (07/09/24 1430)  SpO2: (!) 92 % (07/09/24 1430)    Physical Exam  Constitutional:       Appearance: Normal appearance. She is obese.   HENT:      Head: Normocephalic and atraumatic.   Eyes:      Extraocular Movements: Extraocular movements intact.   Cardiovascular:      Rate and Rhythm: Normal rate and regular rhythm.      Pulses: Normal pulses.      Heart sounds: Normal heart sounds. No murmur heard.  Pulmonary:      Effort: Pulmonary effort is normal. No respiratory distress.      Breath sounds: Normal breath sounds. No stridor. No wheezing, rhonchi or rales.   Abdominal:      General: Abdomen is flat.      Palpations: Abdomen is soft.      Tenderness: There is no abdominal tenderness.   Musculoskeletal:      Right lower leg: No edema.      Left lower leg: No edema.   Neurological:      General: No focal deficit present.      Mental Status: She is alert and oriented to person, place, and time.   Psychiatric:      Comments: SADIE-7 = 17  PHQ-9 = 23  Denies SI/HI         A/P:  Ray was seen today for annual exam and hypertension.    Diagnoses and all orders for this visit:    Obesity (BMI 30-39.9)  -     Hemoglobin A1C; Future  -     EKG 12-lead; Future  -     CBC Auto Differential; Future  -     Comprehensive Metabolic Panel; Future    Angioedema, sequela  -     Ambulatory referral/consult to Allergy; Future    Atypical migraine  Daily/every other day migraines with aura/vomiting, mild relief from NSAIDs    - Counseled on abortive dosing, recommended Tylenol in setting of single kidney  -     topiramate (TOPAMAX) 50 MG tablet; Take 0.5 tablets (25 mg total) by mouth once daily.  -     ondansetron (ZOFRAN-ODT) 4 MG TbDL; Take 1 tablet (4 mg total) by mouth every 6 (six) hours as needed.    Other orders  No PFTs on file  Has not used albuterol recently, but occasionally uses it daily  -     albuterol (PROVENTIL/VENTOLIN HFA) 90 mcg/actuation inhaler; Inhale 1-2 puffs into the lungs every 6 (six)  hours as needed for Wheezing. Rescue  - Counseled on proper use    Anxiety  Denies SI/HI  SADIE-7 = 17  PHQ-9 = 23  -     EScitalopram oxalate (LEXAPRO) 5 MG Tab; Take 1 tablet (5 mg total) by mouth once daily.  - Given contact numbers of Psych services      DUE AT NEXT/FUTURE VISIT:  S/e topomax, lexapro   Recs from Anahi Felder  [facility], PGY-3  07/10/2024    This note was partially created using [employer] Voice Recognition software. Typographical and content errors may occur with this process. While efforts are made to detect and correct such errors, in some cases errors will persist. For this reason, wording in this document should be considered in the proper context and not strictly verbatim     The following information is provided to all patients.  This information is to help you find resources for any of the problems found today that may be affecting your health:                Living healthy guide: www.[facility].[place].[facility]       Understanding Diabetes: www.diabetes.org       Eating healthy: www.cdc.gov/healthyweight      [facility] home safety checklist: www.cdc.gov/steadi/patient.html      Agency on Aging: www.goea.[place].[facility]       Alcoholics anonymous (AA): www.aa.org      Physical Activity: www.say.nih.gov/vw2yakv       Tobacco use: www.quitwithusla.org

## 2024-07-10 LAB
C TRACH DNA SPEC QL NAA+PROBE: NOT DETECTED
N GONORRHOEA DNA SPEC QL NAA+PROBE: NOT DETECTED
OHS QRS DURATION: 90 MS
OHS QTC CALCULATION: 400 MS

## 2024-07-11 LAB — BACTERIA UR CULT: ABNORMAL

## 2024-07-15 ENCOUNTER — TELEPHONE (OUTPATIENT)
Dept: SLEEP MEDICINE | Facility: CLINIC | Age: 29
End: 2024-07-15
Payer: MEDICAID

## 2024-07-15 ENCOUNTER — PATIENT MESSAGE (OUTPATIENT)
Dept: SLEEP MEDICINE | Facility: CLINIC | Age: 29
End: 2024-07-15
Payer: MEDICAID

## 2024-07-30 ENCOUNTER — TELEPHONE (OUTPATIENT)
Dept: SLEEP MEDICINE | Facility: CLINIC | Age: 29
End: 2024-07-30
Payer: MEDICAID

## 2024-07-30 ENCOUNTER — PATIENT MESSAGE (OUTPATIENT)
Dept: OBSTETRICS AND GYNECOLOGY | Facility: CLINIC | Age: 29
End: 2024-07-30
Payer: MEDICAID

## 2024-07-30 DIAGNOSIS — N30.00 ACUTE CYSTITIS WITHOUT HEMATURIA: Primary | ICD-10-CM

## 2024-07-30 RX ORDER — CEPHALEXIN 500 MG/1
500 CAPSULE ORAL EVERY 12 HOURS
Qty: 20 CAPSULE | Refills: 0 | Status: SHIPPED | OUTPATIENT
Start: 2024-07-30 | End: 2024-08-09

## 2024-07-30 NOTE — PROGRESS NOTES
Patient with urine infection.  Rx for keflex sent to treat this infection.    CORTES pritchett MD

## 2024-07-30 NOTE — TELEPHONE ENCOUNTER
Pt cell is not accepting calls so I reached out the mother Theresa Knox and left her a message as well. For us to get a updated number for Ray Knox and to confirm her appointment on tomorrow with Doris Gray. Left office phone number in case she needs to reschedule.

## 2024-07-31 ENCOUNTER — OFFICE VISIT (OUTPATIENT)
Dept: SLEEP MEDICINE | Facility: CLINIC | Age: 29
End: 2024-07-31
Payer: MEDICAID

## 2024-07-31 VITALS
BODY MASS INDEX: 35.78 KG/M2 | HEART RATE: 108 BPM | SYSTOLIC BLOOD PRESSURE: 113 MMHG | WEIGHT: 214.75 LBS | HEIGHT: 65 IN | DIASTOLIC BLOOD PRESSURE: 77 MMHG

## 2024-07-31 DIAGNOSIS — R09.81 NASAL CONGESTION: ICD-10-CM

## 2024-07-31 DIAGNOSIS — G47.61 PERIODIC LIMB MOVEMENTS OF SLEEP: ICD-10-CM

## 2024-07-31 DIAGNOSIS — G47.33 OSA (OBSTRUCTIVE SLEEP APNEA): Primary | ICD-10-CM

## 2024-07-31 DIAGNOSIS — G47.10 HYPERSOMNOLENCE: ICD-10-CM

## 2024-07-31 DIAGNOSIS — R51.9 FREQUENT HEADACHES: ICD-10-CM

## 2024-07-31 PROCEDURE — 99214 OFFICE O/P EST MOD 30 MIN: CPT | Mod: PBBFAC | Performed by: PHYSICIAN ASSISTANT

## 2024-07-31 PROCEDURE — 99214 OFFICE O/P EST MOD 30 MIN: CPT | Mod: S$PBB,,, | Performed by: PHYSICIAN ASSISTANT

## 2024-07-31 PROCEDURE — 3044F HG A1C LEVEL LT 7.0%: CPT | Mod: CPTII,,, | Performed by: PHYSICIAN ASSISTANT

## 2024-07-31 PROCEDURE — 1160F RVW MEDS BY RX/DR IN RCRD: CPT | Mod: CPTII,,, | Performed by: PHYSICIAN ASSISTANT

## 2024-07-31 PROCEDURE — 99999 PR PBB SHADOW E&M-EST. PATIENT-LVL IV: CPT | Mod: PBBFAC,,, | Performed by: PHYSICIAN ASSISTANT

## 2024-07-31 PROCEDURE — 1159F MED LIST DOCD IN RCRD: CPT | Mod: CPTII,,, | Performed by: PHYSICIAN ASSISTANT

## 2024-07-31 PROCEDURE — 3074F SYST BP LT 130 MM HG: CPT | Mod: CPTII,,, | Performed by: PHYSICIAN ASSISTANT

## 2024-07-31 PROCEDURE — 3078F DIAST BP <80 MM HG: CPT | Mod: CPTII,,, | Performed by: PHYSICIAN ASSISTANT

## 2024-07-31 PROCEDURE — 3008F BODY MASS INDEX DOCD: CPT | Mod: CPTII,,, | Performed by: PHYSICIAN ASSISTANT

## 2024-07-31 RX ORDER — TALC
3 POWDER (GRAM) TOPICAL NIGHTLY
Qty: 30 TABLET | Refills: 0 | Status: SHIPPED | OUTPATIENT
Start: 2024-07-31

## 2024-07-31 RX ORDER — FLUTICASONE PROPIONATE 50 MCG
1 SPRAY, SUSPENSION (ML) NASAL DAILY
Qty: 16 G | Refills: 3 | Status: SHIPPED | OUTPATIENT
Start: 2024-07-31

## 2024-07-31 NOTE — PROGRESS NOTES
"Referred by No ref. provider found     ESTABLISHED PATIENT VISIT    Ray Knox  is a pleasant 29 y.o. female  with PMH significant for multicystic kidney disease, hx concussion, chronic post concussion headaches, IIH, BMI 36+, RADHA      Here today for: follow-up     PLAN last visit 12/4/23:   -recommend sleep testing   -HST ordered  -discussed trial therapy if RADHA present and the patient is open to a trial of CPAP therapy  -discussed RADHA and CPAP with patient in detail, including possible complications of untreated RADHA like heart attack/stroke  -advised on strict driving precautions; advised never to drive drowsy      Since last visit:   Reports using CPAP regularly with significant improvement in sx. States sleep is more consolidated and restorative. States she is feeling more rested and less sleepy throughout the day. States she will still fall asleep "if something boring is on TV" still taking occasional afternoon nap, but no longer falling asleep in public or behind the wheel. States her partner notices mild residual snoring and shaking movements in her legs and arms during sleep. Denies punching, kicking, screaming, or injuries to self or others during these instances. She denies sx of RLS, denies aching, cramping, tingling or contestant urge to move legs. She also reports occasional nasal congestion that makes using nasal interface uncomfortable at times, but overall really likes the NP mask interface.  Does also endorse improvement in frequency of headaches, but still having headaches consistently throughout the week. Has not seen Neurology in nearly a year. Would like to be referred again to further address.     PAP history   Problems    Mask Nasal pillows   Pressure 6-12cwp   DME HME   Machine age AirSense 11 3/18/24   Download 7/31/24: 22/30 x 7hrs 27mins, 6-12cwp (8.9/10.8/11.4), leak (0/1.2/24.4), AHI 1.4           Past Medical History:   Diagnosis Date    Anemia     Asthma     Bilateral renal cysts  "    Breast disorder     Congenital absence of one kidney     General anesthetics causing adverse effect in therapeutic use     Kidney cysts     pt born with 1 kidney     Patient Active Problem List   Diagnosis    Chronic pain of left knee    Muscle weakness of lower extremity    Decreased range of motion (ROM) of left knee    Obstructive sleep apnea    Hypersomnolence    Multicystic kidney disease    Unilateral renal atrophy    Renal hypoplasia, unilateral    History of multiple miscarriages    Finger mass, left    Finger stiffness, left    Hand pain, left    Decreased pinch strength    Requires assistance with activities of daily living (ADL)    Pain aggravated by activities of daily living    Cubital tunnel syndrome on left    Cognitive communication deficit    Pain of right upper extremity    Weakness    Stiffness in joint    Chronic post-concussion headache    Dizziness    Concussion with no loss of consciousness    Witnessed episode of apnea    Gastro-esophageal reflux disease without esophagitis       Current Outpatient Medications:     albuterol (PROVENTIL/VENTOLIN HFA) 90 mcg/actuation inhaler, Inhale 1-2 puffs into the lungs every 6 (six) hours as needed for Wheezing. Rescue, Disp: 6.7 g, Rfl: 0    cephALEXin (KEFLEX) 500 MG capsule, Take 1 capsule (500 mg total) by mouth every 12 (twelve) hours. for 10 days, Disp: 20 capsule, Rfl: 0    diclofenac sodium (VOLTAREN) 1 % Gel, APPLY 2 GRAMS TOPICALLY TO THE AFFECTED AREA THREE TIMES DAILY, Disp: , Rfl:     EScitalopram oxalate (LEXAPRO) 5 MG Tab, Take 1 tablet (5 mg total) by mouth once daily., Disp: 90 tablet, Rfl: 3    methylPREDNISolone (MEDROL DOSEPACK) 4 mg tablet, use as directed (Patient not taking: Reported on 7/9/2024), Disp: 1 each, Rfl: 0    norgestrel-ethinyl estradioL (LO/OVRAL) 0.3-30 mg-mcg per tablet, Take 1 tablet by mouth once daily. (Patient not taking: Reported on 7/9/2024), Disp: 30 tablet, Rfl: 2    ondansetron (ZOFRAN) 4 MG tablet, Take 1  tablet (4 mg total) by mouth every 6 (six) hours., Disp: 12 tablet, Rfl: 0    ondansetron (ZOFRAN-ODT) 4 MG TbDL, Take 1 tablet (4 mg total) by mouth every 6 (six) hours as needed., Disp: 15 tablet, Rfl: 0    progesterone (PROMETRIUM) 200 MG capsule, Take 1 capsule (200 mg total) by mouth nightly., Disp: 30 capsule, Rfl: 12    propranoloL (INDERAL) 10 MG tablet, Take 1 tablet (10 mg total) by mouth 2 (two) times daily., Disp: 60 tablet, Rfl: 11    topiramate (TOPAMAX) 50 MG tablet, Take 0.5 tablets (25 mg total) by mouth once daily., Disp: 30 tablet, Rfl: 3    valACYclovir (VALTREX) 500 MG tablet, Take 1 tablet by mouth twice a day for 3 days with outbreaks, Disp: 30 tablet, Rfl: 4       There were no vitals filed for this visit.    Physical Exam:    GEN:   Well-appearing  Psych:  Appropriate affect, demonstrates insight  SKIN:  No rash on the face or bridge of the nose      LABS:   Lab Results   Component Value Date    HGB 13.4 2024    CO2 24 2024       RECORDS REVIEWED PREVIOUSLY:    3/26/21 HST: AHI 1, RDI 10    21 PSG w MSLT: PSG-AHI 1.8, RDI 9.8  MSLT-   1. This test follows an overnight PSG which showed 411.0 minutes of sleep.  2. Urine drug screen performed 5.5. is negative for drugs of abuse  3. Mean sleep latency of 15m 42 minutes is within normal limits  4. Sleep-onset REM periods (SOREMP) were seen on 0/5 attempts    HST 24: AHI 6, RDI 36, likely severe    Previous sleep notes: 3/9/21, 23    CPAP interrogation 24: 22/30 x 7hrs 27mins, 6-12cwp (8.9/10.8/11.4), leak (0/1.2/24.4), AHI 1.4    ASSESSMENT    [place] Sleepiness Scale:  Sitting and readin  Watching TV:    2  Passenger in a car x 1 hr:  2  Sitting quietly after lunch:  2  Lying down to rest in PM:  2  Sitting, inactive in public:  0  Sitting+ talking to someone:  0  Stopped in traffic:   0  Total    10/24    PROBLEM DESCRIPTION/ Sx on Presentation Interval Hx STATUS   RADHA   + loud snoring, + witnessed  apneas  Mallampati 4  + tonsillar hypertrophy (grade 1) Good usage and efficiency; residual snoring noted per partner   Largely controlled    Daytime Sx   + sleepiness when inactive   Does endorse some drowsy driving on occasion   ESS 19/24 on intake (reviewed from 3/9/21)  ESS 22/24 last visit (reviewed from 12/4/23)    Drinks multiple energy drinks    H/H hallucinations: reported sx on initial eval in 2021, denied sx on previous visit   Cataplexy: denies  Still endorses rare sleep paralysis Feels sleep is more restful and she is less sleepy on CPAP, no longer falling asleep at red lights improved   Insomnia   Trouble falling asleep: no issues  Maintenance:         wakes frequently, not usually difficult to return to sleep (30mins)  Prior hypnotics:        Current hypnotics:  Sleep is more consolidated on CPAP improved   Nocturia x  1-2 x nightly 0-1 x nightly improved   AM Headaches On awaking, daily  Lasts for roughly 1 hr  Now taking tylenol instead of Excedrin  occasionally also taking ibuprofen 800mg Wakes with headaches about 2 x a week now Wants to follow back up with Neurology   Other issues:     PLAN     -using and benefiting from CPAP therapy  -continue CPAP nightly  -adjusted CPAP pressures to 8-14cwp  -CPAP supplies ordered  -although significantly improved, consider trial of wake promoting agents if persistent sleepiness persists/interferes with activities of daily living  -discussed limb movements during sleep  -recommended trial of low dose melatonin 3mg nightly  -consider evaluation for RLS (ferritin) and RBD (PSG) if sx persist  -will trial flonase daily for nasal congestion  -referral to Neurology placed for further evaluation and management of chronic headaches   -discussed RADHA and CPAP with patient in detail, including possible complications of untreated RADHA like heart attack/stroke  -advised on strict driving precautions; advised never to drive drowsy    Advised on plan of care. Answered all  patient questions. Patient verbalized understanding and voiced agreement with plan of care.       RTC 6 months or as needed     The patient was given open opportunity to ask questions and/or express concerns about treatment plan. All questions/concerns were discussed.     Two patient identifiers used prior to evaluation.

## 2024-08-05 ENCOUNTER — PATIENT MESSAGE (OUTPATIENT)
Dept: OBSTETRICS AND GYNECOLOGY | Facility: CLINIC | Age: 29
End: 2024-08-05
Payer: MEDICAID

## 2024-08-12 DIAGNOSIS — B00.9 HSV INFECTION: Primary | ICD-10-CM

## 2024-08-12 RX ORDER — ACYCLOVIR 800 MG/1
800 TABLET ORAL 2 TIMES DAILY
Qty: 30 TABLET | Refills: 3 | Status: SHIPPED | OUTPATIENT
Start: 2024-08-12 | End: 2024-08-17

## 2024-08-18 ENCOUNTER — HOSPITAL ENCOUNTER (EMERGENCY)
Facility: OTHER | Age: 29
Discharge: HOME | End: 2024-08-19
Attending: EMERGENCY MEDICINE
Payer: MEDICAID

## 2024-08-18 DIAGNOSIS — R11.2 NAUSEA AND VOMITING, UNSPECIFIED VOMITING TYPE: ICD-10-CM

## 2024-08-18 DIAGNOSIS — R10.13 EPIGASTRIC ABDOMINAL PAIN: Primary | ICD-10-CM

## 2024-08-18 DIAGNOSIS — J90 CHRONIC BILATERAL PLEURAL EFFUSIONS: ICD-10-CM

## 2024-08-18 DIAGNOSIS — I31.39 CHRONIC PERICARDIAL EFFUSION: ICD-10-CM

## 2024-08-18 LAB
B-HCG UR QL: NEGATIVE
CTP QC/QA: YES

## 2024-08-18 PROCEDURE — 87088 URINE BACTERIA CULTURE: CPT | Performed by: EMERGENCY MEDICINE

## 2024-08-18 PROCEDURE — 87186 SC STD MICRODIL/AGAR DIL: CPT | Performed by: EMERGENCY MEDICINE

## 2024-08-18 PROCEDURE — 87635 SARS-COV-2 COVID-19 AMP PRB: CPT | Performed by: EMERGENCY MEDICINE

## 2024-08-18 PROCEDURE — 81000 URINALYSIS NONAUTO W/SCOPE: CPT | Performed by: EMERGENCY MEDICINE

## 2024-08-18 PROCEDURE — 81025 URINE PREGNANCY TEST: CPT | Performed by: EMERGENCY MEDICINE

## 2024-08-18 PROCEDURE — 87086 URINE CULTURE/COLONY COUNT: CPT | Performed by: EMERGENCY MEDICINE

## 2024-08-18 PROCEDURE — 99285 EMERGENCY DEPT VISIT HI MDM: CPT | Mod: 25

## 2024-08-18 RX ORDER — SODIUM CHLORIDE 9 MG/ML
1000 INJECTION, SOLUTION INTRAVENOUS
Status: COMPLETED | OUTPATIENT
Start: 2024-08-19 | End: 2024-08-19

## 2024-08-18 RX ORDER — ONDANSETRON HYDROCHLORIDE 2 MG/ML
4 INJECTION, SOLUTION INTRAVENOUS
Status: COMPLETED | OUTPATIENT
Start: 2024-08-19 | End: 2024-08-19

## 2024-08-19 VITALS
SYSTOLIC BLOOD PRESSURE: 121 MMHG | DIASTOLIC BLOOD PRESSURE: 67 MMHG | BODY MASS INDEX: 35.49 KG/M2 | OXYGEN SATURATION: 99 % | WEIGHT: 213 LBS | HEART RATE: 72 BPM | TEMPERATURE: 98 F | HEIGHT: 65 IN | RESPIRATION RATE: 16 BRPM

## 2024-08-19 LAB
ALBUMIN SERPL BCP-MCNC: 4 G/DL (ref 3.5–5.2)
ALP SERPL-CCNC: 56 U/L (ref 55–135)
ALT SERPL W/O P-5'-P-CCNC: 11 U/L (ref 10–44)
ANION GAP SERPL CALC-SCNC: 7 MMOL/L (ref 8–16)
AST SERPL-CCNC: 14 U/L (ref 10–40)
BACTERIA #/AREA URNS HPF: ABNORMAL /HPF
BASOPHILS # BLD AUTO: 0.05 K/UL (ref 0–0.2)
BASOPHILS NFR BLD: 0.5 % (ref 0–1.9)
BILIRUB SERPL-MCNC: 0.6 MG/DL (ref 0.1–1)
BILIRUB UR QL STRIP: NEGATIVE
BUN SERPL-MCNC: 8 MG/DL (ref 6–20)
CALCIUM SERPL-MCNC: 9.1 MG/DL (ref 8.7–10.5)
CHLORIDE SERPL-SCNC: 108 MMOL/L (ref 95–110)
CLARITY UR: ABNORMAL
CO2 SERPL-SCNC: 24 MMOL/L (ref 23–29)
COLOR UR: YELLOW
CREAT SERPL-MCNC: 1 MG/DL (ref 0.5–1.4)
CTP QC/QA: YES
DIFFERENTIAL METHOD BLD: ABNORMAL
EOSINOPHIL # BLD AUTO: 0.3 K/UL (ref 0–0.5)
EOSINOPHIL NFR BLD: 3 % (ref 0–8)
ERYTHROCYTE [DISTWIDTH] IN BLOOD BY AUTOMATED COUNT: 15.6 % (ref 11.5–14.5)
EST. GFR  (NO RACE VARIABLE): >60 ML/MIN/1.73 M^2
GLUCOSE SERPL-MCNC: 122 MG/DL (ref 70–110)
GLUCOSE UR QL STRIP: NEGATIVE
HCT VFR BLD AUTO: 39.2 % (ref 37–48.5)
HGB BLD-MCNC: 12.6 G/DL (ref 12–16)
HGB UR QL STRIP: NEGATIVE
IMM GRANULOCYTES # BLD AUTO: 0.01 K/UL (ref 0–0.04)
IMM GRANULOCYTES NFR BLD AUTO: 0.1 % (ref 0–0.5)
KETONES UR QL STRIP: NEGATIVE
LEUKOCYTE ESTERASE UR QL STRIP: ABNORMAL
LIPASE SERPL-CCNC: 24 U/L (ref 4–60)
LYMPHOCYTES # BLD AUTO: 3.7 K/UL (ref 1–4.8)
LYMPHOCYTES NFR BLD: 37.1 % (ref 18–48)
MCH RBC QN AUTO: 25.4 PG (ref 27–31)
MCHC RBC AUTO-ENTMCNC: 32.1 G/DL (ref 32–36)
MCV RBC AUTO: 79 FL (ref 82–98)
MICROSCOPIC COMMENT: ABNORMAL
MONOCYTES # BLD AUTO: 0.8 K/UL (ref 0.3–1)
MONOCYTES NFR BLD: 7.7 % (ref 4–15)
NEUTROPHILS # BLD AUTO: 5.1 K/UL (ref 1.8–7.7)
NEUTROPHILS NFR BLD: 51.6 % (ref 38–73)
NITRITE UR QL STRIP: NEGATIVE
NRBC BLD-RTO: 0 /100 WBC
PH UR STRIP: 8 [PH] (ref 5–8)
PLATELET # BLD AUTO: 253 K/UL (ref 150–450)
PMV BLD AUTO: 10.3 FL (ref 9.2–12.9)
POTASSIUM SERPL-SCNC: 3.8 MMOL/L (ref 3.5–5.1)
PROT SERPL-MCNC: 7.3 G/DL (ref 6–8.4)
PROT UR QL STRIP: NEGATIVE
RBC # BLD AUTO: 4.96 M/UL (ref 4–5.4)
RBC #/AREA URNS HPF: 4 /HPF (ref 0–4)
SARS-COV-2 RDRP RESP QL NAA+PROBE: NEGATIVE
SODIUM SERPL-SCNC: 139 MMOL/L (ref 136–145)
SP GR UR STRIP: 1.01 (ref 1–1.03)
SQUAMOUS #/AREA URNS HPF: 13 /HPF
URN SPEC COLLECT METH UR: ABNORMAL
UROBILINOGEN UR STRIP-ACNC: NEGATIVE EU/DL
WBC # BLD AUTO: 9.86 K/UL (ref 3.9–12.7)
WBC #/AREA URNS HPF: 16 /HPF (ref 0–5)

## 2024-08-19 PROCEDURE — 96374 THER/PROPH/DIAG INJ IV PUSH: CPT

## 2024-08-19 PROCEDURE — 83690 ASSAY OF LIPASE: CPT | Performed by: EMERGENCY MEDICINE

## 2024-08-19 PROCEDURE — 80053 COMPREHEN METABOLIC PANEL: CPT | Performed by: EMERGENCY MEDICINE

## 2024-08-19 PROCEDURE — 25000003 PHARM REV CODE 250: Performed by: EMERGENCY MEDICINE

## 2024-08-19 PROCEDURE — 96375 TX/PRO/DX INJ NEW DRUG ADDON: CPT

## 2024-08-19 PROCEDURE — 96361 HYDRATE IV INFUSION ADD-ON: CPT

## 2024-08-19 PROCEDURE — 85025 COMPLETE CBC W/AUTO DIFF WBC: CPT | Performed by: EMERGENCY MEDICINE

## 2024-08-19 PROCEDURE — 63600175 PHARM REV CODE 636 W HCPCS: Performed by: EMERGENCY MEDICINE

## 2024-08-19 RX ORDER — ONDANSETRON 8 MG/1
8 TABLET, ORALLY DISINTEGRATING ORAL EVERY 6 HOURS PRN
Qty: 15 TABLET | Refills: 0 | Status: SHIPPED | OUTPATIENT
Start: 2024-08-19 | End: 2024-08-20 | Stop reason: ALTCHOICE

## 2024-08-19 RX ORDER — MORPHINE SULFATE 2 MG/ML
2 INJECTION, SOLUTION INTRAMUSCULAR; INTRAVENOUS
Status: COMPLETED | OUTPATIENT
Start: 2024-08-19 | End: 2024-08-19

## 2024-08-19 RX ORDER — KETOROLAC TROMETHAMINE 30 MG/ML
10 INJECTION, SOLUTION INTRAMUSCULAR; INTRAVENOUS
Status: COMPLETED | OUTPATIENT
Start: 2024-08-19 | End: 2024-08-19

## 2024-08-19 RX ADMIN — KETOROLAC TROMETHAMINE 10 MG: 30 INJECTION, SOLUTION INTRAMUSCULAR; INTRAVENOUS at 12:08

## 2024-08-19 RX ADMIN — MORPHINE SULFATE 2 MG: 2 INJECTION, SOLUTION INTRAMUSCULAR; INTRAVENOUS at 01:08

## 2024-08-19 RX ADMIN — SODIUM CHLORIDE 1000 ML: 9 INJECTION, SOLUTION INTRAVENOUS at 12:08

## 2024-08-19 RX ADMIN — Medication: at 12:08

## 2024-08-19 RX ADMIN — ONDANSETRON 4 MG: 2 INJECTION INTRAMUSCULAR; INTRAVENOUS at 12:08

## 2024-08-19 NOTE — ED PROVIDER NOTES
Encounter Date: 8/18/2024       History     Chief Complaint   Patient presents with    Abdominal Pain     Generalized abd w/ NV since 9pm   Reports having 6 episodes of vomiting since the discomfort started     29-year-old female with history of bilateral renal cysts presents complaining of bilateral flank/upper abdominal pain for the past 3-4 hours.  Abdominal pain was followed by nausea and vomiting.  She is unable to characterize her pain.  She denies any alleviating or aggravating factors but also admits to no interventions at home prior to arrival.  She denies any associated fever, chills, myalgias, urinary symptoms, constipation, diarrhea, and abnormal vaginal bleeding or discharge.      Review of patient's allergies indicates:   Allergen Reactions    Lisinopril Other (See Comments), Shortness Of Breath and Swelling     angioedema      Shellfish containing products Swelling     Past Medical History:   Diagnosis Date    Anemia     Asthma     Bilateral renal cysts     Breast disorder     Congenital absence of one kidney     General anesthetics causing adverse effect in therapeutic use     Kidney cysts     pt born with 1 kidney     Past Surgical History:   Procedure Laterality Date    FINGER MASS EXCISION Left 3/8/2022    Procedure: EXCISION, MASS, FINGER;  Surgeon: Bk Doty Jr., MD;  Location: [facility] OR;  Service: Orthopedics;  Laterality: Left;    ULNAR NERVE TRANSPOSITION Left 3/29/2023    Procedure: TRANSPOSITION, NERVE, ULNAR;  Surgeon: Bk Doty Jr., MD;  Location: [facility];  Service: Orthopedics;  Laterality: Left;     Family History   Problem Relation Name Age of Onset    Diabetes Paternal Aunt      Hypertension Maternal Grandfather      Cancer Paternal Grandmother      Breast cancer Paternal Grandmother      Leukemia Paternal Grandmother      Kidney disease Maternal Grandmother      Hypertension Mother       Social History     Tobacco Use    Smoking status: Every Day     Types: Vaping with  nicotine    Smokeless tobacco: Never   Substance Use Topics    Alcohol use: Yes     Comment: occasionally    Drug use: Not Currently     Review of Systems    Physical Exam     Initial Vitals [08/18/24 2344]   BP Pulse Resp Temp SpO2   138/86 84 17 98.4 °F (36.9 °C) 98 %      MAP       --         Physical Exam    Nursing note and vitals reviewed.  Constitutional: She appears well-developed and well-nourished. She is not diaphoretic. No distress.   Patient smiling during exam   HENT:   Head: Normocephalic and atraumatic.   Right Ear: External ear normal.   Left Ear: External ear normal.   Eyes: Conjunctivae and EOM are normal. Right eye exhibits no discharge. Left eye exhibits no discharge.   Neck: Neck supple.   Cardiovascular:  Normal rate, regular rhythm and normal heart sounds.     Exam reveals no gallop and no friction rub.       No murmur heard.  Pulmonary/Chest: Breath sounds normal. No respiratory distress. She has no wheezes. She has no rhonchi. She has no rales. She exhibits no tenderness.   Abdominal: Abdomen is soft. She exhibits no distension.   Diffuse abdominal tenderness without rebound or guarding.  Left CVA tenderness to palpation.   Musculoskeletal:         General: No tenderness or edema. Normal range of motion.      Cervical back: Neck supple.     Neurological: She is alert and oriented to person, place, and time. She has normal strength.         ED Course   Procedures  Labs Reviewed   URINALYSIS, REFLEX TO URINE CULTURE - Abnormal       Result Value    Specimen UA Urine, Clean Catch      Color, UA Yellow      Appearance, UA Hazy (*)     pH, UA 8.0      Specific Gravity, UA 1.015      Protein, UA Negative      Glucose, UA Negative      Ketones, UA Negative      Bilirubin (UA) Negative      Occult Blood UA Negative      Nitrite, UA Negative      Urobilinogen, UA Negative      Leukocytes, UA 3+ (*)     Narrative:     Specimen Source->Urine   CBC W/ AUTO DIFFERENTIAL - Abnormal    WBC 9.86      RBC  4.96      Hemoglobin 12.6      Hematocrit 39.2      MCV 79 (*)     MCH 25.4 (*)     MCHC 32.1      RDW 15.6 (*)     Platelets 253      MPV 10.3      Immature Granulocytes 0.1      Gran # (ANC) 5.1      Immature Grans (Abs) 0.01      Lymph # 3.7      Mono # 0.8      Eos # 0.3      Baso # 0.05      nRBC 0      Gran % 51.6      Lymph % 37.1      Mono % 7.7      Eosinophil % 3.0      Basophil % 0.5      Differential Method Automated     COMPREHENSIVE METABOLIC PANEL - Abnormal    Sodium 139      Potassium 3.8      Chloride 108      CO2 24      Glucose 122 (*)     BUN 8      Creatinine 1.0      Calcium 9.1      Total Protein 7.3      Albumin 4.0      Total Bilirubin 0.6      Alkaline Phosphatase 56      AST 14      ALT 11      eGFR >60      Anion Gap 7 (*)    URINALYSIS MICROSCOPIC - Abnormal    RBC, UA 4      WBC, UA 16 (*)     Bacteria Rare      Squam Epithel, UA 13      Microscopic Comment SEE COMMENT      Narrative:     Specimen Source->Urine   CULTURE, URINE   LIPASE    Lipase 24     POCT URINE PREGNANCY    POC Preg Test, Ur Negative       Acceptable Yes     SARS-COV-2 RDRP GENE    POC Rapid COVID Negative       Acceptable Yes            Imaging Results              CT Abdomen Pelvis  Without Contrast (Final result)  Result time 08/19/24 01:51:43      Final result by Blake Mcdonnell MD (08/19/24 01:51:43)                   Impression:      Minimal bilateral pleural effusion and minimal pericardial effusion.    Small hiatal hernia.  The stomach demonstrates nonspecific appearance of mild-to-moderate distention with ingested material and air.    Atrophic right kidney, there is no evidence for ureteral calculus or obstructive uropathy bilaterally.    Mild urinary bladder wall thickening may relate to incomplete distention however correlation for UTI/cystitis is needed.    Prominent appearance of the region of the lower uterine segment/cervix may relate to uterine positioning however  clinical and historical correlation and evaluation is otherwise needed.      Electronically signed by: Blake Mcdonnell  Date:    08/19/2024  Time:    01:51               Narrative:    EXAMINATION:  CT ABDOMEN PELVIS WITHOUT CONTRAST    CLINICAL HISTORY:  Abdominal pain, acute, nonlocalized;    TECHNIQUE:  Low dose axial images, sagittal and coronal reformations were obtained from the lung bases to the pubic symphysis.  Intravenous contrast and oral contrast was not utilized.    COMPARISON:  CT examination of the abdomen and pelvis December 27, 2022    FINDINGS:  The lung bases demonstrate mild motion artifact.  Mild atelectatic change.  There is appearance of minimal pleural effusion bilaterally.  There is minimal pericardial effusion noted as well.  These findings appear similar to the prior study, although the minimal pleural fluid is mildly more conspicuous.    There is a small hiatal hernia.  The stomach demonstrates nonspecific appearance of mild-to-moderate distention with ingested material and air.    The gallbladder is contracted, there is no evidence for pericholecystic or peripancreatic inflammatory change, when accounting for limitations of the exam there is no evidence for acute process of the liver, gallbladder, pancreas, spleen or adrenal glands.    Chronic atrophic change of the right kidney again noted, renal hypodensities on the right are again noted, not optimally evaluated on this examination however may represent cysts.  There is no evidence for hydronephrosis or ureteral calculus or obstructive uropathy or perinephric inflammatory change bilaterally.  The abdominal aorta appears normal in caliber, otherwise not optimally evaluated on this noncontrast examination.    Mild urinary bladder wall thickening may relate to incomplete distention however correlation for UTI/cystitis is needed.    The uterus demonstrates retroflexed configuration, there is prominence of the region of the lower uterine  segment/cervix this may relate to uterine positioning however clinical and historical correlation and evaluation is recommended.  There is minimal free fluid at the cul-de-sac, nonspecific, may be physiologic.  There is no evidence for dominant adnexal mass or cystic collection.    There is no evidence for small bowel obstructive process.  The appendix is identified, it does not appear inflamed.  There is no evidence for inflammatory or obstructive process of the colon.  There is no evidence for free intraperitoneal air.    The visualized osseous structures appear intact.  Findings at the sacroiliac joints may relate to changes of sacroiliitis.                                       Medications   0.9%  NaCl infusion (0 mLs Intravenous Stopped 8/19/24 0140)   ondansetron injection 4 mg (4 mg Intravenous Given 8/19/24 0007)   ketorolac injection 9.999 mg (9.999 mg Intravenous Given 8/19/24 0022)   diphenhydrAMINE-zinc acetate 1-0.1% cream ( Topical (Top) Given 8/19/24 0055)   morphine injection 2 mg (2 mg Intravenous Given 8/19/24 0141)     Medical Decision Making  29-year-old female presents with complaint of bilateral abdominal pain times hours with associated nausea and vomiting.  On exam she pointed to the flank area as location of her pain but she had diffuse tenderness on exam.  UA negative for UTI.  Lab work including CBC, CMP and lipase without significant abnormalities.  CT abdomen pelvis was obtained for further evaluation as she experienced no relief with Toradol.  The CT showed some nonspecific distention of the stomach with both ingested material and air as well as a small hiatal hernia.  In addition there is also a small pericardial effusion noted as well as bilateral pleural effusions.  These appeared to be stable compared to previous.  On re-evaluation after receiving a dose of morphine the patient was requesting to be discharged.  She was able tolerate a p.o. challenge (ate chips) and she was  discharged home in stable condition with a prescription for Zofran and given strict ED return precautions.  PCP follow-up for re-evaluation as needed.    Amount and/or Complexity of Data Reviewed  Labs: ordered.  Radiology: ordered.    Risk  OTC drugs.  Prescription drug management.                                      Clinical Impression:  Final diagnoses:  [R10.13] Epigastric abdominal pain (Primary)  [R11.2] Nausea and vomiting, unspecified vomiting type  [J90] Chronic bilateral pleural effusions  [I31.39] Chronic pericardial effusion          ED Disposition Condition    Discharge Stable          ED Prescriptions       Medication Sig Dispense Start Date End Date Auth. Provider    ondansetron (ZOFRAN-ODT) 8 MG TbDL Take 1 tablet (8 mg total) by mouth every 6 (six) hours as needed (nausea). 15 tablet 8/19/2024 -- Cha Baxter MD          Follow-up Information       Follow up With Specialties Details Why Contact Info    Les Horton MD Family Medicine Schedule an appointment as soon as possible for a visit   [address] [facility] [address]  [phone]      [facility] [place] Emergency Dept Emergency Medicine Go to  [facility] symptoms worsen [address] [facility] [id number] [phone]             Cha Baxter MD  08/19/24 0461

## 2024-08-19 NOTE — ED NOTES
Ray Knox, a 29 y.o. female presents to the ED with x1 day of emesis. Pt states she had 6 episodes of emesis, last occurrence 30 mins PTA was clear with dark red blood dispersed within, this was reason for pt to come to ED. Pt endorses nausea at this time. Visitor at bedside.    Pt resting comfortably. ED workup in progress. Call light within reach. Safety measures in place. Denies further needs. Plan of care ongoing.      Chief Complaint   Patient presents with    Abdominal Pain     Generalized abd w/ NV since 9pm   Reports having 6 episodes of vomiting since the discomfort started     Review of patient's allergies indicates:   Allergen Reactions    Lisinopril Other (See Comments), Shortness Of Breath and Swelling     angioedema      Shellfish containing products Swelling     Past Medical History:   Diagnosis Date    Anemia     Asthma     Bilateral renal cysts     Breast disorder     Congenital absence of one kidney     General anesthetics causing adverse effect in therapeutic use     Kidney cysts     pt born with 1 kidney     Past Surgical History:   Procedure Laterality Date    FINGER MASS EXCISION Left 3/8/2022    Procedure: EXCISION, MASS, FINGER;  Surgeon: Bk Doty Jr., MD;  Location: [facility] OR;  Service: Orthopedics;  Laterality: Left;    ULNAR NERVE TRANSPOSITION Left 3/29/2023    Procedure: TRANSPOSITION, NERVE, ULNAR;  Surgeon: Bk Doty Jr., MD;  Location: [facility] OR;  Service: Orthopedics;  Laterality: Left;

## 2024-08-20 ENCOUNTER — OFFICE VISIT (OUTPATIENT)
Dept: INTERNAL MEDICINE | Facility: CLINIC | Age: 29
End: 2024-08-20
Payer: MEDICAID

## 2024-08-20 ENCOUNTER — HOSPITAL ENCOUNTER (EMERGENCY)
Facility: HOSPITAL | Age: 29
Discharge: HOME | End: 2024-08-20
Attending: EMERGENCY MEDICINE
Payer: MEDICAID

## 2024-08-20 VITALS
OXYGEN SATURATION: 99 % | BODY MASS INDEX: 35.26 KG/M2 | WEIGHT: 211.63 LBS | HEART RATE: 80 BPM | TEMPERATURE: 98 F | SYSTOLIC BLOOD PRESSURE: 120 MMHG | RESPIRATION RATE: 18 BRPM | HEIGHT: 65 IN | DIASTOLIC BLOOD PRESSURE: 74 MMHG

## 2024-08-20 VITALS
HEIGHT: 65 IN | DIASTOLIC BLOOD PRESSURE: 72 MMHG | HEART RATE: 85 BPM | SYSTOLIC BLOOD PRESSURE: 106 MMHG | WEIGHT: 211.63 LBS | BODY MASS INDEX: 35.26 KG/M2 | OXYGEN SATURATION: 99 %

## 2024-08-20 DIAGNOSIS — N30.00 ACUTE CYSTITIS WITHOUT HEMATURIA: Primary | ICD-10-CM

## 2024-08-20 DIAGNOSIS — R42 LIGHTHEADEDNESS: ICD-10-CM

## 2024-08-20 DIAGNOSIS — K29.70 GASTRITIS, PRESENCE OF BLEEDING UNSPECIFIED, UNSPECIFIED CHRONICITY, UNSPECIFIED GASTRITIS TYPE: ICD-10-CM

## 2024-08-20 DIAGNOSIS — R10.9 ABDOMINAL PAIN, UNSPECIFIED ABDOMINAL LOCATION: Primary | ICD-10-CM

## 2024-08-20 DIAGNOSIS — R10.9 ABDOMINAL PAIN: ICD-10-CM

## 2024-08-20 LAB
ALBUMIN SERPL BCP-MCNC: 4.1 G/DL (ref 3.5–5.2)
ALP SERPL-CCNC: 61 U/L (ref 55–135)
ALT SERPL W/O P-5'-P-CCNC: 11 U/L (ref 10–44)
ANION GAP SERPL CALC-SCNC: 8 MMOL/L (ref 8–16)
AST SERPL-CCNC: 13 U/L (ref 10–40)
B-HCG UR QL: NEGATIVE
BACTERIA #/AREA URNS AUTO: ABNORMAL /HPF
BASOPHILS # BLD AUTO: 0.05 K/UL (ref 0–0.2)
BASOPHILS NFR BLD: 0.7 % (ref 0–1.9)
BILIRUB SERPL-MCNC: 0.5 MG/DL (ref 0.1–1)
BILIRUB UR QL STRIP: NEGATIVE
BUN SERPL-MCNC: 9 MG/DL (ref 6–20)
CALCIUM SERPL-MCNC: 9.2 MG/DL (ref 8.7–10.5)
CHLORIDE SERPL-SCNC: 107 MMOL/L (ref 95–110)
CLARITY UR REFRACT.AUTO: ABNORMAL
CO2 SERPL-SCNC: 23 MMOL/L (ref 23–29)
COLOR UR AUTO: YELLOW
CREAT SERPL-MCNC: 1.1 MG/DL (ref 0.5–1.4)
CTP QC/QA: YES
DIFFERENTIAL METHOD BLD: ABNORMAL
EOSINOPHIL # BLD AUTO: 0.1 K/UL (ref 0–0.5)
EOSINOPHIL NFR BLD: 1.5 % (ref 0–8)
ERYTHROCYTE [DISTWIDTH] IN BLOOD BY AUTOMATED COUNT: 15.6 % (ref 11.5–14.5)
EST. GFR  (NO RACE VARIABLE): >60 ML/MIN/1.73 M^2
GLUCOSE SERPL-MCNC: 99 MG/DL (ref 70–110)
GLUCOSE UR QL STRIP: NEGATIVE
HCT VFR BLD AUTO: 42.6 % (ref 37–48.5)
HGB BLD-MCNC: 13.1 G/DL (ref 12–16)
HGB UR QL STRIP: NEGATIVE
IMM GRANULOCYTES # BLD AUTO: 0.01 K/UL (ref 0–0.04)
IMM GRANULOCYTES NFR BLD AUTO: 0.1 % (ref 0–0.5)
KETONES UR QL STRIP: NEGATIVE
LACTATE SERPL-SCNC: 0.7 MMOL/L (ref 0.5–2.2)
LEUKOCYTE ESTERASE UR QL STRIP: ABNORMAL
LIPASE SERPL-CCNC: 38 U/L (ref 4–60)
LYMPHOCYTES # BLD AUTO: 1.9 K/UL (ref 1–4.8)
LYMPHOCYTES NFR BLD: 28.2 % (ref 18–48)
MCH RBC QN AUTO: 25.3 PG (ref 27–31)
MCHC RBC AUTO-ENTMCNC: 30.8 G/DL (ref 32–36)
MCV RBC AUTO: 82 FL (ref 82–98)
MICROSCOPIC COMMENT: ABNORMAL
MONOCYTES # BLD AUTO: 0.4 K/UL (ref 0.3–1)
MONOCYTES NFR BLD: 5.9 % (ref 4–15)
NEUTROPHILS # BLD AUTO: 4.3 K/UL (ref 1.8–7.7)
NEUTROPHILS NFR BLD: 63.6 % (ref 38–73)
NITRITE UR QL STRIP: POSITIVE
NRBC BLD-RTO: 0 /100 WBC
OHS QRS DURATION: 92 MS
OHS QTC CALCULATION: 406 MS
PH UR STRIP: 6 [PH] (ref 5–8)
PLATELET # BLD AUTO: 228 K/UL (ref 150–450)
PMV BLD AUTO: 10.7 FL (ref 9.2–12.9)
POTASSIUM SERPL-SCNC: 4.2 MMOL/L (ref 3.5–5.1)
PROT SERPL-MCNC: 7.7 G/DL (ref 6–8.4)
PROT UR QL STRIP: NEGATIVE
RBC # BLD AUTO: 5.18 M/UL (ref 4–5.4)
RBC #/AREA URNS AUTO: 1 /HPF (ref 0–4)
SODIUM SERPL-SCNC: 138 MMOL/L (ref 136–145)
SP GR UR STRIP: 1.02 (ref 1–1.03)
SQUAMOUS #/AREA URNS AUTO: 4 /HPF
URN SPEC COLLECT METH UR: ABNORMAL
WBC # BLD AUTO: 6.77 K/UL (ref 3.9–12.7)
WBC #/AREA URNS AUTO: 10 /HPF (ref 0–5)

## 2024-08-20 PROCEDURE — 99999 PR PBB SHADOW E&M-EST. PATIENT-LVL III: CPT | Mod: PBBFAC,,,

## 2024-08-20 PROCEDURE — 96374 THER/PROPH/DIAG INJ IV PUSH: CPT

## 2024-08-20 PROCEDURE — 96375 TX/PRO/DX INJ NEW DRUG ADDON: CPT

## 2024-08-20 PROCEDURE — 83690 ASSAY OF LIPASE: CPT | Performed by: PHYSICIAN ASSISTANT

## 2024-08-20 PROCEDURE — 81001 URINALYSIS AUTO W/SCOPE: CPT | Performed by: PHYSICIAN ASSISTANT

## 2024-08-20 PROCEDURE — 96361 HYDRATE IV INFUSION ADD-ON: CPT

## 2024-08-20 PROCEDURE — 63600175 PHARM REV CODE 636 W HCPCS: Performed by: PHYSICIAN ASSISTANT

## 2024-08-20 PROCEDURE — 25000003 PHARM REV CODE 250: Performed by: PHYSICIAN ASSISTANT

## 2024-08-20 PROCEDURE — 81025 URINE PREGNANCY TEST: CPT | Performed by: PHYSICIAN ASSISTANT

## 2024-08-20 PROCEDURE — 93005 ELECTROCARDIOGRAM TRACING: CPT

## 2024-08-20 PROCEDURE — 85025 COMPLETE CBC W/AUTO DIFF WBC: CPT | Performed by: PHYSICIAN ASSISTANT

## 2024-08-20 PROCEDURE — 93010 ELECTROCARDIOGRAM REPORT: CPT | Mod: ,,, | Performed by: STUDENT IN AN ORGANIZED HEALTH CARE EDUCATION/TRAINING PROGRAM

## 2024-08-20 PROCEDURE — 99213 OFFICE O/P EST LOW 20 MIN: CPT | Mod: PBBFAC

## 2024-08-20 PROCEDURE — 80053 COMPREHEN METABOLIC PANEL: CPT | Performed by: PHYSICIAN ASSISTANT

## 2024-08-20 PROCEDURE — 83605 ASSAY OF LACTIC ACID: CPT | Performed by: PHYSICIAN ASSISTANT

## 2024-08-20 PROCEDURE — 99284 EMERGENCY DEPT VISIT MOD MDM: CPT | Mod: 25,27

## 2024-08-20 RX ORDER — ONDANSETRON HYDROCHLORIDE 2 MG/ML
4 INJECTION, SOLUTION INTRAVENOUS
Status: COMPLETED | OUTPATIENT
Start: 2024-08-20 | End: 2024-08-20

## 2024-08-20 RX ORDER — SUCRALFATE 1 G/10ML
1 SUSPENSION ORAL 4 TIMES DAILY
Qty: 473 ML | Refills: 0 | Status: SHIPPED | OUTPATIENT
Start: 2024-08-20

## 2024-08-20 RX ORDER — PANTOPRAZOLE SODIUM 40 MG/1
40 TABLET, DELAYED RELEASE ORAL DAILY
Qty: 30 TABLET | Refills: 0 | Status: SHIPPED | OUTPATIENT
Start: 2024-08-20 | End: 2025-08-20

## 2024-08-20 RX ORDER — FAMOTIDINE 10 MG/ML
20 INJECTION INTRAVENOUS
Status: COMPLETED | OUTPATIENT
Start: 2024-08-20 | End: 2024-08-20

## 2024-08-20 RX ORDER — FAMOTIDINE 20 MG/1
20 TABLET, FILM COATED ORAL 2 TIMES DAILY
Qty: 28 TABLET | Refills: 0 | Status: SHIPPED | OUTPATIENT
Start: 2024-08-20 | End: 2024-09-03

## 2024-08-20 RX ORDER — SUCRALFATE 1 G/10ML
1 SUSPENSION ORAL
Status: COMPLETED | OUTPATIENT
Start: 2024-08-20 | End: 2024-08-20

## 2024-08-20 RX ORDER — CEPHALEXIN 500 MG/1
500 CAPSULE ORAL
Status: COMPLETED | OUTPATIENT
Start: 2024-08-20 | End: 2024-08-20

## 2024-08-20 RX ORDER — ACETAMINOPHEN 500 MG
1000 TABLET ORAL
Status: COMPLETED | OUTPATIENT
Start: 2024-08-20 | End: 2024-08-20

## 2024-08-20 RX ORDER — ONDANSETRON 4 MG/1
4 TABLET, ORALLY DISINTEGRATING ORAL EVERY 8 HOURS PRN
Qty: 16 TABLET | Refills: 0 | Status: SHIPPED | OUTPATIENT
Start: 2024-08-20

## 2024-08-20 RX ORDER — CEPHALEXIN 500 MG/1
500 CAPSULE ORAL EVERY 12 HOURS
Qty: 14 CAPSULE | Refills: 0 | Status: SHIPPED | OUTPATIENT
Start: 2024-08-20 | End: 2024-08-27

## 2024-08-20 RX ADMIN — ACETAMINOPHEN 1000 MG: 500 TABLET ORAL at 03:08

## 2024-08-20 RX ADMIN — SODIUM CHLORIDE 1000 ML: 0.9 INJECTION, SOLUTION INTRAVENOUS at 01:08

## 2024-08-20 RX ADMIN — SODIUM CHLORIDE 500 ML: 9 INJECTION, SOLUTION INTRAVENOUS at 03:08

## 2024-08-20 RX ADMIN — FAMOTIDINE 20 MG: 10 INJECTION, SOLUTION INTRAVENOUS at 01:08

## 2024-08-20 RX ADMIN — SUCRALFATE 1 G: 1 SUSPENSION ORAL at 01:08

## 2024-08-20 RX ADMIN — CEPHALEXIN 500 MG: 500 CAPSULE ORAL at 03:08

## 2024-08-20 RX ADMIN — ONDANSETRON 4 MG: 2 INJECTION INTRAMUSCULAR; INTRAVENOUS at 01:08

## 2024-08-20 NOTE — ED TRIAGE NOTES
Pt reports being seen in ER for L abdominal pain, n/v. Pt reports today she was seen by PCP and became lightheaded. Pt reports worsening abdominal pain, an episode of vomiting this AM and lightheadedness. Pt denies urinary problems, constipation, diarrhea    LOC: The patient is awake, alert and aware of environment with an appropriate affect, the patient is oriented x 3 and speaking appropriately.  APPEARANCE: Patient in no acute distress, patient is clean and well groomed, patient's clothing is properly fastened.  SKIN: The skin is warm and dry, color consistent with ethnicity, patient has normal skin turgor and moist mucus membranes, skin intact, no breakdown or bruising noted.  MUSCULOSKELETAL: Patient moving all extremities spontaneously, no obvious swelling or deformities noted.  RESPIRATORY: Airway is open and patent, respirations are spontaneous, patient has a normal effort and rate, no accessory muscle use noted  CARDIAC: Patient has a normal rate and regular rhythm, no periphreal edema noted, capillary refill < 3 seconds.  ABDOMEN: Soft and non tender to palpation, no distention noted,   NEUROLOGIC:  facial expression is symmetrical, patient moving all extremities spontaneously, normal sensation in all extremities when touched with a finger.  Follows all commands appropriately.

## 2024-08-20 NOTE — PROVIDER PROGRESS NOTES - EMERGENCY DEPT.
Encounter Date: 8/20/2024    ED Physician Progress Notes          ED Physician Hand-off Note:    ED Course: I assumed care of patient from off-going ED physician team. Briefly, Patient is a 21-year-old female presenting for epigastric discomfort.    At the time of signout plan was pending orthostatics.    Medications given in the ED:    Medications   sodium chloride 0.9% bolus 500 mL 500 mL (500 mLs Intravenous New Bag 8/20/24 1540)   sodium chloride 0.9% bolus 1,000 mL 1,000 mL (0 mLs Intravenous Stopped 8/20/24 1417)   ondansetron injection 4 mg (4 mg Intravenous Given 8/20/24 1309)   sucralfate 100 mg/mL suspension 1 g (1 g Oral Given 8/20/24 1309)   famotidine (PF) injection 20 mg (20 mg Intravenous Given 8/20/24 1309)   acetaminophen tablet 1,000 mg (1,000 mg Oral Given 8/20/24 1538)   cephALEXin capsule 500 mg (500 mg Oral Given 8/20/24 1538)     Patient not orthostatic.  Feeling better after therapy.  Tolerating p.o. intake.  Will discharge with referral to GI for follow-up and instructions to return to the ED for worsening symptoms. Stressed the importance of follow-up, strict ED return precautions given.  Patient voiced understanding and is comfortable with discharge.

## 2024-08-20 NOTE — ED PROVIDER NOTES
Encounter Date: 8/20/2024       History     Chief Complaint   Patient presents with    Abdominal Pain     Sent from [facility], seen er on Sunday, told has hernia, states was dressing leaned over almost passed out out no loc     29-year-old female with a PMHx of anemia, asthma, congenital absence of 1 kidney presents to ED with left upper quadrant abdominal pain x1 week. She developed nausea and vomiting 3 days ago prompting her to present to emergency department for further evaluation.  Her workup at that time was negative for acute causes of her abdominal pain.  Her symptoms had improved and she was discharge.  She had a follow up appointment today with her PCP.  Given that her symptoms have worsened it was recommended that she present to the emergency department for further evaluation.  Today she complains of lightheadedness, weakness, nausea, vomiting, abdominal pain.  She noticed approximately a quarter-sized amount of blood in her vomit 2-3 times.  She denies seeing blood in her vomit today. She has not been able to keep food down in 2 days. She smokes marijuana daily though has not since her symptoms have started.  She is an occasional drinker.  Reports taking ibuprofen intermittently for the past week, though she only started taking it after she developed pain. She denies urinary symptoms, flank pain, diarrhea, melena, bloody stool, fever.      The history is provided by the patient.     Review of patient's allergies indicates:   Allergen Reactions    Lisinopril Other (See Comments), Shortness Of Breath and Swelling     angioedema      Shellfish containing products Swelling     Past Medical History:   Diagnosis Date    Anemia     Asthma     Bilateral renal cysts     Breast disorder     Congenital absence of one kidney     General anesthetics causing adverse effect in therapeutic use     Kidney cysts     pt born with 1 kidney     Past Surgical History:   Procedure Laterality Date    FINGER MASS EXCISION Left  3/8/2022    Procedure: EXCISION, MASS, FINGER;  Surgeon: Bk Doty Jr., MD;  Location: [facility] OR;  Service: Orthopedics;  Laterality: Left;    ULNAR NERVE TRANSPOSITION Left 3/29/2023    Procedure: TRANSPOSITION, NERVE, ULNAR;  Surgeon: Bk Doty Jr., MD;  [facility];  Service: Orthopedics;  Laterality: Left;     Family History   Problem Relation Name Age of Onset    Diabetes Paternal Aunt      Hypertension Maternal Grandfather      Cancer Paternal Grandmother      Breast cancer Paternal Grandmother      Leukemia Paternal Grandmother      Kidney disease Maternal Grandmother      Hypertension Mother       Social History     Tobacco Use    Smoking status: Every Day     Types: Vaping with nicotine    Smokeless tobacco: Never   Substance Use Topics    Alcohol use: Yes     Comment: occasionally    Drug use: Not Currently     Review of Systems   Constitutional:  Negative for fever.   Respiratory:  Negative for shortness of breath.    Cardiovascular:  Negative for chest pain.   Gastrointestinal:  Positive for abdominal pain, nausea and vomiting.   Genitourinary:  Negative for dysuria, flank pain and frequency.   Neurological:  Positive for weakness and light-headedness.       Physical Exam     Initial Vitals [08/20/24 1106]   BP Pulse Resp Temp SpO2   110/67 76 18 98.6 °F (37 °C) 99 %      MAP       --         Physical Exam    Nursing note and vitals reviewed.  Constitutional: She appears well-developed and well-nourished. She is not diaphoretic. No distress.   HENT:   Head: Normocephalic and atraumatic.   Nose: Nose normal.   Eyes: Conjunctivae and EOM are normal.   Neck: Neck supple.   Cardiovascular:  Normal rate.           Pulmonary/Chest: No respiratory distress.   Abdominal: Abdomen is soft. There is generalized abdominal tenderness.   Generalized abdominal pain, worse in LLQ.  Nondistended, no guarding   No right CVA tenderness.  No left CVA tenderness.   Musculoskeletal:      Cervical back: Neck  supple.     Neurological: She is alert and oriented to person, place, and time. Gait normal.   Skin: No rash noted.   Psychiatric: She has a normal mood and affect. Thought content normal.         ED Course   Procedures  Labs Reviewed   CBC W/ AUTO DIFFERENTIAL - Abnormal       Result Value    WBC 6.77      RBC 5.18      Hemoglobin 13.1      Hematocrit 42.6      MCV 82      MCH 25.3 (*)     MCHC 30.8 (*)     RDW 15.6 (*)     Platelets 228      MPV 10.7      Immature Granulocytes 0.1      Gran # (ANC) 4.3      Immature Grans (Abs) 0.01      Lymph # 1.9      Mono # 0.4      Eos # 0.1      Baso # 0.05      nRBC 0      Gran % 63.6      Lymph % 28.2      Mono % 5.9      Eosinophil % 1.5      Basophil % 0.7      Differential Method Automated     URINALYSIS, REFLEX TO URINE CULTURE - Abnormal    Specimen UA Urine, Clean Catch      Color, UA Yellow      Appearance, UA Hazy (*)     pH, UA 6.0      Specific Gravity, UA 1.020      Protein, UA Negative      Glucose, UA Negative      Ketones, UA Negative      Bilirubin (UA) Negative      Occult Blood UA Negative      Nitrite, UA Positive (*)     Leukocytes, UA Trace (*)     Narrative:     Specimen Source->Urine   URINALYSIS MICROSCOPIC - Abnormal    RBC, UA 1      WBC, UA 10 (*)     Bacteria Moderate (*)     Squam Epithel, UA 4      Microscopic Comment SEE COMMENT      Narrative:     Specimen Source->Urine   COMPREHENSIVE METABOLIC PANEL    Sodium 138      Potassium 4.2      Chloride 107      CO2 23      Glucose 99      BUN 9      Creatinine 1.1      Calcium 9.2      Total Protein 7.7      Albumin 4.1      Total Bilirubin 0.5      Alkaline Phosphatase 61      AST 13      ALT 11      eGFR >60.0      Anion Gap 8     LIPASE    Lipase 38     LACTIC ACID, PLASMA    Lactate (Lactic Acid) 0.7     POCT URINE PREGNANCY    POC Preg Test, Ur Negative       Acceptable Yes       EKG Readings: (Independently Interpreted)   Initial Reading: No STEMI. Previous EKG: Compared with  most recent EKG Previous EKG Date: 7/9/24. Rhythm: Normal Sinus Rhythm. Heart Rate: 70. Axis: Normal.     ECG Results              EKG 12-lead (Final result)        Collection Time Result Time QRS Duration OHS QTC Calculation    08/20/24 12:42:22 08/20/24 15:14:19 92 406                     Final result by Interface, Lab In [facility] (08/20/24 15:14:24)                   Narrative:    Test Reason : R10.9,    Vent. Rate : 070 BPM     Atrial Rate : 070 BPM     P-R Int : 220 ms          QRS Dur : 092 ms      QT Int : 376 ms       P-R-T Axes : 034 060 022 degrees     QTc Int : 406 ms    Sinus rhythm with 1st degree A-V block  Otherwise normal ECG  When compared with ECG of 09-JUL-2024 15:22,  No significant change was found  Confirmed by Case Perry MD ([id number]) on 8/20/2024 3:14:17 PM    Referred By: [email or web address]   SELF           Confirmed By:Darrin Perry MD                                  Imaging Results    None          Medications   sodium chloride 0.9% bolus 1,000 mL 1,000 mL (0 mLs Intravenous Stopped 8/20/24 1417)   ondansetron injection 4 mg (4 mg Intravenous Given 8/20/24 1309)   sucralfate 100 mg/mL suspension 1 g (1 g Oral Given 8/20/24 1309)   famotidine (PF) injection 20 mg (20 mg Intravenous Given 8/20/24 1309)   sodium chloride 0.9% bolus 500 mL 500 mL (0 mLs Intravenous Stopped 8/20/24 1636)   acetaminophen tablet 1,000 mg (1,000 mg Oral Given 8/20/24 1538)   cephALEXin capsule 500 mg (500 mg Oral Given 8/20/24 1538)     Medical Decision Making  29-year-old female with a PMHx of anemia, asthma, congenital absence of 1 kidney presents to ED with left upper quadrant abdominal pain x1 week. Nontoxic appearing. Hemodynamically stable. Afebrile. Exam as above. I will initiate workup and reassess.    Ddx:  Gastritis, gastroenteritis, SBO, pyelonephritis, dehydration, electrolyte derangement, orthostatic hypotension,    Workup today is reassuring.  Labs without leukocytosis.  H&H stable. Normal  lactate, I do not suspect SBO. Abdominal labs are reassuring. LFTs, alk phos, bilirubin, lipase are WNL.  No significant electrolyte derangements    Patient had CT scan 1 day ago unrevealing for cause of abdominal pain. On reassessment, she is well appearing. She notes improvement in her abdominal pain after Pepcid and Carafate.  I suspect symptoms are secondary to gastritis.  She denies melena or bloody stool and her H&H is stable, I do not suspect acute GI bleed at this time. I Do not believe repeat CT AP scan is indicated at this time given reassuring laboratory workup and clinical improvement    She was successfully p.o. challenged    CT scan performed yesterday showed Mild urinary bladder wall thickening was noted. UA today positive for nitrites leukocytes with moderate bacteria microscopic.  She does not exhibit CVA tenderness.  I will treat for UTI at this time Keflex.    Pending orthostatic vital signs at this time.  Pepcid, Carafate, Zofran, Keflex sent to pharmacy of choice. I Placed a referral with gastroenterology and advised patient to follow up if her symptoms do not improve. I will sign out to Ketty Ramires due to shift change. Strict ED precautions given to return immediately for new, worsening, or concerning symptoms.    Amount and/or Complexity of Data Reviewed  Labs: ordered. Decision-making details documented in ED Course.    Risk  OTC drugs.  Prescription drug management.               ED Course as of 08/21/24 0939   Tue Aug 20, 2024   1301 WBC: 6.77 [HM]   1301 Hemoglobin: 13.1 [HM]   1301 Hematocrit: 42.6 [HM]   1350 Lactic Acid Level: 0.7 [HM]   1431 Creatinine: 1.1 [HM]   1431 BILIRUBIN TOTAL: 0.5 [HM]   1431 ALP: 61 [HM]   1431 AST: 13 [HM]   1431 ALT: 11 [HM]   1431 Lipase: 38 [HM]   1507 NITRITE UA(!): Positive [HM]   1507 Leukocyte Esterase, UA(!): Trace [HM]   1507 Bacteria, UA(!): Moderate [HM]   1507 Squam Epithel, UA: 4 [HM]      ED Course User Index  [HM] Marnie Valentin,  FAYE                             Clinical Impression:  Final diagnoses:  [R10.9] Abdominal pain  [N30.00] Acute cystitis without hematuria (Primary)  [K29.70] Gastritis, presence of bleeding unspecified, unspecified chronicity, unspecified gastritis type  [R42] Lightheadedness          ED Disposition Condition    Discharge Stable          ED Prescriptions       Medication Sig Dispense Start Date End Date Auth. Provider    sucralfate (CARAFATE) 100 mg/mL suspension Take 10 mLs (1 g total) by mouth 4 (four) times daily. 473 mL 8/20/2024 -- Marnie Valentin PA-C    famotidine (PEPCID) 20 MG tablet Take 1 tablet (20 mg total) by mouth 2 (two) times daily. for 14 days 28 tablet 8/20/2024 9/3/2024 Marnie Valentin PA-C    ondansetron (ZOFRAN-ODT) 4 MG TbDL Take 1 tablet (4 mg total) by mouth every 8 (eight) hours as needed (nausea and vomiting). 16 tablet 8/20/2024 -- Marnie Valentin PA-C    cephALEXin (KEFLEX) 500 MG capsule Take 1 capsule (500 mg total) by mouth every 12 (twelve) hours. for 7 days 14 capsule 8/20/2024 8/27/2024 Marnie Valentin PA-C          Follow-up Information       Follow up With Specialties Details Why Contact Info    Les Horton MD Family Medicine Schedule an appointment as soon as possible for a visit in 3 days  [address] [facility] [address]  [phone]      [facility] - Emergency Dept Emergency Medicine  If symptoms worsen [address] [facility] [id number] [phone]             Marnie Valentin PA-C  08/21/24 0910

## 2024-08-20 NOTE — PROGRESS NOTES
"    [facility]  RESIDENT CONTINUITY CLINIC NOTE    Name: Ray Knox  : 1995  MRN: [id number]  Date of Service: 2024   PCP: Les Horton MD (Inactive)          HPI:           Ray Knox is a 29 y.o. female with congenital single kidney, GERD, RADHA, chronic post-concussive headaches who presents to clinic for ED follow up.    Recent ED visit 24 for abdominal pain: "29-year-old female presents with complaint of bilateral abdominal pain times hours with associated nausea and vomiting. On exam she pointed to the flank area as location of her pain but she had diffuse tenderness on exam. UA negative for UTI. Lab work including CBC, CMP and lipase without significant abnormalities. CT abdomen pelvis was obtained for further evaluation as she experienced no relief with Toradol. The CT showed some nonspecific distention of the stomach with both ingested material and air as well as a small hiatal hernia. In addition there is also a small pericardial effusion noted as well as bilateral pleural effusions. These appeared to be stable compared to previous. On re-evaluation after receiving a dose of morphine the patient was requesting to be discharged. She was able tolerate a p.o. challenge (ate chips) and she was discharged home in stable condition with a prescription for Zofran and given strict ED return precautions. PCP follow-up for re-evaluation as needed."    She has been vomiting for 2-3 days but the abdominal pain has been present for a week a so. Started originally as cramps then progressed to upper abdominal sharp pain L>R. She reports that she was throwing up blood, bright red about a tablespoon on . She had bloody vomit last night once. No coffee ground emesis, no dark stools. Chest pains started around the same time with the abdominal pain. Chest pain is there all day, comes and goes, describes is as sharp and tightness that is worse when she takes " "a deep breath. Takes Tylenol for pain and ibuprofen 800mg occasionally. She went the ED a few days ago when the pain was not relieved by these medications. No diarrhea or constipation, no sick contacts, no fevers. She has not been able to keep any food or liquids down since discharge from the ED. She felt lightheaded all day yesterday and this morning and feels like she is going to fall when she stands.    Review of systems as above; all unmentioned systems are negative.     PEX:     Vitals:    08/20/24 1000   BP: 106/72   BP Location: Left arm   Patient Position: Sitting   BP Method: Large (Automatic)   Pulse: 85   SpO2: 99%   Weight: 96 kg (211 lb 10.3 oz)   Height: 5' 5" (1.651 m)     Body mass index is 35.22 kg/m².    Physical Exam  Vitals reviewed.   Constitutional:       General: She is not in acute distress.     Appearance: She is not ill-appearing or toxic-appearing.   HENT:      Head: Normocephalic and atraumatic.   Eyes:      General: No scleral icterus.  Cardiovascular:      Rate and Rhythm: Normal rate and regular rhythm.      Heart sounds: Normal heart sounds. No murmur heard.  Pulmonary:      Effort: Pulmonary effort is normal. No respiratory distress.      Breath sounds: No wheezing or rales.   Abdominal:      General: Abdomen is flat. There is no distension.      Tenderness: There is abdominal tenderness (upper abomden, L>R). There is no rebound.   Musculoskeletal:      Right lower leg: No edema.      Left lower leg: No edema.   Skin:     General: Skin is warm.   Neurological:      Mental Status: She is alert and oriented to person, place, and time. Mental status is at baseline.      Motor: No weakness.              Other pertinent data from chart that formed part of my MDM:             Current Outpatient Medications:     acyclovir (ZOVIRAX) 800 MG Tab, Take 1 tablet (800 mg total) by mouth 2 (two) times daily. Take 1 tablet orally BID x 5 days for recurrent outbreaks for 5 days, Disp: 30 tablet, " Rfl: 3    albuterol (PROVENTIL/VENTOLIN HFA) 90 mcg/actuation inhaler, Inhale 1-2 puffs into the lungs every 6 (six) hours as needed for Wheezing. Rescue, Disp: 6.7 g, Rfl: 0    diclofenac sodium (VOLTAREN) 1 % Gel, APPLY 2 GRAMS TOPICALLY TO THE AFFECTED AREA THREE TIMES DAILY (Patient not taking: Reported on 7/31/2024), Disp: , Rfl:     EScitalopram oxalate (LEXAPRO) 5 MG Tab, Take 1 tablet (5 mg total) by mouth once daily., Disp: 90 tablet, Rfl: 3    fluticasone propionate (FLONASE) 50 mcg/actuation nasal spray, 1 spray (50 mcg total) by Each Nostril route once daily., Disp: 16 g, Rfl: 3    melatonin (MELATIN) 3 mg tablet, Take 1 tablet (3 mg total) by mouth nightly., Disp: 30 tablet, Rfl: 0    methylPREDNISolone (MEDROL DOSEPACK) 4 mg tablet, use as directed (Patient not taking: Reported on 7/9/2024), Disp: 1 each, Rfl: 0    norgestrel-ethinyl estradioL (LO/OVRAL) 0.3-30 mg-mcg per tablet, Take 1 tablet by mouth once daily. (Patient not taking: Reported on 7/9/2024), Disp: 30 tablet, Rfl: 2    ondansetron (ZOFRAN) 4 MG tablet, Take 1 tablet (4 mg total) by mouth every 6 (six) hours. (Patient not taking: Reported on 7/31/2024), Disp: 12 tablet, Rfl: 0    ondansetron (ZOFRAN-ODT) 4 MG TbDL, Take 1 tablet (4 mg total) by mouth every 6 (six) hours as needed., Disp: 15 tablet, Rfl: 0    ondansetron (ZOFRAN-ODT) 8 MG TbDL, Take 1 tablet (8 mg total) by mouth every 6 (six) hours as needed (nausea)., Disp: 15 tablet, Rfl: 0    pantoprazole (PROTONIX) 40 MG tablet, Take 1 tablet (40 mg total) by mouth once daily., Disp: 30 tablet, Rfl: 0    progesterone (PROMETRIUM) 200 MG capsule, Take 1 capsule (200 mg total) by mouth nightly., Disp: 30 capsule, Rfl: 12    propranoloL (INDERAL) 10 MG tablet, Take 1 tablet (10 mg total) by mouth 2 (two) times daily., Disp: 60 tablet, Rfl: 11    topiramate (TOPAMAX) 50 MG tablet, Take 0.5 tablets (25 mg total) by mouth once daily., Disp: 30 tablet, Rfl: 3    valACYclovir (VALTREX) 500 MG  tablet, Take 1 tablet by mouth twice a day for 3 days with outbreaks, Disp: 30 tablet, Rfl: 4   Past Medical History:   Diagnosis Date    Anemia     Asthma     Bilateral renal cysts     Breast disorder     Congenital absence of one kidney     General anesthetics causing adverse effect in therapeutic use     Kidney cysts     pt born with 1 kidney     Past Surgical History:   Procedure Laterality Date    FINGER MASS EXCISION Left 3/8/2022    Procedure: EXCISION, MASS, FINGER;  Surgeon: Bk Doty Jr., MD;  Location: [facility] OR;  Service: Orthopedics;  Laterality: Left;    ULNAR NERVE TRANSPOSITION Left 3/29/2023    Procedure: TRANSPOSITION, NERVE, ULNAR;  Surgeon: Bk Doty Jr., MD;  Location: [facility] OR;  Service: Orthopedics;  Laterality: Left;     Family History   Problem Relation Name Age of Onset    Diabetes Paternal Aunt      Hypertension Maternal Grandfather      Cancer Paternal Grandmother      Breast cancer Paternal Grandmother      Leukemia Paternal Grandmother      Kidney disease Maternal Grandmother      Hypertension Mother       Social History     Socioeconomic History    Marital status: Single   Tobacco Use    Smoking status: Every Day     Types: Vaping with nicotine    Smokeless tobacco: Never   Substance and Sexual Activity    Alcohol use: Yes     Comment: occasionally    Drug use: Not Currently    Sexual activity: Yes     Partners: Male     Birth control/protection: None     Comment: Not current on Depo     Social Determinants of Health     Financial Resource Strain: Medium Risk (7/9/2024)    Overall Financial Resource Strain (CARDIA)     Difficulty of Paying Living Expenses: Somewhat hard   Food Insecurity: Food Insecurity Present (7/9/2024)    Hunger Vital Sign     Worried About Running Out of Food in the Last Year: Sometimes true     Ran Out of Food in the Last Year: Sometimes true   Transportation Needs: No Transportation Needs (10/5/2022)    PRAPARE - Transportation     Lack of  Transportation (Medical): No     Lack of Transportation (Non-Medical): No   Physical Activity: Sufficiently Active (7/9/2024)    Exercise Vital Sign     Days of Exercise per Week: 5 days     Minutes of Exercise per Session: 70 min   Stress: No Stress Concern Present (7/9/2024)    [employer] [place] [employer] - Occupational Stress Questionnaire     Feeling of Stress : Only a little   Housing Stability: Unknown (10/5/2022)    Housing Stability Vital Sign     Unable to Pay for Housing in the Last Year: No     Unstable Housing in the Last Year: No       Labs: Previous labs reviewed.  Lab Results   Component Value Date    WBC 9.86 08/19/2024    HGB 12.6 08/19/2024    HCT 39.2 08/19/2024    MCV 79 (L) 08/19/2024     08/19/2024         Lab Results   Component Value Date     08/19/2024    K 3.8 08/19/2024     08/19/2024    CO2 24 08/19/2024     (H) 08/19/2024    BUN 8 08/19/2024    CREATININE 1.0 08/19/2024    CALCIUM 9.1 08/19/2024    PROT 7.3 08/19/2024    ALBUMIN 4.0 08/19/2024    BILITOT 0.6 08/19/2024    ALKPHOS 56 08/19/2024    AST 14 08/19/2024    ALT 11 08/19/2024    ANIONGAP 7 (L) 08/19/2024    EGFRNORACEVR >60 08/19/2024       Imaging: Previous imaging reviewed.       Assessment and Plan:       Abdominal pain, unspecified abdominal location  -     Refer to Emergency Dept.    Gastritis, presence of bleeding unspecified, unspecified chronicity, unspecified gastritis type  Suspect pain related to gastritis vs gastric ulcer given recent ibuprofen use, bloody vomit, and upper abdominal pain. Patient reporting lightheadedness and feeling like she is going to fall because of inability to eat or drink anything. Discussed that I recommend she go to the ER for immediate evaluation and workup given symptoms and suspicion for GI bleed. Transportation arranged from clinic.  -     Refer to Emergency Dept.      RTC PRN      Discussed with Dr. Cuellar, further recommendations as per  attending addendum. Please feel free to contact me with any questions or concerns.    Rubia Hobbs MD  Resident Continuity Clinic, PGY-II  [facility]  [address] [place] [address]  [phone]

## 2024-08-20 NOTE — DISCHARGE INSTRUCTIONS
You have a urinary tract infection.  This will be treated with Keflex. Take entire course as directed    I suspect your upper abdominal pain is caused by gastritis.  Take Pepcid and Carafate for this.  You may take these medications together.  Avoid triggers such as acidic food, spicy food, fatty food.      Zofran prescribed for nausea and vomiting    Drink plenty of fluids    Follow up with your PCP in 2-3 days for follow up    Strict ED precautions given to return immediately for new, worsening, or concerning symptoms

## 2024-08-20 NOTE — PATIENT INSTRUCTIONS
Stop taking any NSAIDs, such as ibuprofen, advil, Goody's powder, BC powder etc. Anything that is over the counter for pain that is not Tylenol, please avoid.

## 2024-08-21 LAB
BACTERIA UR CULT: ABNORMAL
BACTERIA UR CULT: ABNORMAL

## 2024-08-22 NOTE — PLAN OF CARE
JUDD faxed an Ambulatory Referral/Consult to Gastroenterology @[facility]. Fax# [phone].    PARAMJIT Faust, MSW-LMSW  Medical Social Worker/  ER Department

## 2024-09-03 NOTE — PROGRESS NOTES
I have reviewed and concur with the resident's history, physical, assessment, and plan.  I did not personally interview or examine the patient at bedside.   Patient sent to the ED

## 2024-09-13 DIAGNOSIS — K29.70 GASTRITIS, PRESENCE OF BLEEDING UNSPECIFIED, UNSPECIFIED CHRONICITY, UNSPECIFIED GASTRITIS TYPE: ICD-10-CM

## 2024-09-13 RX ORDER — PANTOPRAZOLE SODIUM 40 MG/1
40 TABLET, DELAYED RELEASE ORAL DAILY
Qty: 30 TABLET | Refills: 0 | Status: SHIPPED | OUTPATIENT
Start: 2024-09-13 | End: 2025-09-13

## 2024-09-22 DIAGNOSIS — G47.61 PERIODIC LIMB MOVEMENTS OF SLEEP: ICD-10-CM

## 2024-09-23 RX ORDER — TALC
3 POWDER (GRAM) TOPICAL NIGHTLY
Qty: 30 TABLET | Refills: 0 | Status: SHIPPED | OUTPATIENT
Start: 2024-09-23

## 2024-09-23 NOTE — TELEPHONE ENCOUNTER
Requested Prescriptions     Pending Prescriptions Disp Refills    melatonin (MELATIN) 3 mg tablet 30 tablet 0     Sig: Take 1 tablet (3 mg total) by mouth nightly.     Lov 7/31/24

## 2024-10-02 ENCOUNTER — PATIENT MESSAGE (OUTPATIENT)
Dept: OBSTETRICS AND GYNECOLOGY | Facility: CLINIC | Age: 29
End: 2024-10-02
Payer: MEDICAID

## 2024-10-02 DIAGNOSIS — R30.0 DYSURIA: Primary | ICD-10-CM

## 2024-10-02 DIAGNOSIS — N91.2 AMENORRHEA: Primary | ICD-10-CM

## 2024-10-02 NOTE — TELEPHONE ENCOUNTER
Pt would like to complete hcg blood work. Has 1 invalid pregnancy test and 1 positive at home test.

## 2024-10-02 NOTE — PROGRESS NOTES
Patient unsure about pregnancy.  Wants serum bhcg to confirm.  Order placed.  Can go to lab to have blood work completed.    CORTES donohue MD

## 2024-10-03 ENCOUNTER — LAB VISIT (OUTPATIENT)
Dept: LAB | Facility: HOSPITAL | Age: 29
End: 2024-10-03
Attending: OBSTETRICS & GYNECOLOGY
Payer: MEDICAID

## 2024-10-03 DIAGNOSIS — N91.2 AMENORRHEA: ICD-10-CM

## 2024-10-03 LAB — HCG INTACT+B SERPL-ACNC: <1.2 MIU/ML

## 2024-10-03 PROCEDURE — 36415 COLL VENOUS BLD VENIPUNCTURE: CPT | Performed by: OBSTETRICS & GYNECOLOGY

## 2024-10-03 PROCEDURE — 84702 CHORIONIC GONADOTROPIN TEST: CPT | Performed by: OBSTETRICS & GYNECOLOGY

## 2024-10-07 DIAGNOSIS — N30.00 ACUTE CYSTITIS WITHOUT HEMATURIA: Primary | ICD-10-CM

## 2024-10-07 RX ORDER — CEPHALEXIN 500 MG/1
500 CAPSULE ORAL EVERY 12 HOURS
Qty: 20 CAPSULE | Refills: 0 | Status: SHIPPED | OUTPATIENT
Start: 2024-10-07 | End: 2024-10-17

## 2024-10-15 ENCOUNTER — TELEPHONE (OUTPATIENT)
Dept: ALLERGY | Facility: CLINIC | Age: 29
End: 2024-10-15
Payer: MEDICAID

## 2024-10-16 ENCOUNTER — OFFICE VISIT (OUTPATIENT)
Dept: ALLERGY | Facility: CLINIC | Age: 29
End: 2024-10-16
Payer: MEDICAID

## 2024-10-16 ENCOUNTER — LAB VISIT (OUTPATIENT)
Dept: LAB | Facility: HOSPITAL | Age: 29
End: 2024-10-16
Payer: MEDICAID

## 2024-10-16 VITALS — HEIGHT: 65 IN | BODY MASS INDEX: 35.59 KG/M2 | WEIGHT: 213.63 LBS

## 2024-10-16 DIAGNOSIS — Z91.013 PERSONAL HISTORY OF ALLERGY TO SHELLFISH: ICD-10-CM

## 2024-10-16 DIAGNOSIS — J31.0 CHRONIC RHINITIS: ICD-10-CM

## 2024-10-16 DIAGNOSIS — T78.3XXS ANGIOEDEMA, SEQUELA: ICD-10-CM

## 2024-10-16 DIAGNOSIS — L50.1 CHRONIC IDIOPATHIC URTICARIA: Primary | ICD-10-CM

## 2024-10-16 LAB
C4 SERPL-MCNC: 27 MG/DL (ref 11–44)
IGE SERPL-ACNC: <35 IU/ML (ref 0–100)

## 2024-10-16 PROCEDURE — 86003 ALLG SPEC IGE CRUDE XTRC EA: CPT | Mod: 59 | Performed by: STUDENT IN AN ORGANIZED HEALTH CARE EDUCATION/TRAINING PROGRAM

## 2024-10-16 PROCEDURE — 99999 PR PBB SHADOW E&M-EST. PATIENT-LVL III: CPT | Mod: PBBFAC,,, | Performed by: STUDENT IN AN ORGANIZED HEALTH CARE EDUCATION/TRAINING PROGRAM

## 2024-10-16 PROCEDURE — 86003 ALLG SPEC IGE CRUDE XTRC EA: CPT | Performed by: STUDENT IN AN ORGANIZED HEALTH CARE EDUCATION/TRAINING PROGRAM

## 2024-10-16 PROCEDURE — 86160 COMPLEMENT ANTIGEN: CPT | Performed by: STUDENT IN AN ORGANIZED HEALTH CARE EDUCATION/TRAINING PROGRAM

## 2024-10-16 PROCEDURE — 99213 OFFICE O/P EST LOW 20 MIN: CPT | Mod: PBBFAC | Performed by: STUDENT IN AN ORGANIZED HEALTH CARE EDUCATION/TRAINING PROGRAM

## 2024-10-16 PROCEDURE — 82785 ASSAY OF IGE: CPT | Performed by: STUDENT IN AN ORGANIZED HEALTH CARE EDUCATION/TRAINING PROGRAM

## 2024-10-16 PROCEDURE — 36415 COLL VENOUS BLD VENIPUNCTURE: CPT | Performed by: STUDENT IN AN ORGANIZED HEALTH CARE EDUCATION/TRAINING PROGRAM

## 2024-10-16 RX ORDER — CETIRIZINE HYDROCHLORIDE 10 MG/1
10 TABLET ORAL DAILY
Qty: 90 TABLET | Refills: 3 | Status: SHIPPED | OUTPATIENT
Start: 2024-10-16 | End: 2025-10-16

## 2024-10-16 NOTE — PROGRESS NOTES
ALLERGY & IMMUNOLOGY CLINIC - INITIAL CONSULTATION      HISTORY OF PRESENT ILLNESS     Referral from: PCP  CC: angioedema    HPI: Ray Knox is a 29 y.o. female who presents to Allergy Clinic today for new patient evaluation to address the following:     Angioedema: intermittent episodes over the past 2 years - typically unilateral and periorbital. Unsure, but thinks they were associated with itching. One period over the summer where this was occurring almost daily. Typically resolved after Benadryl. Since leaving her job at [email or web address] in June she has not had further episodes of swelling.     Flushing/Hives: intermittent episodes, but not associated with the angioedema. Fairly consistent reaction with chest flushing after eating spicy foods. This is not itchy, however, there have been 3-4 instances where this rash progressed to diffuse hives that were itchy.     Shellfish/Lisinopril reaction: 1st episode of periorbital swelling occurred after eating a seafood boil. That same day after the swelling started she took a relative's lisinopril due to being out of her own blood pressure medication. She then developed significant lower lip swelling. Reports getting admitted for this + COVID infection, but did not need to be intubated. Since this reaction she has been eating seafood without any reaction but she will take a Benadryl beforehand.     Rhinitis: Endorses seasonal congestion, rhinorrhea, itchy/watery eyes. Fall is the worse time for her. Symptoms only happen outdoors. Also endorses history of hives when coming into contact with grass. Symptoms controlled with nightly flonase and prn AllegraD.    Eczema: yes, occasional flares of dry skin during winter, uses topical emmolients  Latex: rash/swelling with condoms.  Adhesives: local reaction to adhesives  Venom Allergy: denies (other than large local reactions to stings)    FamHx:   No family history of angioedema     MEDICAL HISTORY     MedHx:   Patient Active  Daughter is agreeable to [facility]. Order is faxed to [phone].    Problem List   Diagnosis    Chronic pain of left knee    Muscle weakness of lower extremity    Decreased range of motion (ROM) of left knee    Obstructive sleep apnea    Hypersomnolence    Multicystic kidney disease    Unilateral renal atrophy    Renal hypoplasia, unilateral    History of multiple miscarriages    Finger mass, left    Finger stiffness, left    Hand pain, left    Decreased pinch strength    Requires assistance with activities of daily living (ADL)    Pain aggravated by activities of daily living    Cubital tunnel syndrome on left    Cognitive communication deficit    Pain of right upper extremity    Weakness    Stiffness in joint    Chronic post-concussion headache    Dizziness    Concussion with no loss of consciousness    Witnessed episode of apnea    Gastro-esophageal reflux disease without esophagitis       Medications:   Current Outpatient Medications on File Prior to Visit   Medication Sig Dispense Refill    albuterol (PROVENTIL/VENTOLIN HFA) 90 mcg/actuation inhaler Inhale 1-2 puffs into the lungs every 6 (six) hours as needed for Wheezing. Rescue 6.7 g 0    cephALEXin (KEFLEX) 500 MG capsule Take 1 capsule (500 mg total) by mouth every 12 (twelve) hours. for 10 days 20 capsule 0    EScitalopram oxalate (LEXAPRO) 5 MG Tab Take 1 tablet (5 mg total) by mouth once daily. 90 tablet 3    fluticasone propionate (FLONASE) 50 mcg/actuation nasal spray 1 spray (50 mcg total) by Each Nostril route once daily. 16 g 3    melatonin (MELATIN) 3 mg tablet Take 1 tablet (3 mg total) by mouth nightly. 30 tablet 0    ondansetron (ZOFRAN) 4 MG tablet Take 1 tablet (4 mg total) by mouth every 6 (six) hours. 12 tablet 0    ondansetron (ZOFRAN-ODT) 4 MG TbDL Take 1 tablet (4 mg total) by mouth every 8 (eight) hours as needed (nausea and vomiting). 16 tablet 0    pantoprazole (PROTONIX) 40 MG tablet Take 1 tablet (40 mg total) by mouth once daily. 30 tablet 0    progesterone (PROMETRIUM) 200 MG capsule Take 1  "capsule (200 mg total) by mouth nightly. 30 capsule 12    sucralfate (CARAFATE) 100 mg/mL suspension Take 10 mLs (1 g total) by mouth 4 (four) times daily. 473 mL 0    topiramate (TOPAMAX) 50 MG tablet Take 0.5 tablets (25 mg total) by mouth once daily. 30 tablet 3    valACYclovir (VALTREX) 500 MG tablet Take 1 tablet by mouth twice a day for 3 days with outbreaks 30 tablet 4    acyclovir (ZOVIRAX) 800 MG Tab Take 1 tablet (800 mg total) by mouth 2 (two) times daily. Take 1 tablet orally BID x 5 days for recurrent outbreaks for 5 days 30 tablet 3    diclofenac sodium (VOLTAREN) 1 % Gel APPLY 2 GRAMS TOPICALLY TO THE AFFECTED AREA THREE TIMES DAILY (Patient not taking: Reported on 10/16/2024)      methylPREDNISolone (MEDROL DOSEPACK) 4 mg tablet use as directed (Patient not taking: Reported on 10/16/2024) 1 each 0    norgestrel-ethinyl estradioL (LO/OVRAL) 0.3-30 mg-mcg per tablet Take 1 tablet by mouth once daily. (Patient not taking: Reported on 10/16/2024) 30 tablet 2    propranoloL (INDERAL) 10 MG tablet Take 1 tablet (10 mg total) by mouth 2 (two) times daily. 60 tablet 11     No current facility-administered medications on file prior to visit.       SurgHx:  Past Surgical History:   Procedure Laterality Date    FINGER MASS EXCISION Left 3/8/2022    Procedure: EXCISION, MASS, FINGER;  Surgeon: Bk Doty Jr., MD;  Location: [facility] OR;  Service: Orthopedics;  Laterality: Left;    ULNAR NERVE TRANSPOSITION Left 3/29/2023    Procedure: TRANSPOSITION, NERVE, ULNAR;  Surgeon: Bk Doty Jr., MD;  Location: [facility] OR;  Service: Orthopedics;  Laterality: Left;        PHYSICAL EXAM     VS: Ht 5' 5" (1.651 m)   Wt 96.9 kg (213 lb 10 oz)   BMI 35.55 kg/m²   GENERAL: NAD, well-appearing, cooperative  EYES: no conjunctival injection, no discharge, no infraorbital shiners  EARS: external auditory canals normal B/L, TM normal B/L  NOSE: NT pink 2+ and enlarged B/L, +mucus, no polyps  ORAL: MMM, no ulcers, no thrush, " no cobblestoning  LUNGS: CTAB, no w/r/c, no increased WOB  HEART: RRR, normal S1/S2, no m/g/r  EXTREMITIES: No edema, no cyanosis  DERM: no rashes     ALLERGEN/IMMUNOLOGY TESTING     Immunocaps: ordered today     ASSESSMENT & PLAN     #Recurrent angioedema, hives, flushing.  Suspect unusual presentation of chronic spontaneous urticaria and angioedema, however, will get some additional blood work to evaluate for alternative etiologies, e.g. C4 to screen for hereditary angioedema or acquired angioedema and IgE to crab (her most commonly consumed shellfish) to evaluate for food allergy as  of symptoms (unlikely but patient has been taking Benadryl prior to consuming shellfish so this may be obscuring symptoms). Recent normal TSH, CBC w/ diff, HIV, and Hepatitis panel. Additionally unclear if chest flushing related or if this is separate flushing in response to capsaicin/spicy foods - will monitor response to antihistamines.  - start daily cetirizine 10 mg  - discussed increasing up to 2 tabs BID if breakthrough hives/angioedema  - labs today: C4, specific IgE to crab    #Chronic Rhinitis, suspect allergic  - immunoCAPs to aeroallergens region 6 today  - continue Flonase 1 SEN nightly, discussed increasing if symptoms become bothersome  - oral antihistamine as above      Follow up: 2-4 weeks for virtual follow-up    Patient staffed with: Dr. Radha Martin MD  Allergy/Immunology Fellow  [facility]

## 2024-10-18 ENCOUNTER — HOSPITAL ENCOUNTER (INPATIENT)
Facility: HOSPITAL | Age: 29
LOS: 1 days | Discharge: HOME | DRG: 101 | End: 2024-10-19
Attending: EMERGENCY MEDICINE | Admitting: STUDENT IN AN ORGANIZED HEALTH CARE EDUCATION/TRAINING PROGRAM
Payer: MEDICAID

## 2024-10-18 DIAGNOSIS — R07.9 CHEST PAIN: ICD-10-CM

## 2024-10-18 DIAGNOSIS — G83.84 TODD'S PARALYSIS: Primary | ICD-10-CM

## 2024-10-18 DIAGNOSIS — M62.81 MUSCLE WEAKNESS OF LOWER EXTREMITY: ICD-10-CM

## 2024-10-18 DIAGNOSIS — R40.4 ALTERED LEVEL OF CONSCIOUSNESS: ICD-10-CM

## 2024-10-18 DIAGNOSIS — M25.662 DECREASED RANGE OF MOTION (ROM) OF LEFT KNEE: ICD-10-CM

## 2024-10-18 DIAGNOSIS — G40.919 BREAKTHROUGH SEIZURE: ICD-10-CM

## 2024-10-18 DIAGNOSIS — N26.1: ICD-10-CM

## 2024-10-18 PROBLEM — R51.9 HEADACHE: Status: ACTIVE | Noted: 2024-10-18

## 2024-10-18 PROBLEM — J45.909 ASTHMA: Status: ACTIVE | Noted: 2024-10-18

## 2024-10-18 LAB
ALBUMIN SERPL BCP-MCNC: 4.1 G/DL (ref 3.5–5.2)
ALP SERPL-CCNC: 61 U/L (ref 40–150)
ALT SERPL W/O P-5'-P-CCNC: 10 U/L (ref 10–44)
ANION GAP SERPL CALC-SCNC: 12 MMOL/L (ref 8–16)
AST SERPL-CCNC: 13 U/L (ref 10–40)
B-HCG UR QL: NEGATIVE
BASOPHILS # BLD AUTO: 0.04 K/UL (ref 0–0.2)
BASOPHILS NFR BLD: 0.4 % (ref 0–1.9)
BILIRUB SERPL-MCNC: 0.3 MG/DL (ref 0.1–1)
BILIRUB UR QL STRIP: NEGATIVE
BUN SERPL-MCNC: 8 MG/DL (ref 6–20)
CALCIUM SERPL-MCNC: 8.7 MG/DL (ref 8.7–10.5)
CHLORIDE SERPL-SCNC: 112 MMOL/L (ref 95–110)
CLARITY UR REFRACT.AUTO: CLEAR
CO2 SERPL-SCNC: 17 MMOL/L (ref 23–29)
COLOR UR AUTO: COLORLESS
CREAT SERPL-MCNC: 1.1 MG/DL (ref 0.5–1.4)
CTP QC/QA: YES
DIFFERENTIAL METHOD BLD: ABNORMAL
EOSINOPHIL # BLD AUTO: 0.2 K/UL (ref 0–0.5)
EOSINOPHIL NFR BLD: 1.9 % (ref 0–8)
ERYTHROCYTE [DISTWIDTH] IN BLOOD BY AUTOMATED COUNT: 15.1 % (ref 11.5–14.5)
EST. GFR  (NO RACE VARIABLE): >60 ML/MIN/1.73 M^2
GLUCOSE SERPL-MCNC: 125 MG/DL (ref 70–110)
GLUCOSE UR QL STRIP: NEGATIVE
HCT VFR BLD AUTO: 39.3 % (ref 37–48.5)
HGB BLD-MCNC: 12.4 G/DL (ref 12–16)
HGB UR QL STRIP: NEGATIVE
IMM GRANULOCYTES # BLD AUTO: 0.02 K/UL (ref 0–0.04)
IMM GRANULOCYTES NFR BLD AUTO: 0.2 % (ref 0–0.5)
KETONES UR QL STRIP: NEGATIVE
LEUKOCYTE ESTERASE UR QL STRIP: NEGATIVE
LYMPHOCYTES # BLD AUTO: 3.8 K/UL (ref 1–4.8)
LYMPHOCYTES NFR BLD: 38.8 % (ref 18–48)
MCH RBC QN AUTO: 26.1 PG (ref 27–31)
MCHC RBC AUTO-ENTMCNC: 31.6 G/DL (ref 32–36)
MCV RBC AUTO: 83 FL (ref 82–98)
MONOCYTES # BLD AUTO: 0.7 K/UL (ref 0.3–1)
MONOCYTES NFR BLD: 7.4 % (ref 4–15)
NEUTROPHILS # BLD AUTO: 5 K/UL (ref 1.8–7.7)
NEUTROPHILS NFR BLD: 51.3 % (ref 38–73)
NITRITE UR QL STRIP: NEGATIVE
NRBC BLD-RTO: 0 /100 WBC
OHS QRS DURATION: 94 MS
OHS QRS DURATION: 96 MS
OHS QRS DURATION: 98 MS
OHS QTC CALCULATION: 428 MS
OHS QTC CALCULATION: 435 MS
OHS QTC CALCULATION: 449 MS
PH UR STRIP: 7 [PH] (ref 5–8)
PLATELET # BLD AUTO: 271 K/UL (ref 150–450)
PMV BLD AUTO: 10.8 FL (ref 9.2–12.9)
POTASSIUM SERPL-SCNC: 3.6 MMOL/L (ref 3.5–5.1)
PROT SERPL-MCNC: 7.6 G/DL (ref 6–8.4)
PROT UR QL STRIP: NEGATIVE
RBC # BLD AUTO: 4.75 M/UL (ref 4–5.4)
SODIUM SERPL-SCNC: 141 MMOL/L (ref 136–145)
SP GR UR STRIP: 1 (ref 1–1.03)
URN SPEC COLLECT METH UR: ABNORMAL
WBC # BLD AUTO: 9.75 K/UL (ref 3.9–12.7)

## 2024-10-18 PROCEDURE — 27100171 HC OXYGEN HIGH FLOW UP TO 24 HOURS

## 2024-10-18 PROCEDURE — 63600175 PHARM REV CODE 636 W HCPCS: Performed by: PHYSICIAN ASSISTANT

## 2024-10-18 PROCEDURE — 25000003 PHARM REV CODE 250: Performed by: PHYSICIAN ASSISTANT

## 2024-10-18 PROCEDURE — 93005 ELECTROCARDIOGRAM TRACING: CPT

## 2024-10-18 PROCEDURE — 80201 ASSAY OF TOPIRAMATE: CPT | Performed by: PHYSICIAN ASSISTANT

## 2024-10-18 PROCEDURE — 99900035 HC TECH TIME PER 15 MIN (STAT)

## 2024-10-18 PROCEDURE — 63600175 PHARM REV CODE 636 W HCPCS: Performed by: STUDENT IN AN ORGANIZED HEALTH CARE EDUCATION/TRAINING PROGRAM

## 2024-10-18 PROCEDURE — 27000190 HC CPAP FULL FACE MASK W/VALVE

## 2024-10-18 PROCEDURE — 95816 EEG AWAKE AND DROWSY: CPT | Mod: 26,,, | Performed by: PSYCHIATRY & NEUROLOGY

## 2024-10-18 PROCEDURE — 81025 URINE PREGNANCY TEST: CPT | Performed by: EMERGENCY MEDICINE

## 2024-10-18 PROCEDURE — 81003 URINALYSIS AUTO W/O SCOPE: CPT | Performed by: EMERGENCY MEDICINE

## 2024-10-18 PROCEDURE — 99223 1ST HOSP IP/OBS HIGH 75: CPT | Mod: ,,, | Performed by: STUDENT IN AN ORGANIZED HEALTH CARE EDUCATION/TRAINING PROGRAM

## 2024-10-18 PROCEDURE — 94761 N-INVAS EAR/PLS OXIMETRY MLT: CPT

## 2024-10-18 PROCEDURE — 11000001 HC ACUTE MED/SURG PRIVATE ROOM

## 2024-10-18 PROCEDURE — 85025 COMPLETE CBC W/AUTO DIFF WBC: CPT | Performed by: EMERGENCY MEDICINE

## 2024-10-18 PROCEDURE — 95816 EEG AWAKE AND DROWSY: CPT

## 2024-10-18 PROCEDURE — 94799 UNLISTED PULMONARY SVC/PX: CPT

## 2024-10-18 PROCEDURE — 25000242 PHARM REV CODE 250 ALT 637 W/ HCPCS: Performed by: PHYSICIAN ASSISTANT

## 2024-10-18 PROCEDURE — 51798 US URINE CAPACITY MEASURE: CPT

## 2024-10-18 PROCEDURE — 80053 COMPREHEN METABOLIC PANEL: CPT | Performed by: EMERGENCY MEDICINE

## 2024-10-18 PROCEDURE — 93010 ELECTROCARDIOGRAM REPORT: CPT | Mod: ,,, | Performed by: INTERNAL MEDICINE

## 2024-10-18 PROCEDURE — 25000003 PHARM REV CODE 250: Performed by: EMERGENCY MEDICINE

## 2024-10-18 PROCEDURE — 94660 CPAP INITIATION&MGMT: CPT

## 2024-10-18 PROCEDURE — 94640 AIRWAY INHALATION TREATMENT: CPT

## 2024-10-18 PROCEDURE — 25000003 PHARM REV CODE 250: Performed by: STUDENT IN AN ORGANIZED HEALTH CARE EDUCATION/TRAINING PROGRAM

## 2024-10-18 PROCEDURE — 5A09357 ASSISTANCE WITH RESPIRATORY VENTILATION, LESS THAN 24 CONSECUTIVE HOURS, CONTINUOUS POSITIVE AIRWAY PRESSURE: ICD-10-PCS | Performed by: STUDENT IN AN ORGANIZED HEALTH CARE EDUCATION/TRAINING PROGRAM

## 2024-10-18 RX ORDER — ACETAMINOPHEN 500 MG
1000 TABLET ORAL
Status: COMPLETED | OUTPATIENT
Start: 2024-10-18 | End: 2024-10-18

## 2024-10-18 RX ORDER — ESCITALOPRAM OXALATE 5 MG/1
5 TABLET ORAL DAILY
Status: DISCONTINUED | OUTPATIENT
Start: 2024-10-18 | End: 2024-10-19 | Stop reason: HOSPADM

## 2024-10-18 RX ORDER — ACETAMINOPHEN 325 MG/1
650 TABLET ORAL EVERY 6 HOURS PRN
Status: DISCONTINUED | OUTPATIENT
Start: 2024-10-18 | End: 2024-10-19 | Stop reason: HOSPADM

## 2024-10-18 RX ORDER — SODIUM CHLORIDE 0.9 % (FLUSH) 0.9 %
10 SYRINGE (ML) INJECTION
Status: DISCONTINUED | OUTPATIENT
Start: 2024-10-18 | End: 2024-10-19 | Stop reason: HOSPADM

## 2024-10-18 RX ORDER — CEPHALEXIN 250 MG/1
500 CAPSULE ORAL EVERY 12 HOURS
Status: DISCONTINUED | OUTPATIENT
Start: 2024-10-18 | End: 2024-10-19

## 2024-10-18 RX ORDER — TALC
6 POWDER (GRAM) TOPICAL NIGHTLY PRN
Status: DISCONTINUED | OUTPATIENT
Start: 2024-10-18 | End: 2024-10-19 | Stop reason: HOSPADM

## 2024-10-18 RX ORDER — IBUPROFEN 200 MG
24 TABLET ORAL
Status: DISCONTINUED | OUTPATIENT
Start: 2024-10-18 | End: 2024-10-19 | Stop reason: HOSPADM

## 2024-10-18 RX ORDER — PROPRANOLOL HYDROCHLORIDE 10 MG/1
10 TABLET ORAL 2 TIMES DAILY
Status: DISCONTINUED | OUTPATIENT
Start: 2024-10-18 | End: 2024-10-19 | Stop reason: HOSPADM

## 2024-10-18 RX ORDER — GLUCAGON 1 MG
1 KIT INJECTION
Status: DISCONTINUED | OUTPATIENT
Start: 2024-10-18 | End: 2024-10-19 | Stop reason: HOSPADM

## 2024-10-18 RX ORDER — TOPIRAMATE 25 MG/1
25 TABLET ORAL DAILY
Status: DISCONTINUED | OUTPATIENT
Start: 2024-10-18 | End: 2024-10-19 | Stop reason: HOSPADM

## 2024-10-18 RX ORDER — SUCRALFATE 1 G/10ML
1 SUSPENSION ORAL 2 TIMES DAILY
Status: DISCONTINUED | OUTPATIENT
Start: 2024-10-18 | End: 2024-10-19 | Stop reason: HOSPADM

## 2024-10-18 RX ORDER — ONDANSETRON HYDROCHLORIDE 2 MG/ML
4 INJECTION, SOLUTION INTRAVENOUS EVERY 6 HOURS PRN
Status: DISCONTINUED | OUTPATIENT
Start: 2024-10-18 | End: 2024-10-19 | Stop reason: HOSPADM

## 2024-10-18 RX ORDER — KETOROLAC TROMETHAMINE 30 MG/ML
10 INJECTION, SOLUTION INTRAMUSCULAR; INTRAVENOUS EVERY 8 HOURS
Status: DISCONTINUED | OUTPATIENT
Start: 2024-10-18 | End: 2024-10-18

## 2024-10-18 RX ORDER — ALBUTEROL SULFATE 90 UG/1
2 INHALANT RESPIRATORY (INHALATION) EVERY 6 HOURS PRN
Status: DISCONTINUED | OUTPATIENT
Start: 2024-10-18 | End: 2024-10-19 | Stop reason: HOSPADM

## 2024-10-18 RX ORDER — CETIRIZINE HYDROCHLORIDE 5 MG/1
10 TABLET ORAL DAILY
Status: DISCONTINUED | OUTPATIENT
Start: 2024-10-18 | End: 2024-10-19 | Stop reason: HOSPADM

## 2024-10-18 RX ORDER — KETOROLAC TROMETHAMINE 30 MG/ML
10 INJECTION, SOLUTION INTRAMUSCULAR; INTRAVENOUS EVERY 8 HOURS PRN
Status: DISPENSED | OUTPATIENT
Start: 2024-10-18 | End: 2024-10-19

## 2024-10-18 RX ORDER — HYDROCODONE BITARTRATE AND ACETAMINOPHEN 5; 325 MG/1; MG/1
1 TABLET ORAL
Status: COMPLETED | OUTPATIENT
Start: 2024-10-18 | End: 2024-10-18

## 2024-10-18 RX ORDER — IBUPROFEN 200 MG
16 TABLET ORAL
Status: DISCONTINUED | OUTPATIENT
Start: 2024-10-18 | End: 2024-10-19 | Stop reason: HOSPADM

## 2024-10-18 RX ORDER — NALOXONE HCL 0.4 MG/ML
0.02 VIAL (ML) INJECTION
Status: DISCONTINUED | OUTPATIENT
Start: 2024-10-18 | End: 2024-10-19 | Stop reason: HOSPADM

## 2024-10-18 RX ORDER — KETOROLAC TROMETHAMINE 30 MG/ML
10 INJECTION, SOLUTION INTRAMUSCULAR; INTRAVENOUS
Status: COMPLETED | OUTPATIENT
Start: 2024-10-18 | End: 2024-10-18

## 2024-10-18 RX ORDER — FLUTICASONE PROPIONATE 50 MCG
1 SPRAY, SUSPENSION (ML) NASAL DAILY
Status: DISCONTINUED | OUTPATIENT
Start: 2024-10-18 | End: 2024-10-19 | Stop reason: HOSPADM

## 2024-10-18 RX ADMIN — ACETAMINOPHEN 1000 MG: 500 TABLET ORAL at 09:10

## 2024-10-18 RX ADMIN — CEPHALEXIN 500 MG: 500 CAPSULE ORAL at 09:10

## 2024-10-18 RX ADMIN — KETOROLAC TROMETHAMINE 10 MG: 30 INJECTION, SOLUTION INTRAMUSCULAR; INTRAVENOUS at 04:10

## 2024-10-18 RX ADMIN — SUCRALFATE 1 G: 1 SUSPENSION ORAL at 08:10

## 2024-10-18 RX ADMIN — KETOROLAC TROMETHAMINE 10 MG: 30 INJECTION, SOLUTION INTRAMUSCULAR; INTRAVENOUS at 10:10

## 2024-10-18 RX ADMIN — ONDANSETRON 4 MG: 2 INJECTION INTRAMUSCULAR; INTRAVENOUS at 09:10

## 2024-10-18 RX ADMIN — KETOROLAC TROMETHAMINE 10 MG: 30 INJECTION, SOLUTION INTRAMUSCULAR; INTRAVENOUS at 09:10

## 2024-10-18 RX ADMIN — PROPRANOLOL HYDROCHLORIDE 10 MG: 10 TABLET ORAL at 08:10

## 2024-10-18 RX ADMIN — FLUTICASONE PROPIONATE 50 MCG: 50 SPRAY, METERED NASAL at 11:10

## 2024-10-18 RX ADMIN — SUCRALFATE 1 G: 1 SUSPENSION ORAL at 10:10

## 2024-10-18 RX ADMIN — CETIRIZINE HYDROCHLORIDE 10 MG: 5 TABLET, FILM COATED ORAL at 09:10

## 2024-10-18 RX ADMIN — ALBUTEROL SULFATE 2 PUFF: 108 INHALANT RESPIRATORY (INHALATION) at 11:10

## 2024-10-18 RX ADMIN — PROPRANOLOL HYDROCHLORIDE 10 MG: 10 TABLET ORAL at 09:10

## 2024-10-18 RX ADMIN — ACETAMINOPHEN 1000 MG: 500 TABLET ORAL at 02:10

## 2024-10-18 RX ADMIN — ALBUTEROL SULFATE 2 PUFF: 108 INHALANT RESPIRATORY (INHALATION) at 10:10

## 2024-10-18 RX ADMIN — ACETAMINOPHEN 650 MG: 325 TABLET ORAL at 09:10

## 2024-10-18 RX ADMIN — ESCITALOPRAM OXALATE 5 MG: 5 TABLET, FILM COATED ORAL at 09:10

## 2024-10-18 RX ADMIN — TOPIRAMATE 25 MG: 25 TABLET, FILM COATED ORAL at 08:10

## 2024-10-18 RX ADMIN — CEPHALEXIN 500 MG: 500 CAPSULE ORAL at 08:10

## 2024-10-18 RX ADMIN — HYDROCODONE BITARTRATE AND ACETAMINOPHEN 1 TABLET: 5; 325 TABLET ORAL at 05:10

## 2024-10-18 NOTE — ED PROVIDER NOTES
History:  Ray Knox is a 29 y.o. female who presents to the ED with Seizures (Reports seizure-like activity 45 minutes ago, pt is lethargic in triage)    Described as 29-year-old female with a history of seizures on Topamax and other unknown seizure medication presenting to the emergency department after alleged seizure x3 at home.  Seizures were witnessed by boyfriend, generalized tonic-clonic, lasting 2-3 minutes and self-resolving with a postictal period.  She reports she was never had this many seizures in a row before.  She reports she was saw neurology on 10/16/2024 after her allergy appointment and received 2 IM medications, though she was not know which.  There is no documentation of this visit.  Her boyfriend said he found her on the floor at 1 point in time and she endorses a severe headache.  No vomiting, no neck pain.    Review of Systems: All systems reviewed and are negative except as per history of present illness.    Medications:   Previous Medications    ACYCLOVIR (ZOVIRAX) 800 MG TAB    Take 1 tablet (800 mg total) by mouth 2 (two) times daily. Take 1 tablet orally BID x 5 days for recurrent outbreaks for 5 days    ALBUTEROL (PROVENTIL/VENTOLIN HFA) 90 MCG/ACTUATION INHALER    Inhale 1-2 puffs into the lungs every 6 (six) hours as needed for Wheezing. Rescue    CETIRIZINE (ZYRTEC) 10 MG TABLET    Take 1 tablet (10 mg total) by mouth once daily.    DICLOFENAC SODIUM (VOLTAREN) 1 % GEL    APPLY 2 GRAMS TOPICALLY TO THE AFFECTED AREA THREE TIMES DAILY    ESCITALOPRAM OXALATE (LEXAPRO) 5 MG TAB    Take 1 tablet (5 mg total) by mouth once daily.    FLUTICASONE PROPIONATE (FLONASE) 50 MCG/ACTUATION NASAL SPRAY    1 spray (50 mcg total) by Each Nostril route once daily.    MELATONIN (MELATIN) 3 MG TABLET    Take 1 tablet (3 mg total) by mouth nightly.    METHYLPREDNISOLONE (MEDROL DOSEPACK) 4 MG TABLET    use as directed    NORGESTREL-ETHINYL ESTRADIOL (LO/OVRAL) 0.3-30 MG-MCG PER TABLET    Take 1  tablet by mouth once daily.    ONDANSETRON (ZOFRAN) 4 MG TABLET    Take 1 tablet (4 mg total) by mouth every 6 (six) hours.    ONDANSETRON (ZOFRAN-ODT) 4 MG TBDL    Take 1 tablet (4 mg total) by mouth every 8 (eight) hours as needed (nausea and vomiting).    PANTOPRAZOLE (PROTONIX) 40 MG TABLET    Take 1 tablet (40 mg total) by mouth once daily.    PROGESTERONE (PROMETRIUM) 200 MG CAPSULE    Take 1 capsule (200 mg total) by mouth nightly.    PROPRANOLOL (INDERAL) 10 MG TABLET    Take 1 tablet (10 mg total) by mouth 2 (two) times daily.    SUCRALFATE (CARAFATE) 100 MG/ML SUSPENSION    Take 10 mLs (1 g total) by mouth 4 (four) times daily.    TOPIRAMATE (TOPAMAX) 50 MG TABLET    Take 0.5 tablets (25 mg total) by mouth once daily.    VALACYCLOVIR (VALTREX) 500 MG TABLET    Take 1 tablet by mouth twice a day for 3 days with outbreaks       PMH:   Past Medical History:   Diagnosis Date    Anemia     Asthma     Bilateral renal cysts     Breast disorder     Congenital absence of one kidney     General anesthetics causing adverse effect in therapeutic use     Kidney cysts     pt born with 1 kidney     PSH:   Past Surgical History:   Procedure Laterality Date    FINGER MASS EXCISION Left 3/8/2022    Procedure: EXCISION, MASS, FINGER;  Surgeon: Bk Doty Jr., MD;  Location: [facility] OR;  Service: Orthopedics;  Laterality: Left;    ULNAR NERVE TRANSPOSITION Left 3/29/2023    Procedure: TRANSPOSITION, NERVE, ULNAR;  Surgeon: Bk Doty Jr., MD;  Location: [facility] OR;  Service: Orthopedics;  Laterality: Left;     Allergies: She is allergic to lisinopril and shellfish containing products.  Social History: Marital Status: single. She  reports that she has been smoking vaping with nicotine. She has never used smokeless tobacco.. She  reports current alcohol use..       Exam:  VITAL SIGNS:   Vitals:    10/18/24 0445 10/18/24 0500 10/18/24 0511 10/18/24 0528   BP: 131/81 112/70     BP Location:       Patient Position:        Pulse: 84 87  86   Resp: 17 16 18 13   Temp:  98.4 °F (36.9 °C)     TempSrc:  Oral     SpO2: 99% 96%  99%   Weight:       Height:         Const: Awake and alert, NAD   Head: Atraumatic  Eyes: Normal Conjunctiva  ENT: Normal External Ears, Nose and Mouth.  Neck: Full range of motion. No meningismus.  No midline tenderness to palpation, no step-offs  Resp: Normal respiratory effort, No distress, CTAB  Cardio: Equal and intact distal pulses, RRR  Abd: Soft, non tender, non distended.   Skin: No petechiae or rashes  Ext: No cyanosis, or edema  Neur: Awake and alert, moves all extremities equally, cranial nerves intact, GCS 15  Psych: Normal Mood and Affect    Data:  Results for orders placed or performed during the hospital encounter of 10/18/24   CBC auto differential    Collection Time: 10/18/24  2:36 AM   Result Value Ref Range    WBC 9.75 3.90 - 12.70 K/uL    RBC 4.75 4.00 - 5.40 M/uL    Hemoglobin 12.4 12.0 - 16.0 g/dL    Hematocrit 39.3 37.0 - 48.5 %    MCV 83 82 - 98 fL    MCH 26.1 (L) 27.0 - 31.0 pg    MCHC 31.6 (L) 32.0 - 36.0 g/dL    RDW 15.1 (H) 11.5 - 14.5 %    Platelets 271 150 - 450 K/uL    MPV 10.8 9.2 - 12.9 fL    Immature Granulocytes 0.2 0.0 - 0.5 %    Gran # (ANC) 5.0 1.8 - 7.7 K/uL    Immature Grans (Abs) 0.02 0.00 - 0.04 K/uL    Lymph # 3.8 1.0 - 4.8 K/uL    Mono # 0.7 0.3 - 1.0 K/uL    Eos # 0.2 0.0 - 0.5 K/uL    Baso # 0.04 0.00 - 0.20 K/uL    nRBC 0 0 /100 WBC    Gran % 51.3 38.0 - 73.0 %    Lymph % 38.8 18.0 - 48.0 %    Mono % 7.4 4.0 - 15.0 %    Eosinophil % 1.9 0.0 - 8.0 %    Basophil % 0.4 0.0 - 1.9 %    Differential Method Automated    Comprehensive metabolic panel    Collection Time: 10/18/24  2:36 AM   Result Value Ref Range    Sodium 141 136 - 145 mmol/L    Potassium 3.6 3.5 - 5.1 mmol/L    Chloride 112 (H) 95 - 110 mmol/L    CO2 17 (L) 23 - 29 mmol/L    Glucose 125 (H) 70 - 110 mg/dL    BUN 8 6 - 20 mg/dL    Creatinine 1.1 0.5 - 1.4 mg/dL    Calcium 8.7 8.7 - 10.5 mg/dL    Total  Protein 7.6 6.0 - 8.4 g/dL    Albumin 4.1 3.5 - 5.2 g/dL    Total Bilirubin 0.3 0.1 - 1.0 mg/dL    Alkaline Phosphatase 61 40 - 150 U/L    AST 13 10 - 40 U/L    ALT 10 10 - 44 U/L    eGFR >60.0 >60 mL/min/1.73 m^2    Anion Gap 12 8 - 16 mmol/L   Urinalysis, Reflex to Urine Culture Urine, Clean Catch    Collection Time: 10/18/24  2:40 AM    Specimen: Urine, Clean Catch   Result Value Ref Range    Specimen UA Urine, Clean Catch     Color, UA Colorless (A) Yellow, Straw, Justina    Appearance, UA Clear Clear    pH, UA 7.0 5.0 - 8.0    Specific Gravity, UA 1.005 1.005 - 1.030    Protein, UA Negative Negative    Glucose, UA Negative Negative    Ketones, UA Negative Negative    Bilirubin (UA) Negative Negative    Occult Blood UA Negative Negative    Nitrite, UA Negative Negative    Leukocytes, UA Negative Negative   POCT urine pregnancy    Collection Time: 10/18/24  2:41 AM   Result Value Ref Range    POC Preg Test, Ur Negative Negative     Acceptable Yes      Imaging Results              CT Head Without Contrast (Final result)  Result time 10/18/24 04:23:13      Final result by Lizzy Hollingsworth MD (10/18/24 04:23:13)                   Impression:      1. No CT evidence of acute intracranial abnormality. Clinical correlation and further evaluation as warranted.  2. Empty configuration of the sella which can be a normal variant or seen with idiopathic intracranial hypertension in the appropriate clinical setting.  Clinical correlation advised.  3. Ethmoid and maxillary sinus disease.      Electronically signed by: Lizzy Hollingsworth MD  Date:    10/18/2024  Time:    04:23               Narrative:    EXAMINATION:  CT HEAD WITHOUT CONTRAST    CLINICAL HISTORY:  Head trauma, moderate-severe;    TECHNIQUE:  Low dose axial images were obtained through the head.  Coronal and sagittal reformations were also performed. Contrast was not administered.    COMPARISON:  MRI brain 10/04/2023, head CT  2023    FINDINGS:  There is no acute intracranial hemorrhage, hydrocephalus, midline shift or mass effect. Gray-white matter differentiation appears maintained. Incidentally noted empty configuration of the sella which can be seen as a variant or with idiopathic intracranial hypertension in the appropriate clinical setting.  Basal cisterns are patent.  There is mucosal thickening patchy opacification of the ethmoid air cells.  There is mucosal thickening of the maxillary sinuses.  The visualized bones of the calvarium demonstrate no acute osseous abnormality.                                    12-LEAD EKG INTERPRETATION BY ME:  Rate/Rhythm: Normal Sinus Rhythm with rate of 86 beats per minute, 1st degree av block  QRS, ST, T-waves: No changes consistent with acute ischemia  Impression: No evidence of ischemia or arrhythmia   Labs & Imaging studies were reviewed independently by me.     Medical Decision Makin-year-old female with a reported history of seizures on Topamax and an unknown seizure medication presenting with seizures x3 at home.  Laboratory studies and CT imaging are unremarkable.  She allegedly follows with Neurology and had to IM shots a few days ago, this is not documented despite reporting it was here with our neurology team.  Given her increased frequency of seizures, will placed in EDOU for epilepsy consultation in AM.    Clinical Impression:  1. Altered level of consciousness    2. Breakthrough seizure                 Mary Mratínez MD  10/18/24 0537

## 2024-10-18 NOTE — H&P
ED Observation Unit  History and Physical    Patient was placed in ED Observation by overnight ED team at 0439 on 10/18/2024.      I assumed care of this patient from the Main ED at that beginning of my shift time, 0700 on 10/18/2024.       History of Present Illness:    Ray Knox is a 29 y.o. female who presents to the ED with Seizures (Reports seizure-like activity 45 minutes ago, pt is lethargic in triage)     Described as 29-year-old female with a history of seizures on Topamax and other unknown seizure medication presenting to the emergency department after alleged seizure x3 at home.  Seizures were witnessed by boyfriend, generalized tonic-clonic, lasting 2-3 minutes and self-resolving with a postictal period.  She reports she was never had this many seizures in a row before.  She reports she was saw neurology on 10/16/2024 after her allergy appointment and received 2 IM medications, though she was not know which.  There is no documentation of this visit.  Her boyfriend said he found her on the floor at 1 point in time and she endorses a severe headache.  No vomiting, no neck pain.     EDOU evaluation:  CT head performed in the ED revealed no acute intracranial pathology.  Patient's boyfriend was present over face time.  He was here for the majority of the night and states that the patient did not have any recurrent episodes.  Currently, patient complaining of a headache.  She states that she gets headaches typically prior to a seizure.  However, she states that the headache is slightly different than normal.   Patient placed in ED observation for Neurology evaluation of breakthrough seizure and increased frequency of seizures.    I reviewed the ED Provider Note dated 10/18/2024 prior to my evaluation of this patient.  I reviewed all labs and imaging performed in the Main ED, prior to patient being placed in Observation. Patient was placed in the ED Observation Unit for Breakthrough seizure.    PMHx    Past Medical History:   Diagnosis Date    Anemia     Asthma     Bilateral renal cysts     Breast disorder     Congenital absence of one kidney     General anesthetics causing adverse effect in therapeutic use     Kidney cysts     pt born with 1 kidney      Past Surgical History:   Procedure Laterality Date    FINGER MASS EXCISION Left 3/8/2022    Procedure: EXCISION, MASS, FINGER;  Surgeon: Bk Doty Jr., MD;  Location: [facility] OR;  Service: Orthopedics;  Laterality: Left;    ULNAR NERVE TRANSPOSITION Left 3/29/2023    Procedure: TRANSPOSITION, NERVE, ULNAR;  Surgeon: Bk Doty Jr., MD;  Location: [facility] OR;  Service: Orthopedics;  Laterality: Left;        Family Hx   Family History   Problem Relation Name Age of Onset    Diabetes Paternal Aunt      Hypertension Maternal Grandfather      Cancer Paternal Grandmother      Breast cancer Paternal Grandmother      Leukemia Paternal Grandmother      Kidney disease Maternal Grandmother      Hypertension Mother          Social Hx   Social History     Socioeconomic History    Marital status: Single   Tobacco Use    Smoking status: Every Day     Types: Vaping with nicotine    Smokeless tobacco: Never   Substance and Sexual Activity    Alcohol use: Yes     Comment: occasionally    Drug use: Not Currently    Sexual activity: Yes     Partners: Male     Birth control/protection: None     Comment: Not current on Depo     Social Drivers of Health     Financial Resource Strain: Medium Risk (7/9/2024)    Overall Financial Resource Strain (CARDIA)     Difficulty of Paying Living Expenses: Somewhat hard   Food Insecurity: Food Insecurity Present (7/9/2024)    Hunger Vital Sign     Worried About Running Out of Food in the Last Year: Sometimes true     Ran Out of Food in the Last Year: Sometimes true   Transportation Needs: No Transportation Needs (10/5/2022)    PRAPARE - Transportation     Lack of Transportation (Medical): No     Lack of Transportation (Non-Medical): No    Physical Activity: Sufficiently Active (7/9/2024)    Exercise Vital Sign     Days of Exercise per Week: 5 days     Minutes of Exercise per Session: 70 min   Stress: No Stress Concern Present (7/9/2024)    [employer] - Occupational Stress Questionnaire     Feeling of Stress : Only a little   Housing Stability: Unknown (10/5/2022)    Housing Stability Vital Sign     Unable to Pay for Housing in the Last Year: No     Unstable Housing in the Last Year: No        Vital Signs   Vitals:    10/18/24 0945 10/18/24 0957 10/18/24 1000 10/18/24 1015   BP: 128/89 128/89 (!) 142/89 136/84   BP Location:       Patient Position:       Pulse: 107 82 89 87   Resp: 19 17 19 17   Temp:       TempSrc:       SpO2: 100% 100% 100% 100%   Weight:       Height:            Review of Systems  Review of Systems   Gastrointestinal:  Positive for nausea.   Neurological:  Positive for headaches.       Physical Exam  Physical Exam  Vitals reviewed.   Constitutional:       General: She is not in acute distress.     Appearance: She is not diaphoretic.      Comments: Appears uncomfortable due to headache. + photophobia   HENT:      Head: Normocephalic and atraumatic.   Eyes:      General: No scleral icterus.     Extraocular Movements: Extraocular movements intact.      Conjunctiva/sclera: Conjunctivae normal.      Pupils: Pupils are equal, round, and reactive to light.   Cardiovascular:      Rate and Rhythm: Normal rate and regular rhythm.      Heart sounds: Heart sounds not distant. No murmur heard.     No friction rub. No gallop.   Pulmonary:      Effort: Pulmonary effort is normal. No respiratory distress.      Breath sounds: Normal breath sounds. No decreased breath sounds, wheezing, rhonchi or rales.   Chest:      Chest wall: No tenderness.   Musculoskeletal:      Cervical back: Neck supple.   Skin:     General: Skin is warm and dry.   Neurological:      Mental Status: She is alert.      Sensory: Sensory deficit  present.      Motor: Weakness present.      Comments: No dysarthria.  Speech fluent.  No facial asymmetry.  Patient reporting diminished sensation to the right lower extremity.   2/5 strength to RLE. 3/5 strength LLE.   4/5 strength BUE.    Psychiatric:         Mood and Affect: Mood and affect normal.         Medications:   Scheduled Meds:   cephALEXin  500 mg Oral Q12H    cetirizine  10 mg Oral Daily    EScitalopram oxalate  5 mg Oral Daily    fluticasone propionate  1 spray Each Nostril Daily    propranoloL  10 mg Oral BID    sucralfate  1 g Oral BID    topiramate  25 mg Oral Daily     Continuous Infusions:  PRN Meds:.  Current Facility-Administered Medications:     albuterol, 2 puff, Inhalation, Q6H PRN    melatonin, 6 mg, Oral, Nightly PRN    ondansetron, 4 mg, Intravenous, Q6H PRN    sodium chloride 0.9%, 10 mL, Intravenous, PRN      Assessment/Plan:  Breakthrough seizure  - CT head negative for acute pathology  - neurology consult.  Neurology arrived at bedside during my evaluation of the patient.  Given neurologic deficit and concern for Rc's paralysis, they would like to further evaluate with EEG.  They will consider loading with Keppra.  - add Topamax level  - patient will be upgraded to Hospital Medicine for further management.      Case was discussed with the ED provider, Dr. Martínez

## 2024-10-18 NOTE — ASSESSMENT & PLAN NOTE
Unclear if patient has prior seizures. No documentation on chart review.   CT head - negative for acute findings  On Topamax for headaches  Neurology consulted - appreciate recs  EEG ordered  MRI brain ordered  Will start keppra if evidence if seizures on EEG  Patient reports right lower extremity numbness/weakness - could be tod's paralysis   PT/OT

## 2024-10-18 NOTE — SUBJECTIVE & OBJECTIVE
Past Medical History:   Diagnosis Date    Anemia     Asthma     Bilateral renal cysts     Breast disorder     Congenital absence of one kidney     General anesthetics causing adverse effect in therapeutic use     Kidney cysts     pt born with 1 kidney       Past Surgical History:   Procedure Laterality Date    FINGER MASS EXCISION Left 3/8/2022    Procedure: EXCISION, MASS, FINGER;  Surgeon: Bk Doty Jr., MD;  Location: [facility] OR;  Service: Orthopedics;  Laterality: Left;    ULNAR NERVE TRANSPOSITION Left 3/29/2023    Procedure: TRANSPOSITION, NERVE, ULNAR;  Surgeon: Bk Doty Jr., MD;  Location: [facility] OR;  Service: Orthopedics;  Laterality: Left;       Review of patient's allergies indicates:   Allergen Reactions    Lisinopril Other (See Comments), Shortness Of Breath and Swelling     angioedema      Shellfish containing products Swelling       No current facility-administered medications on file prior to encounter.     Current Outpatient Medications on File Prior to Encounter   Medication Sig    acyclovir (ZOVIRAX) 800 MG Tab Take 1 tablet (800 mg total) by mouth 2 (two) times daily. Take 1 tablet orally BID x 5 days for recurrent outbreaks for 5 days    albuterol (PROVENTIL/VENTOLIN HFA) 90 mcg/actuation inhaler Inhale 1-2 puffs into the lungs every 6 (six) hours as needed for Wheezing. Rescue    [] cephALEXin (KEFLEX) 500 MG capsule Take 1 capsule (500 mg total) by mouth every 12 (twelve) hours. for 10 days    cetirizine (ZYRTEC) 10 MG tablet Take 1 tablet (10 mg total) by mouth once daily.    diclofenac sodium (VOLTAREN) 1 % Gel APPLY 2 GRAMS TOPICALLY TO THE AFFECTED AREA THREE TIMES DAILY (Patient not taking: Reported on 10/16/2024)    EScitalopram oxalate (LEXAPRO) 5 MG Tab Take 1 tablet (5 mg total) by mouth once daily.    fluticasone propionate (FLONASE) 50 mcg/actuation nasal spray 1 spray (50 mcg total) by Each Nostril route once daily.    melatonin (MELATIN) 3 mg tablet Take 1 tablet  (3 mg total) by mouth nightly.    methylPREDNISolone (MEDROL DOSEPACK) 4 mg tablet use as directed (Patient not taking: Reported on 10/16/2024)    norgestrel-ethinyl estradioL (LO/OVRAL) 0.3-30 mg-mcg per tablet Take 1 tablet by mouth once daily. (Patient not taking: Reported on 10/16/2024)    ondansetron (ZOFRAN) 4 MG tablet Take 1 tablet (4 mg total) by mouth every 6 (six) hours.    ondansetron (ZOFRAN-ODT) 4 MG TbDL Take 1 tablet (4 mg total) by mouth every 8 (eight) hours as needed (nausea and vomiting).    pantoprazole (PROTONIX) 40 MG tablet Take 1 tablet (40 mg total) by mouth once daily.    progesterone (PROMETRIUM) 200 MG capsule Take 1 capsule (200 mg total) by mouth nightly.    propranoloL (INDERAL) 10 MG tablet Take 1 tablet (10 mg total) by mouth 2 (two) times daily.    sucralfate (CARAFATE) 100 mg/mL suspension Take 10 mLs (1 g total) by mouth 4 (four) times daily.    topiramate (TOPAMAX) 50 MG tablet Take 0.5 tablets (25 mg total) by mouth once daily.    valACYclovir (VALTREX) 500 MG tablet Take 1 tablet by mouth twice a day for 3 days with outbreaks     Family History       Problem Relation (Age of Onset)    Breast cancer Paternal Grandmother    Cancer Paternal Grandmother    Diabetes Paternal Aunt    Hypertension Maternal Grandfather, Mother    Kidney disease Maternal Grandmother    Leukemia Paternal Grandmother          Tobacco Use    Smoking status: Every Day     Types: Vaping with nicotine    Smokeless tobacco: Never   Substance and Sexual Activity    Alcohol use: Yes     Comment: occasionally    Drug use: Not Currently    Sexual activity: Yes     Partners: Male     Birth control/protection: None     Comment: Not current on Depo     Review of Systems   Constitutional:  Negative for appetite change, fatigue and fever.   HENT: Negative.     Respiratory: Negative.     Cardiovascular: Negative.    Gastrointestinal: Negative.    Genitourinary: Negative.    Musculoskeletal: Negative.    Neurological:   Positive for seizures, weakness, light-headedness and headaches. Negative for tremors and facial asymmetry.     Objective:     Vital Signs (Most Recent):  Temp: 98.3 °F (36.8 °C) (10/18/24 0600)  Pulse: 70 (10/18/24 1600)  Resp: 16 (10/18/24 1600)  BP: 125/89 (10/18/24 1600)  SpO2: 100 % (10/18/24 1600) Vital Signs (24h Range):  Temp:  [98.1 °F (36.7 °C)-98.4 °F (36.9 °C)] 98.3 °F (36.8 °C)  Pulse:  [] 70  Resp:  [10-20] 16  SpO2:  [96 %-100 %] 100 %  BP: (103-142)/(61-89) 125/89     Weight: 96.6 kg (213 lb)  Body mass index is 35.45 kg/m².     Physical Exam  Constitutional:       General: She is not in acute distress.     Appearance: She is not ill-appearing.   Cardiovascular:      Rate and Rhythm: Normal rate.      Heart sounds: Normal heart sounds.   Pulmonary:      Effort: Pulmonary effort is normal. No respiratory distress.      Breath sounds: Normal breath sounds.   Abdominal:      General: Abdomen is flat.      Palpations: Abdomen is soft.      Tenderness: There is no abdominal tenderness.   Musculoskeletal:      Right lower leg: No edema.      Left lower leg: No edema.   Neurological:      Mental Status: She is alert.      Comments: Aox3  Right LE decreased sensation when compared to right.   RLE weakness   Strength upper extremities 5/5    Psychiatric:         Mood and Affect: Mood normal.                Significant Labs: All pertinent labs within the past 24 hours have been reviewed.    Significant Imaging: I have reviewed all pertinent imaging results/findings within the past 24 hours.

## 2024-10-18 NOTE — CONSULTS
[facility] - Emergency Dept  Neurology  Consult Note    Patient Name: Ray Knox  MRN: [id number]  Admission Date: 10/18/2024  Hospital Length of Stay: 0 days  Code Status: Full Code   Attending Provider: Alice Denton MD   Consulting Provider: Rolando Contreras MD  Primary Care Physician: Les Horton MD (Inactive)  Principal Problem:Breakthrough seizure    Inpatient consult to Neurology  Consult performed by: Rolando Contreras MD  Consult ordered by: Kathya Raman PA-C         Subjective:     Chief Complaint:  Seizure-like activity     HPI:   Ms. Knox is a 30yo F with the PMHx of seizures?. Pt presents to the ED after having 3 witnessed seizures last night by her boyfriend. He states the seizures lasted 2-3 minutes and pt was confused for 3-4 minutes after the seizure. Pt states that she had a metallic taste in her mouth prior to the onset of the seizure. Boyfriend describes the patient as having whole body shaking and that her eyes were open as this occurred. States that the pt was confused for 3-4 minutes. Pt states that she was diagnosed with seizures 2 months ago, however not confirmed by chart review . Pt is on Topamax at home for migraines. States that her seizures usually consist of her staring off into space, and last night was different because she was shaking more than usual.     Denies any fecal/urinary incontinence and tongue biting. Endorses frontal headache, BLLE numbness and tingling, and muscle weakness.      Past Medical History:   Diagnosis Date    Anemia     Asthma     Bilateral renal cysts     Breast disorder     Congenital absence of one kidney     General anesthetics causing adverse effect in therapeutic use     Kidney cysts     pt born with 1 kidney       Past Surgical History:   Procedure Laterality Date    FINGER MASS EXCISION Left 3/8/2022    Procedure: EXCISION, MASS, FINGER;  Surgeon: Bk Doty Jr., MD;  Location: [facility];  Service: Orthopedics;  Laterality: Left;    ULNAR  NERVE TRANSPOSITION Left 3/29/2023    Procedure: TRANSPOSITION, NERVE, ULNAR;  Surgeon: Bk Doty Jr., MD;  Location: [facility];  Service: Orthopedics;  Laterality: Left;       Review of patient's allergies indicates:   Allergen Reactions    Lisinopril Other (See Comments), Shortness Of Breath and Swelling     angioedema      Shellfish containing products Swelling       Current Neurological Medications: Topamax 25mg QD    No current facility-administered medications on file prior to encounter.     Current Outpatient Medications on File Prior to Encounter   Medication Sig    acyclovir (ZOVIRAX) 800 MG Tab Take 1 tablet (800 mg total) by mouth 2 (two) times daily. Take 1 tablet orally BID x 5 days for recurrent outbreaks for 5 days    albuterol (PROVENTIL/VENTOLIN HFA) 90 mcg/actuation inhaler Inhale 1-2 puffs into the lungs every 6 (six) hours as needed for Wheezing. Rescue    [] cephALEXin (KEFLEX) 500 MG capsule Take 1 capsule (500 mg total) by mouth every 12 (twelve) hours. for 10 days    cetirizine (ZYRTEC) 10 MG tablet Take 1 tablet (10 mg total) by mouth once daily.    diclofenac sodium (VOLTAREN) 1 % Gel APPLY 2 GRAMS TOPICALLY TO THE AFFECTED AREA THREE TIMES DAILY (Patient not taking: Reported on 10/16/2024)    EScitalopram oxalate (LEXAPRO) 5 MG Tab Take 1 tablet (5 mg total) by mouth once daily.    fluticasone propionate (FLONASE) 50 mcg/actuation nasal spray 1 spray (50 mcg total) by Each Nostril route once daily.    melatonin (MELATIN) 3 mg tablet Take 1 tablet (3 mg total) by mouth nightly.    methylPREDNISolone (MEDROL DOSEPACK) 4 mg tablet use as directed (Patient not taking: Reported on 10/16/2024)    norgestrel-ethinyl estradioL (LO/OVRAL) 0.3-30 mg-mcg per tablet Take 1 tablet by mouth once daily. (Patient not taking: Reported on 10/16/2024)    ondansetron (ZOFRAN) 4 MG tablet Take 1 tablet (4 mg total) by mouth every 6 (six) hours.    ondansetron (ZOFRAN-ODT) 4 MG TbDL Take 1 tablet (4  mg total) by mouth every 8 (eight) hours as needed (nausea and vomiting).    pantoprazole (PROTONIX) 40 MG tablet Take 1 tablet (40 mg total) by mouth once daily.    progesterone (PROMETRIUM) 200 MG capsule Take 1 capsule (200 mg total) by mouth nightly.    propranoloL (INDERAL) 10 MG tablet Take 1 tablet (10 mg total) by mouth 2 (two) times daily.    sucralfate (CARAFATE) 100 mg/mL suspension Take 10 mLs (1 g total) by mouth 4 (four) times daily.    topiramate (TOPAMAX) 50 MG tablet Take 0.5 tablets (25 mg total) by mouth once daily.    valACYclovir (VALTREX) 500 MG tablet Take 1 tablet by mouth twice a day for 3 days with outbreaks     Family History       Problem Relation (Age of Onset)    Breast cancer Paternal Grandmother    Cancer Paternal Grandmother    Diabetes Paternal Aunt    Hypertension Maternal Grandfather, Mother    Kidney disease Maternal Grandmother    Leukemia Paternal Grandmother          Tobacco Use    Smoking status: Every Day     Types: Vaping with nicotine    Smokeless tobacco: Never   Substance and Sexual Activity    Alcohol use: Yes     Comment: occasionally    Drug use: Not Currently    Sexual activity: Yes     Partners: Male     Birth control/protection: None     Comment: Not current on Depo     Review of Systems   All other systems reviewed and are negative.    Objective:     Vital Signs (Most Recent):  Temp: 98.3 °F (36.8 °C) (10/18/24 0600)  Pulse: 68 (10/18/24 1400)  Resp: 16 (10/18/24 1400)  BP: 115/70 (10/18/24 1400)  SpO2: 100 % (10/18/24 1400) Vital Signs (24h Range):  Temp:  [98.1 °F (36.7 °C)-98.4 °F (36.9 °C)] 98.3 °F (36.8 °C)  Pulse:  [] 68  Resp:  [10-20] 16  SpO2:  [96 %-100 %] 100 %  BP: (103-142)/(61-89) 115/70     Weight: 96.6 kg (213 lb)  Body mass index is 35.45 kg/m².     Physical Exam  Vitals and nursing note reviewed.   Constitutional:       Appearance: She is obese.   HENT:      Head: Normocephalic and atraumatic.      Mouth/Throat:      Mouth: Mucous membranes  are moist.   Eyes:      Extraocular Movements: Extraocular movements intact and EOM normal.      Conjunctiva/sclera: Conjunctivae normal.      Pupils: Pupils are equal, round, and reactive to light.   Skin:     General: Skin is warm and dry.   Neurological:      Mental Status: She is alert and oriented to person, place, and time.      Sensory: Sensory deficit present.      Motor: Weakness present.      Coordination: Finger-Nose-Finger Test normal.      Deep Tendon Reflexes:      Reflex Scores:       Brachioradialis reflexes are 2+ on the right side and 2+ on the left side.       Patellar reflexes are 2+ on the right side and 2+ on the left side.  Psychiatric:         Speech: Speech normal.      Comments: Tearful          NEUROLOGICAL EXAMINATION:     MENTAL STATUS   Oriented to person, place, and time.   Speech: speech is normal   Level of consciousness: alert    CRANIAL NERVES     CN II   Visual fields full to confrontation.   Visual acuity: normal  Right visual field deficit: none  Left visual field deficit: none     CN III, IV, VI   Pupils are equal, round, and reactive to light.  Extraocular motions are normal.     CN V   Facial sensation intact.     CN VII   Facial expression full, symmetric.     CN VIII   CN VIII normal.     CN IX, X   CN IX normal.     CN XI   CN XI normal.     CN XII   CN XII normal.     MOTOR EXAM     Strength   Right strength: RLE strength 3/5,  RUE 4/5    REFLEXES     Reflexes   Right brachioradialis: 2+  Left brachioradialis: 2+  Right patellar: 2+  Left patellar: 2+    SENSORY EXAM   Right arm light touch: normal  Left arm light touch: normal  Right leg light touch: decreased from knee  Left leg light touch: normal    GAIT AND COORDINATION      Coordination   Finger to nose coordination: normal    Tremor   Resting tremor: absent  Intention tremor: absent  Action tremor: absent      Significant Labs:     Significant Imaging:     Recent Labs   Lab 10/18/24  0236   WBC 9.75   HGB 12.4  "  HCT 39.3   MCV 83   RBC 4.75   MCH 26.1*   MCHC 31.6*   RDW 15.1*      MPV 10.8   GRAN 51.3  5.0   LYMPH 38.8  3.8   MONO 7.4  0.7   EOSINOPHIL 1.9   BASOPHIL 0.4       Recent Labs   Lab 10/18/24  0236      K 3.6   *   CO2 17*   BUN 8   CREATININE 1.1   *   CALCIUM 8.7   PROT 7.6   ALBUMIN 4.1   ALKPHOS 61   BILITOT 0.3   ALT 10   AST 13   ANIONGAP 12       Recent Labs   Lab 10/18/24  0240   COLORU Colorless*   APPEARANCEUA Clear   PHUR 7.0   SPECGRAV 1.005   PROTEINUA Negative   GLUCUA Negative   KETONESU Negative   BILIRUBINUA Negative   OCCULTUA Negative   NITRITE Negative   LEUKOCYTESUR Negative       No results for input(s): "PH", "PCO2", "PO2", "HCO3", "POCSATURATED", "BE" in the last 168 hours.    No results for input(s): "TROPONINI", "CPK", "CPKMB" in the last 168 hours.    No results for input(s): "PT", "INR", "APTT" in the last 168 hours.    Lab Results   Component Value Date    HGBA1C 5.5 07/09/2024       No results for input(s): "TSH", "Z9YZGZA", "A6ZPROC", "THYROIDAB", "FREET4" in the last 168 hours.    Microbiology Results (last 7 days)       ** No results found for the last 168 hours. **            CT Head Without Contrast    Result Date: 10/18/2024  EXAMINATION: CT HEAD WITHOUT CONTRAST CLINICAL HISTORY: Head trauma, moderate-severe; TECHNIQUE: Low dose axial images were obtained through the head.  Coronal and sagittal reformations were also performed. Contrast was not administered. COMPARISON: MRI brain 10/04/2023, head CT 06/04/2023 FINDINGS: There is no acute intracranial hemorrhage, hydrocephalus, midline shift or mass effect. Gray-white matter differentiation appears maintained. Incidentally noted empty configuration of the sella which can be seen as a variant or with idiopathic intracranial hypertension in the appropriate clinical setting.  Basal cisterns are patent.  There is mucosal thickening patchy opacification of the ethmoid air cells.  There is mucosal " thickening of the maxillary sinuses.  The visualized bones of the calvarium demonstrate no acute osseous abnormality.     1. No CT evidence of acute intracranial abnormality. Clinical correlation and further evaluation as warranted. 2. Empty configuration of the sella which can be a normal variant or seen with idiopathic intracranial hypertension in the appropriate clinical setting.  Clinical correlation advised. 3. Ethmoid and maxillary sinus disease. Electronically signed by: Lizzy Hollingsworth MD Date:    10/18/2024 Time:    04:23    Assessment and Plan:     * Breakthrough seizure  Pt presents to the ED after having 3 witnessed seizures last night by her boyfriend. He states the seizures lasted 2-3 minutes and pt was confused for 3-4 minutes after the seizure. Pt states that she had a metallic taste in her mouth prior to the onset of the seizure. Boyfriend describes the patient as having whole body shaking and that her eyes were open as this occurred. States that the pt was confused for 3-4 minutes. Pt states that she was diagnosed with seizures 2 months ago, however not confirmed by chart review.    -Pt is A&Ox3, however there is concern for Rc's Paralysis as pt has RLE and RUE muscle weakness with decreased sensation in her RLE up to her knees.   -CT Head: No CT evidence of acute intracranial abnormality.      -EEG, if negative then obtain MRI.    -If EEG is positive, then load with Keppra.         VTE Risk Mitigation (From admission, onward)           Ordered     Reason for No Pharmacological VTE Prophylaxis  Once        Question:  Reasons:  Answer:  Patient is Ambulatory    10/18/24 1127     IP VTE HIGH RISK PATIENT  Once         10/18/24 0930     Place sequential compression device  Until discontinued         10/18/24 0930     Place PAIGE hose  Until discontinued         10/18/24 0930                    Thank you for your consult. I will follow-up with patient. Please contact us if you have any additional  questions.    Rolando Contreras MD PGY-III  Neurology  [facility] - Emergency Dept

## 2024-10-18 NOTE — ASSESSMENT & PLAN NOTE
Reports headache started after the seizures at home.   On chart review, she has chronic tension headaches, post concussive syndrome  CT head - no acute findings  On Topamax for headaches  Patient referred to neurology in the past for evaluation of headaches   - migraine cocktail prn  - ketorolac prn

## 2024-10-18 NOTE — HPI
29 year old female with PMH of anemia, asthma, congential abcense of 1 kidney, chronic headaches, ?seizures, presents to ED after patients boyfriend witnessed her having multiple seizures at home. Patient reports she had multiple seizures at home, lasting few minutes. Had whole body shaking episode. Reports post ictal confusion. Denies urinary incontinence, tongue biting, eyes rolling back. She reports headaches that are chronic. The current headache started after seizure episode. Is mostly on right side of head. Ass with nausea and photophobia. She takes topamax at home for headaches. She as right lower extremity numbness and reports ride side of body weakness. That is improving slowly. Reports tingling sensation in right lower extremity.   Recently completed course of antibiotics for UTI. Denies dysuria, frequency.

## 2024-10-18 NOTE — H&P
[facility] - Neurosurgery ([facility])  [facility] Medicine  History & Physical    Patient Name: Ray Knox  MRN: [id number]  Patient Class: IP- Inpatient  Admission Date: 10/18/2024  Attending Physician: Alice Denton MD  Primary Care Provider: Les Horton MD (Inactive)         Patient information was obtained from patient, past medical records, and ER records.     Subjective:     Principal Problem:Breakthrough seizure    Chief Complaint:   Chief Complaint   Patient presents with    Seizures     Reports seizure-like activity 45 minutes ago, pt is lethargic in triage        HPI: 29 year old female with PMH of anemia, asthma, congential abcense of 1 kidney, chronic headaches, ?seizures, presents to ED after patients boyfriend witnessed her having multiple seizures at home. Patient reports she had multiple seizures at home, lasting few minutes. Had whole body shaking episode. Reports post ictal confusion. Denies urinary incontinence, tongue biting, eyes rolling back. She reports headaches that are chronic. The current headache started after seizure episode. Is mostly on right side of head. Ass with nausea and photophobia. She takes topamax at home for headaches. She as right lower extremity numbness and reports ride side of body weakness. That is improving slowly. Reports tingling sensation in right lower extremity.       Past Medical History:   Diagnosis Date    Anemia     Asthma     Bilateral renal cysts     Breast disorder     Congenital absence of one kidney     General anesthetics causing adverse effect in therapeutic use     Kidney cysts     pt born with 1 kidney       Past Surgical History:   Procedure Laterality Date    FINGER MASS EXCISION Left 3/8/2022    Procedure: EXCISION, MASS, FINGER;  Surgeon: Bk Doty Jr., MD;  Location: [facility] OR;  Service: Orthopedics;  Laterality: Left;    ULNAR NERVE TRANSPOSITION Left 3/29/2023    Procedure: TRANSPOSITION, NERVE, ULNAR;  Surgeon: Bk Doty Jr., MD;   Location: [facility] OR;  Service: Orthopedics;  Laterality: Left;       Review of patient's allergies indicates:   Allergen Reactions    Lisinopril Other (See Comments), Shortness Of Breath and Swelling     angioedema      Shellfish containing products Swelling       No current facility-administered medications on file prior to encounter.     Current Outpatient Medications on File Prior to Encounter   Medication Sig    acyclovir (ZOVIRAX) 800 MG Tab Take 1 tablet (800 mg total) by mouth 2 (two) times daily. Take 1 tablet orally BID x 5 days for recurrent outbreaks for 5 days    albuterol (PROVENTIL/VENTOLIN HFA) 90 mcg/actuation inhaler Inhale 1-2 puffs into the lungs every 6 (six) hours as needed for Wheezing. Rescue    [] cephALEXin (KEFLEX) 500 MG capsule Take 1 capsule (500 mg total) by mouth every 12 (twelve) hours. for 10 days    cetirizine (ZYRTEC) 10 MG tablet Take 1 tablet (10 mg total) by mouth once daily.    diclofenac sodium (VOLTAREN) 1 % Gel APPLY 2 GRAMS TOPICALLY TO THE AFFECTED AREA THREE TIMES DAILY (Patient not taking: Reported on 10/16/2024)    EScitalopram oxalate (LEXAPRO) 5 MG Tab Take 1 tablet (5 mg total) by mouth once daily.    fluticasone propionate (FLONASE) 50 mcg/actuation nasal spray 1 spray (50 mcg total) by Each Nostril route once daily.    melatonin (MELATIN) 3 mg tablet Take 1 tablet (3 mg total) by mouth nightly.    methylPREDNISolone (MEDROL DOSEPACK) 4 mg tablet use as directed (Patient not taking: Reported on 10/16/2024)    norgestrel-ethinyl estradioL (LO/OVRAL) 0.3-30 mg-mcg per tablet Take 1 tablet by mouth once daily. (Patient not taking: Reported on 10/16/2024)    ondansetron (ZOFRAN) 4 MG tablet Take 1 tablet (4 mg total) by mouth every 6 (six) hours.    ondansetron (ZOFRAN-ODT) 4 MG TbDL Take 1 tablet (4 mg total) by mouth every 8 (eight) hours as needed (nausea and vomiting).    pantoprazole (PROTONIX) 40 MG tablet Take 1 tablet (40 mg total) by mouth once daily.     progesterone (PROMETRIUM) 200 MG capsule Take 1 capsule (200 mg total) by mouth nightly.    propranoloL (INDERAL) 10 MG tablet Take 1 tablet (10 mg total) by mouth 2 (two) times daily.    sucralfate (CARAFATE) 100 mg/mL suspension Take 10 mLs (1 g total) by mouth 4 (four) times daily.    topiramate (TOPAMAX) 50 MG tablet Take 0.5 tablets (25 mg total) by mouth once daily.    valACYclovir (VALTREX) 500 MG tablet Take 1 tablet by mouth twice a day for 3 days with outbreaks     Family History       Problem Relation (Age of Onset)    Breast cancer Paternal Grandmother    Cancer Paternal Grandmother    Diabetes Paternal Aunt    Hypertension Maternal Grandfather, Mother    Kidney disease Maternal Grandmother    Leukemia Paternal Grandmother          Tobacco Use    Smoking status: Every Day     Types: Vaping with nicotine    Smokeless tobacco: Never   Substance and Sexual Activity    Alcohol use: Yes     Comment: occasionally    Drug use: Not Currently    Sexual activity: Yes     Partners: Male     Birth control/protection: None     Comment: Not current on Depo     Review of Systems   Constitutional:  Negative for appetite change, fatigue and fever.   HENT: Negative.     Respiratory: Negative.     Cardiovascular: Negative.    Gastrointestinal: Negative.    Genitourinary: Negative.    Musculoskeletal: Negative.    Neurological:  Positive for seizures, weakness, light-headedness and headaches. Negative for tremors and facial asymmetry.     Objective:     Vital Signs (Most Recent):  Temp: 98.3 °F (36.8 °C) (10/18/24 0600)  Pulse: 70 (10/18/24 1600)  Resp: 16 (10/18/24 1600)  BP: 125/89 (10/18/24 1600)  SpO2: 100 % (10/18/24 1600) Vital Signs (24h Range):  Temp:  [98.1 °F (36.7 °C)-98.4 °F (36.9 °C)] 98.3 °F (36.8 °C)  Pulse:  [] 70  Resp:  [10-20] 16  SpO2:  [96 %-100 %] 100 %  BP: (103-142)/(61-89) 125/89     Weight: 96.6 kg (213 lb)  Body mass index is 35.45 kg/m².     Physical Exam  Constitutional:       General: She  is not in acute distress.     Appearance: She is not ill-appearing.   Cardiovascular:      Rate and Rhythm: Normal rate.      Heart sounds: Normal heart sounds.   Pulmonary:      Effort: Pulmonary effort is normal. No respiratory distress.      Breath sounds: Normal breath sounds.   Abdominal:      General: Abdomen is flat.      Palpations: Abdomen is soft.      Tenderness: There is no abdominal tenderness.   Musculoskeletal:      Right lower leg: No edema.      Left lower leg: No edema.   Neurological:      Mental Status: She is alert.      Comments: Aox3  Right LE decreased sensation when compared to right.   RLE weakness   Strength upper extremities 5/5    Psychiatric:         Mood and Affect: Mood normal.                Significant Labs: All pertinent labs within the past 24 hours have been reviewed.    Significant Imaging: I have reviewed all pertinent imaging results/findings within the past 24 hours.  Assessment/Plan:     * Breakthrough seizure  Unclear if patient has prior seizures. No documentation on chart review.   CT head - negative for acute findings  On Topamax for headaches  Neurology consulted - appreciate recs  EEG ordered  MRI brain ordered  Will start keppra if evidence if seizures on EEG  Patient reports right lower extremity numbness/weakness - could be tod's paralysis   PT/OT     Asthma  - continue inhalers      Headache  Reports headache started after the seizures at home.   On chart review, she has chronic tension headaches, post concussive syndrome  CT head - no acute findings  On Topamax for headaches  Patient referred to neurology in the past for evaluation of headaches   - migraine cocktail prn  - ketorolac prn       Obstructive sleep apnea  - CPAP at night        VTE Risk Mitigation (From admission, onward)           Ordered     Reason for No Pharmacological VTE Prophylaxis  Once        Question:  Reasons:  Answer:  Patient is Ambulatory    10/18/24 1127     IP VTE HIGH RISK PATIENT   Once         10/18/24 0930     Place sequential compression device  Until discontinued         10/18/24 0930     Place PAIGE hose  Until discontinued         10/18/24 0930                                    Alice Denton MD  Department of Hospital Medicine  [facility] - Neurosurgery ([facility])

## 2024-10-18 NOTE — SUBJECTIVE & OBJECTIVE
Past Medical History:   Diagnosis Date    Anemia     Asthma     Bilateral renal cysts     Breast disorder     Congenital absence of one kidney     General anesthetics causing adverse effect in therapeutic use     Kidney cysts     pt born with 1 kidney       Past Surgical History:   Procedure Laterality Date    FINGER MASS EXCISION Left 3/8/2022    Procedure: EXCISION, MASS, FINGER;  Surgeon: Bk Doty Jr., MD;  Location: [facility] OR;  Service: Orthopedics;  Laterality: Left;    ULNAR NERVE TRANSPOSITION Left 3/29/2023    Procedure: TRANSPOSITION, NERVE, ULNAR;  Surgeon: Bk Doty Jr., MD;  Location: [facility] OR;  Service: Orthopedics;  Laterality: Left;       Review of patient's allergies indicates:   Allergen Reactions    Lisinopril Other (See Comments), Shortness Of Breath and Swelling     angioedema      Shellfish containing products Swelling       Current Neurological Medications: Topamax 25mg QD    No current facility-administered medications on file prior to encounter.     Current Outpatient Medications on File Prior to Encounter   Medication Sig    acyclovir (ZOVIRAX) 800 MG Tab Take 1 tablet (800 mg total) by mouth 2 (two) times daily. Take 1 tablet orally BID x 5 days for recurrent outbreaks for 5 days    albuterol (PROVENTIL/VENTOLIN HFA) 90 mcg/actuation inhaler Inhale 1-2 puffs into the lungs every 6 (six) hours as needed for Wheezing. Rescue    [] cephALEXin (KEFLEX) 500 MG capsule Take 1 capsule (500 mg total) by mouth every 12 (twelve) hours. for 10 days    cetirizine (ZYRTEC) 10 MG tablet Take 1 tablet (10 mg total) by mouth once daily.    diclofenac sodium (VOLTAREN) 1 % Gel APPLY 2 GRAMS TOPICALLY TO THE AFFECTED AREA THREE TIMES DAILY (Patient not taking: Reported on 10/16/2024)    EScitalopram oxalate (LEXAPRO) 5 MG Tab Take 1 tablet (5 mg total) by mouth once daily.    fluticasone propionate (FLONASE) 50 mcg/actuation nasal spray 1 spray (50 mcg total) by Each Nostril route once  daily.    melatonin (MELATIN) 3 mg tablet Take 1 tablet (3 mg total) by mouth nightly.    methylPREDNISolone (MEDROL DOSEPACK) 4 mg tablet use as directed (Patient not taking: Reported on 10/16/2024)    norgestrel-ethinyl estradioL (LO/OVRAL) 0.3-30 mg-mcg per tablet Take 1 tablet by mouth once daily. (Patient not taking: Reported on 10/16/2024)    ondansetron (ZOFRAN) 4 MG tablet Take 1 tablet (4 mg total) by mouth every 6 (six) hours.    ondansetron (ZOFRAN-ODT) 4 MG TbDL Take 1 tablet (4 mg total) by mouth every 8 (eight) hours as needed (nausea and vomiting).    pantoprazole (PROTONIX) 40 MG tablet Take 1 tablet (40 mg total) by mouth once daily.    progesterone (PROMETRIUM) 200 MG capsule Take 1 capsule (200 mg total) by mouth nightly.    propranoloL (INDERAL) 10 MG tablet Take 1 tablet (10 mg total) by mouth 2 (two) times daily.    sucralfate (CARAFATE) 100 mg/mL suspension Take 10 mLs (1 g total) by mouth 4 (four) times daily.    topiramate (TOPAMAX) 50 MG tablet Take 0.5 tablets (25 mg total) by mouth once daily.    valACYclovir (VALTREX) 500 MG tablet Take 1 tablet by mouth twice a day for 3 days with outbreaks     Family History       Problem Relation (Age of Onset)    Breast cancer Paternal Grandmother    Cancer Paternal Grandmother    Diabetes Paternal Aunt    Hypertension Maternal Grandfather, Mother    Kidney disease Maternal Grandmother    Leukemia Paternal Grandmother          Tobacco Use    Smoking status: Every Day     Types: Vaping with nicotine    Smokeless tobacco: Never   Substance and Sexual Activity    Alcohol use: Yes     Comment: occasionally    Drug use: Not Currently    Sexual activity: Yes     Partners: Male     Birth control/protection: None     Comment: Not current on Depo     Review of Systems   All other systems reviewed and are negative.    Objective:     Vital Signs (Most Recent):  Temp: 98.3 °F (36.8 °C) (10/18/24 0600)  Pulse: 68 (10/18/24 1400)  Resp: 16 (10/18/24 1400)  BP:  115/70 (10/18/24 1400)  SpO2: 100 % (10/18/24 1400) Vital Signs (24h Range):  Temp:  [98.1 °F (36.7 °C)-98.4 °F (36.9 °C)] 98.3 °F (36.8 °C)  Pulse:  [] 68  Resp:  [10-20] 16  SpO2:  [96 %-100 %] 100 %  BP: (103-142)/(61-89) 115/70     Weight: 96.6 kg (213 lb)  Body mass index is 35.45 kg/m².     Physical Exam  Vitals and nursing note reviewed.   Constitutional:       Appearance: She is obese.   HENT:      Head: Normocephalic and atraumatic.      Mouth/Throat:      Mouth: Mucous membranes are moist.   Eyes:      Extraocular Movements: Extraocular movements intact and EOM normal.      Conjunctiva/sclera: Conjunctivae normal.      Pupils: Pupils are equal, round, and reactive to light.   Skin:     General: Skin is warm and dry.   Neurological:      Mental Status: She is alert and oriented to person, place, and time.      Sensory: Sensory deficit present.      Motor: Weakness present.      Coordination: Finger-Nose-Finger Test normal.      Deep Tendon Reflexes:      Reflex Scores:       Brachioradialis reflexes are 2+ on the right side and 2+ on the left side.       Patellar reflexes are 2+ on the right side and 2+ on the left side.  Psychiatric:         Speech: Speech normal.      Comments: Tearful          NEUROLOGICAL EXAMINATION:     MENTAL STATUS   Oriented to person, place, and time.   Speech: speech is normal   Level of consciousness: alert    CRANIAL NERVES     CN II   Visual fields full to confrontation.   Visual acuity: normal  Right visual field deficit: none  Left visual field deficit: none     CN III, IV, VI   Pupils are equal, round, and reactive to light.  Extraocular motions are normal.     CN V   Facial sensation intact.     CN VII   Facial expression full, symmetric.     CN VIII   CN VIII normal.     CN IX, X   CN IX normal.     CN XI   CN XI normal.     CN XII   CN XII normal.     MOTOR EXAM     Strength   Right strength: RLE strength 3/5,  RUE 4/5    REFLEXES     Reflexes   Right  "brachioradialis: 2+  Left brachioradialis: 2+  Right patellar: 2+  Left patellar: 2+    SENSORY EXAM   Right arm light touch: normal  Left arm light touch: normal  Right leg light touch: decreased from knee  Left leg light touch: normal    GAIT AND COORDINATION      Coordination   Finger to nose coordination: normal    Tremor   Resting tremor: absent  Intention tremor: absent  Action tremor: absent      Significant Labs:     Significant Imaging:     Recent Labs   Lab 10/18/24  0236   WBC 9.75   HGB 12.4   HCT 39.3   MCV 83   RBC 4.75   MCH 26.1*   MCHC 31.6*   RDW 15.1*      MPV 10.8   GRAN 51.3  5.0   LYMPH 38.8  3.8   MONO 7.4  0.7   EOSINOPHIL 1.9   BASOPHIL 0.4       Recent Labs   Lab 10/18/24  0236      K 3.6   *   CO2 17*   BUN 8   CREATININE 1.1   *   CALCIUM 8.7   PROT 7.6   ALBUMIN 4.1   ALKPHOS 61   BILITOT 0.3   ALT 10   AST 13   ANIONGAP 12       Recent Labs   Lab 10/18/24  0240   COLORU Colorless*   APPEARANCEUA Clear   PHUR 7.0   SPECGRAV 1.005   PROTEINUA Negative   GLUCUA Negative   KETONESU Negative   BILIRUBINUA Negative   OCCULTUA Negative   NITRITE Negative   LEUKOCYTESUR Negative       No results for input(s): "PH", "PCO2", "PO2", "HCO3", "POCSATURATED", "BE" in the last 168 hours.    No results for input(s): "TROPONINI", "CPK", "CPKMB" in the last 168 hours.    No results for input(s): "PT", "INR", "APTT" in the last 168 hours.    Lab Results   Component Value Date    HGBA1C 5.5 07/09/2024       No results for input(s): "TSH", "Y9PGDAN", "A5NAMWF", "THYROIDAB", "FREET4" in the last 168 hours.    Microbiology Results (last 7 days)       ** No results found for the last 168 hours. **            CT Head Without Contrast    Result Date: 10/18/2024  EXAMINATION: CT HEAD WITHOUT CONTRAST CLINICAL HISTORY: Head trauma, moderate-severe; TECHNIQUE: Low dose axial images were obtained through the head.  Coronal and sagittal reformations were also performed. Contrast was not " administered. COMPARISON: MRI brain 10/04/2023, head CT 06/04/2023 FINDINGS: There is no acute intracranial hemorrhage, hydrocephalus, midline shift or mass effect. Gray-white matter differentiation appears maintained. Incidentally noted empty configuration of the sella which can be seen as a variant or with idiopathic intracranial hypertension in the appropriate clinical setting.  Basal cisterns are patent.  There is mucosal thickening patchy opacification of the ethmoid air cells.  There is mucosal thickening of the maxillary sinuses.  The visualized bones of the calvarium demonstrate no acute osseous abnormality.     1. No CT evidence of acute intracranial abnormality. Clinical correlation and further evaluation as warranted. 2. Empty configuration of the sella which can be a normal variant or seen with idiopathic intracranial hypertension in the appropriate clinical setting.  Clinical correlation advised. 3. Ethmoid and maxillary sinus disease. Electronically signed by: Lizzy Hollingsworth MD Date:    10/18/2024 Time:    04:23

## 2024-10-18 NOTE — PROCEDURES
EEG REPORT      Ray Knox  [id number]  1995    DATE OF SERVICE: 10/18/2024         METHODOLOGY      Extended electroencephalographic recording is made while the patient is ambulatory and continuing normal daily activities.  Electrodes are placed according to the International 10-20 placement system and included T1 and T2 electrode placement.  Twenty four (24) channels of digital signal (sampling rate of 512/sec) was simultaneously recorded from the scalp including EKG and eye monitors.  Recording band pass was 0.1 to 100 hz and all data was stored digitally on the recorder.  The patient is instructed to press an event button when clinical symptoms occur and write the symptoms into a diary. Activation procedures which include photic stimulation, hyperventilation and instructing patients to perform simple task are done in selected patients.        The EEG is displayed on a monitor screen and can be reformatted into different montages for evaluation.  The entire recoding is submitted for computer assisted analysis to detect spike and electrographic seizure activity.  The entire recording is visually reviewed and the times identified by computer analysis as being spikes or seizures are reviewed again.  Compresses spectral analysis (CSA) is also performed on the activity recorded from each individual channel.  This is displayed as a power display of frequencies from 0 to 30 Hz over time.   The CSA analysis is done and displayed continuously.  This is reviewed for asymmetries in power between homologous areas of the scalp and for presence of changes in power which canbe seen when seizures occur.  Sections of suspected abnormalities on the CSA is then compared with the original EEG recording.  .     [employer] software was also utilized in the review of this study.  This software suite analyzes the EEG recording in multiple domains.  Coherence and rhythmicity is computed to identify EEG sections which may  contain organized seizures.  Each channel undergoes analysis to detect presence of spike and sharp waves which have special and morphological characteristic of epileptic activity.  The routine EEG recording is converted from spacial into frequency domain.  This is then displayed comparing homologous areas to identify areas of significant asymmetry.  Algorithm to identify non-cortically generated artifact is used to separate eye movement, EMG and other artifact from the EEG     Recording Times    A total of 00:30:41 hours of EEG was recorded.      EEG FINDINGS:  Background activity:   The background rhythm was characterized by alpha and anterior dominant beta activity with a 10Hz posterior dominant alpha rhythm at 30-70 microvolts.   Symmetry and continuity: the background was continuous and symmetric     Sleep:   No sleep transients although drowsiness noted.    Activation procedures:   Photic stimulation was performed with no abnormalities seen  Hyperventilation was performed with no abnormalities seen    Abnormal activity:   No epileptiform discharges, periodic discharges, lateralized rhythmic delta activity or electrographic seizures were seen.    IMPRESSION:   Normal EEG of drowsiness and the waking state.      Óscar Robles MD  Neurology-Epilepsy.  [facility].

## 2024-10-18 NOTE — ASSESSMENT & PLAN NOTE
Pt presents to the ED after having 3 witnessed seizures last night by her boyfriend. He states the seizures lasted 2-3 minutes and pt was confused for 3-4 minutes after the seizure. Pt states that she had a metallic taste in her mouth prior to the onset of the seizure. Boyfriend describes the patient as having whole body shaking and that her eyes were open as this occurred. States that the pt was confused for 3-4 minutes. Pt states that she was diagnosed with seizures 2 months ago, however not confirmed by chart review.    -Pt is A&Ox3, however there is concern for Rc's Paralysis as pt has RLE and RUE muscle weakness with decreased sensation in her RLE up to her knees.   -CT Head: No CT evidence of acute intracranial abnormality.      -EEG, if negative then obtain MRI.    -If EEG is positive, then load with Keppra.

## 2024-10-18 NOTE — ED NOTES
Assumed care of the patient. Report received from Chet. Pt on continuous cardiac monitoring, continuous pulse oximetry, and automatic BP cuff cycling Q1 hr. Pt in hospital gown, side rails up X2, bed low and locked, and call light is placed within reach. 1 family/visitors at bedside at this time. Pt denies any complaints or needs.

## 2024-10-18 NOTE — ED NOTES
Requested fluticasone proprionate 50mcg/actuation nasal spray 50 mcg from pharmacy for patients 945 dose

## 2024-10-18 NOTE — ED NOTES
"Ray Knox, a 29 y.o. female presents to the ED w/ complaint of increased seizure-like activity. Per patient and S/O at bedside, she typically has 1-2 seizure-like episodes per day, but since she received a shot of a medication from her neurologist two days ago the frequency of her seizure-like episodes has increased to "back to back."    Patient states she does not remember the seizure-like episodes happening but does typically get a headache both before and after. She is not sure what medication she was given.   We do not have a record of a visit to a neurologist in our system.    Patient's S/O describes the seizure-like activity as "upper body shaking and eyes rolling in her head."      Triage note:  Chief Complaint   Patient presents with    Seizures     Reports seizure-like activity 45 minutes ago, pt is lethargic in triage     Review of patient's allergies indicates:   Allergen Reactions    Lisinopril Other (See Comments), Shortness Of Breath and Swelling     angioedema      Shellfish containing products Swelling     Past Medical History:   Diagnosis Date    Anemia     Asthma     Bilateral renal cysts     Breast disorder     Congenital absence of one kidney     General anesthetics causing adverse effect in therapeutic use     Kidney cysts     pt born with 1 kidney      "

## 2024-10-18 NOTE — HPI
Ms. Knox is a 30yo F with the PMHx of seizures?. Pt presents to the ED after having 3 witnessed seizures last night by her boyfriend. He states the seizures lasted 2-3 minutes and pt was confused for 3-4 minutes after the seizure. Pt states that she had a metallic taste in her mouth prior to the onset of the seizure. Boyfriend describes the patient as having whole body shaking and that her eyes were open as this occurred. States that the pt was confused for 3-4 minutes. Pt states that she was diagnosed with seizures 2 months ago, however not confirmed by chart review . Pt is on Topamax at home for migraines. States that her seizures usually consist of her staring off into space, and last night was different because she was shaking more than usual.     Denies any fecal/urinary incontinence and tongue biting. Endorses frontal headache, BLLE numbness and tingling, and muscle weakness.

## 2024-10-19 VITALS
BODY MASS INDEX: 35.48 KG/M2 | OXYGEN SATURATION: 100 % | HEIGHT: 65 IN | SYSTOLIC BLOOD PRESSURE: 108 MMHG | HEART RATE: 70 BPM | WEIGHT: 212.94 LBS | TEMPERATURE: 98 F | DIASTOLIC BLOOD PRESSURE: 64 MMHG | RESPIRATION RATE: 18 BRPM

## 2024-10-19 PROBLEM — R56.9 SEIZURE-LIKE ACTIVITY: Status: ACTIVE | Noted: 2024-10-18

## 2024-10-19 LAB
ALBUMIN SERPL BCP-MCNC: 3.7 G/DL (ref 3.5–5.2)
ALP SERPL-CCNC: 63 U/L (ref 40–150)
ALT SERPL W/O P-5'-P-CCNC: 11 U/L (ref 10–44)
AMPHET+METHAMPHET UR QL: NEGATIVE
ANION GAP SERPL CALC-SCNC: 7 MMOL/L (ref 8–16)
AST SERPL-CCNC: 13 U/L (ref 10–40)
BARBITURATES UR QL SCN>200 NG/ML: NEGATIVE
BASOPHILS # BLD AUTO: 0.04 K/UL (ref 0–0.2)
BASOPHILS NFR BLD: 0.4 % (ref 0–1.9)
BENZODIAZ UR QL SCN>200 NG/ML: NEGATIVE
BILIRUB SERPL-MCNC: 0.4 MG/DL (ref 0.1–1)
BUN SERPL-MCNC: 12 MG/DL (ref 6–20)
BZE UR QL SCN: NEGATIVE
CALCIUM SERPL-MCNC: 8.4 MG/DL (ref 8.7–10.5)
CANNABINOIDS UR QL SCN: ABNORMAL
CHLORIDE SERPL-SCNC: 112 MMOL/L (ref 95–110)
CO2 SERPL-SCNC: 19 MMOL/L (ref 23–29)
CREAT SERPL-MCNC: 1.3 MG/DL (ref 0.5–1.4)
CREAT UR-MCNC: 233 MG/DL (ref 15–325)
DIFFERENTIAL METHOD BLD: ABNORMAL
EOSINOPHIL # BLD AUTO: 0.3 K/UL (ref 0–0.5)
EOSINOPHIL NFR BLD: 3.1 % (ref 0–8)
ERYTHROCYTE [DISTWIDTH] IN BLOOD BY AUTOMATED COUNT: 15.4 % (ref 11.5–14.5)
EST. GFR  (NO RACE VARIABLE): 57.4 ML/MIN/1.73 M^2
ETHANOL UR-MCNC: <10 MG/DL
GLUCOSE SERPL-MCNC: 109 MG/DL (ref 70–110)
HCT VFR BLD AUTO: 41.4 % (ref 37–48.5)
HGB BLD-MCNC: 12.9 G/DL (ref 12–16)
IMM GRANULOCYTES # BLD AUTO: 0.02 K/UL (ref 0–0.04)
IMM GRANULOCYTES NFR BLD AUTO: 0.2 % (ref 0–0.5)
LYMPHOCYTES # BLD AUTO: 2.7 K/UL (ref 1–4.8)
LYMPHOCYTES NFR BLD: 30.7 % (ref 18–48)
MCH RBC QN AUTO: 26.1 PG (ref 27–31)
MCHC RBC AUTO-ENTMCNC: 31.2 G/DL (ref 32–36)
MCV RBC AUTO: 84 FL (ref 82–98)
METHADONE UR QL SCN>300 NG/ML: NEGATIVE
MONOCYTES # BLD AUTO: 0.5 K/UL (ref 0.3–1)
MONOCYTES NFR BLD: 5.4 % (ref 4–15)
NEUTROPHILS # BLD AUTO: 5.3 K/UL (ref 1.8–7.7)
NEUTROPHILS NFR BLD: 60.2 % (ref 38–73)
NRBC BLD-RTO: 0 /100 WBC
OPIATES UR QL SCN: ABNORMAL
PCP UR QL SCN>25 NG/ML: NEGATIVE
PLATELET # BLD AUTO: 255 K/UL (ref 150–450)
PMV BLD AUTO: 10.4 FL (ref 9.2–12.9)
POTASSIUM SERPL-SCNC: 3.8 MMOL/L (ref 3.5–5.1)
PROT SERPL-MCNC: 7.2 G/DL (ref 6–8.4)
RBC # BLD AUTO: 4.95 M/UL (ref 4–5.4)
SODIUM SERPL-SCNC: 138 MMOL/L (ref 136–145)
TOXICOLOGY INFORMATION: ABNORMAL
WBC # BLD AUTO: 8.89 K/UL (ref 3.9–12.7)

## 2024-10-19 PROCEDURE — 25000003 PHARM REV CODE 250: Performed by: INTERNAL MEDICINE

## 2024-10-19 PROCEDURE — 97116 GAIT TRAINING THERAPY: CPT

## 2024-10-19 PROCEDURE — 80053 COMPREHEN METABOLIC PANEL: CPT | Performed by: STUDENT IN AN ORGANIZED HEALTH CARE EDUCATION/TRAINING PROGRAM

## 2024-10-19 PROCEDURE — 25000003 PHARM REV CODE 250: Performed by: STUDENT IN AN ORGANIZED HEALTH CARE EDUCATION/TRAINING PROGRAM

## 2024-10-19 PROCEDURE — 25000242 PHARM REV CODE 250 ALT 637 W/ HCPCS: Performed by: PHYSICIAN ASSISTANT

## 2024-10-19 PROCEDURE — 80307 DRUG TEST PRSMV CHEM ANLYZR: CPT

## 2024-10-19 PROCEDURE — 94761 N-INVAS EAR/PLS OXIMETRY MLT: CPT

## 2024-10-19 PROCEDURE — 99900035 HC TECH TIME PER 15 MIN (STAT)

## 2024-10-19 PROCEDURE — 97530 THERAPEUTIC ACTIVITIES: CPT

## 2024-10-19 PROCEDURE — 97165 OT EVAL LOW COMPLEX 30 MIN: CPT

## 2024-10-19 PROCEDURE — 36415 COLL VENOUS BLD VENIPUNCTURE: CPT | Performed by: STUDENT IN AN ORGANIZED HEALTH CARE EDUCATION/TRAINING PROGRAM

## 2024-10-19 PROCEDURE — 99233 SBSQ HOSP IP/OBS HIGH 50: CPT | Mod: ,,, | Performed by: STUDENT IN AN ORGANIZED HEALTH CARE EDUCATION/TRAINING PROGRAM

## 2024-10-19 PROCEDURE — 25000003 PHARM REV CODE 250: Performed by: PHYSICIAN ASSISTANT

## 2024-10-19 PROCEDURE — 97162 PT EVAL MOD COMPLEX 30 MIN: CPT

## 2024-10-19 PROCEDURE — 94660 CPAP INITIATION&MGMT: CPT

## 2024-10-19 PROCEDURE — 85025 COMPLETE CBC W/AUTO DIFF WBC: CPT | Performed by: STUDENT IN AN ORGANIZED HEALTH CARE EDUCATION/TRAINING PROGRAM

## 2024-10-19 RX ORDER — FLUCONAZOLE 150 MG/1
150 TABLET ORAL DAILY
Qty: 4 TABLET | Refills: 0 | Status: SHIPPED | OUTPATIENT
Start: 2024-10-19 | End: 2024-10-23

## 2024-10-19 RX ORDER — POLYETHYLENE GLYCOL 3350 17 G/17G
17 POWDER, FOR SOLUTION ORAL DAILY
Status: DISCONTINUED | OUTPATIENT
Start: 2024-10-19 | End: 2024-10-19 | Stop reason: HOSPADM

## 2024-10-19 RX ORDER — CEPHALEXIN 500 MG/1
500 CAPSULE ORAL EVERY 12 HOURS
Qty: 10 CAPSULE | Refills: 0 | Status: SHIPPED | OUTPATIENT
Start: 2024-10-19 | End: 2024-10-24

## 2024-10-19 RX ORDER — DIPHENHYDRAMINE HCL 25 MG
25 CAPSULE ORAL EVERY 6 HOURS PRN
Status: DISCONTINUED | OUTPATIENT
Start: 2024-10-19 | End: 2024-10-19 | Stop reason: HOSPADM

## 2024-10-19 RX ORDER — POLYETHYLENE GLYCOL 3350 17 G/17G
17 POWDER, FOR SOLUTION ORAL DAILY
Qty: 238 G | Refills: 0 | Status: SHIPPED | OUTPATIENT
Start: 2024-10-19 | End: 2024-11-02

## 2024-10-19 RX ORDER — CEPHALEXIN 250 MG/1
500 CAPSULE ORAL EVERY 12 HOURS
Status: DISCONTINUED | OUTPATIENT
Start: 2024-10-19 | End: 2024-10-19 | Stop reason: HOSPADM

## 2024-10-19 RX ORDER — PROPRANOLOL HYDROCHLORIDE 10 MG/1
10 TABLET ORAL 2 TIMES DAILY
Qty: 60 TABLET | Refills: 11 | Status: SHIPPED | OUTPATIENT
Start: 2024-10-19 | End: 2025-10-19

## 2024-10-19 RX ADMIN — TOPIRAMATE 25 MG: 25 TABLET, FILM COATED ORAL at 09:10

## 2024-10-19 RX ADMIN — POLYETHYLENE GLYCOL 3350 17 G: 17 POWDER, FOR SOLUTION ORAL at 01:10

## 2024-10-19 RX ADMIN — PROPRANOLOL HYDROCHLORIDE 10 MG: 10 TABLET ORAL at 09:10

## 2024-10-19 RX ADMIN — CETIRIZINE HYDROCHLORIDE 10 MG: 5 TABLET, FILM COATED ORAL at 09:10

## 2024-10-19 RX ADMIN — SUCRALFATE 1 G: 1 SUSPENSION ORAL at 09:10

## 2024-10-19 RX ADMIN — FLUTICASONE PROPIONATE 50 MCG: 50 SPRAY, METERED NASAL at 09:10

## 2024-10-19 RX ADMIN — CEPHALEXIN 500 MG: 250 CAPSULE ORAL at 01:10

## 2024-10-19 RX ADMIN — ESCITALOPRAM OXALATE 5 MG: 5 TABLET, FILM COATED ORAL at 09:10

## 2024-10-19 RX ADMIN — DIPHENHYDRAMINE HYDROCHLORIDE 25 MG: 25 CAPSULE ORAL at 03:10

## 2024-10-19 NOTE — PLAN OF CARE
Problem: Occupational Therapy  Goal: Occupational Therapy Goal  Description: Goals to be met by: 11/2/2024     Patient will increase functional independence with ADLs by performing:    UE Dressing with Mod I.  LE Dressing with Mod I.  Grooming while standing at sink with Mod I.  Toileting from toilet with Mod I for hygiene and clothing management.   Supine to sit with Modified [place].  Step transfer with Modified [place] using LRAD as needed.   Toilet transfer to toilet with Modified [place] using LRAD as needed.    DME Recommendation : RW - Ray's mobility limitation cannot be sufficiently resolved by the use of a cane. Her functional mobility deficit can be sufficiently resolved with the use of a Rolling Walker. Patient's mobility limitation significantly impairs their ability to participate in one of more activities of daily living.  The use of a RW will significantly improve the patient's ability to participate in MRADLS and the patient will use it on regular basis in the home.     Outcome: Progressing     Pt evaluated and OT goals established.

## 2024-10-19 NOTE — NURSING
Patient recently ambulated to bathroom with assist X 1; voided 300 mls yellow urine ; return to bed; gait unsteady with turning; bladder scan = 1 ml.

## 2024-10-19 NOTE — PLAN OF CARE
PT Eval complete, appropriate goals created    Problem: Physical Therapy  Goal: Physical Therapy Goal  Description: Goals to be completed by: 10/31/24    Pt will be able to stand up from EOB w/ modified independence using LRAD  Pt will ambulate 350 feet w/ modified independence using LRAD  Pt will be independent w/ HEP therex on BLE w/ good form and ROM     DME Justifications (see above for complete DME recommendations)    Rolling Walker- Patient demonstrates a mobility limitation that significantly impairs their ability to participate in one or more mobility related activities of daily living. Patient's mobility limitation cannot be sufficiently resolved with the use of a cane, but can be sufficiently resolved with the use of a rolling walker.The use of a rolling walker will considerably improve their ability to participate in MRADLs. Patient will use the walker on a regular basis at home.    Outcome: Progressing

## 2024-10-19 NOTE — SUBJECTIVE & OBJECTIVE
Subjective:     Interval History: see hospital course    Current Facility-Administered Medications   Medication Dose Route Frequency Provider Last Rate Last Admin    acetaminophen tablet 650 mg  650 mg Oral Q6H PRN Junior Potts MD   650 mg at 10/18/24 2137    albuterol inhaler 2 puff  2 puff Inhalation Q6H PRN Kathya Raman PA-C   2 puff at 10/18/24 2204    cephALEXin capsule 500 mg  500 mg Oral Q12H Alice Denton MD   500 mg at 10/19/24 1337    cetirizine tablet 10 mg  10 mg Oral Daily Kathya Raman PA-C   10 mg at 10/19/24 0945    dextrose 10% bolus 125 mL 125 mL  12.5 g Intravenous PRN Alice Denton MD        dextrose 10% bolus 250 mL 250 mL  25 g Intravenous PRN Alice Denton MD        diphenhydrAMINE capsule 25 mg  25 mg Oral Q6H PRN Jeremy Salcedo MD   25 mg at 10/19/24 0354    EScitalopram oxalate tablet 5 mg  5 mg Oral Daily Kathya Raman PA-C   5 mg at 10/19/24 0945    fluticasone propionate 50 mcg/actuation nasal spray 50 mcg  1 spray Each Nostril Daily Kathya Raman PA-C   50 mcg at 10/19/24 0946    glucagon (human recombinant) injection 1 mg  1 mg Intramuscular PRN Alice Denton MD        glucose chewable tablet 16 g  16 g Oral PRN Alice Denton MD        glucose chewable tablet 24 g  24 g Oral PRN Alice Denton MD        ketorolac injection 9.999 mg  9.999 mg Intravenous Q8H PRN Alice Denton MD   9.999 mg at 10/18/24 2137    melatonin tablet 6 mg  6 mg Oral Nightly PRN Kathya Raman PA-C        naloxone 0.4 mg/mL injection 0.02 mg  0.02 mg Intravenous PRN Alice Denton MD        ondansetron injection 4 mg  4 mg Intravenous Q6H PRN Kathya Raman PA-C   4 mg at 10/18/24 0909    polyethylene glycol packet 17 g  17 g Oral Daily Alice Denton MD   17 g at 10/19/24 1337    propranoloL tablet 10 mg  10 mg Oral BID Kathya Raman PA-C   10 mg at 10/19/24 0937    sodium chloride 0.9% flush 10 mL  10 mL Intravenous PRN Kathya Raman PA-C        sucralfate 100  mg/mL suspension 1 g  1 g Oral BID Kathya Raman FAYE WARD   1 g at 10/19/24 0945    topiramate tablet 25 mg  25 mg Oral Daily Kathya RamanFAYE Cho   25 mg at 10/19/24 0945       Review of Systems   Musculoskeletal:  Positive for gait problem.   Neurological:  Positive for weakness.     Objective:     Vital Signs (Most Recent):  Temp: 97.9 °F (36.6 °C) (10/19/24 1245)  Pulse: 70 (10/19/24 1245)  Resp: 18 (10/19/24 1245)  BP: 108/64 (10/19/24 1245)  SpO2: 100 % (10/19/24 1245) Vital Signs (24h Range):  Temp:  [97.4 °F (36.3 °C)-97.9 °F (36.6 °C)] 97.9 °F (36.6 °C)  Pulse:  [59-79] 70  Resp:  [13-20] 18  SpO2:  [96 %-100 %] 100 %  BP: (103-125)/(62-89) 108/64     Weight: 96.6 kg (212 lb 15.4 oz)  Body mass index is 35.44 kg/m².     Physical Exam  Vitals and nursing note reviewed.   Constitutional:       General: She is not in acute distress.     Appearance: Normal appearance.   HENT:      Head: Normocephalic and atraumatic.      Mouth/Throat:      Mouth: Mucous membranes are moist.   Eyes:      Extraocular Movements: Extraocular movements intact.   Pulmonary:      Effort: Pulmonary effort is normal. No respiratory distress.   Abdominal:      General: Abdomen is flat. There is no distension.   Musculoskeletal:         General: No swelling. Normal range of motion.      Cervical back: Normal range of motion.   Skin:     General: Skin is warm.   Neurological:      Mental Status: She is alert and oriented to person, place, and time.      Cranial Nerves: Cranial nerves 2-12 are intact.   Psychiatric:         Mood and Affect: Mood normal.         Speech: Speech normal.         Behavior: Behavior normal.          NEUROLOGICAL EXAMINATION:     MENTAL STATUS   Oriented to person, place, and time.   Speech: speech is normal   Level of consciousness: alert    CRANIAL NERVES   Cranial nerves II through XII intact.     MOTOR EXAM   Muscle bulk: normal  Overall muscle tone: normal    Strength   Strength 5/5 except as noted.         AG in BLE     REFLEXES     Reflexes   Reflexes 2+ except as noted.     SENSORY EXAM   Light touch normal.       Significant Labs: All pertinent lab results from the past 24 hours have been reviewed.    Significant Imaging: I have reviewed all pertinent imaging results/findings within the past 24 hours.

## 2024-10-19 NOTE — PT/OT/SLP EVAL
Physical Therapy Evaluation and Treatment    Patient Name: Ray Knox   MRN: [id number]  Recent Surgery: * No surgery found *      Recommendations:     Discharge Recommendations: Low Intensity Therapy    Discharge Equipment Recommendations: walker, rolling   Barriers to discharge: Increased level of assist and Decreased caregiver support  Nursing Mobilization: pt is safe to ambulate with rolling walker and 1 person assistance    Assessment:     Ray Knox is a 29 y.o. female admitted with a medical diagnosis of Breakthrough seizure. She presents with the following impairments/functional limitations: impaired sensation, impaired endurance, impaired self care skills, impaired functional mobility, gait instability, impaired balance, decreased coordination, decreased lower extremity function, decreased safety awareness, impaired coordination. Pt w/ intact strength in RLE but impaired motor control/muscle activation and dec sensation. Patient currently demonstrates a need for low intensity therapy on a scheduled basis secondary to a decline in functional status due to disease     Rehab Prognosis: Good; patient would benefit from acute skilled PT services to address these deficits and reach maximum level of function.  Recent Surgery: * No surgery found *      Plan:     During this hospitalization, patient to be seen 4 x/week to address the identified rehab impairments via gait training, therapeutic activities, therapeutic exercises, neuromuscular re-education and progress toward the following goals:    Plan of Care Expires: 10/31/24    Subjective     Chief Complaint: RLE n/t  Patient/Family Comments/Goals: pt wants to progress to PLOF and go home  Pain/Comfort:  Pain Rating 1: 0/10  Pain Rating Post-Intervention 1: 0/10    Patients cultural, spiritual, Yazidism conflicts given the current situation: no    Social History:  Living Environment: Patient lives with their significant other in a single story home, number  of outside stairs: 0 .  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  Equipment Used at Home: CPAP    DME owned (not currently used): none  Assistance Upon Discharge: family    Objective:     Communicated with RN prior to session. Patient found HOB elevated with PureWick, bed alarm upon PT entry to room.    General Precautions: Standard, fall   Orthopedic Precautions:N/A   Braces: N/A  Respiratory Status: Room air    Exams:  Cognitive Exam: Patient is oriented to Person, Place, Time, Situation, follows commands 100% of the time  RLE ROM: WFL  RLE Strength:  WFL w/ increased time and verbal+tactile cueing  LLE ROM: WFL  LLE Strength: WFL  Sensation: -       Impaired  light/touch from knee down on RLE w/ tingling and diminished sensation    Functional Mobility:  Bed Mobility:  Supine to Sit: modified independence w/ inc time and pt using BUE to self-assist RLE  Transfers:  Sit to Stand: stand by assistance with rolling walker  Bed to Chair: stand by assistance with rolling walker using Step Transfer  Toilet Transfer: stand by assistance with rolling walker using Step Transfer  Gait:   Patient ambulated 2x 12' with rolling walker and stand by assistance. Patient demonstrates occasional unsteady gait, decreased step length, narrow base of support, decreased weight shift, decreased foot clearance, ambulates outside LORNA of RW, flexed posture, and inconsistent right foot placement. All lines remained intact throughout ambulation trial, gait belt utilized.  Balance:   Static Sitting: independence at EOB  Dynamic Sitting: supervision at EOB  Static Standing: supervision with rolling walker  Dynamic Standing: stand by assistance with rolling walker    Therapeutic Activities and Exercises:  Patient educated on role of acute care PT and PT POC, safety while in hospital including calling nurse for mobility, and call light usage  Patient performed 2  set(s) of 6 repetitions of  the following seated exercises: ankle pumps, long arc quads, marches, and knee flexion for bilateral LE. Patient required skilled PT for instruction of exercises and appropriate cues to perform exercises safely and appropriately  Patient educated about importance of OOB mobility  Gait training w/ verbal, visual, and tactile cueing for proper use of AD, step length, step width, step height, postural control, safety awareness, obstacle avoidance, width of LORNA, proximity to AD, and attention to task   Educated on proper use of RW for transfers and ambulation    AM-PAC 6 CLICK MOBILITY  Turning over in bed (including adjusting bedclothes, sheets and blankets)?: 4  Sitting down on and standing up from a chair with arms (e.g., wheelchair, bedside commode, etc.): 3  Moving from lying on back to sitting on the side of the bed?: 4  Moving to and from a bed to a chair (including a wheelchair)?: 3  Need to walk in hospital room?: 3  Climbing 3-5 steps with a railing?: 2  Basic Mobility Total Score: 19     Patient left up in chair with all lines intact, call button in reach, RN notified, and chair alarm on.    GOALS:   Multidisciplinary Problems       Physical Therapy Goals          Problem: Physical Therapy    Goal Priority Disciplines Outcome Interventions   Physical Therapy Goal     PT, PT/OT Progressing    Description: Goals to be completed by: 10/31/24    Pt will be able to stand up from EOB w/ modified independence using LRAD  Pt will ambulate 350 feet w/ modified independence using LRAD  Pt will be independent w/ HEP therex on BLE w/ good form and ROM     DME Justifications (see above for complete DME recommendations)    Rolling Walker- Patient demonstrates a mobility limitation that significantly impairs their ability to participate in one or more mobility related activities of daily living. Patient's mobility limitation cannot be sufficiently resolved with the use of a cane, but can be  sufficiently resolved with the use of a rolling walker.The use of a rolling walker will considerably improve their ability to participate in MRADLs. Patient will use the walker on a regular basis at home.                         History:     Past Medical History:   Diagnosis Date    Anemia     Asthma     Bilateral renal cysts     Breast disorder     Congenital absence of one kidney     General anesthetics causing adverse effect in therapeutic use     Kidney cysts     pt born with 1 kidney       Past Surgical History:   Procedure Laterality Date    FINGER MASS EXCISION Left 3/8/2022    Procedure: EXCISION, MASS, FINGER;  Surgeon: Bk Doty Jr., MD;  Location: [facility];  Service: Orthopedics;  Laterality: Left;    ULNAR NERVE TRANSPOSITION Left 3/29/2023    Procedure: TRANSPOSITION, NERVE, ULNAR;  Surgeon: Bk Doyt Jr., MD;  Location: [facility] OR;  Service: Orthopedics;  Laterality: Left;       Time Tracking:     PT Received On: 10/19/24  PT Start Time: 0820  PT Stop Time: 0858  PT Total Time (min): 38 min     Billable Minutes: Evaluation 8, Gait Training 15, and Therapeutic Activity 15    10/19/2024

## 2024-10-19 NOTE — PT/OT/SLP EVAL
"Occupational Therapy   Evaluation and Treatment    Name: Ray Knox  MRN: [id number]  Admitting Diagnosis: Breakthrough seizure  Recent Surgery: * No surgery found *      Recommendations:     Discharge Recommendations: Low Intensity Therapy  Discharge Equipment Recommendations:  walker, rolling  Barriers to discharge:  Decreased caregiver support (Pt's significant other works.)    Assessment:     Ray Knox is a 29 y.o. female with a medical diagnosis of Breakthrough seizure.   Pt tolerated session well and without incident, but she requires assistance to perform self-care tasks and mobility and is performing below her functional baseline.  Due to her current level of function compared to her PLOF and her home environment, low intensity therapy recommended at d/c to ensure pt's safety.   She presents with the following.  Performance deficits affecting function: weakness, impaired endurance, impaired sensation, impaired self care skills, impaired functional mobility, gait instability, impaired balance, decreased safety awareness, decreased coordination, decreased lower extremity function.      Rehab Prognosis: Good; patient would benefit from acute skilled OT services to address these deficits and reach maximum level of function.       Plan:     Patient to be seen 4 x/week to address the above listed problems via self-care/home management, therapeutic activities, therapeutic exercises  Plan of Care Expires: 11/17/24  Plan of Care Reviewed with: patient    Subjective     Chief Complaint: RLE tingling  Patient/Family Comments/goals: "This is embarrassing." - ambulating with RW    Occupational Profile:  Living Environment: Pt lives with her significant other in a [facility] with 0 BORIS.  She has a tub/shower combo.    Previous level of function: Independent with ADLs and mobility.    Roles and Routines: significant other  Equipment Used at Home: none  Assistance upon Discharge: Her significant other, but he works.  " "    Pain/Comfort:  Pain Rating 1: 0/10 (at beginning of session, reported "9/10" to nurse upon nursing entrance - rating did not match pt presentation)  Location 1:  (under L breast)  Pain Addressed 1: Nurse notified, Distraction  Pain Rating Post-Intervention 1: other (see comments) (not rated)    Patients cultural, spiritual, Yarsani conflicts given the current situation: no    Objective:     Communicated with: nurse prior to session.  Patient found up in chair with Other (comments), peripheral IV (chair alarm, IV not connected and running) upon OT entry to room.    General Precautions: Standard, fall  Orthopedic Precautions: N/A  Braces: N/A  Respiratory Status: Room air    Occupational Performance:    Bed Mobility:    N/A due to pt's sitting in bedside chair at beginning and end of session    Functional Mobility/Transfers:  Patient completed Sit <> Stand Transfer from bedside chair x 1 trial with stand by assistance  with  no assistive device   Functional Mobility: Pt ambulated ~10 ft x 2 trials with CGA with no AD and with RW.  She had one minor LOB due to her RLE buckling, but she was able to self-correct.      Activities of Daily Living:  Grooming: stand by assistance to perform oral care while standing at bathroom sink  Toileting: stand by assistance to perform hygiene, per PT report (during PT evaluation)    Cognitive/Visual Perceptual:  Cognitive/Psychosocial Skills:     -       Oriented to: Person, Place, Time, and Situation   -       Follows Commands/attention: Follows multistep  commands  -       Communication: clear/fluent    Physical Exam:  Sensation:    -       Impaired  tingling RLE   Upper Extremity Range of Motion:     -       Right Upper Extremity: WFL  -       Left Upper Extremity: WFL  Upper Extremity Strength:    -       Right Upper Extremity: WFL  -       Left Upper Extremity: WFL   Strength:    -       Right Upper Extremity: WFL  -       Left Upper Extremity: WFL  Fine Motor " Coordination:    -       Intact    AMPAC 6 Click ADL:  AMPAC Total Score: 19    Treatment & Education:  Pt edu on role of OT, POC, safety when performing self care tasks, benefit of performing OOB activity, and safety when performing functional transfers and mobility.    - Self care tasks completed-- as noted above      Patient left up in chair with all lines intact, call button in reach, and chair alarm on    GOALS:   Multidisciplinary Problems       Occupational Therapy Goals          Problem: Occupational Therapy    Goal Priority Disciplines Outcome Interventions   Occupational Therapy Goal     OT, PT/OT Progressing    Description: Goals to be met by: 11/2/2024     Patient will increase functional independence with ADLs by performing:    UE Dressing with Mod I.  LE Dressing with Mod I.  Grooming while standing at sink with Mod I.  Toileting from toilet with Mod I for hygiene and clothing management.   Supine to sit with Modified [place].  Step transfer with Modified [place] using LRAD as needed.   Toilet transfer to toilet with Modified [place] using LRAD as needed.    DME Recommendation : RW - Ray's mobility limitation cannot be sufficiently resolved by the use of a cane. Her functional mobility deficit can be sufficiently resolved with the use of a Rolling Walker. Patient's mobility limitation significantly impairs their ability to participate in one of more activities of daily living.  The use of a RW will significantly improve the patient's ability to participate in MRADLS and the patient will use it on regular basis in the home.                          History:     Past Medical History:   Diagnosis Date    Anemia     Asthma     Bilateral renal cysts     Breast disorder     Congenital absence of one kidney     General anesthetics causing adverse effect in therapeutic use     Kidney cysts     pt born with 1 kidney         Past Surgical History:   Procedure Laterality Date    FINGER MASS  EXCISION Left 3/8/2022    Procedure: EXCISION, MASS, FINGER;  Surgeon: Bk Doty Jr., MD;  Location: [facility] OR;  Service: Orthopedics;  Laterality: Left;    ULNAR NERVE TRANSPOSITION Left 3/29/2023    Procedure: TRANSPOSITION, NERVE, ULNAR;  Surgeon: Bk Doty Jr., MD;  [facility]: [facility] OR;  Service: Orthopedics;  Laterality: Left;       Time Tracking:     OT Date of Treatment: 10/19/24  OT Start Time: 0942  OT Stop Time: 0959  OT Total Time (min): 17 min    Billable Minutes:Evaluation 8 min  Therapeutic Activity 9 min    10/19/2024

## 2024-10-19 NOTE — PLAN OF CARE
Problem: Adult Inpatient Plan of Care  Goal: Plan of Care Review  Outcome: Progressing  Goal: Patient-Specific Goal (Individualized)  Outcome: Progressing  Goal: Readiness for Transition of Care  Outcome: Progressing     Problem: Skin Injury Risk Increased  Goal: Skin Health and Integrity  Outcome: Progressing   POC reviewed and updated with the patient. Questions regarding POC were encouraged and addressed with the patient.LEEANNE. Patient is AO X 4 at this time. Fall/safety precautions maintained, no signs of injury noted during shift. Upon exiting room, patient's bed locked in low position, side rails up x 3, bed alarm set, with call light within reach. Instructed patient to call staff for assistance, verbalized understanding.  No acute signs of distress noted at this time.

## 2024-10-19 NOTE — PLAN OF CARE
[facility] (Hospital)  Discharge Final Note    Primary Care Provider: Les Horton MD (Inactive)    Expected Discharge Date: 10/19/2024    Final Discharge Note (most recent)       Final Note - 10/19/24 1429          Final Note    Assessment Type Final Discharge Note     Anticipated Discharge Disposition Home or Self Care     What phone number can be called within the next 1-3 days to see how you are doing after discharge? [phone]     Hospital Resources/Appts/Education Provided Provided patient/caregiver with written discharge plan information;Appointments scheduled and added to AVS        Post-Acute Status    Post-Acute Authorization [facility] Status Set-up Complete/Auth obtained     Patient choice form signed by patient/caregiver List with quality metrics by geographic area provided     Discharge Delays None known at this time                   Patient to discharge to home today. Rolling walker ordered and delivered to bed side. No other case management needs at this time.     Future Appointments   Date Time Provider Department Center   10/22/2024  2:00 PM Melissa Victoria FNP [facility] [place]   10/29/2024  2:00 PM Pamela Summers PA-C [facility]   11/13/2024  8:00 AM Emilie Martin MD [facility] ALLIMM [place]  [facility]   Ext: [phone]

## 2024-10-19 NOTE — NURSING
Patient being discharged home per orders with family to the care of self and to follow up as directed.

## 2024-10-19 NOTE — PLAN OF CARE
RW    The mobility limitation cannot be sufficiently resolved by the use of a cane. Patient's functional mobility deficit can be sufficiently resolved with the use of a (Rolling Walker or Walker). Patient's mobility limitation significantly impairs their ability to participate in one of more activities of daily living. The use of a (RW or Walker) will significantly improve the patient's ability to participate in MRADLS and the patient will use it on regular basis in the home.

## 2024-10-19 NOTE — DISCHARGE INSTRUCTIONS
Please take the antibiotics for 5 days then take fluconazole dose.     Please follow up outpatient with neurology in one week to discuss seizures/headaches.     Referral placed for follow up with urology as outpatient.     Please return to ED if you develop any worsening of symptoms, any new seizures, worsening headaches.   Continue topomax and tylenol for headaches.

## 2024-10-19 NOTE — ASSESSMENT & PLAN NOTE
29 year old female with questionable seizure history who presented to the ED after having 3 witnessed seizures last night by her boyfriend. He states the seizures lasted 2-3 minutes and pt was confused for 3-4 minutes after the seizure. Pt states that she had a metallic taste in her mouth prior to the onset of the seizure. Boyfriend describes the patient as having whole body shaking and that her eyes were open as this occurred. States that the pt was confused for 3-4 minutes. Pt states that she was diagnosed with seizures 2 months ago, however not confirmed by chart review.    MRI and EEG obtained this visit were unremarkable. Patient returning towards baseline and has been able to walk with PT.  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  Continue home topamax  Follow up with neurology outpatient as scheduled

## 2024-10-19 NOTE — PROGRESS NOTES
[facility] - Neurosurgery ([facility])  Neurology  Progress Note    Patient Name: Ray Knox  MRN: [id number]  Admission Date: 10/18/2024  Hospital Length of Stay: 1 days  Code Status: Full Code   Attending Provider: Alice Denton MD  Primary Care Physician: Les Horton MD (Inactive)   Principal Problem:Seizure-like activity    HPI:   Ms. Knox is a 28yo F with the PMHx of seizures?. Pt presents to the ED after having 3 witnessed seizures last night by her boyfriend. He states the seizures lasted 2-3 minutes and pt was confused for 3-4 minutes after the seizure. Pt states that she had a metallic taste in her mouth prior to the onset of the seizure. Boyfriend describes the patient as having whole body shaking and that her eyes were open as this occurred. States that the pt was confused for 3-4 minutes. Pt states that she was diagnosed with seizures 2 months ago, however not confirmed by chart review . Pt is on Topamax at home for migraines. States that her seizures usually consist of her staring off into space, and last night was different because she was shaking more than usual.     Denies any fecal/urinary incontinence and tongue biting. Endorses frontal headache, BLLE numbness and tingling, and muscle weakness.     Overview/Hospital Course:  Leg strength improving. Able to walk today with PT.        Subjective:     Interval History: see hospital course    Current Facility-Administered Medications   Medication Dose Route Frequency Provider Last Rate Last Admin    acetaminophen tablet 650 mg  650 mg Oral Q6H PRN Junior Potts MD   650 mg at 10/18/24 2137    albuterol inhaler 2 puff  2 puff Inhalation Q6H PRN Kathya Raman PA-C   2 puff at 10/18/24 2204    cephALEXin capsule 500 mg  500 mg Oral Q12H Alice Denton MD   500 mg at 10/19/24 1337    cetirizine tablet 10 mg  10 mg Oral Daily Kathya Raman PA-C   10 mg at 10/19/24 0945    dextrose 10% bolus 125 mL 125 mL  12.5 g Intravenous PRN Bertin  MD Alice        dextrose 10% bolus 250 mL 250 mL  25 g Intravenous PRN Alice Denton MD        diphenhydrAMINE capsule 25 mg  25 mg Oral Q6H PRN Jeremy Salcedo MD   25 mg at 10/19/24 0354    EScitalopram oxalate tablet 5 mg  5 mg Oral Daily Kathya Raman PA-C   5 mg at 10/19/24 0945    fluticasone propionate 50 mcg/actuation nasal spray 50 mcg  1 spray Each Nostril Daily Kathya Raman PA-C   50 mcg at 10/19/24 0946    glucagon (human recombinant) injection 1 mg  1 mg Intramuscular PRN Alice Denton MD        glucose chewable tablet 16 g  16 g Oral PRN Alice Denton MD        glucose chewable tablet 24 g  24 g Oral PRN Alice Denton MD        ketorolac injection 9.999 mg  9.999 mg Intravenous Q8H PRN Alice Denton MD   9.999 mg at 10/18/24 2137    melatonin tablet 6 mg  6 mg Oral Nightly PRN Kathya Raman PA-C        naloxone 0.4 mg/mL injection 0.02 mg  0.02 mg Intravenous PRN Alice Denton MD        ondansetron injection 4 mg  4 mg Intravenous Q6H PRN Kathya Raman PA-C   4 mg at 10/18/24 0909    polyethylene glycol packet 17 g  17 g Oral Daily Alice Denton MD   17 g at 10/19/24 1337    propranoloL tablet 10 mg  10 mg Oral BID Kathya Raman PA-C   10 mg at 10/19/24 0945    sodium chloride 0.9% flush 10 mL  10 mL Intravenous PRN Kathya Raman PA-BROWN        sucralfate 100 mg/mL suspension 1 g  1 g Oral BID Kathya Raman PA-C   1 g at 10/19/24 0945    topiramate tablet 25 mg  25 mg Oral Daily Kathya Raman, PA-C   25 mg at 10/19/24 0945       Review of Systems   Musculoskeletal:  Positive for gait problem.   Neurological:  Positive for weakness.     Objective:     Vital Signs (Most Recent):  Temp: 97.9 °F (36.6 °C) (10/19/24 1245)  Pulse: 70 (10/19/24 1245)  Resp: 18 (10/19/24 1245)  BP: 108/64 (10/19/24 1245)  SpO2: 100 % (10/19/24 1245) Vital Signs (24h Range):  Temp:  [97.4 °F (36.3 °C)-97.9 °F (36.6 °C)] 97.9 °F (36.6 °C)  Pulse:  [59-79] 70  Resp:  [13-20] 18  SpO2:  [96 %-100  %] 100 %  BP: (103-125)/(62-89) 108/64     Weight: 96.6 kg (212 lb 15.4 oz)  Body mass index is 35.44 kg/m².     Physical Exam  Vitals and nursing note reviewed.   Constitutional:       General: She is not in acute distress.     Appearance: Normal appearance.   HENT:      Head: Normocephalic and atraumatic.      Mouth/Throat:      Mouth: Mucous membranes are moist.   Eyes:      Extraocular Movements: Extraocular movements intact.   Pulmonary:      Effort: Pulmonary effort is normal. No respiratory distress.   Abdominal:      General: Abdomen is flat. There is no distension.   Musculoskeletal:         General: No swelling. Normal range of motion.      Cervical back: Normal range of motion.   Skin:     General: Skin is warm.   Neurological:      Mental Status: She is alert and oriented to person, place, and time.      Cranial Nerves: Cranial nerves 2-12 are intact.   Psychiatric:         Mood and Affect: Mood normal.         Speech: Speech normal.         Behavior: Behavior normal.          NEUROLOGICAL EXAMINATION:     MENTAL STATUS   Oriented to person, place, and time.   Speech: speech is normal   Level of consciousness: alert    CRANIAL NERVES   Cranial nerves II through XII intact.     MOTOR EXAM   Muscle bulk: normal  Overall muscle tone: normal    Strength   Strength 5/5 except as noted.        AG in BLE     REFLEXES     Reflexes   Reflexes 2+ except as noted.     SENSORY EXAM   Light touch normal.       Significant Labs: All pertinent lab results from the past 24 hours have been reviewed.    Significant Imaging: I have reviewed all pertinent imaging results/findings within the past 24 hours.  Assessment and Plan:     * Seizure-like activity  29 year old female with questionable seizure history who presented to the ED after having 3 witnessed seizures last night by her boyfriend. He states the seizures lasted 2-3 minutes and pt was confused for 3-4 minutes after the seizure. Pt states that she had a metallic  taste in her mouth prior to the onset of the seizure. Boyfriend describes the patient as having whole body shaking and that her eyes were open as this occurred. States that the pt was confused for 3-4 minutes. Pt states that she was diagnosed with seizures 2 months ago, however not confirmed by chart review.    MRI and EEG obtained this visit were unremarkable. Patient returning towards baseline and has been able to walk with PT.  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  Continue home topamax  Follow up with neurology outpatient as scheduled        VTE Risk Mitigation (From admission, onward)           Ordered     Reason for No Pharmacological VTE Prophylaxis  Once        Question:  Reasons:  Answer:  Patient is Ambulatory    10/18/24 1127     IP VTE HIGH RISK PATIENT  Once         10/18/24 0930     Place sequential compression device  Until discontinued         10/18/24 0930     Place PAIGE hose  Until discontinued         10/18/24 0930                    Elijah Betancourt MD  Neurology  [facility] - Neurosurgery ([facility])

## 2024-10-21 PROBLEM — G40.919 BREAKTHROUGH SEIZURE: Status: ACTIVE | Noted: 2024-10-21

## 2024-10-21 LAB
D FARINAE IGE QN: <0.1 KU/L
D PTERONYSS IGE QN: <0.1 KU/L
RAST CLASS: NORMAL
RAST CLASS: NORMAL
TOPIRAMATE SERPL-MCNC: 1.6 MCG/ML

## 2024-10-22 ENCOUNTER — OFFICE VISIT (OUTPATIENT)
Dept: GASTROENTEROLOGY | Facility: CLINIC | Age: 29
End: 2024-10-22
Payer: MEDICAID

## 2024-10-22 VITALS
SYSTOLIC BLOOD PRESSURE: 117 MMHG | BODY MASS INDEX: 35.13 KG/M2 | WEIGHT: 210.88 LBS | DIASTOLIC BLOOD PRESSURE: 74 MMHG | HEIGHT: 65 IN | HEART RATE: 61 BPM

## 2024-10-22 DIAGNOSIS — R10.13 EPIGASTRIC PAIN: ICD-10-CM

## 2024-10-22 DIAGNOSIS — K59.04 CHRONIC IDIOPATHIC CONSTIPATION: Primary | ICD-10-CM

## 2024-10-22 PROBLEM — R41.841 COGNITIVE COMMUNICATION DEFICIT: Status: RESOLVED | Noted: 2023-09-20 | Resolved: 2024-10-22

## 2024-10-22 LAB
A ALTERNATA IGE QN: 0.89 KU/L
A FUMIGATUS IGE QN: 0.12 KU/L
BERMUDA GRASS IGE QN: <0.1 KU/L
CAT DANDER IGE QN: <0.1 KU/L
CEDAR IGE QN: <0.1 KU/L
CRAB IGE QN: <0.1 KU/L
DEPRECATED CEDAR IGE RAST QL: NORMAL
DEPRECATED TIMOTHY IGE RAST QL: NORMAL
DOG DANDER IGE QN: <0.1 KU/L
ELDER IGE QN: <0.1 KU/L
ENGL PLANTAIN IGE QN: <0.1 KU/L
PECAN/HICK TREE IGE QN: <0.1 KU/L
RAST CLASS: ABNORMAL
RAST CLASS: NORMAL
TIMOTHY IGE QN: <0.1 KU/L
WEST RAGWEED IGE QN: 0.13 KU/L
WHITE OAK IGE QN: <0.1 KU/L

## 2024-10-22 PROCEDURE — 1111F DSCHRG MED/CURRENT MED MERGE: CPT | Mod: CPTII,,, | Performed by: NURSE PRACTITIONER

## 2024-10-22 PROCEDURE — 3074F SYST BP LT 130 MM HG: CPT | Mod: CPTII,,, | Performed by: NURSE PRACTITIONER

## 2024-10-22 PROCEDURE — 99204 OFFICE O/P NEW MOD 45 MIN: CPT | Mod: S$PBB,,, | Performed by: NURSE PRACTITIONER

## 2024-10-22 PROCEDURE — 3008F BODY MASS INDEX DOCD: CPT | Mod: CPTII,,, | Performed by: NURSE PRACTITIONER

## 2024-10-22 PROCEDURE — 1160F RVW MEDS BY RX/DR IN RCRD: CPT | Mod: CPTII,,, | Performed by: NURSE PRACTITIONER

## 2024-10-22 PROCEDURE — 99215 OFFICE O/P EST HI 40 MIN: CPT | Mod: PBBFAC,PN | Performed by: NURSE PRACTITIONER

## 2024-10-22 PROCEDURE — 99999 PR PBB SHADOW E&M-EST. PATIENT-LVL V: CPT | Mod: PBBFAC,,, | Performed by: NURSE PRACTITIONER

## 2024-10-22 PROCEDURE — 1159F MED LIST DOCD IN RCRD: CPT | Mod: CPTII,,, | Performed by: NURSE PRACTITIONER

## 2024-10-22 PROCEDURE — 3044F HG A1C LEVEL LT 7.0%: CPT | Mod: CPTII,,, | Performed by: NURSE PRACTITIONER

## 2024-10-22 PROCEDURE — 3078F DIAST BP <80 MM HG: CPT | Mod: CPTII,,, | Performed by: NURSE PRACTITIONER

## 2024-10-22 NOTE — PROGRESS NOTES
Subjective:       Patient ID: Ray Knox is a 29 y.o. female.    Chief Complaint: Abdominal Pain and Constipation    28 y/o female with history anemia, asthma, congenital absence of 1 kidney presents to clinic with c/o abdominal pain and constipation.    Abdominal Pain  This is a recurrent problem. The current episode started more than 1 month ago. The problem occurs intermittently. The pain is located in the epigastric region. The quality of the pain is aching. The abdominal pain does not radiate. Associated symptoms include constipation, nausea and vomiting. Pertinent negatives include no anorexia, diarrhea or hematochezia. The pain is aggravated by eating. The pain is relieved by Nothing. She has tried antacids for the symptoms. The treatment provided mild relief. Prior diagnostic workup includes CT scan. Her past medical history is significant for GERD.   Constipation  This is a chronic problem. The current episode started more than 1 year ago. Her stool frequency is 1 time per week or less. The stool is described as firm and formed. The patient is not on a high fiber diet. She Does not exercise regularly. There has Not been adequate water intake. Associated symptoms include abdominal pain, nausea and vomiting. Pertinent negatives include no anorexia, diarrhea or hematochezia. She has tried laxatives (Miralax) for the symptoms.       Past Medical History:   Diagnosis Date    Anemia     Asthma     Bilateral renal cysts     Breast disorder     Congenital absence of one kidney     General anesthetics causing adverse effect in therapeutic use     Kidney cysts     pt born with 1 kidney       Past Surgical History:   Procedure Laterality Date    FINGER MASS EXCISION Left 3/8/2022    Procedure: EXCISION, MASS, FINGER;  Surgeon: Bk Doty Jr., MD;  [facility];  Service: Orthopedics;  Laterality: Left;    ULNAR NERVE TRANSPOSITION Left 3/29/2023    Procedure: TRANSPOSITION, NERVE, ULNAR;  Surgeon:  Bk Doty Jr., MD;  Location: [facility] OR;  Service: Orthopedics;  Laterality: Left;       Family History   Problem Relation Name Age of Onset    Diabetes Paternal Aunt      Hypertension Maternal Grandfather      Cancer Paternal Grandmother      Breast cancer Paternal Grandmother      Leukemia Paternal Grandmother      Kidney disease Maternal Grandmother      Hypertension Mother         Social History     Socioeconomic History    Marital status: Single   Tobacco Use    Smoking status: Every Day     Types: Vaping with nicotine    Smokeless tobacco: Never   Substance and Sexual Activity    Alcohol use: Yes     Comment: occasionally    Drug use: Not Currently    Sexual activity: Yes     Partners: Male     Birth control/protection: None     Comment: Not current on Depo     Social Drivers of Health     Financial Resource Strain: Low Risk  (10/18/2024)    Overall Financial Resource Strain (CARDIA)     Difficulty of Paying Living Expenses: Not hard at all   Food Insecurity: No Food Insecurity (10/18/2024)    Hunger Vital Sign     Worried About Running Out of Food in the Last Year: Never true     Ran Out of Food in the Last Year: Never true   Transportation Needs: No Transportation Needs (10/18/2024)    TRANSPORTATION NEEDS     Transportation : No   Physical Activity: Sufficiently Active (7/9/2024)    Exercise Vital Sign     Days of Exercise per Week: 5 days     Minutes of Exercise per Session: 70 min   Stress: No Stress Concern Present (10/18/2024)    [employer] - Occupational Stress Questionnaire     Feeling of Stress : Not at all   Housing Stability: Low Risk  (10/18/2024)    Housing Stability Vital Sign     Unable to Pay for Housing in the Last Year: No     Homeless in the Last Year: No       Review of Systems   Constitutional:  Negative for appetite change and unexpected weight change.   HENT:  Negative for trouble swallowing.    Respiratory:  Negative for shortness of breath.   "  Cardiovascular:  Negative for chest pain.   Gastrointestinal:  Positive for abdominal pain, constipation, nausea and vomiting. Negative for anorexia, diarrhea and hematochezia.   Neurological:  Positive for weakness.   Psychiatric/Behavioral:  Negative for dysphoric mood.          Objective:     Vitals:    10/22/24 1412   BP: 117/74   BP Location: Left arm   Patient Position: Sitting   Pulse: 61   Weight: 95.7 kg (210 lb 13.9 oz)   Height: 5' 5" (1.651 m)          Physical Exam  Constitutional:       General: She is not in acute distress.  HENT:      Head: Normocephalic.   Eyes:      Conjunctiva/sclera: Conjunctivae normal.   Pulmonary:      Effort: Pulmonary effort is normal. No respiratory distress.   Abdominal:      General: Bowel sounds are normal. There is no distension.      Palpations: Abdomen is soft.      Tenderness: There is no abdominal tenderness.   Musculoskeletal:         General: Normal range of motion.      Cervical back: Normal range of motion.   Skin:     General: Skin is warm and dry.   Neurological:      Mental Status: She is alert and oriented to person, place, and time.   Psychiatric:         Mood and Affect: Mood normal.         Behavior: Behavior normal.               Assessment:         ICD-10-CM ICD-9-CM   1. Chronic idiopathic constipation  K59.04 564.00   2. Epigastric pain  R10.13 789.06       Plan:       Chronic idiopathic constipation  -     Dul/MOM clean out then start Miralax daily  -     linaCLOtide (LINZESS) 145 mcg Cap capsule; Take 1 capsule (145 mcg total) by mouth once daily.  Dispense: 30 capsule; Refill: 2    Epigastric pain  -     Ambulatory referral/consult to Gastroenterology  -     H. pylori antigen, stool; Future; Expected date: 10/22/2024      Follow up if symptoms worsen or fail to improve.     Patient's Medications   New Prescriptions    LINACLOTIDE (LINZESS) 145 MCG CAP CAPSULE    Take 1 capsule (145 mcg total) by mouth once daily.   Previous Medications    " ALBUTEROL (PROVENTIL/VENTOLIN HFA) 90 MCG/ACTUATION INHALER    Inhale 1-2 puffs into the lungs every 6 (six) hours as needed for Wheezing. Rescue    CEPHALEXIN (KEFLEX) 500 MG CAPSULE    Take 1 capsule (500 mg total) by mouth every 12 (twelve) hours. for 5 days    CETIRIZINE (ZYRTEC) 10 MG TABLET    Take 1 tablet (10 mg total) by mouth once daily.    DICLOFENAC SODIUM (VOLTAREN) 1 % GEL    APPLY 2 GRAMS TOPICALLY TO THE AFFECTED AREA THREE TIMES DAILY    ESCITALOPRAM OXALATE (LEXAPRO) 5 MG TAB    Take 1 tablet (5 mg total) by mouth once daily.    FLUCONAZOLE (DIFLUCAN) 150 MG TAB    Take 1 tablet (150 mg total) by mouth once daily. for 4 days    FLUTICASONE PROPIONATE (FLONASE) 50 MCG/ACTUATION NASAL SPRAY    1 spray (50 mcg total) by Each Nostril route once daily.    MELATONIN (MELATIN) 3 MG TABLET    Take 1 tablet (3 mg total) by mouth nightly.    ONDANSETRON (ZOFRAN-ODT) 4 MG TBDL    Take 1 tablet (4 mg total) by mouth every 8 (eight) hours as needed (nausea and vomiting).    PANTOPRAZOLE (PROTONIX) 40 MG TABLET    Take 1 tablet (40 mg total) by mouth once daily.    POLYETHYLENE GLYCOL (GLYCOLAX) 17 GRAM/DOSE POWDER    Take 17 g by mouth once daily. for 5 days    PROGESTERONE (PROMETRIUM) 200 MG CAPSULE    Take 1 capsule (200 mg total) by mouth nightly.    PROPRANOLOL (INDERAL) 10 MG TABLET    Take 1 tablet (10 mg total) by mouth 2 (two) times daily.    SUCRALFATE (CARAFATE) 100 MG/ML SUSPENSION    Take 10 mLs (1 g total) by mouth 4 (four) times daily.    TOPIRAMATE (TOPAMAX) 50 MG TABLET    Take 0.5 tablets (25 mg total) by mouth once daily.    VALACYCLOVIR (VALTREX) 500 MG TABLET    Take 1 tablet by mouth twice a day for 3 days with outbreaks   Modified Medications    No medications on file   Discontinued Medications    ACYCLOVIR (ZOVIRAX) 800 MG TAB    Take 1 tablet (800 mg total) by mouth 2 (two) times daily. Take 1 tablet orally BID x 5 days for recurrent outbreaks for 5 days

## 2024-10-22 NOTE — PATIENT INSTRUCTIONS
I would like for you to buy a small box of dulcolax tablets and bottle of liquid Peter's Milk of Magnesia (do not use the pills).  When you have time to stay home near the toilet take 2 Dulcolax tablets. Then in 1 hour drink 90 mL of milk of magnesia. Drink another 90 mL 2-3 hours later.     After that is complete, start Linzess 145 mcg daily. Increase fiber intake.    Hold pantoprazole for 2 weeks, then submit stool sample on or after 11/5/2024.

## 2024-10-25 NOTE — HOSPITAL COURSE
Patient admitted after seizure like activity. Neurology was consulted. MRI and EEG obtained were unremarkable. Patient back to baseline. Neurology cleared patient for discharge and out patient follow up. Continued on home Topomax. Outpatient PT/OT referral placed.

## 2024-10-25 NOTE — DISCHARGE SUMMARY
[facility] - Neurosurgery ([facility])  [facility] Medicine  Discharge Summary      Patient Name: Ray Knox  MRN: [id number]  VASYL: [id number]  Patient Class: IP- Inpatient  Admission Date: 10/18/2024  Hospital Length of Stay: 1 days  Discharge Date and Time: 10/19/2024  5:08 PM  Attending Physician: No att. providers found   Discharging Provider: Alice Denton MD  Primary Care Provider: Les Horton MD ([facility])  [facility] Medicine Team: [facility] HOSP MED N Alice Denton MD  Primary Care Team: [facility] HOSP MED N    HPI:   29 year old female with PMH of anemia, asthma, congential abcense of 1 kidney, chronic headaches, ?seizures, presents to ED after patients boyfriend witnessed her having multiple seizures at home. Patient reports she had multiple seizures at home, lasting few minutes. Had whole body shaking episode. Reports post ictal confusion. Denies urinary incontinence, tongue biting, eyes rolling back. She reports headaches that are chronic. The current headache started after seizure episode. Is mostly on right side of head. Ass with nausea and photophobia. She takes topamax at home for headaches. She as right lower extremity numbness and reports ride side of body weakness. That is improving slowly. Reports tingling sensation in right lower extremity.   Recently completed course of antibiotics for UTI. Denies dysuria, frequency.     * No surgery found *      Hospital Course:   Patient admitted after seizure like activity. Neurology was consulted. MRI and EEG obtained were unremarkable. Patient back to baseline. Neurology cleared patient for discharge and out patient follow up. Continued on home Topomax. Outpatient PT/OT referral placed.  Patient complained of urinary symptoms. Given a course of antibiotics for UTI.     Goals of Care Treatment Preferences:  Code Status: Full Code      SDOH Screening:  The patient was screened for utility difficulties, food insecurity, transport difficulties, housing insecurity, and  "interpersonal safety and there were no concerns identified this admission.     Consults:   Consults (From admission, onward)          Status Ordering Provider     Inpatient consult to Neurology  Once        Provider:  (Not yet assigned)    JANNY Chavez            No new Assessment & Plan notes have been filed under this hospital service since the last note was generated.  Service: Hospital Medicine    Final Active Diagnoses:    Diagnosis Date Noted POA    PRINCIPAL PROBLEM:  Seizure-like activity [R56.9] 10/18/2024 Yes    Breakthrough seizure [G40.919] 10/21/2024 Unknown    Headache [R51.9] 10/18/2024 Yes    Asthma [J45.909] 10/18/2024 Yes    Obstructive sleep apnea [G47.33]  Yes      Problems Resolved During this Admission:       Discharged Condition: stable    Disposition: Home or Self Care    Follow Up:    Patient Instructions:      WALKER FOR HOME USE     Order Specific Question Answer Comments   Type of Walker: Adult (5'4"-6'6")    With wheels? Yes    Height: 5' 5" (1.651 m)    Weight: 96.6 kg (212 lb 15.4 oz)    Length of need (1-99 months): 99    Does patient have medical equipment at home? none    Please check all that apply: Patient's condition impairs ambulation.    Please check all that apply: Walker will be used for gait training.    Please check all that apply: Patient is unable to safely ambulate without equipment.      Ambulatory referral/consult to Urology   Standing Status: Future   Referral Priority: Urgent Referral Type: Consultation   Referral Reason: Specialty Services Required   Requested Specialty: Urology   Number of Visits Requested: 1     Ambulatory referral/consult to Physical/Occupational Therapy   Standing Status: Future   Referral Priority: Routine Referral Type: Physical Medicine   Referral Reason: Specialty Services Required   Number of Visits Requested: 1       Significant Diagnostic Studies: N/A    Pending Diagnostic Studies:       None         "   Medications:  Reconciled Home Medications:      Medication List        START taking these medications      CLEARLAX 17 gram/dose powder  Generic drug: polyethylene glycol  Take 17 g by mouth once daily. for 5 days            CONTINUE taking these medications      albuterol 90 mcg/actuation inhaler  Commonly known as: PROVENTIL/VENTOLIN HFA  Inhale 1-2 puffs into the lungs every 6 (six) hours as needed for Wheezing. Rescue     cetirizine 10 MG tablet  Commonly known as: ZYRTEC  Take 1 tablet (10 mg total) by mouth once daily.     EScitalopram oxalate 5 MG Tab  Commonly known as: LEXAPRO  Take 1 tablet (5 mg total) by mouth once daily.     fluticasone propionate 50 mcg/actuation nasal spray  Commonly known as: FLONASE  1 spray (50 mcg total) by Each Nostril route once daily.     melatonin 3 mg tablet  Commonly known as: MELATIN  Take 1 tablet (3 mg total) by mouth nightly.     ondansetron 4 MG Tbdl  Commonly known as: ZOFRAN-ODT  Take 1 tablet (4 mg total) by mouth every 8 (eight) hours as needed (nausea and vomiting).     pantoprazole 40 MG tablet  Commonly known as: PROTONIX  Take 1 tablet (40 mg total) by mouth once daily.     progesterone 200 MG capsule  Commonly known as: PROMETRIUM  Take 1 capsule (200 mg total) by mouth nightly.     propranoloL 10 MG tablet  Commonly known as: INDERAL  Take 1 tablet (10 mg total) by mouth 2 (two) times daily.     sucralfate 100 mg/mL suspension  Commonly known as: CARAFATE  Take 10 mLs (1 g total) by mouth 4 (four) times daily.     topiramate 50 MG tablet  Commonly known as: TOPAMAX  Take 0.5 tablets (25 mg total) by mouth once daily.     valACYclovir 500 MG tablet  Commonly known as: VALTREX  Take 1 tablet by mouth twice a day for 3 days with outbreaks            STOP taking these medications      cephALEXin 500 MG capsule  Commonly known as: KEFLEX     methylPREDNISolone 4 mg tablet  Commonly known as: MEDROL DOSEPACK     norgestrel-ethinyl estradioL 0.3-30 mg-mcg per  tablet  Commonly known as: LO/OVRAL     ondansetron 4 MG tablet  Commonly known as: ZOFRAN            ASK your doctor about these medications      cephALEXin 500 MG capsule  Commonly known as: KEFLEX  Take 1 capsule (500 mg total) by mouth every 12 (twelve) hours. for 5 days  Ask about: Should I take this medication?     diclofenac sodium 1 % Gel  Commonly known as: VOLTAREN  APPLY 2 GRAMS TOPICALLY TO THE AFFECTED AREA THREE TIMES DAILY     fluconazole 150 MG Tab  Commonly known as: DIFLUCAN  Take 1 tablet (150 mg total) by mouth once daily. for 4 days  Ask about: Should I take this medication?              Indwelling Lines/Drains at time of discharge:   Lines/Drains/Airways       None                   Time spent on the discharge of patient: 45 minutes         Alice Denton MD  Department of Hospital Medicine  [facility] Neurosurgery ([facility])

## 2024-10-29 ENCOUNTER — OFFICE VISIT (OUTPATIENT)
Dept: NEUROLOGY | Facility: CLINIC | Age: 29
End: 2024-10-29
Payer: MEDICAID

## 2024-10-29 VITALS
HEART RATE: 83 BPM | WEIGHT: 207.31 LBS | SYSTOLIC BLOOD PRESSURE: 113 MMHG | HEIGHT: 65 IN | BODY MASS INDEX: 34.54 KG/M2 | DIASTOLIC BLOOD PRESSURE: 75 MMHG

## 2024-10-29 DIAGNOSIS — R56.9 SEIZURE-LIKE ACTIVITY: ICD-10-CM

## 2024-10-29 DIAGNOSIS — M79.18 CERVICAL MYOFASCIAL PAIN SYNDROME: ICD-10-CM

## 2024-10-29 DIAGNOSIS — G43.E09 CHRONIC MIGRAINE WITH AURA WITHOUT STATUS MIGRAINOSUS, NOT INTRACTABLE: Primary | ICD-10-CM

## 2024-10-29 DIAGNOSIS — R51.9 FREQUENT HEADACHES: ICD-10-CM

## 2024-10-29 PROCEDURE — 99214 OFFICE O/P EST MOD 30 MIN: CPT | Mod: PBBFAC | Performed by: PHYSICIAN ASSISTANT

## 2024-10-29 PROCEDURE — 99999 PR PBB SHADOW E&M-EST. PATIENT-LVL IV: CPT | Mod: PBBFAC,,, | Performed by: PHYSICIAN ASSISTANT

## 2024-10-29 RX ORDER — SUMATRIPTAN 50 MG/1
50 TABLET, FILM COATED ORAL
Qty: 15 TABLET | Refills: 2 | Status: SHIPPED | OUTPATIENT
Start: 2024-10-29

## 2024-10-31 ENCOUNTER — PATIENT MESSAGE (OUTPATIENT)
Dept: NEUROLOGY | Facility: CLINIC | Age: 29
End: 2024-10-31

## 2024-11-01 ENCOUNTER — TELEPHONE (OUTPATIENT)
Dept: NEUROLOGY | Facility: CLINIC | Age: 29
End: 2024-11-01

## 2024-11-04 ENCOUNTER — CLINICAL SUPPORT (OUTPATIENT)
Dept: REHABILITATION | Facility: HOSPITAL | Age: 29
End: 2024-11-04
Attending: STUDENT IN AN ORGANIZED HEALTH CARE EDUCATION/TRAINING PROGRAM
Payer: MEDICAID

## 2024-11-04 DIAGNOSIS — M79.18 CERVICAL MYOFASCIAL PAIN SYNDROME: ICD-10-CM

## 2024-11-04 DIAGNOSIS — M54.2 CHRONIC NECK PAIN: Primary | ICD-10-CM

## 2024-11-04 DIAGNOSIS — G89.29 CHRONIC NECK PAIN: Primary | ICD-10-CM

## 2024-11-04 PROCEDURE — 97110 THERAPEUTIC EXERCISES: CPT | Mod: PO

## 2024-11-04 PROCEDURE — 97161 PT EVAL LOW COMPLEX 20 MIN: CPT | Mod: PO
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
## 2024-11-14 ENCOUNTER — PATIENT MESSAGE (OUTPATIENT)
Dept: ALLERGY | Facility: CLINIC | Age: 29
End: 2024-11-14

## 2024-11-19 ENCOUNTER — PATIENT MESSAGE (OUTPATIENT)
Dept: NEUROLOGY | Facility: CLINIC | Age: 29
End: 2024-11-19

## 2024-12-03 ENCOUNTER — DOCUMENTATION ONLY (OUTPATIENT)
Dept: REHABILITATION | Facility: HOSPITAL | Age: 29
End: 2024-12-03

## 2024-12-03 NOTE — PROGRESS NOTES
Documentation Only/ No Show    Patient: Ray Knox  Date of Session: 12/03/2024  MRN: [id number]  Ray Knox did not attend her scheduled therapy evaluation today. Rya Knox did not call to cancel nor reschedule. This is the 1st appointment that the patient has not attended. No charges have been posted today.     Yael Mata, PT  12/03/2024

## 2024-12-09 ENCOUNTER — TELEPHONE (OUTPATIENT)
Dept: UROLOGY | Facility: CLINIC | Age: 29
End: 2024-12-09
Payer: MEDICAID

## 2024-12-09 ENCOUNTER — PATIENT MESSAGE (OUTPATIENT)
Dept: UROLOGY | Facility: CLINIC | Age: 29
End: 2024-12-09
Payer: MEDICAID

## 2024-12-16 ENCOUNTER — HOSPITAL ENCOUNTER (EMERGENCY)
Facility: OTHER | Age: 29
Discharge: HOME | End: 2024-12-16
Attending: EMERGENCY MEDICINE
Payer: MEDICAID

## 2024-12-16 VITALS
BODY MASS INDEX: 35.49 KG/M2 | WEIGHT: 213 LBS | SYSTOLIC BLOOD PRESSURE: 131 MMHG | TEMPERATURE: 99 F | HEIGHT: 65 IN | DIASTOLIC BLOOD PRESSURE: 77 MMHG | OXYGEN SATURATION: 98 % | HEART RATE: 69 BPM | RESPIRATION RATE: 18 BRPM

## 2024-12-16 DIAGNOSIS — R10.2 PELVIC PAIN: ICD-10-CM

## 2024-12-16 DIAGNOSIS — N93.9 VAGINAL BLEEDING: Primary | ICD-10-CM

## 2024-12-16 LAB
B-HCG UR QL: NEGATIVE
CTP QC/QA: YES

## 2024-12-16 PROCEDURE — 25000003 PHARM REV CODE 250

## 2024-12-16 PROCEDURE — 81025 URINE PREGNANCY TEST: CPT | Performed by: PHYSICIAN ASSISTANT

## 2024-12-16 PROCEDURE — 96372 THER/PROPH/DIAG INJ SC/IM: CPT

## 2024-12-16 PROCEDURE — 63600175 PHARM REV CODE 636 W HCPCS

## 2024-12-16 PROCEDURE — 99284 EMERGENCY DEPT VISIT MOD MDM: CPT | Mod: 25

## 2024-12-16 RX ORDER — KETOROLAC TROMETHAMINE 30 MG/ML
15 INJECTION, SOLUTION INTRAMUSCULAR; INTRAVENOUS
Status: COMPLETED | OUTPATIENT
Start: 2024-12-16 | End: 2024-12-16

## 2024-12-16 RX ORDER — ONDANSETRON 4 MG/1
4 TABLET, FILM COATED ORAL EVERY 6 HOURS
Qty: 12 TABLET | Refills: 0 | Status: SHIPPED | OUTPATIENT
Start: 2024-12-16

## 2024-12-16 RX ORDER — ACETAMINOPHEN 500 MG
1000 TABLET ORAL
Status: COMPLETED | OUTPATIENT
Start: 2024-12-16 | End: 2024-12-16

## 2024-12-16 RX ADMIN — ACETAMINOPHEN 1000 MG: 500 TABLET, FILM COATED ORAL at 02:12

## 2024-12-16 RX ADMIN — KETOROLAC TROMETHAMINE 15 MG: 30 INJECTION, SOLUTION INTRAMUSCULAR; INTRAVENOUS at 02:12

## 2024-12-16 NOTE — Clinical Note
"Ray Knox (Gelescia) was seen and treated in our emergency department on 12/16/2024.  She may return to work on 12/17/2024.       If you have any questions or concerns, please don't hesitate to call.       LPN    "

## 2024-12-16 NOTE — ED TRIAGE NOTES
Pt states she is pregnant but is unsure of how far. Her  LMP was the end of August. Three days ago she began to have vaginal bleeding with no clots. She only notices it when she is using the bathroom. She also reports cramping.

## 2024-12-16 NOTE — ED PROVIDER NOTES
Encounter Date: 2024       History     Chief Complaint   Patient presents with    Vaginal Bleeding     Pt states that she is pregnant and started having vaginal bleeding 2 days ago, no cycle since August just took test last week.     30yo female  presents for vaginal bleeding and pelvic cramping X 3 days. Pt states she was at her 's work party when she fell on her butt. Pt states she bleeding and cramping began after this. 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 Pt denies nausea today. Pt states she took an at home pregnancy test 2 weeks ago which was positive. She reports LMP sometime in August. Pt reports using 2 pads all day today with minimal clots. Pt states the bleeding has improved since 3 days ago. Pt states she follows with Dr. Rachel Haynes. Pt denies CP, SOB, N/V/D, hematuria, hematemesis, melena, fever, chills        The history is provided by the patient.     Review of patient's allergies indicates:   Allergen Reactions    Latex, natural rubber Rash    Lisinopril Other (See Comments), Shortness Of Breath and Swelling     angioedema      Shellfish containing products Swelling     Past Medical History:   Diagnosis Date    Anemia     Asthma     Bilateral renal cysts     Breast disorder     Congenital absence of one kidney     General anesthetics causing adverse effect in therapeutic use     Kidney cysts     pt born with 1 kidney     Past Surgical History:   Procedure Laterality Date    FINGER MASS EXCISION Left 3/8/2022    Procedure: EXCISION, MASS, FINGER;  Surgeon: Bk Doty Jr., MD;  Location: [facility] OR;  Service: Orthopedics;  Laterality: Left;    ULNAR NERVE TRANSPOSITION Left 3/29/2023    Procedure: TRANSPOSITION, NERVE, ULNAR;  Surgeon: Bk Doty Jr., MD;  [facility]: [facility];  Service: Orthopedics;  Laterality: Left;     Family History   Problem Relation Name Age of Onset    Diabetes Paternal Aunt      Hypertension  Maternal Grandfather      Cancer Paternal Grandmother      Breast cancer Paternal Grandmother      Leukemia Paternal Grandmother      Kidney disease Maternal Grandmother      Hypertension Mother       Social History     Tobacco Use    Smoking status: Every Day     Types: Vaping with nicotine    Smokeless tobacco: Never   Substance Use Topics    Alcohol use: Yes     Comment: occasionally    Drug use: Not Currently     Review of Systems  As per hpi  Physical Exam     Initial Vitals [12/16/24 1237]   BP Pulse Resp Temp SpO2   (!) 138/98 94 18 98.2 °F (36.8 °C) 99 %      MAP       --         Physical Exam    Nursing note and vitals reviewed.  Constitutional: Vital signs are normal. She appears well-developed and well-nourished. She is cooperative.   HENT:   Head: Normocephalic and atraumatic.   Eyes: Conjunctivae and lids are normal.   Neck: Trachea normal. No thyroid mass present.   Cardiovascular:  Normal rate and regular rhythm.           Pulmonary/Chest: No respiratory distress.   Abdominal: Abdomen is soft.   Genitourinary: Cervix exhibits no motion tenderness and no discharge.    No vaginal discharge, erythema, tenderness or bleeding.   No erythema, tenderness or bleeding in the vagina.    No foreign body in the vagina.      Genitourinary Comments: No discharge or bleeding noted within vaginal canal. Cervical os closed.         Neurological: She is alert and oriented to person, place, and time. GCS eye subscore is 4. GCS verbal subscore is 5. GCS motor subscore is 6.   Skin: Skin is warm, dry and intact. No rash noted.   Psychiatric: She has a normal mood and affect. Her speech is normal and behavior is normal. Thought content normal.         ED Course   Procedures  Labs Reviewed   POCT URINE PREGNANCY       Result Value    POC Preg Test, Ur Negative       Acceptable Yes            Imaging Results    None          Medications   acetaminophen tablet 1,000 mg (1,000 mg Oral Given 12/16/24 1401)    ketorolac injection 15 mg (15 mg Intramuscular Given 12/16/24 1413)     Medical Decision Making  Urgent evaluation of a nontoxic afebrile 28yo female who originally presented complaining of vaginal bleeding with associated pelvic cramping and +UPT at home 2 weeks ago. Pt's UPT negative today. Pt reports N/V 2 days ago but that it has resolved. Vitals wnl. Physical exam remarkable for some pelvic tenderness. Pelvic exam unremarkable, no bleeding present. No UTI symptoms. No recent use of abx.    Differential Diagnosis includes, but is not limited to:  Pregnancy complication (threatened AB, inevitable AB, incomplete Ab, missed AB, uterine rupture, ectopic pregnancy, placental abruption, placenta previa), ovarian cyst/torsion, STD, foreign body, trauma, normal menstruation, PCOS    Discussed with patient that her UPT was negative during today's visit with no bleeding during vaginal exam. Pt reassured that we have ruled out any life threatening emergencies and that the pt is safe to be discharged home. Will send home with symptomatic treatment and strict return precautions. Pt advised to follow up with their PCP and their OBGYN for menstrual irregularities. Discussed with pt reasons to return to the ED such as fevers>100.4, severe CP, SOB, N/V/D, severe swelling, vision changes, trouble breathing.      Amount and/or Complexity of Data Reviewed  Labs:  Decision-making details documented in ED Course.    Risk  OTC drugs.  Prescription drug management.               ED Course as of 12/16/24 1453   Mon Dec 16, 2024   1415 POCT urine pregnancy  Urine pregnancy negative [RM]      ED Course User Index  [RM] Rianna Hawkins PA-C                           Clinical Impression:  Final diagnoses:  [N93.9] Vaginal bleeding (Primary)  [R10.2] Pelvic pain          ED Disposition Condition    Discharge Stable          ED Prescriptions       Medication Sig Dispense Start Date End Date Auth. Provider    ondansetron (ZOFRAN) 4 MG  tablet Take 1 tablet (4 mg total) by mouth every 6 (six) hours. 12 tablet 12/16/2024 -- Rianna Hawkins PA-C          Follow-up Information       Follow up With Specialties Details Why Contact Info    Les Horton MD Family Medicine Schedule an appointment as soon as possible for a visit  As needed [address] [facility] [address]  [phone]      [facility] Emergency Dept Emergency Medicine Go to  If symptoms worsen [address]  [facility] [id number] [phone]    your OB/GYN  Schedule an appointment as soon as possible for a visit                Rianna Hawkins PA-C  12/16/24 9147

## 2024-12-16 NOTE — FIRST PROVIDER EVALUATION
Emergency Department TeleTriage Encounter Note      CHIEF COMPLAINT    Chief Complaint   Patient presents with    Vaginal Bleeding     Pt states that she is pregnant and started having vaginal bleeding 2 days ago, no cycle since August just took test last week.       VITAL SIGNS   Initial Vitals [12/16/24 1237]   BP Pulse Resp Temp SpO2   (!) 138/98 94 18 98.2 °F (36.8 °C) 99 %      MAP       --            ALLERGIES    Review of patient's allergies indicates:   Allergen Reactions    Lisinopril Other (See Comments), Shortness Of Breath and Swelling     angioedema      Shellfish containing products Swelling       PROVIDER TRIAGE NOTE  Patient presents with complaint of vaginal bleeding.  Reports took a pregnancy test a few days ago and it was positive.  Reports last menstrual cycle end of August but states history of some irregular cycles.      Phy:   Constitutional: well nourished, well developed, appearing stated age, NAD        Initial orders will be placed and care will be transferred to an alternate provider when patient is roomed for a full evaluation. Any additional orders and the final disposition will be determined by that provider.        ORDERS  Labs Reviewed - No data to display    ED Orders (720h ago, onward)      None              Virtual Visit Note: The provider triage portion of this emergency department evaluation and documentation was performed via [employer], a HIPAA-compliant telemedicine application, in concert with a tele-presenter in the room. A face to face patient evaluation with one of my colleagues will occur once the patient is placed in an emergency department room.      DISCLAIMER: This note was prepared with [employer] voice recognition transcription software. Garbled syntax, mangled pronouns, and other bizarre constructions may be attributed to that software system.

## 2024-12-16 NOTE — DISCHARGE INSTRUCTIONS
May continue to alternate between tylenol and ibuprofen/motrin as needed for cramping. For example, may take tylenol at 9am, motrin at 12pm, tylenol at 3pm, motrin at 6pm.     I have sent you a prescription for Zofran, please take as needed for nausea/vomiting.    Please get adequate rest and stay hydrated.    Please return to ED should your symptoms worsen or not improve.

## 2024-12-17 NOTE — PROGRESS NOTES
[facility]  Epilepsy Clinic Initial Consult Note    [facility] - NEUROLOGY 7TH FL  [facility], [place]    Date: 12/19/24  Patient Name: Ray Knox   MRN: [id number]   PCP: Les Horton (Inactive)  Referring Provider: No ref. provider found    Assessment:     30 yo female with paroxysmal events of TARAN/staring sometimes associated with convulsions.  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  Prior EEG studies normal.  Will refer to EMU for further evaluation.  Continue topamax at this time.  I did discuss with patient the possible side effects of topamax in pregnancy and additionally advised that we would not be able to do EMU evaluation if she becomes pregnant beforehand.  Patient voiced understanding. Patient advised to follow seizure precautions including no driving until EMU evaluation.      Cherri Rhoades MD  [facility]   Department of Neurology    Patient note was created using MModal Dictation.  Any errors in syntax or even information may not have been identified and edited on initial review prior to signing this note.  Subjective:       HPI:   Ms. Ray Knox is a 29 y.o. female who presents for evaluation of paroxysmal spells.  The patient is not accompanied at this visit.      Of note, patient previously hospitalized at [facility] for seizure like activity as follows per discharge summary:  HPI:   29 year old female with PMH of anemia, asthma, congential abcense of 1 kidney, chronic headaches, ?seizures, presents to ED after patients boyfriend witnessed her having multiple seizures at home. Patient reports she had multiple seizures at home, lasting few minutes. Had whole body shaking episode. Reports post ictal confusion. Denies urinary incontinence, tongue biting, eyes rolling back. She reports headaches that are chronic. The current headache started after seizure episode. Is mostly on right side of head. Ass with nausea and photophobia. She takes topamax at home for  "headaches. She as right lower extremity numbness and reports ride side of body weakness. That is improving slowly. Reports tingling sensation in right lower extremity.   Recently completed course of antibiotics for UTI. Denies dysuria, frequency.      Hospital Course:   Patient admitted after seizure like activity. Neurology was consulted. MRI and EEG obtained were unremarkable. Patient back to baseline. Neurology cleared patient for discharge and out patient follow up. Continued on home Topamax. Outpatient PT/OT referral placed.  Patient complained of urinary symptoms. Given a course of antibiotics for UTI.     Onset: this year confirmed although per patient her family members have noticed staring episodes in prior years  Description:     - Starts with headache, sometimes associated with a metallic taste.  Her body feels "weird".  She then loses awareness.  Her boyfriend has described these episodes as either falls with LOC or staring episodes.  She has also had episodes of generalized convulsions which caused her to present to the hospital - had 6 back to back.    Frequency: can cluster,   Last event: unclear, having headaches daily  Event Triggers/ Provoking Features: blood pressure being high - possibly stress,   Previous Seizure Medications: none  Current Seizure Medications: Topamax 50 mg BID (possibly)    Handedness: right  Event Onset Age: 29  Seizure/ Epilepsy Risk Factors: has had 3-4 prior concussions but no LOC, maternal grandfather had seizures but no first degree relatives  Birth/Developmental History: Birth c section, normal development  Psychiatric/Behavioral Comorbitidies: anxiety    Prior Evaluation:  EEG: normal  MRI brain 10/18/24: normal    PMH: HTN, multicystic dysplastic kidney  Social Hx: lives with significant other but he is often at work, 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  Of note, recently had positive pregnancy test at home but subsequently developed vaginal bleeding and subsequent pregnancy " tests negative - possible miscarriage recently.      PAST MEDICAL HISTORY:  Past Medical History:   Diagnosis Date    Anemia     Asthma     Bilateral renal cysts     Breast disorder     Congenital absence of one kidney     General anesthetics causing adverse effect in therapeutic use     Kidney cysts     pt born with 1 kidney       PAST SURGICAL HISTORY:  Past Surgical History:   Procedure Laterality Date    FINGER MASS EXCISION Left 3/8/2022    Procedure: EXCISION, MASS, FINGER;  Surgeon: Bk Doty Jr., MD;  Location: [facility] OR;  Service: Orthopedics;  Laterality: Left;    ULNAR NERVE TRANSPOSITION Left 3/29/2023    Procedure: TRANSPOSITION, NERVE, ULNAR;  Surgeon: Bk Doty Jr., MD;  Location: [facility] OR;  Service: Orthopedics;  Laterality: Left;       CURRENT MEDS:  Current Outpatient Medications   Medication Sig Dispense Refill    albuterol (PROVENTIL/VENTOLIN HFA) 90 mcg/actuation inhaler Inhale 1-2 puffs into the lungs every 6 (six) hours as needed for Wheezing. Rescue 6.7 g 0    cetirizine (ZYRTEC) 10 MG tablet Take 1 tablet (10 mg total) by mouth once daily. 90 tablet 3    diclofenac sodium (VOLTAREN) 1 % Gel APPLY 2 GRAMS TOPICALLY TO THE AFFECTED AREA THREE TIMES DAILY (Patient not taking: Reported on 10/22/2024)      EScitalopram oxalate (LEXAPRO) 5 MG Tab Take 1 tablet (5 mg total) by mouth once daily. 90 tablet 3    fluticasone propionate (FLONASE) 50 mcg/actuation nasal spray 1 spray (50 mcg total) by Each Nostril route once daily. 16 g 3    galcanezumab-gnlm 120 mg/mL PnIj Inject 2 mLs (240 mg total) into the skin every 28 days. 2 mL 0    galcanezumab-gnlm 120 mg/mL PnIj Inject 1 mL (120 mg total) into the skin every 28 days. 1 mL 6    linaCLOtide (LINZESS) 145 mcg Cap capsule Take 1 capsule (145 mcg total) by mouth once daily. 30 capsule 2    melatonin (MELATIN) 3 mg tablet Take 1 tablet (3 mg total) by mouth nightly. 30 tablet 0    ondansetron (ZOFRAN) 4 MG tablet Take 1 tablet (4 mg total)  by mouth every 6 (six) hours. 12 tablet 0    ondansetron (ZOFRAN-ODT) 4 MG TbDL Take 1 tablet (4 mg total) by mouth every 8 (eight) hours as needed (nausea and vomiting). 16 tablet 0    pantoprazole (PROTONIX) 40 MG tablet Take 1 tablet (40 mg total) by mouth once daily. 30 tablet 0    progesterone (PROMETRIUM) 200 MG capsule Take 1 capsule (200 mg total) by mouth nightly. (Patient not taking: Reported on 10/22/2024) 30 capsule 12    propranoloL (INDERAL) 10 MG tablet Take 1 tablet (10 mg total) by mouth 2 (two) times daily. 60 tablet 11    sucralfate (CARAFATE) 100 mg/mL suspension Take 10 mLs (1 g total) by mouth 4 (four) times daily. (Patient not taking: Reported on 10/22/2024) 473 mL 0    sumatriptan (IMITREX) 50 MG tablet Take 1 tablet (50 mg total) by mouth every 2 (two) hours as needed for Migraine. 15 tablet 2    topiramate (TOPAMAX) 50 MG tablet Take 0.5 tablets (25 mg total) by mouth once daily. 30 tablet 3    valACYclovir (VALTREX) 500 MG tablet Take 1 tablet by mouth twice a day for 3 days with outbreaks 30 tablet 4     No current facility-administered medications for this visit.       ALLERGIES:  Review of patient's allergies indicates:   Allergen Reactions    Latex, natural rubber Rash    Lisinopril Other (See Comments), Shortness Of Breath and Swelling     angioedema      Shellfish containing products Swelling       FAMILY HISTORY:  Family History   Problem Relation Name Age of Onset    Diabetes Paternal Aunt      Hypertension Maternal Grandfather      Cancer Paternal Grandmother      Breast cancer Paternal Grandmother      Leukemia Paternal Grandmother      Kidney disease Maternal Grandmother      Hypertension Mother         SOCIAL HISTORY:  Social History     Tobacco Use    Smoking status: Every Day     Types: Vaping with nicotine    Smokeless tobacco: Never   Substance Use Topics    Alcohol use: Yes     Comment: occasionally    Drug use: Not Currently        Review of Systems:   12 system review of  systems is negative except for the symptoms mentioned in HPI.     Objective:   There were no vitals filed for this visit.    General: NAD, well nourished   Eyes: no tearing, discharge, no erythema   ENT: moist mucous membranes of the oral cavity, nares patent    Neck: Supple, Full range of motion  Cardiovascular: Warm and well perfused, pulses equal and symmetrical  Lungs: Normal work of breathing, normal chest wall excursions  Skin: No rash, lesions, or breakdown on exposed skin  Psychiatry: Mood and affect are appropriate   Abdomen: soft, non tender, non distended  Extremeties: No cyanosis, clubbing or edema.    Neurological   MENTAL STATUS: Alert and oriented to person, place, and time. Attention and concentration within normal limits. Speech without dysarthria, able to name and repeat without difficulty. Recent and remote memory within normal limits   CRANIAL NERVES: Visual fields intact. PERRL. EOMI. Facial sensation intact. Face symmetrical. Hearing grossly intact. Full shoulder shrug bilaterally. Tongue protrudes midline   SENSORY: Sensation is intact to light touch throughout.  Joint position perception intact. Negative Romberg.   MOTOR: Normal bulk and tone. No pronator drift.  5/5 deltoid, biceps, triceps, interosseous, hand  bilaterally. 5/5 iliopsoas, knee extension/flexion, foot dorsi/plantarflexion bilaterally.    REFLEXES: Symmetric and 2+ throughout. Toes down going bilaterally.   CEREBELLAR/COORDINATION/GAIT: Gait steady with normal arm swing and stride length.  Heel to shin intact. Finger to nose intact. Normal rapid alternating movements.

## 2024-12-19 ENCOUNTER — OFFICE VISIT (OUTPATIENT)
Dept: NEUROLOGY | Facility: CLINIC | Age: 29
End: 2024-12-19
Payer: MEDICAID

## 2024-12-19 VITALS
WEIGHT: 217.5 LBS | HEIGHT: 65 IN | SYSTOLIC BLOOD PRESSURE: 126 MMHG | BODY MASS INDEX: 36.24 KG/M2 | HEART RATE: 73 BPM | DIASTOLIC BLOOD PRESSURE: 83 MMHG

## 2024-12-19 DIAGNOSIS — R56.9 SEIZURE-LIKE ACTIVITY: Primary | ICD-10-CM

## 2024-12-19 PROCEDURE — 99999 PR PBB SHADOW E&M-EST. PATIENT-LVL III: CPT | Mod: PBBFAC,,, | Performed by: STUDENT IN AN ORGANIZED HEALTH CARE EDUCATION/TRAINING PROGRAM

## 2024-12-19 PROCEDURE — 3079F DIAST BP 80-89 MM HG: CPT | Mod: CPTII,,, | Performed by: STUDENT IN AN ORGANIZED HEALTH CARE EDUCATION/TRAINING PROGRAM

## 2024-12-19 PROCEDURE — 99213 OFFICE O/P EST LOW 20 MIN: CPT | Mod: PBBFAC | Performed by: STUDENT IN AN ORGANIZED HEALTH CARE EDUCATION/TRAINING PROGRAM

## 2024-12-19 PROCEDURE — 1159F MED LIST DOCD IN RCRD: CPT | Mod: CPTII,,, | Performed by: STUDENT IN AN ORGANIZED HEALTH CARE EDUCATION/TRAINING PROGRAM

## 2024-12-19 PROCEDURE — 3074F SYST BP LT 130 MM HG: CPT | Mod: CPTII,,, | Performed by: STUDENT IN AN ORGANIZED HEALTH CARE EDUCATION/TRAINING PROGRAM

## 2024-12-19 PROCEDURE — 3044F HG A1C LEVEL LT 7.0%: CPT | Mod: CPTII,,, | Performed by: STUDENT IN AN ORGANIZED HEALTH CARE EDUCATION/TRAINING PROGRAM

## 2024-12-19 PROCEDURE — 1160F RVW MEDS BY RX/DR IN RCRD: CPT | Mod: CPTII,,, | Performed by: STUDENT IN AN ORGANIZED HEALTH CARE EDUCATION/TRAINING PROGRAM

## 2024-12-19 PROCEDURE — 99213 OFFICE O/P EST LOW 20 MIN: CPT | Mod: S$PBB,,, | Performed by: STUDENT IN AN ORGANIZED HEALTH CARE EDUCATION/TRAINING PROGRAM

## 2024-12-19 PROCEDURE — 3008F BODY MASS INDEX DOCD: CPT | Mod: CPTII,,, | Performed by: STUDENT IN AN ORGANIZED HEALTH CARE EDUCATION/TRAINING PROGRAM

## 2024-12-19 NOTE — LETTER
December 19, 2024      [facility] Neurology 7th Fl  [address], [address]  [facility] [address]  Phone: [phone]  Fax: [phone]       Patient: Ray Knox   YOB: 1995  Date of Visit: 12/19/2024    To Whom It May Concern:    Mary Knox  was at [facility] on 12/19/2024. The patient may return to work 12/20/24 with the following restrictions: no driving, operating heavy machinery, climbing ladders for 6 months since last seizure.  Additionally, given the nature of her job, she should not be carrying/lifting small children in case she has an event.  If you have any questions or concerns, or if I can be of further assistance, please do not hesitate to contact me.    Sincerely,    Cherri Rhoades MD

## 2024-12-22 PROBLEM — G89.29 CHRONIC NECK PAIN: Status: ACTIVE | Noted: 2024-12-22

## 2024-12-22 PROBLEM — M25.662 DECREASED RANGE OF MOTION (ROM) OF LEFT KNEE: Status: RESOLVED | Noted: 2021-02-23 | Resolved: 2024-12-22

## 2024-12-22 PROBLEM — M25.562 CHRONIC PAIN OF LEFT KNEE: Status: RESOLVED | Noted: 2021-02-23 | Resolved: 2024-12-22

## 2024-12-22 PROBLEM — G89.29 CHRONIC PAIN OF LEFT KNEE: Status: RESOLVED | Noted: 2021-02-23 | Resolved: 2024-12-22

## 2024-12-22 PROBLEM — M54.2 CHRONIC NECK PAIN: Status: ACTIVE | Noted: 2024-12-22

## 2024-12-22 PROBLEM — M62.81 MUSCLE WEAKNESS OF LOWER EXTREMITY: Status: RESOLVED | Noted: 2021-02-23 | Resolved: 2024-12-22

## 2024-12-22 NOTE — Clinical Note
Diagnosis: Breakthrough seizure [076236]  Future Attending Provider: CELI OROZCO [[id number]]  Is the patient being sent to ED Observation?: Yes   repeat BMP, Place patient on telemetry
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
## 2024-12-23 ENCOUNTER — TELEPHONE (OUTPATIENT)
Dept: NEUROLOGY | Facility: CLINIC | Age: 29
End: 2024-12-23
Payer: MEDICAID

## 2024-12-23 NOTE — TELEPHONE ENCOUNTER
Spoke with patient about scheduling her EMU admission. She is aware an adult is required to accompany her for the duration including overnight. Has my direct line for scheduling.

## 2025-02-27 NOTE — Clinical Note
"Ray "Joaquim Knox was seen and treated in our emergency department on 12/2/2022.  She may return to work after being cleared by follow-up physician .  This patient tested positive for COVID today 12/2/22. Please follow company policy for clearance for return to work.      If you have any questions or concerns, please don't hesitate to call.      Juana Almonte MD"
PAST SURGICAL HISTORY:  No significant past surgical history
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
## (undated) DEVICE — COVER OVERHEAD SURG LT BLUE

## (undated) DEVICE — BANDAGE SOFFORM STER 2IN

## (undated) DEVICE — TOWEL OR DISP STRL BLUE 4/PK

## (undated) DEVICE — APPLICATOR CHLORAPREP ORN 26ML

## (undated) DEVICE — SUT ETHILON 5-0 PS-2 18IN

## (undated) DEVICE — GAUZE SPONGE 4X4 12PLY

## (undated) DEVICE — SEE MEDLINE ITEM 157173

## (undated) DEVICE — ELECTRODE REM PLYHSV RETURN 9

## (undated) DEVICE — SEE L#120831

## (undated) DEVICE — BLADE SURG #15 CARBON STEEL

## (undated) DEVICE — PACK BASIC

## (undated) DEVICE — BANDAGE MATRIX HK LOOP 2IN 5YD

## (undated) DEVICE — SEE MEDLINE ITEM 157117

## (undated) DEVICE — BANDAGE ESMARK ELASTIC ST 4X9

## (undated) DEVICE — PAD CAST 2 IN X 4YDS STERILE

## (undated) DEVICE — ALCOHOL 70% ISOP W/GREEN 16OZ

## (undated) DEVICE — STOCKINET 4INX48

## (undated) DEVICE — SEE MEDLINE ITEM 156955

## (undated) DEVICE — PAD PREP 50/CA

## (undated) DEVICE — MANIFOLD 4 PORT

## (undated) DEVICE — DRESSING XEROFORM FOIL PK 1X8

## (undated) DEVICE — SYR B-D DISP CONTROL 10CC100/C

## (undated) DEVICE — SEE MEDLINE ITEM 157116

## (undated) DEVICE — GLOVE SURG BIOGEL LATEX SZ 7.5

## (undated) DEVICE — BANDAGE ELASTIC 2X5 VELCRO ST

## (undated) DEVICE — NDL HYPO REG 25G X 1 1/2

## (undated) DEVICE — INSTRUMENT SUCTION FRAZIER 12F

## (undated) DEVICE — BLADE SCALP OPHTL BEVEL STR